# Patient Record
Sex: FEMALE | Race: WHITE | NOT HISPANIC OR LATINO | Employment: UNEMPLOYED | URBAN - METROPOLITAN AREA
[De-identification: names, ages, dates, MRNs, and addresses within clinical notes are randomized per-mention and may not be internally consistent; named-entity substitution may affect disease eponyms.]

---

## 2018-08-21 ENCOUNTER — HOSPITAL ENCOUNTER (OUTPATIENT)
Dept: LAB | Facility: HOSPITAL | Age: 63
Discharge: HOME | End: 2018-08-21
Attending: FAMILY MEDICINE | Admitting: FAMILY MEDICINE

## 2018-08-21 LAB
ALBUMIN SERPL-MCNC: 4 G/DL (ref 3.5–4.8)
ALBUMIN/GLOB SERPL: 1.5 {RATIO} (ref 1–1.7)
ALP SERPL-CCNC: 51 IU/L (ref 32–91)
ALT SERPL-CCNC: 29 IU/L (ref 14–54)
ANION GAP SERPL CALC-SCNC: 13.4 MMOL/L (ref 10–20)
AST SERPL-CCNC: 24 IU/L (ref 15–41)
BILIRUB SERPL-MCNC: 0.5 MG/DL (ref 0.3–1.2)
BUN SERPL-MCNC: 17 MG/DL (ref 8–20)
BUN/CREAT SERPL: 24.3 (ref 5.4–26.2)
CALCIUM SERPL-MCNC: 10.3 MG/DL (ref 8.9–10.3)
CHLORIDE SERPL-SCNC: 99 MMOL/L (ref 101–111)
CHOLEST SERPL-MCNC: 279 MG/DL
CHOLEST/HDLC SERPL: 4.8 {RATIO}
CONV CO2: 28 MMOL/L (ref 22–32)
CONV LDL CHOLESTEROL DIRECT: 196 MG/DL (ref 0–100)
CONV MICROALBUM.,U,RANDOM: 2 MG/L
CONV TOTAL PROTEIN: 6.6 G/DL (ref 6.1–7.9)
CREAT UR-MCNC: 0.7 MG/DL (ref 0.4–1)
GLOBULIN UR ELPH-MCNC: 2.6 G/DL (ref 2.5–3.8)
GLUCOSE SERPL-MCNC: 125 MG/DL (ref 65–99)
HDLC SERPL-MCNC: 58 MG/DL
LDLC/HDLC SERPL: 3.4 {RATIO}
LIPID INTERPRETATION: ABNORMAL
POTASSIUM SERPL-SCNC: 4.4 MMOL/L (ref 3.6–5.1)
SODIUM SERPL-SCNC: 136 MMOL/L (ref 136–144)
TRIGL SERPL-MCNC: 193 MG/DL
URATE SERPL-MCNC: 5.5 MG/DL (ref 2.6–8)
VLDLC SERPL CALC-MCNC: 24.7 MG/DL

## 2018-08-27 ENCOUNTER — CONVERSION ENCOUNTER (OUTPATIENT)
Dept: FAMILY MEDICINE CLINIC | Facility: CLINIC | Age: 63
End: 2018-08-27

## 2018-08-27 LAB
FOLATE SERPL-MCNC: 10.7 NG/ML
T4 FREE SERPL-MCNC: 1.3 NG/DL (ref 0.8–1.8)
TSH SERPL-ACNC: 0.79 MICROINTL UNITS/ML (ref 0.4–4.5)
VIT B12 SERPL-MCNC: 430 PG/ML (ref 200–1100)

## 2019-01-08 ENCOUNTER — HOSPITAL ENCOUNTER (OUTPATIENT)
Dept: FAMILY MEDICINE CLINIC | Facility: CLINIC | Age: 64
Setting detail: SPECIMEN
Discharge: HOME | End: 2019-01-08
Attending: FAMILY MEDICINE | Admitting: FAMILY MEDICINE

## 2019-01-09 LAB
ALBUMIN SERPL-MCNC: 4 G/DL (ref 3.5–4.8)
ALBUMIN/GLOB SERPL: 1.4 {RATIO} (ref 1–1.7)
ALP SERPL-CCNC: 47 IU/L (ref 32–91)
ALT SERPL-CCNC: 28 IU/L (ref 14–54)
ANION GAP SERPL CALC-SCNC: 16.4 MMOL/L (ref 10–20)
AST SERPL-CCNC: 24 IU/L (ref 15–41)
BILIRUB SERPL-MCNC: 0.4 MG/DL (ref 0.3–1.2)
BUN SERPL-MCNC: 17 MG/DL (ref 8–20)
BUN/CREAT SERPL: 14.2 (ref 5.4–26.2)
CALCIUM SERPL-MCNC: 10.9 MG/DL (ref 8.9–10.3)
CHLORIDE SERPL-SCNC: 99 MMOL/L (ref 101–111)
CHOLEST SERPL-MCNC: 239 MG/DL
CHOLEST/HDLC SERPL: 3.7 {RATIO}
CONV CO2: 23 MMOL/L (ref 22–32)
CONV LDL CHOLESTEROL DIRECT: 168 MG/DL (ref 0–100)
CONV TOTAL PROTEIN: 6.9 G/DL (ref 6.1–7.9)
CREAT UR-MCNC: 1.2 MG/DL (ref 0.4–1)
GLOBULIN UR ELPH-MCNC: 2.9 G/DL (ref 2.5–3.8)
GLUCOSE SERPL-MCNC: 129 MG/DL (ref 65–99)
HDLC SERPL-MCNC: 64 MG/DL
LDLC/HDLC SERPL: 2.6 {RATIO}
LIPID INTERPRETATION: ABNORMAL
POTASSIUM SERPL-SCNC: 5.4 MMOL/L (ref 3.6–5.1)
SODIUM SERPL-SCNC: 133 MMOL/L (ref 136–144)
TRIGL SERPL-MCNC: 105 MG/DL
VLDLC SERPL CALC-MCNC: 7 MG/DL

## 2019-03-12 ENCOUNTER — HOSPITAL ENCOUNTER (OUTPATIENT)
Dept: FAMILY MEDICINE CLINIC | Facility: CLINIC | Age: 64
Setting detail: SPECIMEN
Discharge: HOME | End: 2019-03-12
Attending: FAMILY MEDICINE | Admitting: FAMILY MEDICINE

## 2019-03-12 LAB
ANION GAP SERPL CALC-SCNC: 16.3 MMOL/L (ref 10–20)
BUN SERPL-MCNC: 16 MG/DL (ref 8–20)
BUN/CREAT SERPL: 17.8 (ref 5.4–26.2)
CALCIUM SERPL-MCNC: 10.1 MG/DL (ref 8.9–10.3)
CHLORIDE SERPL-SCNC: 100 MMOL/L (ref 101–111)
CONV CO2: 25 MMOL/L (ref 22–32)
CREAT UR-MCNC: 0.9 MG/DL (ref 0.4–1)
GLUCOSE SERPL-MCNC: 129 MG/DL (ref 65–99)
POTASSIUM SERPL-SCNC: 5.3 MMOL/L (ref 3.6–5.1)
SODIUM SERPL-SCNC: 136 MMOL/L (ref 136–144)

## 2019-04-24 ENCOUNTER — HOSPITAL ENCOUNTER (OUTPATIENT)
Dept: FAMILY MEDICINE CLINIC | Facility: CLINIC | Age: 64
Setting detail: SPECIMEN
Discharge: HOME | End: 2019-04-24
Attending: FAMILY MEDICINE | Admitting: FAMILY MEDICINE

## 2019-04-24 LAB
ALBUMIN SERPL-MCNC: 4.2 G/DL (ref 3.5–4.8)
ALBUMIN/GLOB SERPL: 1.4 {RATIO} (ref 1–1.7)
ALP SERPL-CCNC: 43 IU/L (ref 32–91)
ALT SERPL-CCNC: 22 IU/L (ref 14–54)
ANION GAP SERPL CALC-SCNC: 17.6 MMOL/L (ref 10–20)
AST SERPL-CCNC: 23 IU/L (ref 15–41)
BILIRUB SERPL-MCNC: 0.2 MG/DL (ref 0.3–1.2)
BUN SERPL-MCNC: 16 MG/DL (ref 8–20)
BUN/CREAT SERPL: 17.8 (ref 5.4–26.2)
CALCIUM SERPL-MCNC: 10.3 MG/DL (ref 8.9–10.3)
CHLORIDE SERPL-SCNC: 101 MMOL/L (ref 101–111)
CONV CO2: 25 MMOL/L (ref 22–32)
CONV TOTAL PROTEIN: 7.1 G/DL (ref 6.1–7.9)
CREAT UR-MCNC: 0.9 MG/DL (ref 0.4–1)
GLOBULIN UR ELPH-MCNC: 2.9 G/DL (ref 2.5–3.8)
GLUCOSE SERPL-MCNC: 107 MG/DL (ref 65–99)
POTASSIUM SERPL-SCNC: 4.6 MMOL/L (ref 3.6–5.1)
SODIUM SERPL-SCNC: 139 MMOL/L (ref 136–144)

## 2019-05-22 ENCOUNTER — OUTSIDE FACILITY SERVICE (OUTPATIENT)
Dept: CARDIAC SURGERY | Facility: CLINIC | Age: 64
End: 2019-05-22

## 2019-05-22 PROCEDURE — 33518 CABG ARTERY-VEIN TWO: CPT | Performed by: THORACIC SURGERY (CARDIOTHORACIC VASCULAR SURGERY)

## 2019-05-22 PROCEDURE — 33508 ENDOSCOPIC VEIN HARVEST: CPT | Performed by: THORACIC SURGERY (CARDIOTHORACIC VASCULAR SURGERY)

## 2019-05-22 PROCEDURE — 33533 CABG ARTERIAL SINGLE: CPT | Performed by: THORACIC SURGERY (CARDIOTHORACIC VASCULAR SURGERY)

## 2019-06-12 ENCOUNTER — HOSPITAL ENCOUNTER (OUTPATIENT)
Dept: FAMILY MEDICINE CLINIC | Facility: CLINIC | Age: 64
Setting detail: SPECIMEN
Discharge: HOME | End: 2019-06-12
Attending: FAMILY MEDICINE | Admitting: FAMILY MEDICINE

## 2019-06-12 ENCOUNTER — CONVERSION ENCOUNTER (OUTPATIENT)
Dept: FAMILY MEDICINE CLINIC | Facility: CLINIC | Age: 64
End: 2019-06-12

## 2019-06-12 ENCOUNTER — CLINICAL SUPPORT (OUTPATIENT)
Dept: CARDIAC SURGERY | Facility: CLINIC | Age: 64
End: 2019-06-12

## 2019-06-12 DIAGNOSIS — E11.9 TYPE 2 DIABETES MELLITUS WITHOUT COMPLICATION, UNSPECIFIED WHETHER LONG TERM INSULIN USE (HCC): ICD-10-CM

## 2019-06-12 DIAGNOSIS — Z48.812 ENCOUNTER FOR SURGICAL AFTERCARE FOLLOWING SURGERY OF CIRCULATORY SYSTEM: Primary | ICD-10-CM

## 2019-06-12 DIAGNOSIS — I25.10 ATHEROSCLEROSIS OF NATIVE CORONARY ARTERY WITHOUT ANGINA PECTORIS, UNSPECIFIED WHETHER NATIVE OR TRANSPLANTED HEART: ICD-10-CM

## 2019-06-12 DIAGNOSIS — I10 ESSENTIAL (PRIMARY) HYPERTENSION: ICD-10-CM

## 2019-06-12 LAB
ALBUMIN SERPL-MCNC: 3.8 G/DL (ref 3.5–4.8)
ALBUMIN/GLOB SERPL: 1.2 {RATIO} (ref 1–1.7)
ALP SERPL-CCNC: 76 IU/L (ref 32–91)
ALT SERPL-CCNC: 13 IU/L (ref 14–54)
ANION GAP SERPL CALC-SCNC: 15.3 MMOL/L (ref 10–20)
AST SERPL-CCNC: 19 IU/L (ref 15–41)
BILIRUB SERPL-MCNC: 0.7 MG/DL (ref 0.3–1.2)
BUN SERPL-MCNC: 26 MG/DL (ref 8–20)
BUN/CREAT SERPL: 20 (ref 5.4–26.2)
CALCIUM SERPL-MCNC: 10.2 MG/DL (ref 8.9–10.3)
CHLORIDE SERPL-SCNC: 102 MMOL/L (ref 101–111)
CONV CO2: 28 MMOL/L (ref 22–32)
CONV TOTAL PROTEIN: 6.9 G/DL (ref 6.1–7.9)
CREAT UR-MCNC: 1.3 MG/DL (ref 0.4–1)
GLOBULIN UR ELPH-MCNC: 3.1 G/DL (ref 2.5–3.8)
GLUCOSE SERPL-MCNC: 204 MG/DL (ref 65–99)
POTASSIUM SERPL-SCNC: 4.3 MMOL/L (ref 3.6–5.1)
SODIUM SERPL-SCNC: 141 MMOL/L (ref 136–144)

## 2019-06-12 PROCEDURE — G0180 MD CERTIFICATION HHA PATIENT: HCPCS | Performed by: THORACIC SURGERY (CARDIOTHORACIC VASCULAR SURGERY)

## 2019-06-14 ENCOUNTER — TRANSCRIBE ORDERS (OUTPATIENT)
Dept: CARDIAC REHAB | Facility: HOSPITAL | Age: 64
End: 2019-06-14

## 2019-06-14 DIAGNOSIS — I25.2 CORONARY ARTERY DISEASE WITH HISTORY OF MYOCARDIAL INFARCTION WITHOUT HISTORY OF CABG: Primary | ICD-10-CM

## 2019-06-14 DIAGNOSIS — I25.10 CORONARY ARTERY DISEASE WITH HISTORY OF MYOCARDIAL INFARCTION WITHOUT HISTORY OF CABG: Primary | ICD-10-CM

## 2019-06-18 ENCOUNTER — APPOINTMENT (OUTPATIENT)
Dept: GENERAL RADIOLOGY | Facility: HOSPITAL | Age: 64
End: 2019-06-18

## 2019-06-18 ENCOUNTER — HOSPITAL ENCOUNTER (INPATIENT)
Facility: HOSPITAL | Age: 64
LOS: 6 days | Discharge: HOME HEALTH | End: 2019-06-24
Attending: EMERGENCY MEDICINE | Admitting: THORACIC SURGERY (CARDIOTHORACIC VASCULAR SURGERY)

## 2019-06-18 ENCOUNTER — TELEPHONE (OUTPATIENT)
Dept: CARDIAC SURGERY | Facility: CLINIC | Age: 64
End: 2019-06-18

## 2019-06-18 ENCOUNTER — APPOINTMENT (OUTPATIENT)
Dept: CT IMAGING | Facility: HOSPITAL | Age: 64
End: 2019-06-18

## 2019-06-18 DIAGNOSIS — L08.9 WOUND INFECTION: Primary | ICD-10-CM

## 2019-06-18 DIAGNOSIS — T14.8XXA WOUND INFECTION: Primary | ICD-10-CM

## 2019-06-18 LAB
ALBUMIN SERPL-MCNC: 3.8 G/DL (ref 3.5–5.2)
ALBUMIN/GLOB SERPL: 1.3 G/DL
ALP SERPL-CCNC: 73 U/L (ref 39–117)
ALT SERPL W P-5'-P-CCNC: 9 U/L (ref 1–33)
ANION GAP SERPL CALCULATED.3IONS-SCNC: 10.3 MMOL/L
AST SERPL-CCNC: 13 U/L (ref 1–32)
BASOPHILS # BLD AUTO: 0.07 10*3/MM3 (ref 0–0.2)
BASOPHILS NFR BLD AUTO: 0.5 % (ref 0–1.5)
BILIRUB SERPL-MCNC: 0.3 MG/DL (ref 0.2–1.2)
BUN BLD-MCNC: 16 MG/DL (ref 8–23)
BUN/CREAT SERPL: 22.2 (ref 7–25)
CALCIUM SPEC-SCNC: 10.2 MG/DL (ref 8.6–10.5)
CHLORIDE SERPL-SCNC: 103 MMOL/L (ref 98–107)
CO2 SERPL-SCNC: 25.7 MMOL/L (ref 22–29)
CREAT BLD-MCNC: 0.72 MG/DL (ref 0.57–1)
D-LACTATE SERPL-SCNC: 0.6 MMOL/L (ref 0.5–2)
DEPRECATED RDW RBC AUTO: 44.1 FL (ref 37–54)
EOSINOPHIL # BLD AUTO: 0.36 10*3/MM3 (ref 0–0.4)
EOSINOPHIL NFR BLD AUTO: 2.4 % (ref 0.3–6.2)
ERYTHROCYTE [DISTWIDTH] IN BLOOD BY AUTOMATED COUNT: 13.1 % (ref 12.3–15.4)
GFR SERPL CREATININE-BSD FRML MDRD: 82 ML/MIN/1.73
GLOBULIN UR ELPH-MCNC: 2.9 GM/DL
GLUCOSE BLD-MCNC: 88 MG/DL (ref 65–99)
GLUCOSE BLDC GLUCOMTR-MCNC: 139 MG/DL (ref 70–130)
HCT VFR BLD AUTO: 38 % (ref 34–46.6)
HGB BLD-MCNC: 11.6 G/DL (ref 12–15.9)
IMM GRANULOCYTES # BLD AUTO: 0.07 10*3/MM3 (ref 0–0.05)
IMM GRANULOCYTES NFR BLD AUTO: 0.5 % (ref 0–0.5)
LYMPHOCYTES # BLD AUTO: 2.78 10*3/MM3 (ref 0.7–3.1)
LYMPHOCYTES NFR BLD AUTO: 18.3 % (ref 19.6–45.3)
MCH RBC QN AUTO: 28.2 PG (ref 26.6–33)
MCHC RBC AUTO-ENTMCNC: 30.5 G/DL (ref 31.5–35.7)
MCV RBC AUTO: 92.2 FL (ref 79–97)
MONOCYTES # BLD AUTO: 1.38 10*3/MM3 (ref 0.1–0.9)
MONOCYTES NFR BLD AUTO: 9.1 % (ref 5–12)
NEUTROPHILS # BLD AUTO: 10.55 10*3/MM3 (ref 1.7–7)
NEUTROPHILS NFR BLD AUTO: 69.2 % (ref 42.7–76)
NRBC BLD AUTO-RTO: 0 /100 WBC (ref 0–0.2)
PLATELET # BLD AUTO: 527 10*3/MM3 (ref 140–450)
PMV BLD AUTO: 9.9 FL (ref 6–12)
POTASSIUM BLD-SCNC: 4.3 MMOL/L (ref 3.5–5.2)
PROT SERPL-MCNC: 6.7 G/DL (ref 6–8.5)
RBC # BLD AUTO: 4.12 10*6/MM3 (ref 3.77–5.28)
SODIUM BLD-SCNC: 139 MMOL/L (ref 136–145)
TROPONIN T SERPL-MCNC: <0.01 NG/ML (ref 0–0.03)
WBC NRBC COR # BLD: 15.21 10*3/MM3 (ref 3.4–10.8)

## 2019-06-18 PROCEDURE — 25010000002 PIPERACILLIN SOD-TAZOBACTAM PER 1 G: Performed by: THORACIC SURGERY (CARDIOTHORACIC VASCULAR SURGERY)

## 2019-06-18 PROCEDURE — 25010000002 ENOXAPARIN PER 10 MG: Performed by: THORACIC SURGERY (CARDIOTHORACIC VASCULAR SURGERY)

## 2019-06-18 PROCEDURE — 80053 COMPREHEN METABOLIC PANEL: CPT | Performed by: EMERGENCY MEDICINE

## 2019-06-18 PROCEDURE — 87070 CULTURE OTHR SPECIMN AEROBIC: CPT | Performed by: EMERGENCY MEDICINE

## 2019-06-18 PROCEDURE — 71260 CT THORAX DX C+: CPT

## 2019-06-18 PROCEDURE — 93005 ELECTROCARDIOGRAM TRACING: CPT | Performed by: EMERGENCY MEDICINE

## 2019-06-18 PROCEDURE — 25010000002 VANCOMYCIN 10 G RECONSTITUTED SOLUTION: Performed by: THORACIC SURGERY (CARDIOTHORACIC VASCULAR SURGERY)

## 2019-06-18 PROCEDURE — 87077 CULTURE AEROBIC IDENTIFY: CPT | Performed by: EMERGENCY MEDICINE

## 2019-06-18 PROCEDURE — 82962 GLUCOSE BLOOD TEST: CPT

## 2019-06-18 PROCEDURE — 93010 ELECTROCARDIOGRAM REPORT: CPT | Performed by: INTERNAL MEDICINE

## 2019-06-18 PROCEDURE — 25010000002 IOPAMIDOL 61 % SOLUTION: Performed by: THORACIC SURGERY (CARDIOTHORACIC VASCULAR SURGERY)

## 2019-06-18 PROCEDURE — 87205 SMEAR GRAM STAIN: CPT | Performed by: EMERGENCY MEDICINE

## 2019-06-18 PROCEDURE — 93005 ELECTROCARDIOGRAM TRACING: CPT

## 2019-06-18 PROCEDURE — 87186 SC STD MICRODIL/AGAR DIL: CPT | Performed by: EMERGENCY MEDICINE

## 2019-06-18 PROCEDURE — 94799 UNLISTED PULMONARY SVC/PX: CPT

## 2019-06-18 PROCEDURE — 71045 X-RAY EXAM CHEST 1 VIEW: CPT

## 2019-06-18 PROCEDURE — 99285 EMERGENCY DEPT VISIT HI MDM: CPT

## 2019-06-18 PROCEDURE — 94640 AIRWAY INHALATION TREATMENT: CPT

## 2019-06-18 PROCEDURE — 84484 ASSAY OF TROPONIN QUANT: CPT | Performed by: EMERGENCY MEDICINE

## 2019-06-18 PROCEDURE — 85025 COMPLETE CBC W/AUTO DIFF WBC: CPT | Performed by: EMERGENCY MEDICINE

## 2019-06-18 PROCEDURE — 83605 ASSAY OF LACTIC ACID: CPT | Performed by: EMERGENCY MEDICINE

## 2019-06-18 RX ORDER — PROPRANOLOL HCL 60 MG
120 CAPSULE, EXTENDED RELEASE 24HR ORAL DAILY
Status: DISCONTINUED | OUTPATIENT
Start: 2019-06-18 | End: 2019-06-24 | Stop reason: HOSPADM

## 2019-06-18 RX ORDER — ATORVASTATIN CALCIUM 20 MG/1
40 TABLET, FILM COATED ORAL NIGHTLY
Status: DISCONTINUED | OUTPATIENT
Start: 2019-06-18 | End: 2019-06-18

## 2019-06-18 RX ORDER — TRAMADOL HYDROCHLORIDE 50 MG/1
50 TABLET ORAL EVERY 6 HOURS PRN
Status: DISCONTINUED | OUTPATIENT
Start: 2019-06-18 | End: 2019-06-18

## 2019-06-18 RX ORDER — TRAMADOL HYDROCHLORIDE 50 MG/1
50 TABLET ORAL EVERY 6 HOURS PRN
COMMUNITY
End: 2019-06-24 | Stop reason: HOSPADM

## 2019-06-18 RX ORDER — AMLODIPINE BESYLATE 5 MG/1
5 TABLET ORAL DAILY
COMMUNITY
Start: 2019-05-13 | End: 2020-02-26

## 2019-06-18 RX ORDER — ONDANSETRON 2 MG/ML
4 INJECTION INTRAMUSCULAR; INTRAVENOUS EVERY 6 HOURS PRN
Status: DISCONTINUED | OUTPATIENT
Start: 2019-06-18 | End: 2019-06-24 | Stop reason: HOSPADM

## 2019-06-18 RX ORDER — ESCITALOPRAM OXALATE 10 MG/1
10 TABLET ORAL
COMMUNITY
Start: 2019-04-24 | End: 2020-01-27

## 2019-06-18 RX ORDER — SENNA AND DOCUSATE SODIUM 50; 8.6 MG/1; MG/1
1 TABLET, FILM COATED ORAL NIGHTLY PRN
Status: DISCONTINUED | OUTPATIENT
Start: 2019-06-18 | End: 2019-06-24 | Stop reason: HOSPADM

## 2019-06-18 RX ORDER — HYDROCODONE BITARTRATE AND ACETAMINOPHEN 5; 325 MG/1; MG/1
2 TABLET ORAL EVERY 4 HOURS PRN
Status: DISCONTINUED | OUTPATIENT
Start: 2019-06-18 | End: 2019-06-24 | Stop reason: HOSPADM

## 2019-06-18 RX ORDER — ATORVASTATIN CALCIUM 40 MG/1
40 TABLET, FILM COATED ORAL DAILY
COMMUNITY
Start: 2019-06-12 | End: 2020-02-06

## 2019-06-18 RX ORDER — ACETAMINOPHEN 325 MG/1
650 TABLET ORAL EVERY 4 HOURS PRN
Status: DISCONTINUED | OUTPATIENT
Start: 2019-06-18 | End: 2019-06-24 | Stop reason: HOSPADM

## 2019-06-18 RX ORDER — SODIUM CHLORIDE 0.9 % (FLUSH) 0.9 %
3-10 SYRINGE (ML) INJECTION AS NEEDED
Status: DISCONTINUED | OUTPATIENT
Start: 2019-06-18 | End: 2019-06-24 | Stop reason: HOSPADM

## 2019-06-18 RX ORDER — ASPIRIN 81 MG/1
324 TABLET, CHEWABLE ORAL ONCE
Status: DISCONTINUED | OUTPATIENT
Start: 2019-06-18 | End: 2019-06-18

## 2019-06-18 RX ORDER — ATORVASTATIN CALCIUM 20 MG/1
40 TABLET, FILM COATED ORAL NIGHTLY
Status: DISCONTINUED | OUTPATIENT
Start: 2019-06-18 | End: 2019-06-24 | Stop reason: HOSPADM

## 2019-06-18 RX ORDER — CEFTRIAXONE SODIUM 2 G/50ML
2 INJECTION, SOLUTION INTRAVENOUS EVERY 24 HOURS
Status: DISCONTINUED | OUTPATIENT
Start: 2019-06-18 | End: 2019-06-21

## 2019-06-18 RX ORDER — CALCIUM CARBONATE 200(500)MG
1 TABLET,CHEWABLE ORAL DAILY PRN
Status: DISCONTINUED | OUTPATIENT
Start: 2019-06-18 | End: 2019-06-24 | Stop reason: HOSPADM

## 2019-06-18 RX ORDER — PROPRANOLOL HYDROCHLORIDE 120 MG/1
CAPSULE, EXTENDED RELEASE ORAL
COMMUNITY
Start: 2019-04-24 | End: 2019-06-18

## 2019-06-18 RX ORDER — IBUPROFEN 200 MG
400 TABLET ORAL EVERY 6 HOURS PRN
Status: ON HOLD | COMMUNITY
End: 2019-06-20

## 2019-06-18 RX ORDER — BUDESONIDE AND FORMOTEROL FUMARATE DIHYDRATE 160; 4.5 UG/1; UG/1
2 AEROSOL RESPIRATORY (INHALATION)
COMMUNITY
End: 2020-05-13 | Stop reason: SDUPTHER

## 2019-06-18 RX ORDER — IPRATROPIUM BROMIDE AND ALBUTEROL SULFATE 2.5; .5 MG/3ML; MG/3ML
3 SOLUTION RESPIRATORY (INHALATION)
Status: DISCONTINUED | OUTPATIENT
Start: 2019-06-18 | End: 2019-06-24 | Stop reason: HOSPADM

## 2019-06-18 RX ORDER — NITROGLYCERIN 0.4 MG/1
0.4 TABLET SUBLINGUAL
Status: DISCONTINUED | OUTPATIENT
Start: 2019-06-18 | End: 2019-06-24 | Stop reason: HOSPADM

## 2019-06-18 RX ORDER — ASPIRIN 81 MG/1
81 TABLET ORAL DAILY
Status: DISCONTINUED | OUTPATIENT
Start: 2019-06-19 | End: 2019-06-24 | Stop reason: HOSPADM

## 2019-06-18 RX ORDER — SODIUM CHLORIDE 0.9 % (FLUSH) 0.9 %
3 SYRINGE (ML) INJECTION EVERY 12 HOURS SCHEDULED
Status: DISCONTINUED | OUTPATIENT
Start: 2019-06-18 | End: 2019-06-24 | Stop reason: HOSPADM

## 2019-06-18 RX ORDER — HYDROCODONE BITARTRATE AND ACETAMINOPHEN 7.5; 325 MG/1; MG/1
1 TABLET ORAL ONCE
Status: COMPLETED | OUTPATIENT
Start: 2019-06-18 | End: 2019-06-18

## 2019-06-18 RX ORDER — FENOFIBRATE 160 MG/1
160 TABLET ORAL DAILY
COMMUNITY
End: 2019-09-06 | Stop reason: SDUPTHER

## 2019-06-18 RX ORDER — ASPIRIN 81 MG/1
81 TABLET, CHEWABLE ORAL DAILY
Status: DISCONTINUED | OUTPATIENT
Start: 2019-06-18 | End: 2019-06-18

## 2019-06-18 RX ORDER — ACETAMINOPHEN 500 MG
500 TABLET ORAL EVERY 6 HOURS PRN
Status: DISCONTINUED | OUTPATIENT
Start: 2019-06-18 | End: 2019-06-24 | Stop reason: HOSPADM

## 2019-06-18 RX ORDER — THEOPHYLLINE 400 MG/1
200 TABLET, EXTENDED RELEASE ORAL DAILY
Status: ON HOLD | COMMUNITY
End: 2019-06-20

## 2019-06-18 RX ORDER — PROPRANOLOL HYDROCHLORIDE 120 MG/1
120 CAPSULE, EXTENDED RELEASE ORAL DAILY
COMMUNITY
End: 2019-12-10 | Stop reason: SDUPTHER

## 2019-06-18 RX ORDER — SODIUM CHLORIDE 0.9 % (FLUSH) 0.9 %
10 SYRINGE (ML) INJECTION AS NEEDED
Status: DISCONTINUED | OUTPATIENT
Start: 2019-06-18 | End: 2019-06-24 | Stop reason: HOSPADM

## 2019-06-18 RX ORDER — HYDRALAZINE HYDROCHLORIDE 10 MG/1
20 TABLET, FILM COATED ORAL 2 TIMES DAILY
Status: ON HOLD | COMMUNITY
End: 2019-06-20

## 2019-06-18 RX ADMIN — VANCOMYCIN HYDROCHLORIDE 1500 MG: 10 INJECTION, POWDER, LYOPHILIZED, FOR SOLUTION INTRAVENOUS at 17:54

## 2019-06-18 RX ADMIN — PROPRANOLOL HYDROCHLORIDE 120 MG: 60 CAPSULE, EXTENDED RELEASE ORAL at 22:15

## 2019-06-18 RX ADMIN — HYDROCODONE BITARTRATE AND ACETAMINOPHEN 2 TABLET: 5; 325 TABLET ORAL at 22:07

## 2019-06-18 RX ADMIN — ATORVASTATIN CALCIUM 40 MG: 20 TABLET, FILM COATED ORAL at 22:08

## 2019-06-18 RX ADMIN — HYDROCODONE BITARTRATE AND ACETAMINOPHEN 1 TABLET: 7.5; 325 TABLET ORAL at 16:50

## 2019-06-18 RX ADMIN — TAZOBACTAM SODIUM AND PIPERACILLIN SODIUM 3.38 G: 375; 3 INJECTION, SOLUTION INTRAVENOUS at 16:47

## 2019-06-18 RX ADMIN — ENOXAPARIN SODIUM 40 MG: 40 INJECTION SUBCUTANEOUS at 22:07

## 2019-06-18 RX ADMIN — IPRATROPIUM BROMIDE AND ALBUTEROL SULFATE 3 ML: 2.5; .5 SOLUTION RESPIRATORY (INHALATION) at 20:25

## 2019-06-18 RX ADMIN — IOPAMIDOL 75 ML: 612 INJECTION, SOLUTION INTRAVENOUS at 17:31

## 2019-06-19 LAB
ANION GAP SERPL CALCULATED.3IONS-SCNC: 12 MMOL/L
BUN BLD-MCNC: 13 MG/DL (ref 8–23)
BUN/CREAT SERPL: 20.3 (ref 7–25)
CALCIUM SPEC-SCNC: 9.2 MG/DL (ref 8.6–10.5)
CHLORIDE SERPL-SCNC: 107 MMOL/L (ref 98–107)
CO2 SERPL-SCNC: 23 MMOL/L (ref 22–29)
CREAT BLD-MCNC: 0.64 MG/DL (ref 0.57–1)
DEPRECATED RDW RBC AUTO: 43.7 FL (ref 37–54)
ERYTHROCYTE [DISTWIDTH] IN BLOOD BY AUTOMATED COUNT: 13.1 % (ref 12.3–15.4)
GFR SERPL CREATININE-BSD FRML MDRD: 94 ML/MIN/1.73
GLUCOSE BLD-MCNC: 109 MG/DL (ref 65–99)
GLUCOSE BLDC GLUCOMTR-MCNC: 106 MG/DL (ref 70–130)
GLUCOSE BLDC GLUCOMTR-MCNC: 148 MG/DL (ref 70–130)
GLUCOSE BLDC GLUCOMTR-MCNC: 158 MG/DL (ref 70–130)
GLUCOSE BLDC GLUCOMTR-MCNC: 195 MG/DL (ref 70–130)
HBA1C MFR BLD: 6.05 % (ref 4.8–5.6)
HCT VFR BLD AUTO: 35.1 % (ref 34–46.6)
HGB BLD-MCNC: 10.7 G/DL (ref 12–15.9)
MCH RBC QN AUTO: 28 PG (ref 26.6–33)
MCHC RBC AUTO-ENTMCNC: 30.5 G/DL (ref 31.5–35.7)
MCV RBC AUTO: 91.9 FL (ref 79–97)
PLATELET # BLD AUTO: 454 10*3/MM3 (ref 140–450)
PMV BLD AUTO: 10.2 FL (ref 6–12)
POTASSIUM BLD-SCNC: 4 MMOL/L (ref 3.5–5.2)
RBC # BLD AUTO: 3.82 10*6/MM3 (ref 3.77–5.28)
SODIUM BLD-SCNC: 142 MMOL/L (ref 136–145)
WBC NRBC COR # BLD: 11.57 10*3/MM3 (ref 3.4–10.8)

## 2019-06-19 PROCEDURE — 82962 GLUCOSE BLOOD TEST: CPT

## 2019-06-19 PROCEDURE — 99255 IP/OBS CONSLTJ NEW/EST HI 80: CPT | Performed by: INTERNAL MEDICINE

## 2019-06-19 PROCEDURE — 80048 BASIC METABOLIC PNL TOTAL CA: CPT | Performed by: THORACIC SURGERY (CARDIOTHORACIC VASCULAR SURGERY)

## 2019-06-19 PROCEDURE — 87070 CULTURE OTHR SPECIMN AEROBIC: CPT | Performed by: NURSE PRACTITIONER

## 2019-06-19 PROCEDURE — 94799 UNLISTED PULMONARY SVC/PX: CPT

## 2019-06-19 PROCEDURE — 63710000001 INSULIN LISPRO (HUMAN) PER 5 UNITS: Performed by: PHYSICIAN ASSISTANT

## 2019-06-19 PROCEDURE — 85027 COMPLETE CBC AUTOMATED: CPT | Performed by: THORACIC SURGERY (CARDIOTHORACIC VASCULAR SURGERY)

## 2019-06-19 PROCEDURE — 25010000003 CEFTRIAXONE PER 250 MG: Performed by: INTERNAL MEDICINE

## 2019-06-19 PROCEDURE — 99024 POSTOP FOLLOW-UP VISIT: CPT | Performed by: PHYSICIAN ASSISTANT

## 2019-06-19 PROCEDURE — 25010000002 VANCOMYCIN: Performed by: INTERNAL MEDICINE

## 2019-06-19 PROCEDURE — 83036 HEMOGLOBIN GLYCOSYLATED A1C: CPT | Performed by: PHYSICIAN ASSISTANT

## 2019-06-19 PROCEDURE — 25010000002 ENOXAPARIN PER 10 MG: Performed by: THORACIC SURGERY (CARDIOTHORACIC VASCULAR SURGERY)

## 2019-06-19 PROCEDURE — 99024 POSTOP FOLLOW-UP VISIT: CPT | Performed by: THORACIC SURGERY (CARDIOTHORACIC VASCULAR SURGERY)

## 2019-06-19 PROCEDURE — 25010000002 VANCOMYCIN 10 G RECONSTITUTED SOLUTION: Performed by: INTERNAL MEDICINE

## 2019-06-19 PROCEDURE — 87205 SMEAR GRAM STAIN: CPT | Performed by: NURSE PRACTITIONER

## 2019-06-19 RX ORDER — LIDOCAINE HYDROCHLORIDE 20 MG/ML
10 INJECTION, SOLUTION INFILTRATION; PERINEURAL ONCE
Status: COMPLETED | OUTPATIENT
Start: 2019-06-19 | End: 2019-06-19

## 2019-06-19 RX ORDER — DEXTROSE MONOHYDRATE 25 G/50ML
25 INJECTION, SOLUTION INTRAVENOUS
Status: DISCONTINUED | OUTPATIENT
Start: 2019-06-19 | End: 2019-06-24 | Stop reason: HOSPADM

## 2019-06-19 RX ORDER — NICOTINE POLACRILEX 4 MG
15 LOZENGE BUCCAL
Status: DISCONTINUED | OUTPATIENT
Start: 2019-06-19 | End: 2019-06-24 | Stop reason: HOSPADM

## 2019-06-19 RX ADMIN — IPRATROPIUM BROMIDE AND ALBUTEROL SULFATE 3 ML: 2.5; .5 SOLUTION RESPIRATORY (INHALATION) at 10:47

## 2019-06-19 RX ADMIN — IPRATROPIUM BROMIDE AND ALBUTEROL SULFATE 3 ML: 2.5; .5 SOLUTION RESPIRATORY (INHALATION) at 20:39

## 2019-06-19 RX ADMIN — SODIUM CHLORIDE, PRESERVATIVE FREE 3 ML: 5 INJECTION INTRAVENOUS at 21:15

## 2019-06-19 RX ADMIN — IPRATROPIUM BROMIDE AND ALBUTEROL SULFATE 3 ML: 2.5; .5 SOLUTION RESPIRATORY (INHALATION) at 16:21

## 2019-06-19 RX ADMIN — PROPRANOLOL HYDROCHLORIDE 120 MG: 60 CAPSULE, EXTENDED RELEASE ORAL at 21:15

## 2019-06-19 RX ADMIN — ENOXAPARIN SODIUM 40 MG: 40 INJECTION SUBCUTANEOUS at 21:15

## 2019-06-19 RX ADMIN — ASPIRIN 81 MG: 81 TABLET, COATED ORAL at 08:50

## 2019-06-19 RX ADMIN — VANCOMYCIN HYDROCHLORIDE 1250 MG: 10 INJECTION, POWDER, LYOPHILIZED, FOR SOLUTION INTRAVENOUS at 06:34

## 2019-06-19 RX ADMIN — CEFTRIAXONE SODIUM 2 G: 2 INJECTION, SOLUTION INTRAVENOUS at 22:36

## 2019-06-19 RX ADMIN — LIDOCAINE HYDROCHLORIDE 10 ML: 20 INJECTION, SOLUTION INFILTRATION; PERINEURAL at 17:15

## 2019-06-19 RX ADMIN — VANCOMYCIN HYDROCHLORIDE 1250 MG: 10 INJECTION, POWDER, LYOPHILIZED, FOR SOLUTION INTRAVENOUS at 17:13

## 2019-06-19 RX ADMIN — INSULIN LISPRO 2 UNITS: 100 INJECTION, SOLUTION INTRAVENOUS; SUBCUTANEOUS at 21:15

## 2019-06-19 RX ADMIN — HYDROCODONE BITARTRATE AND ACETAMINOPHEN 2 TABLET: 5; 325 TABLET ORAL at 08:50

## 2019-06-19 RX ADMIN — HYDROCODONE BITARTRATE AND ACETAMINOPHEN 2 TABLET: 5; 325 TABLET ORAL at 17:13

## 2019-06-19 RX ADMIN — IPRATROPIUM BROMIDE AND ALBUTEROL SULFATE 3 ML: 2.5; .5 SOLUTION RESPIRATORY (INHALATION) at 07:22

## 2019-06-19 RX ADMIN — ATORVASTATIN CALCIUM 40 MG: 20 TABLET, FILM COATED ORAL at 21:15

## 2019-06-19 RX ADMIN — HYDROCODONE BITARTRATE AND ACETAMINOPHEN 2 TABLET: 5; 325 TABLET ORAL at 22:35

## 2019-06-19 RX ADMIN — CEFTRIAXONE SODIUM 2 G: 2 INJECTION, SOLUTION INTRAVENOUS at 00:13

## 2019-06-20 LAB
BASOPHILS # BLD AUTO: 0.07 10*3/MM3 (ref 0–0.2)
BASOPHILS NFR BLD AUTO: 0.7 % (ref 0–1.5)
DEPRECATED RDW RBC AUTO: 43 FL (ref 37–54)
EOSINOPHIL # BLD AUTO: 0.87 10*3/MM3 (ref 0–0.4)
EOSINOPHIL NFR BLD AUTO: 8.5 % (ref 0.3–6.2)
ERYTHROCYTE [DISTWIDTH] IN BLOOD BY AUTOMATED COUNT: 12.9 % (ref 12.3–15.4)
GLUCOSE BLDC GLUCOMTR-MCNC: 106 MG/DL (ref 70–130)
GLUCOSE BLDC GLUCOMTR-MCNC: 189 MG/DL (ref 70–130)
GLUCOSE BLDC GLUCOMTR-MCNC: 199 MG/DL (ref 70–130)
GLUCOSE BLDC GLUCOMTR-MCNC: 75 MG/DL (ref 70–130)
HCT VFR BLD AUTO: 34.7 % (ref 34–46.6)
HGB BLD-MCNC: 10.7 G/DL (ref 12–15.9)
IMM GRANULOCYTES # BLD AUTO: 0.03 10*3/MM3 (ref 0–0.05)
IMM GRANULOCYTES NFR BLD AUTO: 0.3 % (ref 0–0.5)
LYMPHOCYTES # BLD AUTO: 2.73 10*3/MM3 (ref 0.7–3.1)
LYMPHOCYTES NFR BLD AUTO: 26.6 % (ref 19.6–45.3)
MCH RBC QN AUTO: 28.3 PG (ref 26.6–33)
MCHC RBC AUTO-ENTMCNC: 30.8 G/DL (ref 31.5–35.7)
MCV RBC AUTO: 91.8 FL (ref 79–97)
MONOCYTES # BLD AUTO: 0.93 10*3/MM3 (ref 0.1–0.9)
MONOCYTES NFR BLD AUTO: 9 % (ref 5–12)
NEUTROPHILS # BLD AUTO: 5.65 10*3/MM3 (ref 1.7–7)
NEUTROPHILS NFR BLD AUTO: 54.9 % (ref 42.7–76)
NRBC BLD AUTO-RTO: 0 /100 WBC (ref 0–0.2)
PLATELET # BLD AUTO: 470 10*3/MM3 (ref 140–450)
PMV BLD AUTO: 10.2 FL (ref 6–12)
RBC # BLD AUTO: 3.78 10*6/MM3 (ref 3.77–5.28)
VANCOMYCIN TROUGH SERPL-MCNC: 19.2 MCG/ML (ref 5–20)
WBC NRBC COR # BLD: 10.28 10*3/MM3 (ref 3.4–10.8)

## 2019-06-20 PROCEDURE — 85025 COMPLETE CBC W/AUTO DIFF WBC: CPT | Performed by: PHYSICIAN ASSISTANT

## 2019-06-20 PROCEDURE — 99024 POSTOP FOLLOW-UP VISIT: CPT | Performed by: NURSE PRACTITIONER

## 2019-06-20 PROCEDURE — 80202 ASSAY OF VANCOMYCIN: CPT | Performed by: INTERNAL MEDICINE

## 2019-06-20 PROCEDURE — 94799 UNLISTED PULMONARY SVC/PX: CPT

## 2019-06-20 PROCEDURE — 25010000002 ENOXAPARIN PER 10 MG: Performed by: THORACIC SURGERY (CARDIOTHORACIC VASCULAR SURGERY)

## 2019-06-20 PROCEDURE — 25010000003 CEFTRIAXONE PER 250 MG: Performed by: INTERNAL MEDICINE

## 2019-06-20 PROCEDURE — 25010000002 VANCOMYCIN 10 G RECONSTITUTED SOLUTION: Performed by: INTERNAL MEDICINE

## 2019-06-20 PROCEDURE — 82962 GLUCOSE BLOOD TEST: CPT

## 2019-06-20 PROCEDURE — 99232 SBSQ HOSP IP/OBS MODERATE 35: CPT | Performed by: INTERNAL MEDICINE

## 2019-06-20 PROCEDURE — 63710000001 INSULIN LISPRO (HUMAN) PER 5 UNITS: Performed by: PHYSICIAN ASSISTANT

## 2019-06-20 PROCEDURE — 94640 AIRWAY INHALATION TREATMENT: CPT

## 2019-06-20 PROCEDURE — 99024 POSTOP FOLLOW-UP VISIT: CPT | Performed by: THORACIC SURGERY (CARDIOTHORACIC VASCULAR SURGERY)

## 2019-06-20 RX ORDER — ESCITALOPRAM OXALATE 10 MG/1
10 TABLET ORAL DAILY
Status: DISCONTINUED | OUTPATIENT
Start: 2019-06-20 | End: 2019-06-24 | Stop reason: HOSPADM

## 2019-06-20 RX ORDER — BUDESONIDE AND FORMOTEROL FUMARATE DIHYDRATE 160; 4.5 UG/1; UG/1
2 AEROSOL RESPIRATORY (INHALATION)
Status: DISCONTINUED | OUTPATIENT
Start: 2019-06-20 | End: 2019-06-24 | Stop reason: HOSPADM

## 2019-06-20 RX ADMIN — LINAGLIPTIN 5 MG: 5 TABLET, FILM COATED ORAL at 17:13

## 2019-06-20 RX ADMIN — INSULIN LISPRO 2 UNITS: 100 INJECTION, SOLUTION INTRAVENOUS; SUBCUTANEOUS at 20:28

## 2019-06-20 RX ADMIN — ENOXAPARIN SODIUM 40 MG: 40 INJECTION SUBCUTANEOUS at 23:23

## 2019-06-20 RX ADMIN — PROPRANOLOL HYDROCHLORIDE 120 MG: 60 CAPSULE, EXTENDED RELEASE ORAL at 20:29

## 2019-06-20 RX ADMIN — BUDESONIDE AND FORMOTEROL FUMARATE DIHYDRATE 2 PUFF: 160; 4.5 AEROSOL RESPIRATORY (INHALATION) at 19:41

## 2019-06-20 RX ADMIN — ATORVASTATIN CALCIUM 40 MG: 20 TABLET, FILM COATED ORAL at 20:28

## 2019-06-20 RX ADMIN — IPRATROPIUM BROMIDE AND ALBUTEROL SULFATE 3 ML: 2.5; .5 SOLUTION RESPIRATORY (INHALATION) at 11:40

## 2019-06-20 RX ADMIN — HYDROCODONE BITARTRATE AND ACETAMINOPHEN 2 TABLET: 5; 325 TABLET ORAL at 23:23

## 2019-06-20 RX ADMIN — SODIUM CHLORIDE, PRESERVATIVE FREE 3 ML: 5 INJECTION INTRAVENOUS at 20:28

## 2019-06-20 RX ADMIN — HYDROCODONE BITARTRATE AND ACETAMINOPHEN 2 TABLET: 5; 325 TABLET ORAL at 09:57

## 2019-06-20 RX ADMIN — INSULIN LISPRO 2 UNITS: 100 INJECTION, SOLUTION INTRAVENOUS; SUBCUTANEOUS at 12:28

## 2019-06-20 RX ADMIN — ESCITALOPRAM 10 MG: 10 TABLET, FILM COATED ORAL at 17:13

## 2019-06-20 RX ADMIN — VANCOMYCIN HYDROCHLORIDE 1250 MG: 10 INJECTION, POWDER, LYOPHILIZED, FOR SOLUTION INTRAVENOUS at 05:13

## 2019-06-20 RX ADMIN — CEFTRIAXONE SODIUM 2 G: 2 INJECTION, SOLUTION INTRAVENOUS at 23:23

## 2019-06-20 RX ADMIN — IPRATROPIUM BROMIDE AND ALBUTEROL SULFATE 3 ML: 2.5; .5 SOLUTION RESPIRATORY (INHALATION) at 15:51

## 2019-06-20 RX ADMIN — SODIUM CHLORIDE, PRESERVATIVE FREE 3 ML: 5 INJECTION INTRAVENOUS at 09:42

## 2019-06-20 RX ADMIN — IPRATROPIUM BROMIDE AND ALBUTEROL SULFATE 3 ML: 2.5; .5 SOLUTION RESPIRATORY (INHALATION) at 08:37

## 2019-06-20 RX ADMIN — HYDROCODONE BITARTRATE AND ACETAMINOPHEN 2 TABLET: 5; 325 TABLET ORAL at 17:13

## 2019-06-20 RX ADMIN — ASPIRIN 81 MG: 81 TABLET, COATED ORAL at 09:41

## 2019-06-21 LAB
BACTERIA SPEC AEROBE CULT: ABNORMAL
DEPRECATED RDW RBC AUTO: 42.5 FL (ref 37–54)
ERYTHROCYTE [DISTWIDTH] IN BLOOD BY AUTOMATED COUNT: 12.6 % (ref 12.3–15.4)
GLUCOSE BLDC GLUCOMTR-MCNC: 120 MG/DL (ref 70–130)
GLUCOSE BLDC GLUCOMTR-MCNC: 138 MG/DL (ref 70–130)
GLUCOSE BLDC GLUCOMTR-MCNC: 140 MG/DL (ref 70–130)
GRAM STN SPEC: ABNORMAL
GRAM STN SPEC: ABNORMAL
HCT VFR BLD AUTO: 36 % (ref 34–46.6)
HGB BLD-MCNC: 11 G/DL (ref 12–15.9)
MCH RBC QN AUTO: 28 PG (ref 26.6–33)
MCHC RBC AUTO-ENTMCNC: 30.6 G/DL (ref 31.5–35.7)
MCV RBC AUTO: 91.6 FL (ref 79–97)
PLATELET # BLD AUTO: 495 10*3/MM3 (ref 140–450)
PMV BLD AUTO: 9.9 FL (ref 6–12)
RBC # BLD AUTO: 3.93 10*6/MM3 (ref 3.77–5.28)
WBC NRBC COR # BLD: 10.28 10*3/MM3 (ref 3.4–10.8)

## 2019-06-21 PROCEDURE — 99232 SBSQ HOSP IP/OBS MODERATE 35: CPT | Performed by: INTERNAL MEDICINE

## 2019-06-21 PROCEDURE — 99024 POSTOP FOLLOW-UP VISIT: CPT | Performed by: THORACIC SURGERY (CARDIOTHORACIC VASCULAR SURGERY)

## 2019-06-21 PROCEDURE — 25010000002 ENOXAPARIN PER 10 MG: Performed by: THORACIC SURGERY (CARDIOTHORACIC VASCULAR SURGERY)

## 2019-06-21 PROCEDURE — 82962 GLUCOSE BLOOD TEST: CPT

## 2019-06-21 PROCEDURE — 85027 COMPLETE CBC AUTOMATED: CPT | Performed by: THORACIC SURGERY (CARDIOTHORACIC VASCULAR SURGERY)

## 2019-06-21 PROCEDURE — 94799 UNLISTED PULMONARY SVC/PX: CPT

## 2019-06-21 PROCEDURE — 25010000002 PIPERACILLIN SOD-TAZOBACTAM PER 1 G: Performed by: INTERNAL MEDICINE

## 2019-06-21 RX ADMIN — ASPIRIN 81 MG: 81 TABLET, COATED ORAL at 08:09

## 2019-06-21 RX ADMIN — BUDESONIDE AND FORMOTEROL FUMARATE DIHYDRATE 2 PUFF: 160; 4.5 AEROSOL RESPIRATORY (INHALATION) at 07:31

## 2019-06-21 RX ADMIN — TAZOBACTAM SODIUM AND PIPERACILLIN SODIUM 3.38 G: 375; 3 INJECTION, SOLUTION INTRAVENOUS at 11:40

## 2019-06-21 RX ADMIN — TAZOBACTAM SODIUM AND PIPERACILLIN SODIUM 3.38 G: 375; 3 INJECTION, SOLUTION INTRAVENOUS at 18:46

## 2019-06-21 RX ADMIN — HYDROCODONE BITARTRATE AND ACETAMINOPHEN 2 TABLET: 5; 325 TABLET ORAL at 21:24

## 2019-06-21 RX ADMIN — BUDESONIDE AND FORMOTEROL FUMARATE DIHYDRATE 2 PUFF: 160; 4.5 AEROSOL RESPIRATORY (INHALATION) at 20:16

## 2019-06-21 RX ADMIN — PROPRANOLOL HYDROCHLORIDE 120 MG: 60 CAPSULE, EXTENDED RELEASE ORAL at 21:18

## 2019-06-21 RX ADMIN — ENOXAPARIN SODIUM 40 MG: 40 INJECTION SUBCUTANEOUS at 21:22

## 2019-06-21 RX ADMIN — HYDROCODONE BITARTRATE AND ACETAMINOPHEN 2 TABLET: 5; 325 TABLET ORAL at 08:16

## 2019-06-21 RX ADMIN — ATORVASTATIN CALCIUM 40 MG: 20 TABLET, FILM COATED ORAL at 20:05

## 2019-06-21 RX ADMIN — IPRATROPIUM BROMIDE AND ALBUTEROL SULFATE 3 ML: 2.5; .5 SOLUTION RESPIRATORY (INHALATION) at 20:16

## 2019-06-21 RX ADMIN — SODIUM CHLORIDE, PRESERVATIVE FREE 3 ML: 5 INJECTION INTRAVENOUS at 08:10

## 2019-06-21 RX ADMIN — ESCITALOPRAM 10 MG: 10 TABLET, FILM COATED ORAL at 08:09

## 2019-06-21 RX ADMIN — HYDROCODONE BITARTRATE AND ACETAMINOPHEN 2 TABLET: 5; 325 TABLET ORAL at 16:40

## 2019-06-21 RX ADMIN — LINAGLIPTIN 5 MG: 5 TABLET, FILM COATED ORAL at 08:09

## 2019-06-21 RX ADMIN — IPRATROPIUM BROMIDE AND ALBUTEROL SULFATE 3 ML: 2.5; .5 SOLUTION RESPIRATORY (INHALATION) at 14:55

## 2019-06-21 RX ADMIN — SODIUM CHLORIDE, PRESERVATIVE FREE 3 ML: 5 INJECTION INTRAVENOUS at 20:05

## 2019-06-22 LAB
ANION GAP SERPL CALCULATED.3IONS-SCNC: 12.5 MMOL/L
BACTERIA SPEC AEROBE CULT: NORMAL
BUN BLD-MCNC: 12 MG/DL (ref 8–23)
BUN/CREAT SERPL: 17.1 (ref 7–25)
CALCIUM SPEC-SCNC: 9.7 MG/DL (ref 8.6–10.5)
CHLORIDE SERPL-SCNC: 107 MMOL/L (ref 98–107)
CO2 SERPL-SCNC: 22.5 MMOL/L (ref 22–29)
CREAT BLD-MCNC: 0.7 MG/DL (ref 0.57–1)
DEPRECATED RDW RBC AUTO: 41.1 FL (ref 37–54)
ERYTHROCYTE [DISTWIDTH] IN BLOOD BY AUTOMATED COUNT: 12.5 % (ref 12.3–15.4)
GFR SERPL CREATININE-BSD FRML MDRD: 85 ML/MIN/1.73
GLUCOSE BLD-MCNC: 103 MG/DL (ref 65–99)
GLUCOSE BLDC GLUCOMTR-MCNC: 119 MG/DL (ref 70–130)
GLUCOSE BLDC GLUCOMTR-MCNC: 142 MG/DL (ref 70–130)
GLUCOSE BLDC GLUCOMTR-MCNC: 91 MG/DL (ref 70–130)
GLUCOSE BLDC GLUCOMTR-MCNC: 99 MG/DL (ref 70–130)
GRAM STN SPEC: NORMAL
GRAM STN SPEC: NORMAL
HCT VFR BLD AUTO: 35.4 % (ref 34–46.6)
HGB BLD-MCNC: 11.1 G/DL (ref 12–15.9)
MCH RBC QN AUTO: 28 PG (ref 26.6–33)
MCHC RBC AUTO-ENTMCNC: 31.4 G/DL (ref 31.5–35.7)
MCV RBC AUTO: 89.4 FL (ref 79–97)
PLATELET # BLD AUTO: 488 10*3/MM3 (ref 140–450)
PMV BLD AUTO: 9.8 FL (ref 6–12)
POTASSIUM BLD-SCNC: 4.1 MMOL/L (ref 3.5–5.2)
RBC # BLD AUTO: 3.96 10*6/MM3 (ref 3.77–5.28)
SODIUM BLD-SCNC: 142 MMOL/L (ref 136–145)
WBC NRBC COR # BLD: 8.67 10*3/MM3 (ref 3.4–10.8)

## 2019-06-22 PROCEDURE — 99024 POSTOP FOLLOW-UP VISIT: CPT | Performed by: THORACIC SURGERY (CARDIOTHORACIC VASCULAR SURGERY)

## 2019-06-22 PROCEDURE — 80048 BASIC METABOLIC PNL TOTAL CA: CPT | Performed by: THORACIC SURGERY (CARDIOTHORACIC VASCULAR SURGERY)

## 2019-06-22 PROCEDURE — 94799 UNLISTED PULMONARY SVC/PX: CPT

## 2019-06-22 PROCEDURE — 25010000002 ENOXAPARIN PER 10 MG: Performed by: THORACIC SURGERY (CARDIOTHORACIC VASCULAR SURGERY)

## 2019-06-22 PROCEDURE — 82962 GLUCOSE BLOOD TEST: CPT

## 2019-06-22 PROCEDURE — 25010000002 PIPERACILLIN SOD-TAZOBACTAM PER 1 G: Performed by: INTERNAL MEDICINE

## 2019-06-22 PROCEDURE — 85027 COMPLETE CBC AUTOMATED: CPT | Performed by: THORACIC SURGERY (CARDIOTHORACIC VASCULAR SURGERY)

## 2019-06-22 RX ADMIN — TAZOBACTAM SODIUM AND PIPERACILLIN SODIUM 3.38 G: 375; 3 INJECTION, SOLUTION INTRAVENOUS at 18:04

## 2019-06-22 RX ADMIN — PROPRANOLOL HYDROCHLORIDE 120 MG: 60 CAPSULE, EXTENDED RELEASE ORAL at 20:05

## 2019-06-22 RX ADMIN — BUDESONIDE AND FORMOTEROL FUMARATE DIHYDRATE 2 PUFF: 160; 4.5 AEROSOL RESPIRATORY (INHALATION) at 08:40

## 2019-06-22 RX ADMIN — SODIUM CHLORIDE, PRESERVATIVE FREE 3 ML: 5 INJECTION INTRAVENOUS at 20:07

## 2019-06-22 RX ADMIN — BUDESONIDE AND FORMOTEROL FUMARATE DIHYDRATE 2 PUFF: 160; 4.5 AEROSOL RESPIRATORY (INHALATION) at 19:43

## 2019-06-22 RX ADMIN — IPRATROPIUM BROMIDE AND ALBUTEROL SULFATE 3 ML: 2.5; .5 SOLUTION RESPIRATORY (INHALATION) at 10:55

## 2019-06-22 RX ADMIN — TAZOBACTAM SODIUM AND PIPERACILLIN SODIUM 3.38 G: 375; 3 INJECTION, SOLUTION INTRAVENOUS at 12:51

## 2019-06-22 RX ADMIN — ATORVASTATIN CALCIUM 40 MG: 20 TABLET, FILM COATED ORAL at 20:05

## 2019-06-22 RX ADMIN — TAZOBACTAM SODIUM AND PIPERACILLIN SODIUM 3.38 G: 375; 3 INJECTION, SOLUTION INTRAVENOUS at 03:58

## 2019-06-22 RX ADMIN — ESCITALOPRAM 10 MG: 10 TABLET, FILM COATED ORAL at 09:03

## 2019-06-22 RX ADMIN — HYDROCODONE BITARTRATE AND ACETAMINOPHEN 2 TABLET: 5; 325 TABLET ORAL at 20:05

## 2019-06-22 RX ADMIN — ENOXAPARIN SODIUM 40 MG: 40 INJECTION SUBCUTANEOUS at 21:14

## 2019-06-22 RX ADMIN — ASPIRIN 81 MG: 81 TABLET, COATED ORAL at 09:03

## 2019-06-22 RX ADMIN — LINAGLIPTIN 5 MG: 5 TABLET, FILM COATED ORAL at 09:03

## 2019-06-22 RX ADMIN — IPRATROPIUM BROMIDE AND ALBUTEROL SULFATE 3 ML: 2.5; .5 SOLUTION RESPIRATORY (INHALATION) at 19:43

## 2019-06-22 RX ADMIN — IPRATROPIUM BROMIDE AND ALBUTEROL SULFATE 3 ML: 2.5; .5 SOLUTION RESPIRATORY (INHALATION) at 15:51

## 2019-06-22 RX ADMIN — HYDROCODONE BITARTRATE AND ACETAMINOPHEN 2 TABLET: 5; 325 TABLET ORAL at 09:11

## 2019-06-22 RX ADMIN — SODIUM CHLORIDE, PRESERVATIVE FREE 3 ML: 5 INJECTION INTRAVENOUS at 09:04

## 2019-06-23 LAB
GLUCOSE BLDC GLUCOMTR-MCNC: 101 MG/DL (ref 70–130)
GLUCOSE BLDC GLUCOMTR-MCNC: 116 MG/DL (ref 70–130)
GLUCOSE BLDC GLUCOMTR-MCNC: 187 MG/DL (ref 70–130)
GLUCOSE BLDC GLUCOMTR-MCNC: 233 MG/DL (ref 70–130)

## 2019-06-23 PROCEDURE — 94799 UNLISTED PULMONARY SVC/PX: CPT

## 2019-06-23 PROCEDURE — 99024 POSTOP FOLLOW-UP VISIT: CPT | Performed by: THORACIC SURGERY (CARDIOTHORACIC VASCULAR SURGERY)

## 2019-06-23 PROCEDURE — 25010000002 ENOXAPARIN PER 10 MG: Performed by: THORACIC SURGERY (CARDIOTHORACIC VASCULAR SURGERY)

## 2019-06-23 PROCEDURE — 63710000001 INSULIN LISPRO (HUMAN) PER 5 UNITS: Performed by: PHYSICIAN ASSISTANT

## 2019-06-23 PROCEDURE — 25010000002 PIPERACILLIN SOD-TAZOBACTAM PER 1 G: Performed by: INTERNAL MEDICINE

## 2019-06-23 PROCEDURE — 82962 GLUCOSE BLOOD TEST: CPT

## 2019-06-23 RX ADMIN — HYDROCODONE BITARTRATE AND ACETAMINOPHEN 2 TABLET: 5; 325 TABLET ORAL at 08:39

## 2019-06-23 RX ADMIN — IPRATROPIUM BROMIDE AND ALBUTEROL SULFATE 3 ML: 2.5; .5 SOLUTION RESPIRATORY (INHALATION) at 07:44

## 2019-06-23 RX ADMIN — INSULIN LISPRO 2 UNITS: 100 INJECTION, SOLUTION INTRAVENOUS; SUBCUTANEOUS at 20:02

## 2019-06-23 RX ADMIN — LINAGLIPTIN 5 MG: 5 TABLET, FILM COATED ORAL at 08:34

## 2019-06-23 RX ADMIN — BUDESONIDE AND FORMOTEROL FUMARATE DIHYDRATE 2 PUFF: 160; 4.5 AEROSOL RESPIRATORY (INHALATION) at 07:50

## 2019-06-23 RX ADMIN — ATORVASTATIN CALCIUM 40 MG: 20 TABLET, FILM COATED ORAL at 20:02

## 2019-06-23 RX ADMIN — TAZOBACTAM SODIUM AND PIPERACILLIN SODIUM 3.38 G: 375; 3 INJECTION, SOLUTION INTRAVENOUS at 03:37

## 2019-06-23 RX ADMIN — BUDESONIDE AND FORMOTEROL FUMARATE DIHYDRATE 2 PUFF: 160; 4.5 AEROSOL RESPIRATORY (INHALATION) at 19:45

## 2019-06-23 RX ADMIN — ESCITALOPRAM 10 MG: 10 TABLET, FILM COATED ORAL at 08:34

## 2019-06-23 RX ADMIN — IPRATROPIUM BROMIDE AND ALBUTEROL SULFATE 3 ML: 2.5; .5 SOLUTION RESPIRATORY (INHALATION) at 11:51

## 2019-06-23 RX ADMIN — ENOXAPARIN SODIUM 40 MG: 40 INJECTION SUBCUTANEOUS at 21:21

## 2019-06-23 RX ADMIN — ASPIRIN 81 MG: 81 TABLET, COATED ORAL at 08:34

## 2019-06-23 RX ADMIN — SODIUM CHLORIDE, PRESERVATIVE FREE 3 ML: 5 INJECTION INTRAVENOUS at 20:03

## 2019-06-23 RX ADMIN — TAZOBACTAM SODIUM AND PIPERACILLIN SODIUM 3.38 G: 375; 3 INJECTION, SOLUTION INTRAVENOUS at 18:17

## 2019-06-23 RX ADMIN — HYDROCODONE BITARTRATE AND ACETAMINOPHEN 2 TABLET: 5; 325 TABLET ORAL at 17:13

## 2019-06-23 RX ADMIN — INSULIN LISPRO 3 UNITS: 100 INJECTION, SOLUTION INTRAVENOUS; SUBCUTANEOUS at 11:23

## 2019-06-23 RX ADMIN — IPRATROPIUM BROMIDE AND ALBUTEROL SULFATE 3 ML: 2.5; .5 SOLUTION RESPIRATORY (INHALATION) at 19:45

## 2019-06-23 RX ADMIN — SODIUM CHLORIDE, PRESERVATIVE FREE 3 ML: 5 INJECTION INTRAVENOUS at 08:34

## 2019-06-23 RX ADMIN — PROPRANOLOL HYDROCHLORIDE 120 MG: 60 CAPSULE, EXTENDED RELEASE ORAL at 20:02

## 2019-06-23 RX ADMIN — HYDROCODONE BITARTRATE AND ACETAMINOPHEN 2 TABLET: 5; 325 TABLET ORAL at 23:30

## 2019-06-23 RX ADMIN — TAZOBACTAM SODIUM AND PIPERACILLIN SODIUM 3.38 G: 375; 3 INJECTION, SOLUTION INTRAVENOUS at 10:28

## 2019-06-24 VITALS
BODY MASS INDEX: 24.72 KG/M2 | OXYGEN SATURATION: 93 % | HEIGHT: 64 IN | SYSTOLIC BLOOD PRESSURE: 145 MMHG | DIASTOLIC BLOOD PRESSURE: 93 MMHG | WEIGHT: 144.8 LBS | HEART RATE: 78 BPM | TEMPERATURE: 98.7 F | RESPIRATION RATE: 16 BRPM

## 2019-06-24 LAB
GLUCOSE BLDC GLUCOMTR-MCNC: 102 MG/DL (ref 70–130)
GLUCOSE BLDC GLUCOMTR-MCNC: 212 MG/DL (ref 70–130)

## 2019-06-24 PROCEDURE — 25010000002 PIPERACILLIN SOD-TAZOBACTAM PER 1 G: Performed by: INTERNAL MEDICINE

## 2019-06-24 PROCEDURE — 63710000001 INSULIN LISPRO (HUMAN) PER 5 UNITS: Performed by: PHYSICIAN ASSISTANT

## 2019-06-24 PROCEDURE — 82962 GLUCOSE BLOOD TEST: CPT

## 2019-06-24 PROCEDURE — 99232 SBSQ HOSP IP/OBS MODERATE 35: CPT | Performed by: INTERNAL MEDICINE

## 2019-06-24 PROCEDURE — 94799 UNLISTED PULMONARY SVC/PX: CPT

## 2019-06-24 PROCEDURE — 99024 POSTOP FOLLOW-UP VISIT: CPT | Performed by: NURSE PRACTITIONER

## 2019-06-24 RX ORDER — SULFAMETHOXAZOLE AND TRIMETHOPRIM 800; 160 MG/1; MG/1
1 TABLET ORAL EVERY 12 HOURS SCHEDULED
Qty: 59 TABLET | Refills: 0 | Status: SHIPPED | OUTPATIENT
Start: 2019-06-24 | End: 2019-07-20

## 2019-06-24 RX ORDER — AMOXICILLIN 875 MG/1
875 TABLET, COATED ORAL EVERY 12 HOURS SCHEDULED
Status: DISCONTINUED | OUTPATIENT
Start: 2019-06-24 | End: 2019-06-24 | Stop reason: HOSPADM

## 2019-06-24 RX ORDER — SULFAMETHOXAZOLE AND TRIMETHOPRIM 800; 160 MG/1; MG/1
1 TABLET ORAL EVERY 12 HOURS SCHEDULED
Status: DISCONTINUED | OUTPATIENT
Start: 2019-06-24 | End: 2019-06-24 | Stop reason: HOSPADM

## 2019-06-24 RX ORDER — HYDROCODONE BITARTRATE AND ACETAMINOPHEN 5; 325 MG/1; MG/1
1 TABLET ORAL EVERY 4 HOURS PRN
Qty: 42 TABLET | Refills: 0 | Status: SHIPPED | OUTPATIENT
Start: 2019-06-24 | End: 2019-07-01

## 2019-06-24 RX ORDER — AMOXICILLIN 875 MG/1
875 TABLET, COATED ORAL EVERY 12 HOURS SCHEDULED
Qty: 59 TABLET | Refills: 0 | Status: SHIPPED | OUTPATIENT
Start: 2019-06-24 | End: 2019-07-24

## 2019-06-24 RX ORDER — SACCHAROMYCES BOULARDII 250 MG
250 CAPSULE ORAL 2 TIMES DAILY
Qty: 60 CAPSULE | Refills: 0 | Status: SHIPPED | OUTPATIENT
Start: 2019-06-24 | End: 2019-07-20

## 2019-06-24 RX ADMIN — IPRATROPIUM BROMIDE AND ALBUTEROL SULFATE 3 ML: 2.5; .5 SOLUTION RESPIRATORY (INHALATION) at 07:59

## 2019-06-24 RX ADMIN — IPRATROPIUM BROMIDE AND ALBUTEROL SULFATE 3 ML: 2.5; .5 SOLUTION RESPIRATORY (INHALATION) at 10:55

## 2019-06-24 RX ADMIN — IPRATROPIUM BROMIDE AND ALBUTEROL SULFATE 3 ML: 2.5; .5 SOLUTION RESPIRATORY (INHALATION) at 15:07

## 2019-06-24 RX ADMIN — TAZOBACTAM SODIUM AND PIPERACILLIN SODIUM 3.38 G: 375; 3 INJECTION, SOLUTION INTRAVENOUS at 10:08

## 2019-06-24 RX ADMIN — SULFAMETHOXAZOLE AND TRIMETHOPRIM 160 MG: 800; 160 TABLET ORAL at 13:06

## 2019-06-24 RX ADMIN — ESCITALOPRAM 10 MG: 10 TABLET, FILM COATED ORAL at 08:32

## 2019-06-24 RX ADMIN — BUDESONIDE AND FORMOTEROL FUMARATE DIHYDRATE 2 PUFF: 160; 4.5 AEROSOL RESPIRATORY (INHALATION) at 07:59

## 2019-06-24 RX ADMIN — ASPIRIN 81 MG: 81 TABLET, COATED ORAL at 08:31

## 2019-06-24 RX ADMIN — HYDROCODONE BITARTRATE AND ACETAMINOPHEN 2 TABLET: 5; 325 TABLET ORAL at 12:11

## 2019-06-24 RX ADMIN — AMOXICILLIN 875 MG: 875 TABLET, FILM COATED ORAL at 12:06

## 2019-06-24 RX ADMIN — LINAGLIPTIN 5 MG: 5 TABLET, FILM COATED ORAL at 08:32

## 2019-06-24 RX ADMIN — SODIUM CHLORIDE, PRESERVATIVE FREE 3 ML: 5 INJECTION INTRAVENOUS at 08:32

## 2019-06-24 RX ADMIN — INSULIN LISPRO 3 UNITS: 100 INJECTION, SOLUTION INTRAVENOUS; SUBCUTANEOUS at 12:06

## 2019-06-24 RX ADMIN — TAZOBACTAM SODIUM AND PIPERACILLIN SODIUM 3.38 G: 375; 3 INJECTION, SOLUTION INTRAVENOUS at 03:30

## 2019-06-25 ENCOUNTER — READMISSION MANAGEMENT (OUTPATIENT)
Dept: CALL CENTER | Facility: HOSPITAL | Age: 64
End: 2019-06-25

## 2019-06-25 ENCOUNTER — NURSE TRIAGE (OUTPATIENT)
Dept: CALL CENTER | Facility: HOSPITAL | Age: 64
End: 2019-06-25

## 2019-06-27 ENCOUNTER — READMISSION MANAGEMENT (OUTPATIENT)
Dept: CALL CENTER | Facility: HOSPITAL | Age: 64
End: 2019-06-27

## 2019-06-27 VITALS
HEIGHT: 64 IN | DIASTOLIC BLOOD PRESSURE: 73 MMHG | WEIGHT: 146 LBS | BODY MASS INDEX: 24.92 KG/M2 | SYSTOLIC BLOOD PRESSURE: 120 MMHG | RESPIRATION RATE: 18 BRPM | HEART RATE: 76 BPM | OXYGEN SATURATION: 97 %

## 2019-06-28 ENCOUNTER — READMISSION MANAGEMENT (OUTPATIENT)
Dept: CALL CENTER | Facility: HOSPITAL | Age: 64
End: 2019-06-28

## 2019-07-01 ENCOUNTER — OFFICE VISIT (OUTPATIENT)
Dept: CARDIAC SURGERY | Facility: CLINIC | Age: 64
End: 2019-07-01

## 2019-07-01 VITALS
DIASTOLIC BLOOD PRESSURE: 63 MMHG | TEMPERATURE: 98.4 F | RESPIRATION RATE: 20 BRPM | WEIGHT: 142 LBS | HEART RATE: 77 BPM | SYSTOLIC BLOOD PRESSURE: 106 MMHG | BODY MASS INDEX: 24.24 KG/M2 | HEIGHT: 64 IN | OXYGEN SATURATION: 94 %

## 2019-07-01 DIAGNOSIS — T14.8XXA WOUND INFECTION: ICD-10-CM

## 2019-07-01 DIAGNOSIS — L08.9 WOUND INFECTION: ICD-10-CM

## 2019-07-01 DIAGNOSIS — Z95.1 S/P CABG X 3: Primary | ICD-10-CM

## 2019-07-01 PROCEDURE — 99024 POSTOP FOLLOW-UP VISIT: CPT | Performed by: NURSE PRACTITIONER

## 2019-07-01 RX ORDER — ONDANSETRON 4 MG/1
4 TABLET, FILM COATED ORAL EVERY 6 HOURS PRN
Refills: 0 | COMMUNITY
Start: 2019-06-12 | End: 2019-07-20

## 2019-07-01 RX ORDER — FLUCONAZOLE 150 MG/1
150 TABLET ORAL ONCE
Qty: 2 TABLET | Refills: 0 | Status: SHIPPED | OUTPATIENT
Start: 2019-07-01 | End: 2019-07-01

## 2019-07-01 RX ORDER — ATORVASTATIN CALCIUM 20 MG/1
20 TABLET, FILM COATED ORAL
Refills: 3 | COMMUNITY
Start: 2019-06-10 | End: 2019-07-01 | Stop reason: DRUGHIGH

## 2019-07-02 ENCOUNTER — READMISSION MANAGEMENT (OUTPATIENT)
Dept: CALL CENTER | Facility: HOSPITAL | Age: 64
End: 2019-07-02

## 2019-07-02 ENCOUNTER — TELEPHONE (OUTPATIENT)
Dept: CARDIAC REHAB | Facility: HOSPITAL | Age: 64
End: 2019-07-02

## 2019-07-08 ENCOUNTER — OFFICE VISIT (OUTPATIENT)
Dept: CARDIAC SURGERY | Facility: CLINIC | Age: 64
End: 2019-07-08

## 2019-07-08 VITALS
OXYGEN SATURATION: 94 % | HEIGHT: 64 IN | HEART RATE: 83 BPM | WEIGHT: 140.8 LBS | TEMPERATURE: 98.3 F | BODY MASS INDEX: 24.04 KG/M2 | DIASTOLIC BLOOD PRESSURE: 56 MMHG | SYSTOLIC BLOOD PRESSURE: 114 MMHG

## 2019-07-08 DIAGNOSIS — Z95.1 S/P CABG (CORONARY ARTERY BYPASS GRAFT): Primary | ICD-10-CM

## 2019-07-08 PROCEDURE — 99024 POSTOP FOLLOW-UP VISIT: CPT | Performed by: THORACIC SURGERY (CARDIOTHORACIC VASCULAR SURGERY)

## 2019-07-10 ENCOUNTER — READMISSION MANAGEMENT (OUTPATIENT)
Dept: CALL CENTER | Facility: HOSPITAL | Age: 64
End: 2019-07-10

## 2019-07-11 ENCOUNTER — READMISSION MANAGEMENT (OUTPATIENT)
Dept: CALL CENTER | Facility: HOSPITAL | Age: 64
End: 2019-07-11

## 2019-07-18 ENCOUNTER — OFFICE VISIT (OUTPATIENT)
Dept: CARDIAC SURGERY | Facility: CLINIC | Age: 64
End: 2019-07-18

## 2019-07-18 VITALS
DIASTOLIC BLOOD PRESSURE: 70 MMHG | HEIGHT: 64 IN | TEMPERATURE: 97.6 F | RESPIRATION RATE: 20 BRPM | BODY MASS INDEX: 24.07 KG/M2 | SYSTOLIC BLOOD PRESSURE: 120 MMHG | HEART RATE: 80 BPM | OXYGEN SATURATION: 96 % | WEIGHT: 141 LBS

## 2019-07-18 DIAGNOSIS — L08.9 WOUND INFECTION: ICD-10-CM

## 2019-07-18 DIAGNOSIS — Z95.1 S/P CABG X 3: Primary | ICD-10-CM

## 2019-07-18 DIAGNOSIS — T14.8XXA WOUND INFECTION: ICD-10-CM

## 2019-07-18 PROCEDURE — 99024 POSTOP FOLLOW-UP VISIT: CPT | Performed by: NURSE PRACTITIONER

## 2019-07-18 RX ORDER — FLUCONAZOLE 150 MG/1
TABLET ORAL
Refills: 0 | COMMUNITY
Start: 2019-07-01 | End: 2019-07-20

## 2019-07-19 ENCOUNTER — READMISSION MANAGEMENT (OUTPATIENT)
Dept: CALL CENTER | Facility: HOSPITAL | Age: 64
End: 2019-07-19

## 2019-07-29 ENCOUNTER — CLINICAL SUPPORT (OUTPATIENT)
Dept: FAMILY MEDICINE CLINIC | Facility: CLINIC | Age: 64
End: 2019-07-29

## 2019-07-29 DIAGNOSIS — E11.9 TYPE 2 DIABETES MELLITUS WITHOUT COMPLICATION, UNSPECIFIED WHETHER LONG TERM INSULIN USE (HCC): ICD-10-CM

## 2019-07-29 DIAGNOSIS — Z13.220 LIPID SCREENING: Primary | ICD-10-CM

## 2019-07-29 DIAGNOSIS — T14.8XXA WOUND INFECTION: ICD-10-CM

## 2019-07-29 DIAGNOSIS — E55.9 VITAMIN D DEFICIENCY: ICD-10-CM

## 2019-07-29 DIAGNOSIS — L08.9 WOUND INFECTION: ICD-10-CM

## 2019-07-29 LAB
ANION GAP SERPL CALCULATED.3IONS-SCNC: 16.2 MMOL/L (ref 5–15)
ARTICHOKE IGE QN: 123 MG/DL (ref 0–100)
BASOPHILS # BLD AUTO: 0.1 10*3/MM3 (ref 0–0.2)
BASOPHILS NFR BLD AUTO: 0.6 % (ref 0–1.5)
BUN BLD-MCNC: 26 MG/DL (ref 8–20)
BUN/CREAT SERPL: 23.6 (ref 5.4–26.2)
CALCIUM SPEC-SCNC: 10.4 MG/DL (ref 8.9–10.3)
CHLORIDE SERPL-SCNC: 101 MMOL/L (ref 101–111)
CHOLEST SERPL-MCNC: 188 MG/DL
CO2 SERPL-SCNC: 24 MMOL/L (ref 22–32)
CREAT BLD-MCNC: 1.1 MG/DL (ref 0.4–1)
DEPRECATED RDW RBC AUTO: 44.2 FL (ref 37–54)
EOSINOPHIL # BLD AUTO: 0.2 10*3/MM3 (ref 0–0.4)
EOSINOPHIL NFR BLD AUTO: 1.3 % (ref 0.3–6.2)
ERYTHROCYTE [DISTWIDTH] IN BLOOD BY AUTOMATED COUNT: 14.3 % (ref 12.3–15.4)
GFR SERPL CREATININE-BSD FRML MDRD: 50 ML/MIN/1.73
GLUCOSE BLD-MCNC: 100 MG/DL (ref 65–99)
HCT VFR BLD AUTO: 44.8 % (ref 34–46.6)
HDLC SERPL QL: 3.76
HDLC SERPL-MCNC: 50 MG/DL
HGB BLD-MCNC: 14.5 G/DL (ref 12–15.9)
LDLC/HDLC SERPL: 2.18 {RATIO}
LYMPHOCYTES # BLD AUTO: 3.5 10*3/MM3 (ref 0.7–3.1)
LYMPHOCYTES NFR BLD AUTO: 22.7 % (ref 19.6–45.3)
MCH RBC QN AUTO: 28 PG (ref 26.6–33)
MCHC RBC AUTO-ENTMCNC: 32.3 G/DL (ref 31.5–35.7)
MCV RBC AUTO: 86.6 FL (ref 79–97)
MONOCYTES # BLD AUTO: 1 10*3/MM3 (ref 0.1–0.9)
MONOCYTES NFR BLD AUTO: 6.4 % (ref 5–12)
NEUTROPHILS # BLD AUTO: 10.6 10*3/MM3 (ref 1.7–7)
NEUTROPHILS NFR BLD AUTO: 69 % (ref 42.7–76)
NRBC BLD AUTO-RTO: 0.1 /100 WBC (ref 0–0.2)
PLATELET # BLD AUTO: 528 10*3/MM3 (ref 140–450)
PMV BLD AUTO: 8.1 FL (ref 6–12)
POTASSIUM BLD-SCNC: 5.2 MMOL/L (ref 3.6–5.1)
RBC # BLD AUTO: 5.17 10*6/MM3 (ref 3.77–5.28)
SODIUM BLD-SCNC: 136 MMOL/L (ref 136–144)
TRIGL SERPL-MCNC: 145 MG/DL
VLDLC SERPL-MCNC: 29 MG/DL
WBC NRBC COR # BLD: 15.4 10*3/MM3 (ref 3.4–10.8)

## 2019-07-29 PROCEDURE — 80061 LIPID PANEL: CPT | Performed by: FAMILY MEDICINE

## 2019-07-29 PROCEDURE — 80048 BASIC METABOLIC PNL TOTAL CA: CPT | Performed by: FAMILY MEDICINE

## 2019-07-29 PROCEDURE — 36415 COLL VENOUS BLD VENIPUNCTURE: CPT | Performed by: FAMILY MEDICINE

## 2019-07-29 PROCEDURE — 82306 VITAMIN D 25 HYDROXY: CPT | Performed by: FAMILY MEDICINE

## 2019-07-29 PROCEDURE — 83036 HEMOGLOBIN GLYCOSYLATED A1C: CPT | Performed by: FAMILY MEDICINE

## 2019-07-29 PROCEDURE — 85025 COMPLETE CBC W/AUTO DIFF WBC: CPT | Performed by: FAMILY MEDICINE

## 2019-07-30 LAB
25(OH)D3 SERPL-MCNC: 16.4 NG/ML (ref 30–100)
HBA1C MFR BLD: 5.8 % (ref 3.5–5.6)

## 2019-07-30 RX ORDER — TRAMADOL HYDROCHLORIDE 50 MG/1
TABLET ORAL
Qty: 90 TABLET | Refills: 0 | Status: SHIPPED | OUTPATIENT
Start: 2019-07-30 | End: 2019-08-20 | Stop reason: SDUPTHER

## 2019-08-01 RX ORDER — TRAMADOL HYDROCHLORIDE 50 MG/1
TABLET ORAL
Qty: 90 TABLET | Refills: 0 | OUTPATIENT
Start: 2019-08-01

## 2019-08-02 DIAGNOSIS — E11.9 TYPE 2 DIABETES MELLITUS WITHOUT COMPLICATION, WITHOUT LONG-TERM CURRENT USE OF INSULIN (HCC): Primary | ICD-10-CM

## 2019-08-02 DIAGNOSIS — E55.9 VITAMIN D DEFICIENCY: ICD-10-CM

## 2019-08-02 RX ORDER — ERGOCALCIFEROL 1.25 MG/1
50000 CAPSULE ORAL WEEKLY
Qty: 12 CAPSULE | Refills: 0 | Status: SHIPPED | OUTPATIENT
Start: 2019-08-02 | End: 2019-08-20 | Stop reason: SDUPTHER

## 2019-08-02 RX ORDER — TRAMADOL HYDROCHLORIDE 50 MG/1
TABLET ORAL
Qty: 90 TABLET | Refills: 0 | OUTPATIENT
Start: 2019-08-02

## 2019-08-06 ENCOUNTER — TELEPHONE (OUTPATIENT)
Dept: CARDIAC REHAB | Facility: HOSPITAL | Age: 64
End: 2019-08-06

## 2019-08-06 ENCOUNTER — OFFICE VISIT (OUTPATIENT)
Dept: CARDIOLOGY | Facility: CLINIC | Age: 64
End: 2019-08-06

## 2019-08-06 VITALS
HEIGHT: 64 IN | DIASTOLIC BLOOD PRESSURE: 74 MMHG | RESPIRATION RATE: 18 BRPM | BODY MASS INDEX: 23.56 KG/M2 | OXYGEN SATURATION: 96 % | SYSTOLIC BLOOD PRESSURE: 110 MMHG | WEIGHT: 138 LBS | HEART RATE: 85 BPM

## 2019-08-06 DIAGNOSIS — Z95.1 S/P CABG X 3: ICD-10-CM

## 2019-08-06 PROCEDURE — 93000 ELECTROCARDIOGRAM COMPLETE: CPT | Performed by: INTERNAL MEDICINE

## 2019-08-06 PROCEDURE — 99214 OFFICE O/P EST MOD 30 MIN: CPT | Performed by: INTERNAL MEDICINE

## 2019-08-19 ENCOUNTER — TELEPHONE (OUTPATIENT)
Dept: FAMILY MEDICINE CLINIC | Facility: CLINIC | Age: 64
End: 2019-08-19

## 2019-08-20 ENCOUNTER — OFFICE VISIT (OUTPATIENT)
Dept: FAMILY MEDICINE CLINIC | Facility: CLINIC | Age: 64
End: 2019-08-20

## 2019-08-20 VITALS
WEIGHT: 138 LBS | HEART RATE: 78 BPM | BODY MASS INDEX: 23.56 KG/M2 | DIASTOLIC BLOOD PRESSURE: 71 MMHG | OXYGEN SATURATION: 95 % | HEIGHT: 64 IN | TEMPERATURE: 98.3 F | SYSTOLIC BLOOD PRESSURE: 150 MMHG | RESPIRATION RATE: 18 BRPM

## 2019-08-20 DIAGNOSIS — E55.9 VITAMIN D DEFICIENCY: ICD-10-CM

## 2019-08-20 DIAGNOSIS — N28.9 RENAL INSUFFICIENCY: ICD-10-CM

## 2019-08-20 DIAGNOSIS — I10 BENIGN ESSENTIAL HYPERTENSION: ICD-10-CM

## 2019-08-20 DIAGNOSIS — T07.XXXA MULTIPLE CONTUSIONS: Primary | ICD-10-CM

## 2019-08-20 DIAGNOSIS — J43.9 PULMONARY EMPHYSEMA, UNSPECIFIED EMPHYSEMA TYPE (HCC): ICD-10-CM

## 2019-08-20 PROBLEM — N28.89 LEFT RENAL MASS: Status: ACTIVE | Noted: 2018-07-02

## 2019-08-20 PROBLEM — G25.2 INTENTION TREMOR: Status: ACTIVE | Noted: 2018-07-17

## 2019-08-20 PROBLEM — R10.30 ABDOMINAL PAIN, LOWER: Status: ACTIVE | Noted: 2018-08-07

## 2019-08-20 PROBLEM — R41.3 OTHER AMNESIA: Status: ACTIVE | Noted: 2018-08-27

## 2019-08-20 PROBLEM — H53.9 VISUAL CHANGES: Status: ACTIVE | Noted: 2019-04-24

## 2019-08-20 PROBLEM — N39.0 ACUTE URINARY TRACT INFECTION: Status: ACTIVE | Noted: 2018-08-07

## 2019-08-20 PROBLEM — F41.1 GENERALIZED ANXIETY DISORDER: Status: ACTIVE | Noted: 2018-08-27

## 2019-08-20 PROBLEM — F32.A DEPRESSION: Status: ACTIVE | Noted: 2018-07-17

## 2019-08-20 PROBLEM — R89.9 ABNORMAL LABORATORY TEST RESULT: Status: ACTIVE | Noted: 2019-01-17

## 2019-08-20 PROBLEM — I25.10 CORONARY ARTERY DISEASE: Status: ACTIVE | Noted: 2019-05-06

## 2019-08-20 PROBLEM — E78.5 HYPERLIPIDEMIA: Status: ACTIVE | Noted: 2018-07-17

## 2019-08-20 PROBLEM — Z12.31 VISIT FOR SCREENING MAMMOGRAM: Status: ACTIVE | Noted: 2018-07-17

## 2019-08-20 PROBLEM — R19.5 FECES CONTENTS ABNORMAL: Status: ACTIVE | Noted: 2018-08-07

## 2019-08-20 PROBLEM — J44.9 CHRONIC OBSTRUCTIVE PULMONARY DISEASE: Status: ACTIVE | Noted: 2018-10-02

## 2019-08-20 PROBLEM — R06.09 DYSPNEA ON EXERTION: Status: ACTIVE | Noted: 2019-05-06

## 2019-08-20 PROBLEM — M10.9 GOUT: Status: ACTIVE | Noted: 2018-07-17

## 2019-08-20 PROBLEM — Z23 NEED FOR OTHER PROPHYLACTIC VACCINATION AND INOCULATION AGAINST SINGLE DISEASES: Status: ACTIVE | Noted: 2018-10-02

## 2019-08-20 PROBLEM — R06.02 SHORTNESS OF BREATH: Status: ACTIVE | Noted: 2019-05-06

## 2019-08-20 PROBLEM — T30.0 BURN: Status: ACTIVE | Noted: 2018-10-02

## 2019-08-20 PROBLEM — Z78.9 FAILURE OF OUTPATIENT TREATMENT: Status: ACTIVE | Noted: 2018-07-02

## 2019-08-20 PROBLEM — Z72.0 TOBACCO ABUSE: Status: ACTIVE | Noted: 2018-07-02

## 2019-08-20 PROBLEM — R41.0 CONFUSION: Status: ACTIVE | Noted: 2018-10-02

## 2019-08-20 PROBLEM — E11.9 TYPE 2 DIABETES MELLITUS WITHOUT COMPLICATIONS: Status: ACTIVE | Noted: 2018-07-17

## 2019-08-20 PROCEDURE — 99214 OFFICE O/P EST MOD 30 MIN: CPT | Performed by: FAMILY MEDICINE

## 2019-08-20 RX ORDER — TRAMADOL HYDROCHLORIDE 50 MG/1
50 TABLET ORAL EVERY 8 HOURS PRN
Qty: 90 TABLET | Refills: 0
Start: 2019-08-20 | End: 2019-09-13

## 2019-08-20 RX ORDER — ERGOCALCIFEROL 1.25 MG/1
50000 CAPSULE ORAL WEEKLY
Qty: 12 CAPSULE | Refills: 0 | Status: SHIPPED | OUTPATIENT
Start: 2019-08-20 | End: 2019-12-10 | Stop reason: SDUPTHER

## 2019-08-20 RX ORDER — IPRATROPIUM BROMIDE AND ALBUTEROL SULFATE 2.5; .5 MG/3ML; MG/3ML
3 SOLUTION RESPIRATORY (INHALATION) EVERY 4 HOURS PRN
Qty: 360 ML | Refills: 0
Start: 2019-08-20 | End: 2020-05-13 | Stop reason: SDUPTHER

## 2019-08-20 RX ORDER — PREDNISONE 20 MG/1
TABLET ORAL
Qty: 20 TABLET | Refills: 0 | Status: SHIPPED | OUTPATIENT
Start: 2019-08-20 | End: 2019-12-10

## 2019-08-20 RX ORDER — BUPROPION HYDROCHLORIDE 150 MG/1
150 TABLET ORAL EVERY MORNING
Qty: 30 TABLET | Refills: 1 | Status: SHIPPED | OUTPATIENT
Start: 2019-08-20 | End: 2019-09-30 | Stop reason: SDUPTHER

## 2019-09-06 RX ORDER — FENOFIBRATE 160 MG/1
TABLET ORAL
Qty: 90 TABLET | Refills: 0 | Status: SHIPPED | OUTPATIENT
Start: 2019-09-06 | End: 2020-01-27

## 2019-09-11 RX ORDER — TRAMADOL HYDROCHLORIDE 50 MG/1
TABLET ORAL
Qty: 90 TABLET | Refills: 0 | Status: SHIPPED | OUTPATIENT
Start: 2019-09-11 | End: 2019-09-13

## 2019-09-13 RX ORDER — TRAMADOL HYDROCHLORIDE 50 MG/1
TABLET ORAL
Qty: 90 TABLET | Refills: 0 | Status: SHIPPED | OUTPATIENT
Start: 2019-09-13 | End: 2019-11-27 | Stop reason: SDUPTHER

## 2019-10-01 ENCOUNTER — OFFICE VISIT (OUTPATIENT)
Dept: NEUROLOGY | Facility: CLINIC | Age: 64
End: 2019-10-01

## 2019-10-01 VITALS
SYSTOLIC BLOOD PRESSURE: 148 MMHG | DIASTOLIC BLOOD PRESSURE: 70 MMHG | HEIGHT: 64 IN | BODY MASS INDEX: 23.08 KG/M2 | WEIGHT: 135.2 LBS | HEART RATE: 76 BPM

## 2019-10-01 DIAGNOSIS — R25.1 TREMOR: ICD-10-CM

## 2019-10-01 DIAGNOSIS — R41.3 MEMORY LOSS: Primary | ICD-10-CM

## 2019-10-01 PROBLEM — G25.0 ESSENTIAL TREMOR: Status: ACTIVE | Noted: 2018-07-17

## 2019-10-01 PROCEDURE — 99244 OFF/OP CNSLTJ NEW/EST MOD 40: CPT | Performed by: PSYCHIATRY & NEUROLOGY

## 2019-10-01 RX ORDER — BUPROPION HYDROCHLORIDE 150 MG/1
TABLET ORAL
Qty: 90 TABLET | Refills: 0 | Status: SHIPPED | OUTPATIENT
Start: 2019-10-01 | End: 2019-10-16

## 2019-10-16 RX ORDER — BUPROPION HYDROCHLORIDE 150 MG/1
TABLET ORAL
Qty: 90 TABLET | Refills: 0 | Status: SHIPPED | OUTPATIENT
Start: 2019-10-16 | End: 2019-12-10 | Stop reason: SDUPTHER

## 2019-11-20 ENCOUNTER — CLINICAL SUPPORT (OUTPATIENT)
Dept: CARDIAC SURGERY | Facility: CLINIC | Age: 64
End: 2019-11-20

## 2019-11-20 DIAGNOSIS — I25.10 ATHSCL HEART DISEASE OF NATIVE CORONARY ARTERY W/O ANG PCTRS: ICD-10-CM

## 2019-11-20 DIAGNOSIS — E11.9 TYPE 2 DIABETES MELLITUS WITHOUT COMPLICATION, UNSPECIFIED WHETHER LONG TERM INSULIN USE (HCC): ICD-10-CM

## 2019-11-20 DIAGNOSIS — I10 ESSENTIAL (PRIMARY) HYPERTENSION: ICD-10-CM

## 2019-11-20 DIAGNOSIS — T81.42XA INFECTION FOLLOWING A PROCEDURE, DEEP INCISIONAL SURGICAL SITE, INITIAL ENCOUNTER: Primary | ICD-10-CM

## 2019-11-20 PROCEDURE — G0179 MD RECERTIFICATION HHA PT: HCPCS | Performed by: THORACIC SURGERY (CARDIOTHORACIC VASCULAR SURGERY)

## 2019-11-26 ENCOUNTER — CLINICAL SUPPORT (OUTPATIENT)
Dept: FAMILY MEDICINE CLINIC | Facility: CLINIC | Age: 64
End: 2019-11-26

## 2019-11-26 DIAGNOSIS — E55.9 VITAMIN D DEFICIENCY: ICD-10-CM

## 2019-11-26 DIAGNOSIS — E11.9 TYPE 2 DIABETES MELLITUS WITHOUT COMPLICATION, WITHOUT LONG-TERM CURRENT USE OF INSULIN (HCC): ICD-10-CM

## 2019-11-26 DIAGNOSIS — N28.9 RENAL INSUFFICIENCY: ICD-10-CM

## 2019-11-26 LAB — HBA1C MFR BLD: 6.1 % (ref 3.5–5.6)

## 2019-11-26 PROCEDURE — 83036 HEMOGLOBIN GLYCOSYLATED A1C: CPT | Performed by: FAMILY MEDICINE

## 2019-11-26 PROCEDURE — 82306 VITAMIN D 25 HYDROXY: CPT | Performed by: FAMILY MEDICINE

## 2019-11-26 PROCEDURE — 80048 BASIC METABOLIC PNL TOTAL CA: CPT | Performed by: FAMILY MEDICINE

## 2019-11-26 PROCEDURE — 36415 COLL VENOUS BLD VENIPUNCTURE: CPT | Performed by: FAMILY MEDICINE

## 2019-11-27 LAB
25(OH)D3 SERPL-MCNC: 27.5 NG/ML (ref 30–100)
ANION GAP SERPL CALCULATED.3IONS-SCNC: 10.7 MMOL/L (ref 5–15)
BUN BLD-MCNC: 17 MG/DL (ref 8–23)
BUN/CREAT SERPL: 17.7 (ref 7–25)
CALCIUM SPEC-SCNC: 10.2 MG/DL (ref 8.6–10.5)
CHLORIDE SERPL-SCNC: 102 MMOL/L (ref 98–107)
CO2 SERPL-SCNC: 28.3 MMOL/L (ref 22–29)
CREAT BLD-MCNC: 0.96 MG/DL (ref 0.57–1)
GFR SERPL CREATININE-BSD FRML MDRD: 59 ML/MIN/1.73
GLUCOSE BLD-MCNC: 110 MG/DL (ref 65–99)
POTASSIUM BLD-SCNC: 4.4 MMOL/L (ref 3.5–5.2)
SODIUM BLD-SCNC: 141 MMOL/L (ref 136–145)

## 2019-11-27 RX ORDER — TRAMADOL HYDROCHLORIDE 50 MG/1
TABLET ORAL
Qty: 90 TABLET | Refills: 0 | Status: SHIPPED | OUTPATIENT
Start: 2019-11-27 | End: 2020-01-07

## 2019-12-04 RX ORDER — TRAMADOL HYDROCHLORIDE 50 MG/1
TABLET ORAL
Qty: 90 TABLET | Refills: 0 | OUTPATIENT
Start: 2019-12-04

## 2019-12-10 ENCOUNTER — OFFICE VISIT (OUTPATIENT)
Dept: FAMILY MEDICINE CLINIC | Facility: CLINIC | Age: 64
End: 2019-12-10

## 2019-12-10 VITALS
WEIGHT: 140 LBS | SYSTOLIC BLOOD PRESSURE: 147 MMHG | HEIGHT: 64 IN | RESPIRATION RATE: 18 BRPM | HEART RATE: 85 BPM | OXYGEN SATURATION: 96 % | TEMPERATURE: 98.1 F | DIASTOLIC BLOOD PRESSURE: 81 MMHG | BODY MASS INDEX: 23.9 KG/M2

## 2019-12-10 DIAGNOSIS — J06.9 ACUTE URI: ICD-10-CM

## 2019-12-10 DIAGNOSIS — J44.1 COPD WITH ACUTE EXACERBATION (HCC): Primary | ICD-10-CM

## 2019-12-10 DIAGNOSIS — M54.50 ACUTE BILATERAL LOW BACK PAIN WITHOUT SCIATICA: ICD-10-CM

## 2019-12-10 DIAGNOSIS — E55.9 VITAMIN D DEFICIENCY: ICD-10-CM

## 2019-12-10 DIAGNOSIS — G25.0 ESSENTIAL TREMOR: ICD-10-CM

## 2019-12-10 DIAGNOSIS — E11.9 TYPE 2 DIABETES MELLITUS WITHOUT COMPLICATION, WITHOUT LONG-TERM CURRENT USE OF INSULIN (HCC): ICD-10-CM

## 2019-12-10 PROCEDURE — 99214 OFFICE O/P EST MOD 30 MIN: CPT | Performed by: FAMILY MEDICINE

## 2019-12-10 RX ORDER — BUPROPION HYDROCHLORIDE 150 MG/1
150 TABLET ORAL EVERY MORNING
Qty: 30 TABLET | Refills: 3 | Status: SHIPPED | OUTPATIENT
Start: 2019-12-10 | End: 2020-05-13 | Stop reason: SDUPTHER

## 2019-12-10 RX ORDER — PROPRANOLOL HYDROCHLORIDE 160 MG/1
160 CAPSULE, EXTENDED RELEASE ORAL DAILY
Qty: 30 CAPSULE | Refills: 2 | Status: SHIPPED | OUTPATIENT
Start: 2019-12-10 | End: 2020-05-13 | Stop reason: SDUPTHER

## 2019-12-10 RX ORDER — PREDNISONE 20 MG/1
TABLET ORAL
Qty: 20 TABLET | Refills: 0 | Status: SHIPPED | OUTPATIENT
Start: 2019-12-10 | End: 2020-03-12

## 2019-12-10 RX ORDER — CYCLOBENZAPRINE HCL 10 MG
10 TABLET ORAL NIGHTLY PRN
Qty: 30 TABLET | Refills: 0 | Status: SHIPPED | OUTPATIENT
Start: 2019-12-10 | End: 2020-01-07

## 2019-12-10 RX ORDER — PROPRANOLOL HYDROCHLORIDE 160 MG/1
160 CAPSULE, EXTENDED RELEASE ORAL
Qty: 30 CAPSULE | Refills: 3 | Status: SHIPPED | OUTPATIENT
Start: 2019-12-10 | End: 2020-03-12

## 2019-12-10 RX ORDER — PROPRANOLOL HYDROCHLORIDE 160 MG/1
160 CAPSULE, EXTENDED RELEASE ORAL
Qty: 30 CAPSULE | Refills: 3 | Status: SHIPPED | OUTPATIENT
Start: 2019-12-10 | End: 2019-12-10

## 2019-12-10 RX ORDER — ERGOCALCIFEROL 1.25 MG/1
50000 CAPSULE ORAL WEEKLY
Qty: 12 CAPSULE | Refills: 0 | Status: SHIPPED | OUTPATIENT
Start: 2019-12-10 | End: 2020-03-12

## 2019-12-20 ENCOUNTER — TELEPHONE (OUTPATIENT)
Dept: FAMILY MEDICINE CLINIC | Facility: CLINIC | Age: 64
End: 2019-12-20

## 2020-01-06 DIAGNOSIS — M54.50 ACUTE BILATERAL LOW BACK PAIN WITHOUT SCIATICA: Primary | ICD-10-CM

## 2020-01-07 RX ORDER — TRAMADOL HYDROCHLORIDE 50 MG/1
TABLET ORAL
Qty: 90 TABLET | Refills: 0 | Status: SHIPPED | OUTPATIENT
Start: 2020-01-07 | End: 2020-02-19

## 2020-01-07 RX ORDER — CYCLOBENZAPRINE HCL 10 MG
TABLET ORAL
Qty: 30 TABLET | Refills: 0 | Status: SHIPPED | OUTPATIENT
Start: 2020-01-07 | End: 2020-02-06

## 2020-01-09 RX ORDER — CYCLOBENZAPRINE HCL 10 MG
TABLET ORAL
Qty: 30 TABLET | Refills: 0 | OUTPATIENT
Start: 2020-01-09

## 2020-01-27 RX ORDER — ESCITALOPRAM OXALATE 10 MG/1
TABLET ORAL
Qty: 90 TABLET | Refills: 0 | Status: SHIPPED | OUTPATIENT
Start: 2020-01-27 | End: 2020-02-26

## 2020-01-27 RX ORDER — FENOFIBRATE 160 MG/1
TABLET ORAL
Qty: 90 TABLET | Refills: 0 | Status: SHIPPED | OUTPATIENT
Start: 2020-01-27 | End: 2020-05-13 | Stop reason: SDUPTHER

## 2020-01-31 RX ORDER — IPRATROPIUM/ALBUTEROL SULFATE 20-100 MCG
MIST INHALER (GRAM) INHALATION
Qty: 4 G | Refills: 2 | Status: SHIPPED | OUTPATIENT
Start: 2020-01-31 | End: 2020-03-12

## 2020-02-06 RX ORDER — ATORVASTATIN CALCIUM 40 MG/1
TABLET, FILM COATED ORAL
Qty: 30 TABLET | Refills: 0 | Status: SHIPPED | OUTPATIENT
Start: 2020-02-06 | End: 2020-03-30

## 2020-02-06 RX ORDER — CYCLOBENZAPRINE HCL 10 MG
TABLET ORAL
Qty: 30 TABLET | Refills: 0 | Status: SHIPPED | OUTPATIENT
Start: 2020-02-06 | End: 2020-03-04

## 2020-02-18 DIAGNOSIS — M54.50 ACUTE BILATERAL LOW BACK PAIN WITHOUT SCIATICA: ICD-10-CM

## 2020-02-19 RX ORDER — TRAMADOL HYDROCHLORIDE 50 MG/1
TABLET ORAL
Qty: 90 TABLET | Refills: 0 | Status: SHIPPED | OUTPATIENT
Start: 2020-02-19 | End: 2020-03-24

## 2020-02-20 DIAGNOSIS — M54.50 ACUTE BILATERAL LOW BACK PAIN WITHOUT SCIATICA: ICD-10-CM

## 2020-02-20 RX ORDER — TRAMADOL HYDROCHLORIDE 50 MG/1
TABLET ORAL
Qty: 90 TABLET | Refills: 0 | OUTPATIENT
Start: 2020-02-20

## 2020-02-26 RX ORDER — ESCITALOPRAM OXALATE 10 MG/1
TABLET ORAL
Qty: 90 TABLET | Refills: 0 | Status: SHIPPED | OUTPATIENT
Start: 2020-02-26 | End: 2020-05-13 | Stop reason: SDUPTHER

## 2020-02-26 RX ORDER — AMLODIPINE BESYLATE 5 MG/1
TABLET ORAL
Qty: 90 TABLET | Refills: 0 | Status: SHIPPED | OUTPATIENT
Start: 2020-02-26 | End: 2020-05-13 | Stop reason: SDUPTHER

## 2020-03-04 RX ORDER — CYCLOBENZAPRINE HCL 10 MG
TABLET ORAL
Qty: 30 TABLET | Refills: 0 | Status: SHIPPED | OUTPATIENT
Start: 2020-03-04 | End: 2020-04-10

## 2020-03-12 ENCOUNTER — OFFICE VISIT (OUTPATIENT)
Dept: FAMILY MEDICINE CLINIC | Facility: CLINIC | Age: 65
End: 2020-03-12

## 2020-03-12 VITALS
BODY MASS INDEX: 25.1 KG/M2 | WEIGHT: 147 LBS | HEART RATE: 104 BPM | SYSTOLIC BLOOD PRESSURE: 145 MMHG | DIASTOLIC BLOOD PRESSURE: 79 MMHG | HEIGHT: 64 IN | TEMPERATURE: 98.1 F | RESPIRATION RATE: 14 BRPM | OXYGEN SATURATION: 95 %

## 2020-03-12 DIAGNOSIS — R05.9 COUGH: ICD-10-CM

## 2020-03-12 DIAGNOSIS — B34.9 ACUTE VIRAL SYNDROME: Primary | ICD-10-CM

## 2020-03-12 DIAGNOSIS — E11.9 TYPE 2 DIABETES MELLITUS WITHOUT COMPLICATION, WITHOUT LONG-TERM CURRENT USE OF INSULIN (HCC): ICD-10-CM

## 2020-03-12 DIAGNOSIS — E55.9 VITAMIN D DEFICIENCY: ICD-10-CM

## 2020-03-12 DIAGNOSIS — J43.1 PANLOBULAR EMPHYSEMA (HCC): ICD-10-CM

## 2020-03-12 DIAGNOSIS — F43.21 GRIEF: ICD-10-CM

## 2020-03-12 LAB
EXPIRATION DATE: NORMAL
FLUAV AG NPH QL: NEGATIVE
FLUBV AG NPH QL: NEGATIVE
HBA1C MFR BLD: 7.1 % (ref 3.5–5.6)
INTERNAL CONTROL: NORMAL
Lab: NORMAL

## 2020-03-12 PROCEDURE — 99213 OFFICE O/P EST LOW 20 MIN: CPT | Performed by: FAMILY MEDICINE

## 2020-03-12 PROCEDURE — 82306 VITAMIN D 25 HYDROXY: CPT | Performed by: FAMILY MEDICINE

## 2020-03-12 PROCEDURE — 87804 INFLUENZA ASSAY W/OPTIC: CPT | Performed by: FAMILY MEDICINE

## 2020-03-12 PROCEDURE — 36415 COLL VENOUS BLD VENIPUNCTURE: CPT | Performed by: FAMILY MEDICINE

## 2020-03-12 PROCEDURE — 83036 HEMOGLOBIN GLYCOSYLATED A1C: CPT | Performed by: FAMILY MEDICINE

## 2020-03-12 RX ORDER — ALBUTEROL SULFATE 90 UG/1
2 AEROSOL, METERED RESPIRATORY (INHALATION) EVERY 6 HOURS PRN
Qty: 18 G | Refills: 0 | Status: SHIPPED | OUTPATIENT
Start: 2020-03-12 | End: 2020-07-23

## 2020-03-13 LAB — 25(OH)D3 SERPL-MCNC: 29.2 NG/ML (ref 30–100)

## 2020-03-24 DIAGNOSIS — M54.50 ACUTE BILATERAL LOW BACK PAIN WITHOUT SCIATICA: ICD-10-CM

## 2020-03-24 RX ORDER — TRAMADOL HYDROCHLORIDE 50 MG/1
TABLET ORAL
Qty: 90 TABLET | Refills: 0 | Status: SHIPPED | OUTPATIENT
Start: 2020-03-24 | End: 2020-05-13 | Stop reason: SDUPTHER

## 2020-03-30 RX ORDER — ATORVASTATIN CALCIUM 40 MG/1
TABLET, FILM COATED ORAL
Qty: 90 TABLET | Refills: 0 | Status: SHIPPED | OUTPATIENT
Start: 2020-03-30 | End: 2020-05-13 | Stop reason: SDUPTHER

## 2020-04-02 ENCOUNTER — TELEPHONE (OUTPATIENT)
Dept: FAMILY MEDICINE CLINIC | Facility: CLINIC | Age: 65
End: 2020-04-02

## 2020-04-02 RX ORDER — TRAZODONE HYDROCHLORIDE 100 MG/1
TABLET ORAL
Qty: 30 TABLET | Refills: 0 | Status: SHIPPED | OUTPATIENT
Start: 2020-04-02 | End: 2020-05-13

## 2020-04-10 RX ORDER — CYCLOBENZAPRINE HCL 10 MG
TABLET ORAL
Qty: 30 TABLET | Refills: 0 | Status: SHIPPED | OUTPATIENT
Start: 2020-04-10 | End: 2020-05-13

## 2020-05-13 ENCOUNTER — OFFICE VISIT (OUTPATIENT)
Dept: FAMILY MEDICINE CLINIC | Facility: CLINIC | Age: 65
End: 2020-05-13

## 2020-05-13 DIAGNOSIS — I10 BENIGN ESSENTIAL HYPERTENSION: ICD-10-CM

## 2020-05-13 DIAGNOSIS — J44.1 COPD WITH ACUTE EXACERBATION (HCC): Primary | ICD-10-CM

## 2020-05-13 DIAGNOSIS — E11.9 TYPE 2 DIABETES MELLITUS WITHOUT COMPLICATION, WITHOUT LONG-TERM CURRENT USE OF INSULIN (HCC): ICD-10-CM

## 2020-05-13 DIAGNOSIS — F43.21 GRIEF AT LOSS OF CHILD: ICD-10-CM

## 2020-05-13 DIAGNOSIS — N28.9 RENAL INSUFFICIENCY: ICD-10-CM

## 2020-05-13 DIAGNOSIS — G25.0 ESSENTIAL TREMOR: ICD-10-CM

## 2020-05-13 DIAGNOSIS — Z63.4 GRIEF AT LOSS OF CHILD: ICD-10-CM

## 2020-05-13 DIAGNOSIS — E78.2 MIXED HYPERLIPIDEMIA: ICD-10-CM

## 2020-05-13 DIAGNOSIS — M54.50 ACUTE BILATERAL LOW BACK PAIN WITHOUT SCIATICA: ICD-10-CM

## 2020-05-13 PROCEDURE — 99214 OFFICE O/P EST MOD 30 MIN: CPT | Performed by: FAMILY MEDICINE

## 2020-05-13 RX ORDER — PREDNISONE 20 MG/1
TABLET ORAL
Qty: 26 TABLET | Refills: 0 | Status: SHIPPED | OUTPATIENT
Start: 2020-05-13 | End: 2020-07-23

## 2020-05-13 RX ORDER — FENOFIBRATE 160 MG/1
160 TABLET ORAL DAILY
Qty: 90 TABLET | Refills: 1 | Status: SHIPPED | OUTPATIENT
Start: 2020-05-13 | End: 2020-11-03 | Stop reason: SDUPTHER

## 2020-05-13 RX ORDER — BUPROPION HYDROCHLORIDE 150 MG/1
150 TABLET ORAL EVERY MORNING
Qty: 30 TABLET | Refills: 3 | Status: SHIPPED | OUTPATIENT
Start: 2020-05-13 | End: 2020-09-11

## 2020-05-13 RX ORDER — BUDESONIDE AND FORMOTEROL FUMARATE DIHYDRATE 160; 4.5 UG/1; UG/1
2 AEROSOL RESPIRATORY (INHALATION)
Qty: 3 INHALER | Refills: 1 | Status: SHIPPED | OUTPATIENT
Start: 2020-05-13 | End: 2020-11-03 | Stop reason: SDUPTHER

## 2020-05-13 RX ORDER — ATORVASTATIN CALCIUM 40 MG/1
40 TABLET, FILM COATED ORAL DAILY
Qty: 90 TABLET | Refills: 0 | Status: SHIPPED | OUTPATIENT
Start: 2020-05-13 | End: 2020-09-11 | Stop reason: SDUPTHER

## 2020-05-13 RX ORDER — PROPRANOLOL HYDROCHLORIDE 160 MG/1
160 CAPSULE, EXTENDED RELEASE ORAL DAILY
Qty: 90 CAPSULE | Refills: 1 | Status: SHIPPED | OUTPATIENT
Start: 2020-05-13 | End: 2020-09-11 | Stop reason: SDUPTHER

## 2020-05-13 RX ORDER — TRAMADOL HYDROCHLORIDE 50 MG/1
50 TABLET ORAL DAILY
Qty: 90 TABLET | Refills: 0 | Status: SHIPPED | OUTPATIENT
Start: 2020-05-13 | End: 2020-07-01

## 2020-05-13 RX ORDER — IPRATROPIUM BROMIDE AND ALBUTEROL SULFATE 2.5; .5 MG/3ML; MG/3ML
3 SOLUTION RESPIRATORY (INHALATION) EVERY 4 HOURS PRN
Qty: 360 ML | Refills: 0 | Status: SHIPPED | OUTPATIENT
Start: 2020-05-13 | End: 2020-09-11

## 2020-05-13 RX ORDER — AMLODIPINE BESYLATE 5 MG/1
5 TABLET ORAL DAILY
Qty: 90 TABLET | Refills: 0 | Status: SHIPPED | OUTPATIENT
Start: 2020-05-13 | End: 2020-09-11 | Stop reason: SDUPTHER

## 2020-05-13 RX ORDER — ESCITALOPRAM OXALATE 10 MG/1
10 TABLET ORAL
Qty: 90 TABLET | Refills: 0 | Status: SHIPPED | OUTPATIENT
Start: 2020-05-13 | End: 2020-07-23 | Stop reason: SDUPTHER

## 2020-05-13 SDOH — SOCIAL STABILITY - SOCIAL INSECURITY: DISSAPEARANCE AND DEATH OF FAMILY MEMBER: Z63.4

## 2020-07-01 DIAGNOSIS — M54.50 ACUTE BILATERAL LOW BACK PAIN WITHOUT SCIATICA: ICD-10-CM

## 2020-07-01 RX ORDER — TRAMADOL HYDROCHLORIDE 50 MG/1
TABLET ORAL
Qty: 90 TABLET | Refills: 0 | Status: SHIPPED | OUTPATIENT
Start: 2020-07-01 | End: 2020-07-06 | Stop reason: SDUPTHER

## 2020-07-06 ENCOUNTER — TELEPHONE (OUTPATIENT)
Dept: FAMILY MEDICINE CLINIC | Facility: CLINIC | Age: 65
End: 2020-07-06

## 2020-07-06 DIAGNOSIS — M54.50 ACUTE BILATERAL LOW BACK PAIN WITHOUT SCIATICA: ICD-10-CM

## 2020-07-06 RX ORDER — TRAMADOL HYDROCHLORIDE 50 MG/1
50 TABLET ORAL EVERY 8 HOURS PRN
Qty: 90 TABLET | Refills: 0 | Status: SHIPPED | OUTPATIENT
Start: 2020-07-06 | End: 2020-09-11 | Stop reason: SDUPTHER

## 2020-07-21 ENCOUNTER — TELEPHONE (OUTPATIENT)
Dept: FAMILY MEDICINE CLINIC | Facility: CLINIC | Age: 65
End: 2020-07-21

## 2020-07-23 ENCOUNTER — OFFICE VISIT (OUTPATIENT)
Dept: FAMILY MEDICINE CLINIC | Facility: CLINIC | Age: 65
End: 2020-07-23

## 2020-07-23 VITALS
BODY MASS INDEX: 24.59 KG/M2 | TEMPERATURE: 97.3 F | HEART RATE: 79 BPM | DIASTOLIC BLOOD PRESSURE: 88 MMHG | WEIGHT: 144 LBS | SYSTOLIC BLOOD PRESSURE: 134 MMHG | RESPIRATION RATE: 18 BRPM | HEIGHT: 64 IN | OXYGEN SATURATION: 95 %

## 2020-07-23 DIAGNOSIS — Z12.31 SCREENING MAMMOGRAM, ENCOUNTER FOR: ICD-10-CM

## 2020-07-23 DIAGNOSIS — M54.50 ACUTE BILATERAL LOW BACK PAIN WITHOUT SCIATICA: Primary | ICD-10-CM

## 2020-07-23 DIAGNOSIS — E11.9 TYPE 2 DIABETES MELLITUS WITHOUT COMPLICATION, WITHOUT LONG-TERM CURRENT USE OF INSULIN (HCC): ICD-10-CM

## 2020-07-23 DIAGNOSIS — I10 BENIGN ESSENTIAL HYPERTENSION: ICD-10-CM

## 2020-07-23 DIAGNOSIS — N28.9 RENAL INSUFFICIENCY: ICD-10-CM

## 2020-07-23 DIAGNOSIS — E78.2 MIXED HYPERLIPIDEMIA: ICD-10-CM

## 2020-07-23 LAB — HBA1C MFR BLD: 8 % (ref 3.5–5.6)

## 2020-07-23 PROCEDURE — 80061 LIPID PANEL: CPT | Performed by: FAMILY MEDICINE

## 2020-07-23 PROCEDURE — 82043 UR ALBUMIN QUANTITATIVE: CPT | Performed by: FAMILY MEDICINE

## 2020-07-23 PROCEDURE — 83036 HEMOGLOBIN GLYCOSYLATED A1C: CPT | Performed by: FAMILY MEDICINE

## 2020-07-23 PROCEDURE — 36415 COLL VENOUS BLD VENIPUNCTURE: CPT | Performed by: FAMILY MEDICINE

## 2020-07-23 PROCEDURE — 99214 OFFICE O/P EST MOD 30 MIN: CPT | Performed by: FAMILY MEDICINE

## 2020-07-23 PROCEDURE — 80053 COMPREHEN METABOLIC PANEL: CPT | Performed by: FAMILY MEDICINE

## 2020-07-23 RX ORDER — PREDNISONE 20 MG/1
TABLET ORAL
Qty: 26 TABLET | Refills: 0 | Status: SHIPPED | OUTPATIENT
Start: 2020-07-23 | End: 2020-09-11

## 2020-07-23 RX ORDER — ESCITALOPRAM OXALATE 10 MG/1
10 TABLET ORAL
Qty: 90 TABLET | Refills: 0 | Status: SHIPPED | OUTPATIENT
Start: 2020-07-23 | End: 2020-09-11 | Stop reason: SDUPTHER

## 2020-07-23 RX ORDER — METHOCARBAMOL 500 MG/1
TABLET, FILM COATED ORAL
Qty: 40 TABLET | Refills: 0 | Status: SHIPPED | OUTPATIENT
Start: 2020-07-23 | End: 2020-11-03

## 2020-07-23 RX ORDER — DONEPEZIL HYDROCHLORIDE 5 MG/1
5 TABLET, FILM COATED ORAL NIGHTLY
Qty: 30 TABLET | Refills: 0 | Status: SHIPPED | OUTPATIENT
Start: 2020-07-23 | End: 2020-08-25

## 2020-07-24 LAB
ALBUMIN SERPL-MCNC: 4.1 G/DL (ref 3.5–5.2)
ALBUMIN UR-MCNC: 2.7 MG/DL
ALBUMIN/GLOB SERPL: 1.6 G/DL
ALP SERPL-CCNC: 58 U/L (ref 39–117)
ALT SERPL W P-5'-P-CCNC: 5 U/L (ref 1–33)
ANION GAP SERPL CALCULATED.3IONS-SCNC: 13.5 MMOL/L (ref 5–15)
AST SERPL-CCNC: 16 U/L (ref 1–32)
BILIRUB SERPL-MCNC: 0.3 MG/DL (ref 0–1.2)
BUN SERPL-MCNC: 22 MG/DL (ref 8–23)
BUN/CREAT SERPL: 28.2 (ref 7–25)
CALCIUM SPEC-SCNC: 10.2 MG/DL (ref 8.6–10.5)
CHLORIDE SERPL-SCNC: 100 MMOL/L (ref 98–107)
CHOLEST SERPL-MCNC: 206 MG/DL (ref 0–200)
CO2 SERPL-SCNC: 23.5 MMOL/L (ref 22–29)
CREAT SERPL-MCNC: 0.78 MG/DL (ref 0.57–1)
GFR SERPL CREATININE-BSD FRML MDRD: 75 ML/MIN/1.73
GLOBULIN UR ELPH-MCNC: 2.6 GM/DL
GLUCOSE SERPL-MCNC: 192 MG/DL (ref 65–99)
HDLC SERPL-MCNC: 47 MG/DL (ref 40–60)
LDLC SERPL CALC-MCNC: 128 MG/DL (ref 0–100)
LDLC/HDLC SERPL: 2.71 {RATIO}
POTASSIUM SERPL-SCNC: 4.3 MMOL/L (ref 3.5–5.2)
PROT SERPL-MCNC: 6.7 G/DL (ref 6–8.5)
SODIUM SERPL-SCNC: 137 MMOL/L (ref 136–145)
TRIGL SERPL-MCNC: 157 MG/DL (ref 0–150)
VLDLC SERPL-MCNC: 31.4 MG/DL (ref 5–40)

## 2020-07-29 RX ORDER — BLOOD-GLUCOSE METER
1 KIT MISCELLANEOUS DAILY
Qty: 1 EACH | Refills: 0 | Status: SHIPPED | OUTPATIENT
Start: 2020-07-29 | End: 2020-11-03

## 2020-08-12 ENCOUNTER — TELEPHONE (OUTPATIENT)
Dept: FAMILY MEDICINE CLINIC | Facility: CLINIC | Age: 65
End: 2020-08-12

## 2020-08-25 RX ORDER — DONEPEZIL HYDROCHLORIDE 5 MG/1
TABLET, FILM COATED ORAL
Qty: 90 TABLET | Refills: 0 | Status: SHIPPED | OUTPATIENT
Start: 2020-08-25 | End: 2020-09-11

## 2020-08-28 ENCOUNTER — TELEPHONE (OUTPATIENT)
Dept: FAMILY MEDICINE CLINIC | Facility: CLINIC | Age: 65
End: 2020-08-28

## 2020-09-11 ENCOUNTER — OFFICE VISIT (OUTPATIENT)
Dept: FAMILY MEDICINE CLINIC | Facility: CLINIC | Age: 65
End: 2020-09-11

## 2020-09-11 VITALS
HEART RATE: 84 BPM | HEIGHT: 64 IN | TEMPERATURE: 97.3 F | RESPIRATION RATE: 14 BRPM | DIASTOLIC BLOOD PRESSURE: 80 MMHG | SYSTOLIC BLOOD PRESSURE: 143 MMHG | WEIGHT: 143 LBS | BODY MASS INDEX: 24.41 KG/M2 | OXYGEN SATURATION: 96 %

## 2020-09-11 DIAGNOSIS — I25.10 CORONARY ARTERY DISEASE INVOLVING NATIVE CORONARY ARTERY OF NATIVE HEART WITHOUT ANGINA PECTORIS: Primary | ICD-10-CM

## 2020-09-11 DIAGNOSIS — R41.3 MEMORY LOSS: ICD-10-CM

## 2020-09-11 DIAGNOSIS — M54.50 ACUTE BILATERAL LOW BACK PAIN WITHOUT SCIATICA: ICD-10-CM

## 2020-09-11 DIAGNOSIS — Z23 NEED FOR INFLUENZA VACCINATION: ICD-10-CM

## 2020-09-11 DIAGNOSIS — Z12.31 VISIT FOR SCREENING MAMMOGRAM: ICD-10-CM

## 2020-09-11 DIAGNOSIS — E11.9 TYPE 2 DIABETES MELLITUS WITHOUT COMPLICATION, WITHOUT LONG-TERM CURRENT USE OF INSULIN (HCC): ICD-10-CM

## 2020-09-11 DIAGNOSIS — Z12.11 SCREENING FOR COLON CANCER: ICD-10-CM

## 2020-09-11 PROCEDURE — 99214 OFFICE O/P EST MOD 30 MIN: CPT | Performed by: FAMILY MEDICINE

## 2020-09-11 PROCEDURE — 90472 IMMUNIZATION ADMIN EACH ADD: CPT | Performed by: FAMILY MEDICINE

## 2020-09-11 PROCEDURE — 90686 IIV4 VACC NO PRSV 0.5 ML IM: CPT | Performed by: FAMILY MEDICINE

## 2020-09-11 PROCEDURE — 90471 IMMUNIZATION ADMIN: CPT | Performed by: FAMILY MEDICINE

## 2020-09-11 PROCEDURE — 90732 PPSV23 VACC 2 YRS+ SUBQ/IM: CPT | Performed by: FAMILY MEDICINE

## 2020-09-11 RX ORDER — IPRATROPIUM BROMIDE AND ALBUTEROL SULFATE 2.5; .5 MG/3ML; MG/3ML
3 SOLUTION RESPIRATORY (INHALATION) EVERY 4 HOURS PRN
Qty: 360 ML | Refills: 0 | Status: SHIPPED | OUTPATIENT
Start: 2020-09-11 | End: 2022-09-01

## 2020-09-11 RX ORDER — PROPRANOLOL HYDROCHLORIDE 160 MG/1
160 CAPSULE, EXTENDED RELEASE ORAL DAILY
Qty: 90 CAPSULE | Refills: 1 | Status: SHIPPED | OUTPATIENT
Start: 2020-09-11 | End: 2021-06-29

## 2020-09-11 RX ORDER — TRAMADOL HYDROCHLORIDE 50 MG/1
50 TABLET ORAL EVERY 8 HOURS PRN
Qty: 90 TABLET | Refills: 0 | Status: SHIPPED | OUTPATIENT
Start: 2020-09-11 | End: 2020-11-18

## 2020-09-11 RX ORDER — ATORVASTATIN CALCIUM 40 MG/1
40 TABLET, FILM COATED ORAL DAILY
Qty: 90 TABLET | Refills: 0 | Status: SHIPPED | OUTPATIENT
Start: 2020-09-11 | End: 2020-10-07

## 2020-09-11 RX ORDER — OMEPRAZOLE 40 MG/1
40 CAPSULE, DELAYED RELEASE ORAL DAILY
Qty: 90 CAPSULE | Refills: 0 | Status: SHIPPED | OUTPATIENT
Start: 2020-09-11 | End: 2020-11-03 | Stop reason: SDUPTHER

## 2020-09-11 RX ORDER — BUPROPION HYDROCHLORIDE 300 MG/1
300 TABLET ORAL EVERY MORNING
Qty: 90 TABLET | Refills: 0 | Status: SHIPPED | OUTPATIENT
Start: 2020-09-11 | End: 2020-11-03 | Stop reason: SDUPTHER

## 2020-09-11 RX ORDER — DONEPEZIL HYDROCHLORIDE 10 MG/1
10 TABLET, FILM COATED ORAL NIGHTLY
Qty: 90 TABLET | Refills: 0 | Status: SHIPPED | OUTPATIENT
Start: 2020-09-11 | End: 2020-11-03 | Stop reason: SDUPTHER

## 2020-09-11 RX ORDER — AMLODIPINE BESYLATE 5 MG/1
5 TABLET ORAL DAILY
Qty: 90 TABLET | Refills: 0 | Status: SHIPPED | OUTPATIENT
Start: 2020-09-11 | End: 2020-11-03 | Stop reason: SDUPTHER

## 2020-09-11 RX ORDER — ESCITALOPRAM OXALATE 10 MG/1
10 TABLET ORAL
Qty: 90 TABLET | Refills: 0 | Status: SHIPPED | OUTPATIENT
Start: 2020-09-11 | End: 2020-11-03 | Stop reason: SDUPTHER

## 2020-09-14 ENCOUNTER — TELEPHONE (OUTPATIENT)
Dept: FAMILY MEDICINE CLINIC | Facility: CLINIC | Age: 65
End: 2020-09-14

## 2020-09-19 ENCOUNTER — RESULTS ENCOUNTER (OUTPATIENT)
Dept: FAMILY MEDICINE CLINIC | Facility: CLINIC | Age: 65
End: 2020-09-19

## 2020-09-19 DIAGNOSIS — Z12.11 SCREENING FOR COLON CANCER: ICD-10-CM

## 2020-09-25 ENCOUNTER — TELEPHONE (OUTPATIENT)
Dept: FAMILY MEDICINE CLINIC | Facility: CLINIC | Age: 65
End: 2020-09-25

## 2020-09-30 RX ORDER — PROPRANOLOL HCL 60 MG
60 CAPSULE, EXTENDED RELEASE 24HR ORAL DAILY
Qty: 30 CAPSULE | Refills: 0 | Status: SHIPPED | OUTPATIENT
Start: 2020-09-30 | End: 2020-11-03

## 2020-10-07 ENCOUNTER — OFFICE VISIT (OUTPATIENT)
Dept: CARDIOLOGY | Facility: CLINIC | Age: 65
End: 2020-10-07

## 2020-10-07 VITALS
HEART RATE: 73 BPM | BODY MASS INDEX: 24.37 KG/M2 | SYSTOLIC BLOOD PRESSURE: 143 MMHG | DIASTOLIC BLOOD PRESSURE: 77 MMHG | OXYGEN SATURATION: 95 % | WEIGHT: 142 LBS

## 2020-10-07 DIAGNOSIS — I10 BENIGN ESSENTIAL HYPERTENSION: ICD-10-CM

## 2020-10-07 DIAGNOSIS — E78.2 MIXED HYPERLIPIDEMIA: ICD-10-CM

## 2020-10-07 DIAGNOSIS — I25.10 CORONARY ARTERY DISEASE INVOLVING NATIVE CORONARY ARTERY OF NATIVE HEART WITHOUT ANGINA PECTORIS: ICD-10-CM

## 2020-10-07 DIAGNOSIS — R07.9 CHEST PAIN, UNSPECIFIED TYPE: Primary | ICD-10-CM

## 2020-10-07 DIAGNOSIS — R06.09 DYSPNEA ON EXERTION: ICD-10-CM

## 2020-10-07 DIAGNOSIS — R06.02 SHORTNESS OF BREATH: ICD-10-CM

## 2020-10-07 PROCEDURE — 99214 OFFICE O/P EST MOD 30 MIN: CPT | Performed by: INTERNAL MEDICINE

## 2020-10-07 PROCEDURE — 93000 ELECTROCARDIOGRAM COMPLETE: CPT | Performed by: INTERNAL MEDICINE

## 2020-10-07 RX ORDER — ATORVASTATIN CALCIUM 80 MG/1
80 TABLET, FILM COATED ORAL DAILY
Qty: 90 TABLET | Refills: 3 | Status: SHIPPED | OUTPATIENT
Start: 2020-10-07 | End: 2021-11-16 | Stop reason: SDUPTHER

## 2020-10-09 ENCOUNTER — APPOINTMENT (OUTPATIENT)
Dept: MAMMOGRAPHY | Facility: HOSPITAL | Age: 65
End: 2020-10-09

## 2020-10-27 ENCOUNTER — TELEPHONE (OUTPATIENT)
Dept: FAMILY MEDICINE CLINIC | Facility: CLINIC | Age: 65
End: 2020-10-27

## 2020-11-03 ENCOUNTER — OFFICE VISIT (OUTPATIENT)
Dept: FAMILY MEDICINE CLINIC | Facility: CLINIC | Age: 65
End: 2020-11-03

## 2020-11-03 VITALS
OXYGEN SATURATION: 95 % | TEMPERATURE: 97.7 F | DIASTOLIC BLOOD PRESSURE: 70 MMHG | BODY MASS INDEX: 24.41 KG/M2 | WEIGHT: 143 LBS | RESPIRATION RATE: 14 BRPM | HEART RATE: 70 BPM | HEIGHT: 64 IN | SYSTOLIC BLOOD PRESSURE: 132 MMHG

## 2020-11-03 DIAGNOSIS — G25.0 ESSENTIAL TREMOR: ICD-10-CM

## 2020-11-03 DIAGNOSIS — I10 BENIGN ESSENTIAL HYPERTENSION: ICD-10-CM

## 2020-11-03 DIAGNOSIS — E11.9 TYPE 2 DIABETES MELLITUS WITHOUT COMPLICATION, WITHOUT LONG-TERM CURRENT USE OF INSULIN (HCC): Primary | ICD-10-CM

## 2020-11-03 LAB — HBA1C MFR BLD: 7.5 %

## 2020-11-03 PROCEDURE — 99214 OFFICE O/P EST MOD 30 MIN: CPT | Performed by: FAMILY MEDICINE

## 2020-11-03 PROCEDURE — 83036 HEMOGLOBIN GLYCOSYLATED A1C: CPT | Performed by: FAMILY MEDICINE

## 2020-11-03 RX ORDER — SITAGLIPTIN AND METFORMIN HYDROCHLORIDE 1000; 100 MG/1; MG/1
TABLET, FILM COATED, EXTENDED RELEASE ORAL
Qty: 30 TABLET | Refills: 3
Start: 2020-11-03 | End: 2021-02-26 | Stop reason: SDUPTHER

## 2020-11-03 RX ORDER — IBUPROFEN 200 MG
TABLET ORAL
COMMUNITY
End: 2022-03-27 | Stop reason: HOSPADM

## 2020-11-03 RX ORDER — AMLODIPINE BESYLATE 5 MG/1
5 TABLET ORAL DAILY
Qty: 90 TABLET | Refills: 0 | Status: SHIPPED | OUTPATIENT
Start: 2020-11-03 | End: 2020-12-09

## 2020-11-03 RX ORDER — DONEPEZIL HYDROCHLORIDE 10 MG/1
10 TABLET, FILM COATED ORAL NIGHTLY
Qty: 90 TABLET | Refills: 0 | Status: SHIPPED | OUTPATIENT
Start: 2020-11-03 | End: 2021-08-09

## 2020-11-03 RX ORDER — BUDESONIDE AND FORMOTEROL FUMARATE DIHYDRATE 160; 4.5 UG/1; UG/1
2 AEROSOL RESPIRATORY (INHALATION)
Qty: 3 INHALER | Refills: 1 | Status: SHIPPED | OUTPATIENT
Start: 2020-11-03 | End: 2021-08-09 | Stop reason: SDUPTHER

## 2020-11-03 RX ORDER — BUPROPION HYDROCHLORIDE 300 MG/1
300 TABLET ORAL EVERY MORNING
Qty: 90 TABLET | Refills: 0 | Status: SHIPPED | OUTPATIENT
Start: 2020-11-03 | End: 2021-01-18

## 2020-11-03 RX ORDER — FENOFIBRATE 160 MG/1
160 TABLET ORAL DAILY
Qty: 90 TABLET | Refills: 1 | Status: SHIPPED | OUTPATIENT
Start: 2020-11-03 | End: 2021-06-29

## 2020-11-03 RX ORDER — MULTIVIT-MIN/IRON/FOLIC ACID/K 18-600-40
1 CAPSULE ORAL DAILY
Qty: 30 CAPSULE
Start: 2020-11-03 | End: 2021-09-09

## 2020-11-03 RX ORDER — PROPRANOLOL HYDROCHLORIDE 80 MG/1
80 CAPSULE, EXTENDED RELEASE ORAL DAILY
Qty: 30 CAPSULE | Refills: 1 | Status: SHIPPED | OUTPATIENT
Start: 2020-11-03 | End: 2021-03-08

## 2020-11-03 RX ORDER — OMEPRAZOLE 40 MG/1
40 CAPSULE, DELAYED RELEASE ORAL DAILY
Qty: 90 CAPSULE | Refills: 0 | Status: SHIPPED | OUTPATIENT
Start: 2020-11-03 | End: 2021-09-16 | Stop reason: SDUPTHER

## 2020-11-03 RX ORDER — ESCITALOPRAM OXALATE 10 MG/1
10 TABLET ORAL
Qty: 90 TABLET | Refills: 0 | Status: SHIPPED | OUTPATIENT
Start: 2020-11-03 | End: 2021-09-16 | Stop reason: SDUPTHER

## 2020-11-18 DIAGNOSIS — M54.50 ACUTE BILATERAL LOW BACK PAIN WITHOUT SCIATICA: ICD-10-CM

## 2020-11-18 RX ORDER — TRAMADOL HYDROCHLORIDE 50 MG/1
TABLET ORAL
Qty: 90 TABLET | Refills: 0 | Status: SHIPPED | OUTPATIENT
Start: 2020-11-18 | End: 2021-01-08

## 2020-11-23 RX ORDER — PROPRANOLOL HCL 60 MG
CAPSULE, EXTENDED RELEASE 24HR ORAL
Qty: 90 CAPSULE | Refills: 0 | Status: SHIPPED | OUTPATIENT
Start: 2020-11-23 | End: 2020-12-09

## 2020-12-09 RX ORDER — PROPRANOLOL HCL 60 MG
CAPSULE, EXTENDED RELEASE 24HR ORAL
Qty: 90 CAPSULE | Refills: 0 | Status: SHIPPED | OUTPATIENT
Start: 2020-12-09 | End: 2021-11-16

## 2020-12-09 RX ORDER — AMLODIPINE BESYLATE 5 MG/1
TABLET ORAL
Qty: 90 TABLET | Refills: 0 | Status: SHIPPED | OUTPATIENT
Start: 2020-12-09 | End: 2021-03-08 | Stop reason: SDUPTHER

## 2020-12-09 RX ORDER — ATORVASTATIN CALCIUM 40 MG/1
TABLET, FILM COATED ORAL
Qty: 90 TABLET | Refills: 0 | Status: SHIPPED | OUTPATIENT
Start: 2020-12-09 | End: 2021-03-08

## 2021-01-04 ENCOUNTER — TELEPHONE (OUTPATIENT)
Dept: FAMILY MEDICINE CLINIC | Facility: CLINIC | Age: 66
End: 2021-01-04

## 2021-01-08 DIAGNOSIS — M54.50 ACUTE BILATERAL LOW BACK PAIN WITHOUT SCIATICA: ICD-10-CM

## 2021-01-08 RX ORDER — TRAMADOL HYDROCHLORIDE 50 MG/1
TABLET ORAL
Qty: 90 TABLET | Refills: 0 | Status: SHIPPED | OUTPATIENT
Start: 2021-01-08 | End: 2021-03-02

## 2021-01-18 RX ORDER — BUPROPION HYDROCHLORIDE 300 MG/1
TABLET ORAL
Qty: 90 TABLET | Refills: 0 | Status: SHIPPED | OUTPATIENT
Start: 2021-01-18 | End: 2021-08-06

## 2021-01-21 ENCOUNTER — TELEPHONE (OUTPATIENT)
Dept: FAMILY MEDICINE CLINIC | Facility: CLINIC | Age: 66
End: 2021-01-21

## 2021-02-02 RX ORDER — AMLODIPINE BESYLATE 5 MG/1
TABLET ORAL
Qty: 90 TABLET | Refills: 0 | OUTPATIENT
Start: 2021-02-02

## 2021-02-25 DIAGNOSIS — M54.50 ACUTE BILATERAL LOW BACK PAIN WITHOUT SCIATICA: ICD-10-CM

## 2021-02-25 RX ORDER — TRAMADOL HYDROCHLORIDE 50 MG/1
TABLET ORAL
Qty: 90 TABLET | Refills: 0 | OUTPATIENT
Start: 2021-02-25

## 2021-02-26 ENCOUNTER — TELEPHONE (OUTPATIENT)
Dept: FAMILY MEDICINE CLINIC | Facility: CLINIC | Age: 66
End: 2021-02-26

## 2021-02-26 RX ORDER — SITAGLIPTIN AND METFORMIN HYDROCHLORIDE 1000; 100 MG/1; MG/1
TABLET, FILM COATED, EXTENDED RELEASE ORAL
Qty: 30 TABLET | Refills: 3 | Status: SHIPPED | OUTPATIENT
Start: 2021-02-26 | End: 2021-11-16 | Stop reason: SDUPTHER

## 2021-03-01 DIAGNOSIS — M54.50 ACUTE BILATERAL LOW BACK PAIN WITHOUT SCIATICA: ICD-10-CM

## 2021-03-02 RX ORDER — TRAMADOL HYDROCHLORIDE 50 MG/1
TABLET ORAL
Qty: 90 TABLET | Refills: 0 | Status: SHIPPED | OUTPATIENT
Start: 2021-03-02 | End: 2021-05-07

## 2021-03-08 ENCOUNTER — OFFICE VISIT (OUTPATIENT)
Dept: FAMILY MEDICINE CLINIC | Facility: CLINIC | Age: 66
End: 2021-03-08

## 2021-03-08 VITALS
RESPIRATION RATE: 16 BRPM | SYSTOLIC BLOOD PRESSURE: 137 MMHG | HEART RATE: 71 BPM | DIASTOLIC BLOOD PRESSURE: 79 MMHG | HEIGHT: 64 IN | WEIGHT: 140 LBS | OXYGEN SATURATION: 98 % | TEMPERATURE: 97.1 F | BODY MASS INDEX: 23.9 KG/M2

## 2021-03-08 DIAGNOSIS — I10 BENIGN ESSENTIAL HYPERTENSION: ICD-10-CM

## 2021-03-08 DIAGNOSIS — J43.1 PANLOBULAR EMPHYSEMA (HCC): ICD-10-CM

## 2021-03-08 DIAGNOSIS — E11.9 TYPE 2 DIABETES MELLITUS WITHOUT COMPLICATION, WITHOUT LONG-TERM CURRENT USE OF INSULIN (HCC): Primary | ICD-10-CM

## 2021-03-08 LAB — HBA1C MFR BLD: 6.5 %

## 2021-03-08 PROCEDURE — 99214 OFFICE O/P EST MOD 30 MIN: CPT | Performed by: FAMILY MEDICINE

## 2021-03-08 PROCEDURE — 83036 HEMOGLOBIN GLYCOSYLATED A1C: CPT | Performed by: FAMILY MEDICINE

## 2021-03-08 RX ORDER — AMMONIUM LACTATE 12 G/100G
CREAM TOPICAL NIGHTLY
Qty: 140 G | Refills: 1 | Status: SHIPPED | OUTPATIENT
Start: 2021-03-08

## 2021-03-08 RX ORDER — AMLODIPINE BESYLATE 5 MG/1
5 TABLET ORAL DAILY
Qty: 90 TABLET | Refills: 0 | Status: SHIPPED | OUTPATIENT
Start: 2021-03-08 | End: 2021-09-16

## 2021-03-26 ENCOUNTER — TELEPHONE (OUTPATIENT)
Dept: FAMILY MEDICINE CLINIC | Facility: CLINIC | Age: 66
End: 2021-03-26

## 2021-03-26 RX ORDER — PREDNISONE 20 MG/1
TABLET ORAL
Qty: 26 TABLET | Refills: 0 | Status: SHIPPED | OUTPATIENT
Start: 2021-03-26 | End: 2021-07-13 | Stop reason: SDUPTHER

## 2021-04-01 ENCOUNTER — PATIENT MESSAGE (OUTPATIENT)
Dept: FAMILY MEDICINE CLINIC | Facility: CLINIC | Age: 66
End: 2021-04-01

## 2021-04-16 ENCOUNTER — TELEPHONE (OUTPATIENT)
Dept: FAMILY MEDICINE CLINIC | Facility: CLINIC | Age: 66
End: 2021-04-16

## 2021-04-16 PROBLEM — R35.0 URINE FREQUENCY: Status: ACTIVE | Noted: 2021-04-16

## 2021-04-16 PROCEDURE — 87086 URINE CULTURE/COLONY COUNT: CPT | Performed by: FAMILY MEDICINE

## 2021-04-16 PROCEDURE — 87077 CULTURE AEROBIC IDENTIFY: CPT | Performed by: FAMILY MEDICINE

## 2021-04-16 PROCEDURE — 87186 SC STD MICRODIL/AGAR DIL: CPT | Performed by: FAMILY MEDICINE

## 2021-04-23 ENCOUNTER — TELEPHONE (OUTPATIENT)
Dept: FAMILY MEDICINE CLINIC | Facility: CLINIC | Age: 66
End: 2021-04-23

## 2021-05-05 RX ORDER — ALBUTEROL SULFATE 90 UG/1
2 AEROSOL, METERED RESPIRATORY (INHALATION) EVERY 6 HOURS PRN
Qty: 18 G | Refills: 0 | Status: SHIPPED | OUTPATIENT
Start: 2021-05-05 | End: 2021-07-31

## 2021-05-07 DIAGNOSIS — M54.50 ACUTE BILATERAL LOW BACK PAIN WITHOUT SCIATICA: ICD-10-CM

## 2021-05-07 RX ORDER — TRAMADOL HYDROCHLORIDE 50 MG/1
TABLET ORAL
Qty: 90 TABLET | Refills: 0 | Status: SHIPPED | OUTPATIENT
Start: 2021-05-07 | End: 2021-07-15

## 2021-06-29 RX ORDER — PROPRANOLOL HYDROCHLORIDE 160 MG/1
CAPSULE, EXTENDED RELEASE ORAL
Qty: 90 CAPSULE | Refills: 0 | Status: SHIPPED | OUTPATIENT
Start: 2021-06-29 | End: 2021-11-16 | Stop reason: SDUPTHER

## 2021-06-29 RX ORDER — FENOFIBRATE 160 MG/1
TABLET ORAL
Qty: 90 TABLET | Refills: 0 | Status: SHIPPED | OUTPATIENT
Start: 2021-06-29 | End: 2021-11-16 | Stop reason: SDUPTHER

## 2021-07-13 ENCOUNTER — TELEPHONE (OUTPATIENT)
Dept: FAMILY MEDICINE CLINIC | Facility: CLINIC | Age: 66
End: 2021-07-13

## 2021-07-13 RX ORDER — PREDNISONE 20 MG/1
TABLET ORAL
Qty: 26 TABLET | Refills: 0 | Status: SHIPPED | OUTPATIENT
Start: 2021-07-13 | End: 2021-08-09

## 2021-07-14 DIAGNOSIS — M54.50 ACUTE BILATERAL LOW BACK PAIN WITHOUT SCIATICA: ICD-10-CM

## 2021-07-15 RX ORDER — TRAMADOL HYDROCHLORIDE 50 MG/1
TABLET ORAL
Qty: 90 TABLET | Refills: 0 | Status: SHIPPED | OUTPATIENT
Start: 2021-07-15 | End: 2021-09-23

## 2021-07-31 RX ORDER — ALBUTEROL SULFATE 90 UG/1
AEROSOL, METERED RESPIRATORY (INHALATION)
Qty: 18 G | Refills: 0 | Status: SHIPPED | OUTPATIENT
Start: 2021-07-31 | End: 2021-10-27

## 2021-08-06 RX ORDER — BUPROPION HYDROCHLORIDE 300 MG/1
TABLET ORAL
Qty: 90 TABLET | Refills: 0 | Status: SHIPPED | OUTPATIENT
Start: 2021-08-06 | End: 2021-11-16 | Stop reason: SDUPTHER

## 2021-08-09 ENCOUNTER — OFFICE VISIT (OUTPATIENT)
Dept: FAMILY MEDICINE CLINIC | Facility: CLINIC | Age: 66
End: 2021-08-09

## 2021-08-09 VITALS
HEART RATE: 74 BPM | SYSTOLIC BLOOD PRESSURE: 127 MMHG | OXYGEN SATURATION: 95 % | HEIGHT: 64 IN | WEIGHT: 140 LBS | RESPIRATION RATE: 16 BRPM | TEMPERATURE: 98.4 F | BODY MASS INDEX: 23.9 KG/M2 | DIASTOLIC BLOOD PRESSURE: 71 MMHG

## 2021-08-09 DIAGNOSIS — E55.9 VITAMIN D DEFICIENCY: ICD-10-CM

## 2021-08-09 DIAGNOSIS — E78.2 MIXED HYPERLIPIDEMIA: ICD-10-CM

## 2021-08-09 DIAGNOSIS — E11.9 TYPE 2 DIABETES MELLITUS WITHOUT COMPLICATION, WITHOUT LONG-TERM CURRENT USE OF INSULIN (HCC): ICD-10-CM

## 2021-08-09 DIAGNOSIS — R06.02 SHORTNESS OF BREATH: ICD-10-CM

## 2021-08-09 DIAGNOSIS — R13.12 OROPHARYNGEAL DYSPHAGIA: ICD-10-CM

## 2021-08-09 DIAGNOSIS — J44.1 COPD WITH EXACERBATION (HCC): ICD-10-CM

## 2021-08-09 DIAGNOSIS — R05.9 COUGH: Primary | ICD-10-CM

## 2021-08-09 PROCEDURE — U0003 INFECTIOUS AGENT DETECTION BY NUCLEIC ACID (DNA OR RNA); SEVERE ACUTE RESPIRATORY SYNDROME CORONAVIRUS 2 (SARS-COV-2) (CORONAVIRUS DISEASE [COVID-19]), AMPLIFIED PROBE TECHNIQUE, MAKING USE OF HIGH THROUGHPUT TECHNOLOGIES AS DESCRIBED BY CMS-2020-01-R: HCPCS | Performed by: FAMILY MEDICINE

## 2021-08-09 PROCEDURE — 96372 THER/PROPH/DIAG INJ SC/IM: CPT | Performed by: FAMILY MEDICINE

## 2021-08-09 PROCEDURE — 99214 OFFICE O/P EST MOD 30 MIN: CPT | Performed by: FAMILY MEDICINE

## 2021-08-09 RX ORDER — AZITHROMYCIN 250 MG/1
TABLET, FILM COATED ORAL
Qty: 6 TABLET | Refills: 0 | Status: SHIPPED | OUTPATIENT
Start: 2021-08-09 | End: 2021-09-16

## 2021-08-09 RX ORDER — PREDNISONE 20 MG/1
TABLET ORAL
Qty: 26 TABLET | Refills: 0 | Status: SHIPPED | OUTPATIENT
Start: 2021-08-09 | End: 2021-09-16

## 2021-08-09 RX ORDER — BUDESONIDE AND FORMOTEROL FUMARATE DIHYDRATE 160; 4.5 UG/1; UG/1
2 AEROSOL RESPIRATORY (INHALATION)
Qty: 30.6 G | Refills: 0 | Status: SHIPPED | OUTPATIENT
Start: 2021-08-09 | End: 2021-09-16

## 2021-08-09 RX ORDER — MEMANTINE HYDROCHLORIDE 5 MG/1
5 TABLET ORAL 2 TIMES DAILY
Qty: 30 TABLET | Refills: 1 | Status: SHIPPED | OUTPATIENT
Start: 2021-08-09 | End: 2021-09-16 | Stop reason: SDUPTHER

## 2021-08-09 RX ORDER — METHYLPREDNISOLONE SODIUM SUCCINATE 40 MG/ML
80 INJECTION, POWDER, LYOPHILIZED, FOR SOLUTION INTRAMUSCULAR; INTRAVENOUS ONCE
Status: COMPLETED | OUTPATIENT
Start: 2021-08-09 | End: 2021-08-09

## 2021-08-09 RX ADMIN — METHYLPREDNISOLONE SODIUM SUCCINATE 80 MG: 40 INJECTION, POWDER, LYOPHILIZED, FOR SOLUTION INTRAMUSCULAR; INTRAVENOUS at 17:15

## 2021-08-10 ENCOUNTER — PATIENT MESSAGE (OUTPATIENT)
Dept: FAMILY MEDICINE CLINIC | Facility: CLINIC | Age: 66
End: 2021-08-10

## 2021-08-10 LAB
LABCORP SARS-COV-2, NAA 2 DAY TAT: NORMAL
SARS-COV-2 RNA RESP QL NAA+PROBE: NOT DETECTED

## 2021-08-11 ENCOUNTER — TELEPHONE (OUTPATIENT)
Dept: FAMILY MEDICINE CLINIC | Facility: CLINIC | Age: 66
End: 2021-08-11

## 2021-08-11 ENCOUNTER — PATIENT MESSAGE (OUTPATIENT)
Dept: FAMILY MEDICINE CLINIC | Facility: CLINIC | Age: 66
End: 2021-08-11

## 2021-08-20 ENCOUNTER — TELEPHONE (OUTPATIENT)
Dept: FAMILY MEDICINE CLINIC | Facility: CLINIC | Age: 66
End: 2021-08-20

## 2021-09-08 ENCOUNTER — CLINICAL SUPPORT (OUTPATIENT)
Dept: FAMILY MEDICINE CLINIC | Facility: CLINIC | Age: 66
End: 2021-09-08

## 2021-09-08 DIAGNOSIS — E11.9 TYPE 2 DIABETES MELLITUS WITHOUT COMPLICATION, WITHOUT LONG-TERM CURRENT USE OF INSULIN (HCC): ICD-10-CM

## 2021-09-08 DIAGNOSIS — E55.9 VITAMIN D DEFICIENCY: ICD-10-CM

## 2021-09-08 DIAGNOSIS — E78.2 MIXED HYPERLIPIDEMIA: ICD-10-CM

## 2021-09-08 PROCEDURE — 80061 LIPID PANEL: CPT | Performed by: FAMILY MEDICINE

## 2021-09-08 PROCEDURE — 80053 COMPREHEN METABOLIC PANEL: CPT | Performed by: FAMILY MEDICINE

## 2021-09-08 PROCEDURE — 82043 UR ALBUMIN QUANTITATIVE: CPT | Performed by: FAMILY MEDICINE

## 2021-09-08 PROCEDURE — 82306 VITAMIN D 25 HYDROXY: CPT | Performed by: FAMILY MEDICINE

## 2021-09-08 PROCEDURE — 83036 HEMOGLOBIN GLYCOSYLATED A1C: CPT | Performed by: FAMILY MEDICINE

## 2021-09-08 PROCEDURE — 36415 COLL VENOUS BLD VENIPUNCTURE: CPT | Performed by: FAMILY MEDICINE

## 2021-09-09 LAB
25(OH)D3 SERPL-MCNC: 23.1 NG/ML (ref 30–100)
ALBUMIN SERPL-MCNC: 4.1 G/DL (ref 3.5–5.2)
ALBUMIN UR-MCNC: 1.7 MG/DL
ALBUMIN/GLOB SERPL: 1.8 G/DL
ALP SERPL-CCNC: 52 U/L (ref 39–117)
ALT SERPL W P-5'-P-CCNC: 14 U/L (ref 1–33)
ANION GAP SERPL CALCULATED.3IONS-SCNC: 10.1 MMOL/L (ref 5–15)
AST SERPL-CCNC: 17 U/L (ref 1–32)
BILIRUB SERPL-MCNC: 0.2 MG/DL (ref 0–1.2)
BUN SERPL-MCNC: 21 MG/DL (ref 8–23)
BUN/CREAT SERPL: 23.9 (ref 7–25)
CALCIUM SPEC-SCNC: 10.2 MG/DL (ref 8.6–10.5)
CHLORIDE SERPL-SCNC: 101 MMOL/L (ref 98–107)
CHOLEST SERPL-MCNC: 189 MG/DL (ref 0–200)
CO2 SERPL-SCNC: 28.9 MMOL/L (ref 22–29)
CREAT SERPL-MCNC: 0.88 MG/DL (ref 0.57–1)
GFR SERPL CREATININE-BSD FRML MDRD: 65 ML/MIN/1.73
GLOBULIN UR ELPH-MCNC: 2.3 GM/DL
GLUCOSE SERPL-MCNC: 153 MG/DL (ref 65–99)
HBA1C MFR BLD: 7.4 % (ref 3.5–5.6)
HDLC SERPL-MCNC: 45 MG/DL (ref 40–60)
LDLC SERPL CALC-MCNC: 117 MG/DL (ref 0–100)
LDLC/HDLC SERPL: 2.53 {RATIO}
POTASSIUM SERPL-SCNC: 4.6 MMOL/L (ref 3.5–5.2)
PROT SERPL-MCNC: 6.4 G/DL (ref 6–8.5)
SODIUM SERPL-SCNC: 140 MMOL/L (ref 136–145)
TRIGL SERPL-MCNC: 151 MG/DL (ref 0–150)
VLDLC SERPL-MCNC: 27 MG/DL (ref 5–40)

## 2021-09-09 RX ORDER — ERGOCALCIFEROL 1.25 MG/1
50000 CAPSULE ORAL WEEKLY
Qty: 12 CAPSULE | Refills: 0 | Status: SHIPPED | OUTPATIENT
Start: 2021-09-09 | End: 2021-11-16

## 2021-09-16 ENCOUNTER — OFFICE VISIT (OUTPATIENT)
Dept: FAMILY MEDICINE CLINIC | Facility: CLINIC | Age: 66
End: 2021-09-16

## 2021-09-16 VITALS
RESPIRATION RATE: 16 BRPM | OXYGEN SATURATION: 91 % | SYSTOLIC BLOOD PRESSURE: 112 MMHG | HEART RATE: 80 BPM | TEMPERATURE: 97.3 F | HEIGHT: 64 IN | BODY MASS INDEX: 22.53 KG/M2 | WEIGHT: 132 LBS | DIASTOLIC BLOOD PRESSURE: 67 MMHG

## 2021-09-16 DIAGNOSIS — Z12.31 VISIT FOR SCREENING MAMMOGRAM: ICD-10-CM

## 2021-09-16 DIAGNOSIS — I10 BENIGN ESSENTIAL HYPERTENSION: ICD-10-CM

## 2021-09-16 DIAGNOSIS — J43.1 PANLOBULAR EMPHYSEMA (HCC): ICD-10-CM

## 2021-09-16 DIAGNOSIS — Z00.00 ANNUAL PHYSICAL EXAM: Primary | ICD-10-CM

## 2021-09-16 DIAGNOSIS — I25.10 CORONARY ARTERY DISEASE INVOLVING NATIVE CORONARY ARTERY OF NATIVE HEART WITHOUT ANGINA PECTORIS: ICD-10-CM

## 2021-09-16 DIAGNOSIS — R41.3 MEMORY LOSS: ICD-10-CM

## 2021-09-16 DIAGNOSIS — E11.65 TYPE 2 DIABETES MELLITUS WITH HYPERGLYCEMIA, WITHOUT LONG-TERM CURRENT USE OF INSULIN (HCC): ICD-10-CM

## 2021-09-16 LAB
BILIRUB BLD-MCNC: ABNORMAL MG/DL
CLARITY, POC: CLEAR
COLOR UR: YELLOW
GLUCOSE UR STRIP-MCNC: NEGATIVE MG/DL
KETONES UR QL: NEGATIVE
LEUKOCYTE EST, POC: NEGATIVE
NITRITE UR-MCNC: NEGATIVE MG/ML
PH UR: 6 [PH] (ref 5–8)
PROT UR STRIP-MCNC: ABNORMAL MG/DL
RBC # UR STRIP: NEGATIVE /UL
SP GR UR: 1.03 (ref 1–1.03)
UROBILINOGEN UR QL: NORMAL

## 2021-09-16 PROCEDURE — 81003 URINALYSIS AUTO W/O SCOPE: CPT | Performed by: FAMILY MEDICINE

## 2021-09-16 PROCEDURE — 99396 PREV VISIT EST AGE 40-64: CPT | Performed by: FAMILY MEDICINE

## 2021-09-16 RX ORDER — OMEPRAZOLE 40 MG/1
40 CAPSULE, DELAYED RELEASE ORAL DAILY
Qty: 90 CAPSULE | Refills: 0 | Status: SHIPPED | OUTPATIENT
Start: 2021-09-16 | End: 2021-11-16 | Stop reason: SDUPTHER

## 2021-09-16 RX ORDER — ESCITALOPRAM OXALATE 10 MG/1
10 TABLET ORAL
Qty: 90 TABLET | Refills: 0 | Status: SHIPPED | OUTPATIENT
Start: 2021-09-16 | End: 2021-11-16 | Stop reason: SDUPTHER

## 2021-09-16 RX ORDER — MEMANTINE HYDROCHLORIDE 5 MG/1
5 TABLET ORAL 2 TIMES DAILY
Qty: 180 TABLET | Refills: 0 | Status: SHIPPED | OUTPATIENT
Start: 2021-09-16 | End: 2021-11-16

## 2021-09-16 RX ORDER — AMLODIPINE BESYLATE 5 MG/1
TABLET ORAL
Qty: 90 TABLET | Refills: 0 | Status: SHIPPED | OUTPATIENT
Start: 2021-09-16 | End: 2021-11-16 | Stop reason: SDUPTHER

## 2021-09-21 ENCOUNTER — TELEPHONE (OUTPATIENT)
Dept: FAMILY MEDICINE CLINIC | Facility: CLINIC | Age: 66
End: 2021-09-21

## 2021-09-21 LAB
AGE GDLN ACOG TESTING: NORMAL
CYTOLOGIST CVX/VAG CYTO: NORMAL
CYTOLOGY CVX/VAG DOC CYTO: NORMAL
CYTOLOGY CVX/VAG DOC THIN PREP: NORMAL
DX ICD CODE: NORMAL
HIV 1 & 2 AB SER-IMP: NORMAL
HPV I/H RISK 4 DNA CVX QL PROBE+SIG AMP: NEGATIVE
OTHER STN SPEC: NORMAL
STAT OF ADQ CVX/VAG CYTO-IMP: NORMAL

## 2021-09-23 DIAGNOSIS — M54.50 ACUTE BILATERAL LOW BACK PAIN WITHOUT SCIATICA: ICD-10-CM

## 2021-09-23 RX ORDER — TRAMADOL HYDROCHLORIDE 50 MG/1
TABLET ORAL
Qty: 90 TABLET | Refills: 0 | Status: SHIPPED | OUTPATIENT
Start: 2021-09-23 | End: 2021-12-09

## 2021-10-26 ENCOUNTER — TELEPHONE (OUTPATIENT)
Dept: FAMILY MEDICINE CLINIC | Facility: CLINIC | Age: 66
End: 2021-10-26

## 2021-10-27 RX ORDER — ALBUTEROL SULFATE 90 UG/1
AEROSOL, METERED RESPIRATORY (INHALATION)
Qty: 18 G | Refills: 0 | Status: SHIPPED | OUTPATIENT
Start: 2021-10-27 | End: 2022-03-17

## 2021-11-10 ENCOUNTER — TELEPHONE (OUTPATIENT)
Dept: FAMILY MEDICINE CLINIC | Facility: CLINIC | Age: 66
End: 2021-11-10

## 2021-11-16 ENCOUNTER — OFFICE VISIT (OUTPATIENT)
Dept: FAMILY MEDICINE CLINIC | Facility: CLINIC | Age: 66
End: 2021-11-16

## 2021-11-16 VITALS
SYSTOLIC BLOOD PRESSURE: 131 MMHG | WEIGHT: 137 LBS | BODY MASS INDEX: 23.39 KG/M2 | HEART RATE: 56 BPM | OXYGEN SATURATION: 99 % | DIASTOLIC BLOOD PRESSURE: 72 MMHG | TEMPERATURE: 96.8 F | HEIGHT: 64 IN | RESPIRATION RATE: 14 BRPM

## 2021-11-16 DIAGNOSIS — R42 DIZZY SPELLS: ICD-10-CM

## 2021-11-16 DIAGNOSIS — R41.3 MEMORY LOSS: ICD-10-CM

## 2021-11-16 DIAGNOSIS — F32.4 MAJOR DEPRESSIVE DISORDER IN PARTIAL REMISSION, UNSPECIFIED WHETHER RECURRENT (HCC): ICD-10-CM

## 2021-11-16 DIAGNOSIS — E55.9 VITAMIN D DEFICIENCY: ICD-10-CM

## 2021-11-16 DIAGNOSIS — Z23 NEED FOR INFLUENZA VACCINATION: ICD-10-CM

## 2021-11-16 DIAGNOSIS — G25.0 ESSENTIAL TREMOR: ICD-10-CM

## 2021-11-16 DIAGNOSIS — E11.65 TYPE 2 DIABETES MELLITUS WITH HYPERGLYCEMIA, WITHOUT LONG-TERM CURRENT USE OF INSULIN (HCC): Primary | ICD-10-CM

## 2021-11-16 PROCEDURE — 99214 OFFICE O/P EST MOD 30 MIN: CPT | Performed by: FAMILY MEDICINE

## 2021-11-16 PROCEDURE — 90471 IMMUNIZATION ADMIN: CPT | Performed by: FAMILY MEDICINE

## 2021-11-16 PROCEDURE — 90686 IIV4 VACC NO PRSV 0.5 ML IM: CPT | Performed by: FAMILY MEDICINE

## 2021-11-16 RX ORDER — AMLODIPINE BESYLATE 5 MG/1
5 TABLET ORAL DAILY
Qty: 90 TABLET | Refills: 1 | Status: SHIPPED | OUTPATIENT
Start: 2021-11-16 | End: 2021-12-17 | Stop reason: SDUPTHER

## 2021-11-16 RX ORDER — SITAGLIPTIN AND METFORMIN HYDROCHLORIDE 1000; 100 MG/1; MG/1
TABLET, FILM COATED, EXTENDED RELEASE ORAL
Qty: 30 TABLET | Refills: 5 | Status: SHIPPED | OUTPATIENT
Start: 2021-11-16 | End: 2022-08-11

## 2021-11-16 RX ORDER — BUPROPION HYDROCHLORIDE 300 MG/1
300 TABLET ORAL EVERY MORNING
Qty: 90 TABLET | Refills: 0 | Status: SHIPPED | OUTPATIENT
Start: 2021-11-16 | End: 2021-12-17 | Stop reason: SDUPTHER

## 2021-11-16 RX ORDER — PROPRANOLOL HYDROCHLORIDE 160 MG/1
160 CAPSULE, EXTENDED RELEASE ORAL DAILY
Qty: 90 CAPSULE | Refills: 1 | Status: SHIPPED | OUTPATIENT
Start: 2021-11-16 | End: 2022-10-11

## 2021-11-16 RX ORDER — ESCITALOPRAM OXALATE 20 MG/1
20 TABLET ORAL
Qty: 90 TABLET | Refills: 1 | Status: SHIPPED | OUTPATIENT
Start: 2021-11-16 | End: 2021-12-17 | Stop reason: SDUPTHER

## 2021-11-16 RX ORDER — MELATONIN
1000 DAILY
COMMUNITY

## 2021-11-16 RX ORDER — FENOFIBRATE 160 MG/1
160 TABLET ORAL DAILY
Qty: 90 TABLET | Refills: 0 | Status: SHIPPED | OUTPATIENT
Start: 2021-11-16 | End: 2021-12-17 | Stop reason: SDUPTHER

## 2021-11-16 RX ORDER — OMEPRAZOLE 40 MG/1
40 CAPSULE, DELAYED RELEASE ORAL DAILY
Qty: 90 CAPSULE | Refills: 0 | Status: SHIPPED | OUTPATIENT
Start: 2021-11-16 | End: 2023-03-02 | Stop reason: SDUPTHER

## 2021-11-16 RX ORDER — ATORVASTATIN CALCIUM 80 MG/1
80 TABLET, FILM COATED ORAL DAILY
Qty: 90 TABLET | Refills: 3 | Status: SHIPPED | OUTPATIENT
Start: 2021-11-16 | End: 2021-12-17 | Stop reason: SDUPTHER

## 2021-12-09 DIAGNOSIS — M54.50 ACUTE BILATERAL LOW BACK PAIN WITHOUT SCIATICA: ICD-10-CM

## 2021-12-09 RX ORDER — TRAMADOL HYDROCHLORIDE 50 MG/1
TABLET ORAL
Qty: 90 TABLET | Refills: 0 | Status: SHIPPED | OUTPATIENT
Start: 2021-12-09 | End: 2022-02-22

## 2021-12-15 ENCOUNTER — LAB (OUTPATIENT)
Dept: FAMILY MEDICINE CLINIC | Facility: CLINIC | Age: 66
End: 2021-12-15

## 2021-12-15 DIAGNOSIS — E55.9 VITAMIN D DEFICIENCY: ICD-10-CM

## 2021-12-15 DIAGNOSIS — E11.65 TYPE 2 DIABETES MELLITUS WITH HYPERGLYCEMIA, WITHOUT LONG-TERM CURRENT USE OF INSULIN (HCC): ICD-10-CM

## 2021-12-15 LAB — HBA1C MFR BLD: 6.3 % (ref 3.5–5.6)

## 2021-12-15 PROCEDURE — 82306 VITAMIN D 25 HYDROXY: CPT | Performed by: FAMILY MEDICINE

## 2021-12-15 PROCEDURE — 83036 HEMOGLOBIN GLYCOSYLATED A1C: CPT | Performed by: FAMILY MEDICINE

## 2021-12-15 PROCEDURE — 36415 COLL VENOUS BLD VENIPUNCTURE: CPT | Performed by: FAMILY MEDICINE

## 2021-12-16 ENCOUNTER — TELEPHONE (OUTPATIENT)
Dept: FAMILY MEDICINE CLINIC | Facility: CLINIC | Age: 66
End: 2021-12-16

## 2021-12-16 LAB — 25(OH)D3 SERPL-MCNC: 33.3 NG/ML

## 2021-12-17 RX ORDER — BUPROPION HYDROCHLORIDE 300 MG/1
300 TABLET ORAL EVERY MORNING
Qty: 90 TABLET | Refills: 0 | Status: SHIPPED | OUTPATIENT
Start: 2021-12-17 | End: 2022-10-11

## 2021-12-17 RX ORDER — ESCITALOPRAM OXALATE 20 MG/1
20 TABLET ORAL
Qty: 90 TABLET | Refills: 1 | Status: SHIPPED | OUTPATIENT
Start: 2021-12-17 | End: 2022-11-29

## 2021-12-17 RX ORDER — ATORVASTATIN CALCIUM 80 MG/1
80 TABLET, FILM COATED ORAL DAILY
Qty: 90 TABLET | Refills: 3 | Status: SHIPPED | OUTPATIENT
Start: 2021-12-17 | End: 2022-10-11 | Stop reason: SDUPTHER

## 2021-12-17 RX ORDER — AMLODIPINE BESYLATE 5 MG/1
5 TABLET ORAL DAILY
Qty: 90 TABLET | Refills: 1 | Status: SHIPPED | OUTPATIENT
Start: 2021-12-17 | End: 2023-02-01 | Stop reason: SDUPTHER

## 2021-12-17 RX ORDER — FENOFIBRATE 160 MG/1
160 TABLET ORAL DAILY
Qty: 90 TABLET | Refills: 0 | Status: SHIPPED | OUTPATIENT
Start: 2021-12-17 | End: 2022-10-11

## 2021-12-20 RX ORDER — ESCITALOPRAM OXALATE 10 MG/1
10 TABLET ORAL
Qty: 90 TABLET | Refills: 0 | OUTPATIENT
Start: 2021-12-20

## 2021-12-20 RX ORDER — ERGOCALCIFEROL 1.25 MG/1
CAPSULE ORAL
Qty: 12 CAPSULE | Refills: 0 | Status: SHIPPED | OUTPATIENT
Start: 2021-12-20 | End: 2022-04-05

## 2021-12-28 ENCOUNTER — TELEPHONE (OUTPATIENT)
Dept: FAMILY MEDICINE CLINIC | Facility: CLINIC | Age: 66
End: 2021-12-28

## 2021-12-28 RX ORDER — METHOCARBAMOL 500 MG/1
TABLET, FILM COATED ORAL
Qty: 40 TABLET | Refills: 0 | Status: SHIPPED | OUTPATIENT
Start: 2021-12-28 | End: 2022-07-26

## 2022-01-07 ENCOUNTER — TELEPHONE (OUTPATIENT)
Dept: FAMILY MEDICINE CLINIC | Facility: CLINIC | Age: 67
End: 2022-01-07

## 2022-01-07 RX ORDER — ESCITALOPRAM OXALATE 10 MG/1
10 TABLET ORAL
Qty: 90 TABLET | Refills: 0 | OUTPATIENT
Start: 2022-01-07

## 2022-01-07 RX ORDER — NITROFURANTOIN 25; 75 MG/1; MG/1
100 CAPSULE ORAL 2 TIMES DAILY
Qty: 14 CAPSULE | Refills: 0 | Status: ON HOLD | OUTPATIENT
Start: 2022-01-07 | End: 2022-03-26

## 2022-01-20 ENCOUNTER — TELEPHONE (OUTPATIENT)
Dept: FAMILY MEDICINE CLINIC | Facility: CLINIC | Age: 67
End: 2022-01-20

## 2022-01-21 ENCOUNTER — TELEPHONE (OUTPATIENT)
Dept: FAMILY MEDICINE CLINIC | Facility: CLINIC | Age: 67
End: 2022-01-21

## 2022-01-21 RX ORDER — DEXAMETHASONE 6 MG/1
6 TABLET ORAL DAILY
Qty: 10 TABLET | Refills: 0 | Status: ON HOLD | OUTPATIENT
Start: 2022-01-21 | End: 2022-03-26

## 2022-02-02 RX ORDER — AZITHROMYCIN 250 MG/1
TABLET, FILM COATED ORAL
Qty: 6 TABLET | Refills: 0 | Status: ON HOLD | OUTPATIENT
Start: 2022-02-02 | End: 2022-03-26

## 2022-02-21 DIAGNOSIS — M54.50 ACUTE BILATERAL LOW BACK PAIN WITHOUT SCIATICA: ICD-10-CM

## 2022-02-22 RX ORDER — TRAMADOL HYDROCHLORIDE 50 MG/1
TABLET ORAL
Qty: 90 TABLET | Refills: 0 | Status: SHIPPED | OUTPATIENT
Start: 2022-02-22 | End: 2022-05-17

## 2022-03-17 RX ORDER — ALBUTEROL SULFATE 90 UG/1
AEROSOL, METERED RESPIRATORY (INHALATION)
Qty: 18 G | Refills: 0 | Status: SHIPPED | OUTPATIENT
Start: 2022-03-17 | End: 2022-05-16

## 2022-03-23 ENCOUNTER — APPOINTMENT (OUTPATIENT)
Dept: GENERAL RADIOLOGY | Facility: HOSPITAL | Age: 67
End: 2022-03-23

## 2022-03-23 ENCOUNTER — APPOINTMENT (OUTPATIENT)
Dept: CARDIOLOGY | Facility: HOSPITAL | Age: 67
End: 2022-03-23

## 2022-03-23 ENCOUNTER — HOSPITAL ENCOUNTER (INPATIENT)
Facility: HOSPITAL | Age: 67
LOS: 2 days | Discharge: HOME HEALTH | End: 2022-03-27
Attending: EMERGENCY MEDICINE | Admitting: HOSPITALIST

## 2022-03-23 DIAGNOSIS — R06.09 EXERTIONAL DYSPNEA: ICD-10-CM

## 2022-03-23 DIAGNOSIS — N39.0 URINARY TRACT INFECTION WITHOUT HEMATURIA, SITE UNSPECIFIED: ICD-10-CM

## 2022-03-23 DIAGNOSIS — J96.21 ACUTE ON CHRONIC RESPIRATORY FAILURE WITH HYPOXIA: ICD-10-CM

## 2022-03-23 DIAGNOSIS — S72.044A NONDISPLACED FRACTURE OF BASE OF NECK OF RIGHT FEMUR, INITIAL ENCOUNTER FOR CLOSED FRACTURE: ICD-10-CM

## 2022-03-23 DIAGNOSIS — R07.9 CHEST PAIN, UNSPECIFIED TYPE: ICD-10-CM

## 2022-03-23 DIAGNOSIS — J44.1 COPD EXACERBATION: Primary | ICD-10-CM

## 2022-03-23 PROBLEM — J96.20 ACUTE-ON-CHRONIC RESPIRATORY FAILURE: Status: ACTIVE | Noted: 2022-03-23

## 2022-03-23 LAB
ALBUMIN SERPL-MCNC: 4 G/DL (ref 3.5–5.2)
ALBUMIN/GLOB SERPL: 1.6 G/DL
ALP SERPL-CCNC: 64 U/L (ref 39–117)
ALT SERPL W P-5'-P-CCNC: 12 U/L (ref 1–33)
ANION GAP SERPL CALCULATED.3IONS-SCNC: 11 MMOL/L (ref 5–15)
APTT PPP: 28.5 SECONDS (ref 61–76.5)
AST SERPL-CCNC: 17 U/L (ref 1–32)
B PARAPERT DNA SPEC QL NAA+PROBE: NOT DETECTED
B PERT DNA SPEC QL NAA+PROBE: NOT DETECTED
BACTERIA UR QL AUTO: ABNORMAL /HPF
BASOPHILS # BLD AUTO: 0.1 10*3/MM3 (ref 0–0.2)
BASOPHILS NFR BLD AUTO: 1.2 % (ref 0–1.5)
BH CV LOWER VASCULAR LEFT COMMON FEMORAL AUGMENT: NORMAL
BH CV LOWER VASCULAR LEFT COMMON FEMORAL COMPETENT: NORMAL
BH CV LOWER VASCULAR LEFT COMMON FEMORAL COMPRESS: NORMAL
BH CV LOWER VASCULAR LEFT COMMON FEMORAL PHASIC: NORMAL
BH CV LOWER VASCULAR LEFT COMMON FEMORAL SPONT: NORMAL
BH CV LOWER VASCULAR RIGHT COMMON FEMORAL AUGMENT: NORMAL
BH CV LOWER VASCULAR RIGHT COMMON FEMORAL COMPETENT: NORMAL
BH CV LOWER VASCULAR RIGHT COMMON FEMORAL COMPRESS: NORMAL
BH CV LOWER VASCULAR RIGHT COMMON FEMORAL PHASIC: NORMAL
BH CV LOWER VASCULAR RIGHT COMMON FEMORAL SPONT: NORMAL
BH CV LOWER VASCULAR RIGHT DISTAL FEMORAL COMPRESS: NORMAL
BH CV LOWER VASCULAR RIGHT GASTRONEMIUS COMPRESS: NORMAL
BH CV LOWER VASCULAR RIGHT GREATER SAPH BK COMPRESS: NORMAL
BH CV LOWER VASCULAR RIGHT LESSER SAPH COMPRESS: NORMAL
BH CV LOWER VASCULAR RIGHT MID FEMORAL AUGMENT: NORMAL
BH CV LOWER VASCULAR RIGHT MID FEMORAL COMPETENT: NORMAL
BH CV LOWER VASCULAR RIGHT MID FEMORAL COMPRESS: NORMAL
BH CV LOWER VASCULAR RIGHT MID FEMORAL PHASIC: NORMAL
BH CV LOWER VASCULAR RIGHT MID FEMORAL SPONT: NORMAL
BH CV LOWER VASCULAR RIGHT PERONEAL COMPRESS: NORMAL
BH CV LOWER VASCULAR RIGHT POPLITEAL AUGMENT: NORMAL
BH CV LOWER VASCULAR RIGHT POPLITEAL COMPETENT: NORMAL
BH CV LOWER VASCULAR RIGHT POPLITEAL COMPRESS: NORMAL
BH CV LOWER VASCULAR RIGHT POPLITEAL PHASIC: NORMAL
BH CV LOWER VASCULAR RIGHT POPLITEAL SPONT: NORMAL
BH CV LOWER VASCULAR RIGHT POSTERIOR TIBIAL COMPRESS: NORMAL
BH CV LOWER VASCULAR RIGHT PROXIMAL FEMORAL COMPRESS: NORMAL
BH CV LOWER VASCULAR RIGHT SAPHENOFEMORAL JUNCTION COMPRESS: NORMAL
BILIRUB SERPL-MCNC: <0.2 MG/DL (ref 0–1.2)
BILIRUB UR QL STRIP: NEGATIVE
BUN SERPL-MCNC: 22 MG/DL (ref 8–23)
BUN/CREAT SERPL: 23.9 (ref 7–25)
C PNEUM DNA NPH QL NAA+NON-PROBE: NOT DETECTED
CALCIUM SPEC-SCNC: 10 MG/DL (ref 8.6–10.5)
CHLORIDE SERPL-SCNC: 105 MMOL/L (ref 98–107)
CLARITY UR: ABNORMAL
CO2 SERPL-SCNC: 24 MMOL/L (ref 22–29)
COLOR UR: YELLOW
CREAT SERPL-MCNC: 0.92 MG/DL (ref 0.57–1)
D DIMER PPP FEU-MCNC: 0.42 MG/L (FEU) (ref 0–0.59)
D-LACTATE SERPL-SCNC: 0.4 MMOL/L (ref 0.5–2)
DEPRECATED RDW RBC AUTO: 41.6 FL (ref 37–54)
EGFRCR SERPLBLD CKD-EPI 2021: 69.7 ML/MIN/1.73
EOSINOPHIL # BLD AUTO: 1 10*3/MM3 (ref 0–0.4)
EOSINOPHIL NFR BLD AUTO: 10.9 % (ref 0.3–6.2)
ERYTHROCYTE [DISTWIDTH] IN BLOOD BY AUTOMATED COUNT: 13.8 % (ref 12.3–15.4)
FLUAV SUBTYP SPEC NAA+PROBE: NOT DETECTED
FLUBV RNA ISLT QL NAA+PROBE: NOT DETECTED
GLOBULIN UR ELPH-MCNC: 2.5 GM/DL
GLUCOSE SERPL-MCNC: 182 MG/DL (ref 65–99)
GLUCOSE UR STRIP-MCNC: NEGATIVE MG/DL
HADV DNA SPEC NAA+PROBE: NOT DETECTED
HCOV 229E RNA SPEC QL NAA+PROBE: NOT DETECTED
HCOV HKU1 RNA SPEC QL NAA+PROBE: NOT DETECTED
HCOV NL63 RNA SPEC QL NAA+PROBE: NOT DETECTED
HCOV OC43 RNA SPEC QL NAA+PROBE: NOT DETECTED
HCT VFR BLD AUTO: 40 % (ref 34–46.6)
HGB BLD-MCNC: 13.9 G/DL (ref 12–15.9)
HGB UR QL STRIP.AUTO: NEGATIVE
HMPV RNA NPH QL NAA+NON-PROBE: NOT DETECTED
HPIV1 RNA ISLT QL NAA+PROBE: NOT DETECTED
HPIV2 RNA SPEC QL NAA+PROBE: NOT DETECTED
HPIV3 RNA NPH QL NAA+PROBE: NOT DETECTED
HPIV4 P GENE NPH QL NAA+PROBE: NOT DETECTED
HYALINE CASTS UR QL AUTO: ABNORMAL /LPF
INR PPP: 1 (ref 0.93–1.1)
KETONES UR QL STRIP: NEGATIVE
LEUKOCYTE ESTERASE UR QL STRIP.AUTO: ABNORMAL
LYMPHOCYTES # BLD AUTO: 2.3 10*3/MM3 (ref 0.7–3.1)
LYMPHOCYTES NFR BLD AUTO: 25.2 % (ref 19.6–45.3)
M PNEUMO IGG SER IA-ACNC: NOT DETECTED
MAXIMAL PREDICTED HEART RATE: 156 BPM
MCH RBC QN AUTO: 29.9 PG (ref 26.6–33)
MCHC RBC AUTO-ENTMCNC: 34.7 G/DL (ref 31.5–35.7)
MCV RBC AUTO: 86 FL (ref 79–97)
MONOCYTES # BLD AUTO: 0.9 10*3/MM3 (ref 0.1–0.9)
MONOCYTES NFR BLD AUTO: 9.7 % (ref 5–12)
NEUTROPHILS NFR BLD AUTO: 4.8 10*3/MM3 (ref 1.7–7)
NEUTROPHILS NFR BLD AUTO: 53 % (ref 42.7–76)
NITRITE UR QL STRIP: NEGATIVE
NRBC BLD AUTO-RTO: 0 /100 WBC (ref 0–0.2)
NT-PROBNP SERPL-MCNC: 356.1 PG/ML (ref 0–900)
PH UR STRIP.AUTO: 7 [PH] (ref 5–8)
PLATELET # BLD AUTO: 345 10*3/MM3 (ref 140–450)
PMV BLD AUTO: 7.6 FL (ref 6–12)
POTASSIUM SERPL-SCNC: 4.3 MMOL/L (ref 3.5–5.2)
PROT SERPL-MCNC: 6.5 G/DL (ref 6–8.5)
PROT UR QL STRIP: NEGATIVE
PROTHROMBIN TIME: 11.1 SECONDS (ref 9.6–11.7)
RBC # BLD AUTO: 4.65 10*6/MM3 (ref 3.77–5.28)
RBC # UR STRIP: ABNORMAL /HPF
REF LAB TEST METHOD: ABNORMAL
RHINOVIRUS RNA SPEC NAA+PROBE: NOT DETECTED
RSV RNA NPH QL NAA+NON-PROBE: NOT DETECTED
SARS-COV-2 RNA NPH QL NAA+NON-PROBE: NOT DETECTED
SODIUM SERPL-SCNC: 140 MMOL/L (ref 136–145)
SP GR UR STRIP: 1.02 (ref 1–1.03)
SQUAMOUS #/AREA URNS HPF: ABNORMAL /HPF
STRESS TARGET HR: 133 BPM
TROPONIN T SERPL-MCNC: 0.02 NG/ML (ref 0–0.03)
UROBILINOGEN UR QL STRIP: ABNORMAL
WBC # UR STRIP: ABNORMAL /HPF
WBC NRBC COR # BLD: 9.1 10*3/MM3 (ref 3.4–10.8)

## 2022-03-23 PROCEDURE — 81001 URINALYSIS AUTO W/SCOPE: CPT | Performed by: EMERGENCY MEDICINE

## 2022-03-23 PROCEDURE — 73502 X-RAY EXAM HIP UNI 2-3 VIEWS: CPT

## 2022-03-23 PROCEDURE — 85610 PROTHROMBIN TIME: CPT | Performed by: EMERGENCY MEDICINE

## 2022-03-23 PROCEDURE — 85025 COMPLETE CBC W/AUTO DIFF WBC: CPT | Performed by: EMERGENCY MEDICINE

## 2022-03-23 PROCEDURE — G0378 HOSPITAL OBSERVATION PER HR: HCPCS

## 2022-03-23 PROCEDURE — 25010000002 CEFTRIAXONE PER 250 MG: Performed by: EMERGENCY MEDICINE

## 2022-03-23 PROCEDURE — 93005 ELECTROCARDIOGRAM TRACING: CPT

## 2022-03-23 PROCEDURE — 71045 X-RAY EXAM CHEST 1 VIEW: CPT

## 2022-03-23 PROCEDURE — 93971 EXTREMITY STUDY: CPT

## 2022-03-23 PROCEDURE — 84484 ASSAY OF TROPONIN QUANT: CPT | Performed by: EMERGENCY MEDICINE

## 2022-03-23 PROCEDURE — 85730 THROMBOPLASTIN TIME PARTIAL: CPT | Performed by: EMERGENCY MEDICINE

## 2022-03-23 PROCEDURE — 85379 FIBRIN DEGRADATION QUANT: CPT | Performed by: EMERGENCY MEDICINE

## 2022-03-23 PROCEDURE — 99284 EMERGENCY DEPT VISIT MOD MDM: CPT

## 2022-03-23 PROCEDURE — 0202U NFCT DS 22 TRGT SARS-COV-2: CPT | Performed by: EMERGENCY MEDICINE

## 2022-03-23 PROCEDURE — 83605 ASSAY OF LACTIC ACID: CPT

## 2022-03-23 PROCEDURE — 80053 COMPREHEN METABOLIC PANEL: CPT | Performed by: EMERGENCY MEDICINE

## 2022-03-23 PROCEDURE — 94799 UNLISTED PULMONARY SVC/PX: CPT

## 2022-03-23 PROCEDURE — 94640 AIRWAY INHALATION TREATMENT: CPT

## 2022-03-23 PROCEDURE — 83880 ASSAY OF NATRIURETIC PEPTIDE: CPT | Performed by: EMERGENCY MEDICINE

## 2022-03-23 PROCEDURE — 25010000002 METHYLPREDNISOLONE PER 125 MG: Performed by: EMERGENCY MEDICINE

## 2022-03-23 PROCEDURE — 87040 BLOOD CULTURE FOR BACTERIA: CPT | Performed by: EMERGENCY MEDICINE

## 2022-03-23 RX ORDER — IPRATROPIUM BROMIDE AND ALBUTEROL SULFATE 2.5; .5 MG/3ML; MG/3ML
3 SOLUTION RESPIRATORY (INHALATION)
Status: DISCONTINUED | OUTPATIENT
Start: 2022-03-24 | End: 2022-03-27 | Stop reason: HOSPADM

## 2022-03-23 RX ORDER — SODIUM CHLORIDE 0.9 % (FLUSH) 0.9 %
10 SYRINGE (ML) INJECTION AS NEEDED
Status: DISCONTINUED | OUTPATIENT
Start: 2022-03-23 | End: 2022-03-27 | Stop reason: HOSPADM

## 2022-03-23 RX ORDER — ONDANSETRON 2 MG/ML
4 INJECTION INTRAMUSCULAR; INTRAVENOUS EVERY 6 HOURS PRN
Status: DISCONTINUED | OUTPATIENT
Start: 2022-03-23 | End: 2022-03-27 | Stop reason: HOSPADM

## 2022-03-23 RX ORDER — METHYLPREDNISOLONE SODIUM SUCCINATE 40 MG/ML
32 INJECTION, POWDER, LYOPHILIZED, FOR SOLUTION INTRAMUSCULAR; INTRAVENOUS 2 TIMES DAILY
Status: COMPLETED | OUTPATIENT
Start: 2022-03-24 | End: 2022-03-25

## 2022-03-23 RX ORDER — METHYLPREDNISOLONE SODIUM SUCCINATE 125 MG/2ML
80 INJECTION, POWDER, LYOPHILIZED, FOR SOLUTION INTRAMUSCULAR; INTRAVENOUS ONCE
Status: COMPLETED | OUTPATIENT
Start: 2022-03-23 | End: 2022-03-23

## 2022-03-23 RX ORDER — CHOLECALCIFEROL (VITAMIN D3) 125 MCG
5 CAPSULE ORAL NIGHTLY PRN
Status: DISCONTINUED | OUTPATIENT
Start: 2022-03-23 | End: 2022-03-27 | Stop reason: HOSPADM

## 2022-03-23 RX ORDER — IPRATROPIUM BROMIDE AND ALBUTEROL SULFATE 2.5; .5 MG/3ML; MG/3ML
3 SOLUTION RESPIRATORY (INHALATION) ONCE
Status: COMPLETED | OUTPATIENT
Start: 2022-03-23 | End: 2022-03-23

## 2022-03-23 RX ORDER — SODIUM CHLORIDE 0.9 % (FLUSH) 0.9 %
10 SYRINGE (ML) INJECTION EVERY 12 HOURS SCHEDULED
Status: DISCONTINUED | OUTPATIENT
Start: 2022-03-23 | End: 2022-03-27 | Stop reason: HOSPADM

## 2022-03-23 RX ORDER — NITROGLYCERIN 0.4 MG/1
0.4 TABLET SUBLINGUAL
Status: DISCONTINUED | OUTPATIENT
Start: 2022-03-23 | End: 2022-03-27 | Stop reason: HOSPADM

## 2022-03-23 RX ORDER — ACETAMINOPHEN 325 MG/1
650 TABLET ORAL EVERY 4 HOURS PRN
Status: DISCONTINUED | OUTPATIENT
Start: 2022-03-23 | End: 2022-03-27 | Stop reason: HOSPADM

## 2022-03-23 RX ORDER — GUAIFENESIN/DEXTROMETHORPHAN 100-10MG/5
10 SYRUP ORAL EVERY 6 HOURS PRN
Status: DISCONTINUED | OUTPATIENT
Start: 2022-03-23 | End: 2022-03-24

## 2022-03-23 RX ORDER — ASPIRIN 325 MG
325 TABLET ORAL ONCE
Status: COMPLETED | OUTPATIENT
Start: 2022-03-23 | End: 2022-03-23

## 2022-03-23 RX ADMIN — WATER 1 G: 1000 INJECTION, SOLUTION INTRAVENOUS at 20:30

## 2022-03-23 RX ADMIN — IPRATROPIUM BROMIDE AND ALBUTEROL SULFATE 3 ML: .5; 3 SOLUTION RESPIRATORY (INHALATION) at 19:36

## 2022-03-23 RX ADMIN — Medication 10 ML: at 20:30

## 2022-03-23 RX ADMIN — METHYLPREDNISOLONE SODIUM SUCCINATE 80 MG: 125 INJECTION, POWDER, FOR SOLUTION INTRAMUSCULAR; INTRAVENOUS at 18:08

## 2022-03-23 RX ADMIN — Medication 10 ML: at 18:08

## 2022-03-23 RX ADMIN — ASPIRIN 325 MG ORAL TABLET 325 MG: 325 PILL ORAL at 20:28

## 2022-03-23 RX ADMIN — Medication 10 ML: at 23:06

## 2022-03-24 ENCOUNTER — APPOINTMENT (OUTPATIENT)
Dept: MRI IMAGING | Facility: HOSPITAL | Age: 67
End: 2022-03-24

## 2022-03-24 ENCOUNTER — APPOINTMENT (OUTPATIENT)
Dept: CT IMAGING | Facility: HOSPITAL | Age: 67
End: 2022-03-24

## 2022-03-24 PROBLEM — S72.044A: Status: ACTIVE | Noted: 2022-03-23

## 2022-03-24 LAB
ANION GAP SERPL CALCULATED.3IONS-SCNC: 12 MMOL/L (ref 5–15)
BUN SERPL-MCNC: 28 MG/DL (ref 8–23)
BUN/CREAT SERPL: 25.7 (ref 7–25)
CALCIUM SPEC-SCNC: 9.3 MG/DL (ref 8.6–10.5)
CHLORIDE SERPL-SCNC: 105 MMOL/L (ref 98–107)
CO2 SERPL-SCNC: 21 MMOL/L (ref 22–29)
CREAT SERPL-MCNC: 1.09 MG/DL (ref 0.57–1)
DEPRECATED RDW RBC AUTO: 41.1 FL (ref 37–54)
EGFRCR SERPLBLD CKD-EPI 2021: 56.8 ML/MIN/1.73
ERYTHROCYTE [DISTWIDTH] IN BLOOD BY AUTOMATED COUNT: 13.6 % (ref 12.3–15.4)
GLUCOSE SERPL-MCNC: 317 MG/DL (ref 65–99)
HCT VFR BLD AUTO: 40 % (ref 34–46.6)
HGB BLD-MCNC: 13.6 G/DL (ref 12–15.9)
MCH RBC QN AUTO: 29.2 PG (ref 26.6–33)
MCHC RBC AUTO-ENTMCNC: 34 G/DL (ref 31.5–35.7)
MCV RBC AUTO: 85.9 FL (ref 79–97)
PLATELET # BLD AUTO: 343 10*3/MM3 (ref 140–450)
PMV BLD AUTO: 7.8 FL (ref 6–12)
POTASSIUM SERPL-SCNC: 4 MMOL/L (ref 3.5–5.2)
RBC # BLD AUTO: 4.66 10*6/MM3 (ref 3.77–5.28)
SODIUM SERPL-SCNC: 138 MMOL/L (ref 136–145)
TROPONIN T SERPL-MCNC: <0.01 NG/ML (ref 0–0.03)
WBC NRBC COR # BLD: 10 10*3/MM3 (ref 3.4–10.8)

## 2022-03-24 PROCEDURE — 80048 BASIC METABOLIC PNL TOTAL CA: CPT | Performed by: EMERGENCY MEDICINE

## 2022-03-24 PROCEDURE — G0378 HOSPITAL OBSERVATION PER HR: HCPCS

## 2022-03-24 PROCEDURE — 73721 MRI JNT OF LWR EXTRE W/O DYE: CPT

## 2022-03-24 PROCEDURE — 99222 1ST HOSP IP/OBS MODERATE 55: CPT | Performed by: INTERNAL MEDICINE

## 2022-03-24 PROCEDURE — 94664 DEMO&/EVAL PT USE INHALER: CPT

## 2022-03-24 PROCEDURE — 94799 UNLISTED PULMONARY SVC/PX: CPT

## 2022-03-24 PROCEDURE — 25010000002 METHYLPREDNISOLONE PER 40 MG: Performed by: EMERGENCY MEDICINE

## 2022-03-24 PROCEDURE — 99204 OFFICE O/P NEW MOD 45 MIN: CPT | Performed by: INTERNAL MEDICINE

## 2022-03-24 PROCEDURE — 85027 COMPLETE CBC AUTOMATED: CPT | Performed by: EMERGENCY MEDICINE

## 2022-03-24 PROCEDURE — 71250 CT THORAX DX C-: CPT

## 2022-03-24 PROCEDURE — 84484 ASSAY OF TROPONIN QUANT: CPT | Performed by: EMERGENCY MEDICINE

## 2022-03-24 PROCEDURE — 25010000002 CEFTRIAXONE SODIUM-DEXTROSE 1-3.74 GM-%(50ML) RECONSTITUTED SOLUTION: Performed by: PHYSICIAN ASSISTANT

## 2022-03-24 RX ORDER — SODIUM CHLORIDE 9 MG/ML
75 INJECTION, SOLUTION INTRAVENOUS CONTINUOUS
Status: CANCELLED | OUTPATIENT
Start: 2022-03-25

## 2022-03-24 RX ORDER — GUAIFENESIN 600 MG/1
600 TABLET, EXTENDED RELEASE ORAL EVERY 12 HOURS SCHEDULED
Status: DISCONTINUED | OUTPATIENT
Start: 2022-03-24 | End: 2022-03-27 | Stop reason: HOSPADM

## 2022-03-24 RX ORDER — ESCITALOPRAM OXALATE 10 MG/1
20 TABLET ORAL DAILY
Status: DISCONTINUED | OUTPATIENT
Start: 2022-03-24 | End: 2022-03-27 | Stop reason: HOSPADM

## 2022-03-24 RX ORDER — BUDESONIDE 0.5 MG/2ML
0.5 INHALANT ORAL
Status: DISCONTINUED | OUTPATIENT
Start: 2022-03-24 | End: 2022-03-27 | Stop reason: HOSPADM

## 2022-03-24 RX ORDER — ATORVASTATIN CALCIUM 40 MG/1
80 TABLET, FILM COATED ORAL DAILY
Status: DISCONTINUED | OUTPATIENT
Start: 2022-03-24 | End: 2022-03-27 | Stop reason: HOSPADM

## 2022-03-24 RX ORDER — CEFTRIAXONE 1 G/50ML
1 INJECTION, SOLUTION INTRAVENOUS NIGHTLY
Status: DISCONTINUED | OUTPATIENT
Start: 2022-03-24 | End: 2022-03-27 | Stop reason: HOSPADM

## 2022-03-24 RX ORDER — BUPROPION HYDROCHLORIDE 150 MG/1
300 TABLET ORAL DAILY
Status: DISCONTINUED | OUTPATIENT
Start: 2022-03-24 | End: 2022-03-27 | Stop reason: HOSPADM

## 2022-03-24 RX ORDER — PANTOPRAZOLE SODIUM 40 MG/1
40 TABLET, DELAYED RELEASE ORAL EVERY MORNING
Refills: 0 | Status: DISCONTINUED | OUTPATIENT
Start: 2022-03-24 | End: 2022-03-27 | Stop reason: HOSPADM

## 2022-03-24 RX ORDER — BENZONATATE 100 MG/1
100 CAPSULE ORAL 3 TIMES DAILY PRN
Status: DISCONTINUED | OUTPATIENT
Start: 2022-03-24 | End: 2022-03-27 | Stop reason: HOSPADM

## 2022-03-24 RX ORDER — AMLODIPINE BESYLATE 5 MG/1
5 TABLET ORAL DAILY
Status: DISCONTINUED | OUTPATIENT
Start: 2022-03-24 | End: 2022-03-27 | Stop reason: HOSPADM

## 2022-03-24 RX ORDER — NICOTINE 21 MG/24HR
1 PATCH, TRANSDERMAL 24 HOURS TRANSDERMAL DAILY
Status: DISCONTINUED | OUTPATIENT
Start: 2022-03-24 | End: 2022-03-27 | Stop reason: HOSPADM

## 2022-03-24 RX ORDER — HYDROCODONE BITARTRATE AND ACETAMINOPHEN 7.5; 325 MG/1; MG/1
1 TABLET ORAL EVERY 6 HOURS PRN
Status: DISCONTINUED | OUTPATIENT
Start: 2022-03-24 | End: 2022-03-25 | Stop reason: SDUPTHER

## 2022-03-24 RX ORDER — ASPIRIN 81 MG/1
81 TABLET ORAL DAILY
Status: DISCONTINUED | OUTPATIENT
Start: 2022-03-24 | End: 2022-03-27 | Stop reason: HOSPADM

## 2022-03-24 RX ORDER — FENOFIBRATE 145 MG/1
145 TABLET, COATED ORAL DAILY
Refills: 0 | Status: DISCONTINUED | OUTPATIENT
Start: 2022-03-24 | End: 2022-03-27 | Stop reason: HOSPADM

## 2022-03-24 RX ORDER — PROPRANOLOL HYDROCHLORIDE 80 MG/1
160 CAPSULE, EXTENDED RELEASE ORAL DAILY
Status: DISCONTINUED | OUTPATIENT
Start: 2022-03-24 | End: 2022-03-27 | Stop reason: HOSPADM

## 2022-03-24 RX ADMIN — ATORVASTATIN CALCIUM 80 MG: 40 TABLET, FILM COATED ORAL at 09:03

## 2022-03-24 RX ADMIN — BUDESONIDE 0.5 MG: 0.5 INHALANT RESPIRATORY (INHALATION) at 19:08

## 2022-03-24 RX ADMIN — PROPRANOLOL HYDROCHLORIDE 160 MG: 80 CAPSULE, EXTENDED RELEASE ORAL at 09:03

## 2022-03-24 RX ADMIN — IPRATROPIUM BROMIDE AND ALBUTEROL SULFATE 3 ML: .5; 3 SOLUTION RESPIRATORY (INHALATION) at 19:00

## 2022-03-24 RX ADMIN — FENOFIBRATE 145 MG: 145 TABLET ORAL at 09:04

## 2022-03-24 RX ADMIN — ASPIRIN 81 MG: 81 TABLET, COATED ORAL at 09:11

## 2022-03-24 RX ADMIN — BUDESONIDE 0.5 MG: 0.5 INHALANT RESPIRATORY (INHALATION) at 13:02

## 2022-03-24 RX ADMIN — GUAIFENESIN 600 MG: 600 TABLET, EXTENDED RELEASE ORAL at 09:03

## 2022-03-24 RX ADMIN — BUPROPION HYDROCHLORIDE 300 MG: 150 TABLET, EXTENDED RELEASE ORAL at 09:03

## 2022-03-24 RX ADMIN — AMLODIPINE BESYLATE 5 MG: 5 TABLET ORAL at 09:03

## 2022-03-24 RX ADMIN — GUAIFENESIN 600 MG: 600 TABLET, EXTENDED RELEASE ORAL at 21:45

## 2022-03-24 RX ADMIN — CEFTRIAXONE 1 G: 1 INJECTION, SOLUTION INTRAVENOUS at 21:46

## 2022-03-24 RX ADMIN — Medication 10 ML: at 09:05

## 2022-03-24 RX ADMIN — METHYLPREDNISOLONE SODIUM SUCCINATE 32 MG: 40 INJECTION, POWDER, FOR SOLUTION INTRAMUSCULAR; INTRAVENOUS at 21:46

## 2022-03-24 RX ADMIN — PANTOPRAZOLE SODIUM 40 MG: 40 TABLET, DELAYED RELEASE ORAL at 09:04

## 2022-03-24 RX ADMIN — IPRATROPIUM BROMIDE AND ALBUTEROL SULFATE 3 ML: .5; 3 SOLUTION RESPIRATORY (INHALATION) at 12:56

## 2022-03-24 RX ADMIN — METHYLPREDNISOLONE SODIUM SUCCINATE 32 MG: 40 INJECTION, POWDER, FOR SOLUTION INTRAMUSCULAR; INTRAVENOUS at 09:02

## 2022-03-24 RX ADMIN — ESCITALOPRAM OXALATE 20 MG: 10 TABLET ORAL at 09:03

## 2022-03-24 RX ADMIN — HYDROCODONE BITARTRATE AND ACETAMINOPHEN 1 TABLET: 7.5; 325 TABLET ORAL at 15:15

## 2022-03-24 RX ADMIN — Medication 10 ML: at 21:46

## 2022-03-25 ENCOUNTER — ANESTHESIA (OUTPATIENT)
Dept: PERIOP | Facility: HOSPITAL | Age: 67
End: 2022-03-25

## 2022-03-25 ENCOUNTER — APPOINTMENT (OUTPATIENT)
Dept: GENERAL RADIOLOGY | Facility: HOSPITAL | Age: 67
End: 2022-03-25

## 2022-03-25 ENCOUNTER — ANESTHESIA EVENT (OUTPATIENT)
Dept: PERIOP | Facility: HOSPITAL | Age: 67
End: 2022-03-25

## 2022-03-25 LAB
ANION GAP SERPL CALCULATED.3IONS-SCNC: 11 MMOL/L (ref 5–15)
BUN SERPL-MCNC: 31 MG/DL (ref 8–23)
BUN/CREAT SERPL: 29.2 (ref 7–25)
CALCIUM SPEC-SCNC: 9.1 MG/DL (ref 8.6–10.5)
CHLORIDE SERPL-SCNC: 105 MMOL/L (ref 98–107)
CO2 SERPL-SCNC: 21 MMOL/L (ref 22–29)
CREAT SERPL-MCNC: 1.06 MG/DL (ref 0.57–1)
EGFRCR SERPLBLD CKD-EPI 2021: 58.8 ML/MIN/1.73
GLUCOSE BLDC GLUCOMTR-MCNC: 177 MG/DL (ref 70–105)
GLUCOSE BLDC GLUCOMTR-MCNC: 178 MG/DL (ref 70–105)
GLUCOSE BLDC GLUCOMTR-MCNC: 181 MG/DL (ref 70–105)
GLUCOSE SERPL-MCNC: 170 MG/DL (ref 65–99)
POTASSIUM SERPL-SCNC: 4.5 MMOL/L (ref 3.5–5.2)
QT INTERVAL: 393 MS
SODIUM SERPL-SCNC: 137 MMOL/L (ref 136–145)

## 2022-03-25 PROCEDURE — 25010000002 HYDROMORPHONE PER 4 MG: Performed by: NURSE ANESTHETIST, CERTIFIED REGISTERED

## 2022-03-25 PROCEDURE — 94799 UNLISTED PULMONARY SVC/PX: CPT

## 2022-03-25 PROCEDURE — 25010000002 PROPOFOL 10 MG/ML EMULSION: Performed by: NURSE ANESTHETIST, CERTIFIED REGISTERED

## 2022-03-25 PROCEDURE — 25010000002 CEFTRIAXONE SODIUM-DEXTROSE 1-3.74 GM-%(50ML) RECONSTITUTED SOLUTION: Performed by: ORTHOPAEDIC SURGERY

## 2022-03-25 PROCEDURE — 73502 X-RAY EXAM HIP UNI 2-3 VIEWS: CPT

## 2022-03-25 PROCEDURE — 76000 FLUOROSCOPY <1 HR PHYS/QHP: CPT

## 2022-03-25 PROCEDURE — 80048 BASIC METABOLIC PNL TOTAL CA: CPT | Performed by: ORTHOPAEDIC SURGERY

## 2022-03-25 PROCEDURE — C1713 ANCHOR/SCREW BN/BN,TIS/BN: HCPCS | Performed by: ORTHOPAEDIC SURGERY

## 2022-03-25 PROCEDURE — 25010000002 ONDANSETRON PER 1 MG: Performed by: NURSE ANESTHETIST, CERTIFIED REGISTERED

## 2022-03-25 PROCEDURE — 99232 SBSQ HOSP IP/OBS MODERATE 35: CPT | Performed by: INTERNAL MEDICINE

## 2022-03-25 PROCEDURE — 82962 GLUCOSE BLOOD TEST: CPT

## 2022-03-25 PROCEDURE — 0QS634Z REPOSITION RIGHT UPPER FEMUR WITH INTERNAL FIXATION DEVICE, PERCUTANEOUS APPROACH: ICD-10-PCS | Performed by: ORTHOPAEDIC SURGERY

## 2022-03-25 PROCEDURE — 25010000002 NEOSTIGMINE 5 MG/5ML SOLUTION: Performed by: NURSE ANESTHETIST, CERTIFIED REGISTERED

## 2022-03-25 PROCEDURE — 25010000002 METHYLPREDNISOLONE PER 40 MG: Performed by: ORTHOPAEDIC SURGERY

## 2022-03-25 PROCEDURE — 25010000002 METHYLPREDNISOLONE PER 40 MG: Performed by: EMERGENCY MEDICINE

## 2022-03-25 PROCEDURE — 25010000002 DEXAMETHASONE PER 1 MG: Performed by: NURSE ANESTHETIST, CERTIFIED REGISTERED

## 2022-03-25 PROCEDURE — 25010000002 FENTANYL CITRATE (PF) 100 MCG/2ML SOLUTION: Performed by: NURSE ANESTHETIST, CERTIFIED REGISTERED

## 2022-03-25 DEVICE — CORTEX SCREW S.T.: Type: IMPLANTABLE DEVICE | Site: HIP | Status: FUNCTIONAL

## 2022-03-25 DEVICE — STANDARD BARREL HIP PLATE; KEYLESS
Type: IMPLANTABLE DEVICE | Site: HIP | Status: FUNCTIONAL
Brand: OMEGA

## 2022-03-25 DEVICE — STANDARD LAG SCREW
Type: IMPLANTABLE DEVICE | Site: HIP | Status: FUNCTIONAL
Brand: OMEGA

## 2022-03-25 RX ORDER — SODIUM CHLORIDE 9 MG/ML
125 INJECTION, SOLUTION INTRAVENOUS CONTINUOUS
Status: DISPENSED | OUTPATIENT
Start: 2022-03-25 | End: 2022-03-26

## 2022-03-25 RX ORDER — MORPHINE SULFATE 4 MG/ML
2 INJECTION, SOLUTION INTRAMUSCULAR; INTRAVENOUS
Status: DISCONTINUED | OUTPATIENT
Start: 2022-03-25 | End: 2022-03-25 | Stop reason: HOSPADM

## 2022-03-25 RX ORDER — SODIUM CHLORIDE, SODIUM LACTATE, POTASSIUM CHLORIDE, CALCIUM CHLORIDE 600; 310; 30; 20 MG/100ML; MG/100ML; MG/100ML; MG/100ML
INJECTION, SOLUTION INTRAVENOUS CONTINUOUS PRN
Status: DISCONTINUED | OUTPATIENT
Start: 2022-03-25 | End: 2022-03-25 | Stop reason: SURG

## 2022-03-25 RX ORDER — MEPERIDINE HYDROCHLORIDE 25 MG/ML
12.5 INJECTION INTRAMUSCULAR; INTRAVENOUS; SUBCUTANEOUS
Status: DISCONTINUED | OUTPATIENT
Start: 2022-03-25 | End: 2022-03-25 | Stop reason: HOSPADM

## 2022-03-25 RX ORDER — FENTANYL CITRATE 50 UG/ML
INJECTION, SOLUTION INTRAMUSCULAR; INTRAVENOUS AS NEEDED
Status: DISCONTINUED | OUTPATIENT
Start: 2022-03-25 | End: 2022-03-25 | Stop reason: SURG

## 2022-03-25 RX ORDER — DEXAMETHASONE SODIUM PHOSPHATE 4 MG/ML
INJECTION, SOLUTION INTRA-ARTICULAR; INTRALESIONAL; INTRAMUSCULAR; INTRAVENOUS; SOFT TISSUE AS NEEDED
Status: DISCONTINUED | OUTPATIENT
Start: 2022-03-25 | End: 2022-03-25 | Stop reason: SURG

## 2022-03-25 RX ORDER — NALBUPHINE HCL 10 MG/ML
2 AMPUL (ML) INJECTION EVERY 4 HOURS PRN
Status: DISCONTINUED | OUTPATIENT
Start: 2022-03-25 | End: 2022-03-25 | Stop reason: HOSPADM

## 2022-03-25 RX ORDER — ONDANSETRON 2 MG/ML
4 INJECTION INTRAMUSCULAR; INTRAVENOUS EVERY 6 HOURS PRN
Status: DISCONTINUED | OUTPATIENT
Start: 2022-03-25 | End: 2022-03-27 | Stop reason: HOSPADM

## 2022-03-25 RX ORDER — PROPOFOL 10 MG/ML
VIAL (ML) INTRAVENOUS AS NEEDED
Status: DISCONTINUED | OUTPATIENT
Start: 2022-03-25 | End: 2022-03-25 | Stop reason: SURG

## 2022-03-25 RX ORDER — KETAMINE HCL IN NACL, ISO-OSM 100MG/10ML
SYRINGE (ML) INJECTION AS NEEDED
Status: DISCONTINUED | OUTPATIENT
Start: 2022-03-25 | End: 2022-03-25 | Stop reason: SURG

## 2022-03-25 RX ORDER — LIDOCAINE HYDROCHLORIDE 10 MG/ML
INJECTION, SOLUTION EPIDURAL; INFILTRATION; INTRACAUDAL; PERINEURAL AS NEEDED
Status: DISCONTINUED | OUTPATIENT
Start: 2022-03-25 | End: 2022-03-25 | Stop reason: SURG

## 2022-03-25 RX ORDER — HYDROCODONE BITARTRATE AND ACETAMINOPHEN 7.5; 325 MG/1; MG/1
1 TABLET ORAL ONCE AS NEEDED
Status: COMPLETED | OUTPATIENT
Start: 2022-03-25 | End: 2022-03-25

## 2022-03-25 RX ORDER — GLYCOPYRROLATE 1 MG/5 ML
SYRINGE (ML) INTRAVENOUS AS NEEDED
Status: DISCONTINUED | OUTPATIENT
Start: 2022-03-25 | End: 2022-03-25 | Stop reason: SURG

## 2022-03-25 RX ORDER — ONDANSETRON 4 MG/1
4 TABLET, FILM COATED ORAL EVERY 6 HOURS PRN
Status: DISCONTINUED | OUTPATIENT
Start: 2022-03-25 | End: 2022-03-27 | Stop reason: HOSPADM

## 2022-03-25 RX ORDER — DIPHENHYDRAMINE HCL 25 MG
25 CAPSULE ORAL
Status: DISCONTINUED | OUTPATIENT
Start: 2022-03-25 | End: 2022-03-25 | Stop reason: HOSPADM

## 2022-03-25 RX ORDER — ROCURONIUM BROMIDE 10 MG/ML
INJECTION, SOLUTION INTRAVENOUS AS NEEDED
Status: DISCONTINUED | OUTPATIENT
Start: 2022-03-25 | End: 2022-03-25 | Stop reason: SURG

## 2022-03-25 RX ORDER — HYDROCODONE BITARTRATE AND ACETAMINOPHEN 7.5; 325 MG/1; MG/1
1 TABLET ORAL EVERY 4 HOURS PRN
Status: DISCONTINUED | OUTPATIENT
Start: 2022-03-25 | End: 2022-03-27 | Stop reason: HOSPADM

## 2022-03-25 RX ORDER — ONDANSETRON 2 MG/ML
4 INJECTION INTRAMUSCULAR; INTRAVENOUS ONCE AS NEEDED
Status: COMPLETED | OUTPATIENT
Start: 2022-03-25 | End: 2022-03-25

## 2022-03-25 RX ORDER — ACETAMINOPHEN 325 MG/1
325 TABLET ORAL EVERY 4 HOURS PRN
Status: DISCONTINUED | OUTPATIENT
Start: 2022-03-25 | End: 2022-03-27 | Stop reason: HOSPADM

## 2022-03-25 RX ORDER — SODIUM CHLORIDE 0.9 % (FLUSH) 0.9 %
1-10 SYRINGE (ML) INJECTION AS NEEDED
Status: DISCONTINUED | OUTPATIENT
Start: 2022-03-25 | End: 2022-03-27 | Stop reason: HOSPADM

## 2022-03-25 RX ORDER — MORPHINE SULFATE 2 MG/ML
2 INJECTION, SOLUTION INTRAMUSCULAR; INTRAVENOUS
Status: DISCONTINUED | OUTPATIENT
Start: 2022-03-25 | End: 2022-03-27 | Stop reason: HOSPADM

## 2022-03-25 RX ORDER — HYDROMORPHONE HCL 110MG/55ML
0.5 PATIENT CONTROLLED ANALGESIA SYRINGE INTRAVENOUS
Status: DISCONTINUED | OUTPATIENT
Start: 2022-03-25 | End: 2022-03-25 | Stop reason: HOSPADM

## 2022-03-25 RX ORDER — NALBUPHINE HCL 10 MG/ML
10 AMPUL (ML) INJECTION EVERY 4 HOURS PRN
Status: DISCONTINUED | OUTPATIENT
Start: 2022-03-25 | End: 2022-03-25 | Stop reason: HOSPADM

## 2022-03-25 RX ORDER — POLYETHYLENE GLYCOL 3350 17 G/17G
17 POWDER, FOR SOLUTION ORAL DAILY
Status: DISCONTINUED | OUTPATIENT
Start: 2022-03-26 | End: 2022-03-27 | Stop reason: HOSPADM

## 2022-03-25 RX ORDER — DIPHENHYDRAMINE HYDROCHLORIDE 50 MG/ML
12.5 INJECTION INTRAMUSCULAR; INTRAVENOUS
Status: DISCONTINUED | OUTPATIENT
Start: 2022-03-25 | End: 2022-03-25 | Stop reason: HOSPADM

## 2022-03-25 RX ORDER — TRANEXAMIC ACID 10 MG/ML
1000 INJECTION, SOLUTION INTRAVENOUS ONCE
Status: COMPLETED | OUTPATIENT
Start: 2022-03-25 | End: 2022-03-25

## 2022-03-25 RX ORDER — NALOXONE HCL 0.4 MG/ML
0.4 VIAL (ML) INJECTION
Status: DISCONTINUED | OUTPATIENT
Start: 2022-03-25 | End: 2022-03-27 | Stop reason: HOSPADM

## 2022-03-25 RX ORDER — NEOSTIGMINE METHYLSULFATE 5 MG/5 ML
SYRINGE (ML) INTRAVENOUS AS NEEDED
Status: DISCONTINUED | OUTPATIENT
Start: 2022-03-25 | End: 2022-03-25 | Stop reason: SURG

## 2022-03-25 RX ORDER — OXYCODONE HYDROCHLORIDE 5 MG/1
10 TABLET ORAL EVERY 4 HOURS PRN
Status: DISCONTINUED | OUTPATIENT
Start: 2022-03-25 | End: 2022-03-27 | Stop reason: HOSPADM

## 2022-03-25 RX ADMIN — PANTOPRAZOLE SODIUM 40 MG: 40 TABLET, DELAYED RELEASE ORAL at 06:23

## 2022-03-25 RX ADMIN — PROPRANOLOL HYDROCHLORIDE 160 MG: 80 CAPSULE, EXTENDED RELEASE ORAL at 09:16

## 2022-03-25 RX ADMIN — METHYLPREDNISOLONE SODIUM SUCCINATE 32 MG: 40 INJECTION, POWDER, FOR SOLUTION INTRAMUSCULAR; INTRAVENOUS at 22:25

## 2022-03-25 RX ADMIN — NICOTINE 1 PATCH: 21 PATCH, EXTENDED RELEASE TRANSDERMAL at 09:17

## 2022-03-25 RX ADMIN — METHYLPREDNISOLONE SODIUM SUCCINATE 32 MG: 40 INJECTION, POWDER, FOR SOLUTION INTRAMUSCULAR; INTRAVENOUS at 09:16

## 2022-03-25 RX ADMIN — ATORVASTATIN CALCIUM 80 MG: 40 TABLET, FILM COATED ORAL at 09:17

## 2022-03-25 RX ADMIN — SODIUM CHLORIDE 125 ML/HR: 9 INJECTION, SOLUTION INTRAVENOUS at 22:26

## 2022-03-25 RX ADMIN — CEFTRIAXONE 1 G: 1 INJECTION, SOLUTION INTRAVENOUS at 22:25

## 2022-03-25 RX ADMIN — BUDESONIDE 0.5 MG: 0.5 INHALANT RESPIRATORY (INHALATION) at 07:04

## 2022-03-25 RX ADMIN — HYDROCODONE BITARTRATE AND ACETAMINOPHEN 1 TABLET: 7.5; 325 TABLET ORAL at 18:09

## 2022-03-25 RX ADMIN — SODIUM CHLORIDE, SODIUM LACTATE, POTASSIUM CHLORIDE, AND CALCIUM CHLORIDE: .6; .31; .03; .02 INJECTION, SOLUTION INTRAVENOUS at 16:13

## 2022-03-25 RX ADMIN — HYDROMORPHONE HYDROCHLORIDE 0.5 MG: 2 INJECTION, SOLUTION INTRAMUSCULAR; INTRAVENOUS; SUBCUTANEOUS at 18:10

## 2022-03-25 RX ADMIN — AMLODIPINE BESYLATE 5 MG: 5 TABLET ORAL at 09:17

## 2022-03-25 RX ADMIN — ESCITALOPRAM OXALATE 20 MG: 10 TABLET ORAL at 09:16

## 2022-03-25 RX ADMIN — FENTANYL CITRATE 50 MCG: 50 INJECTION, SOLUTION INTRAMUSCULAR; INTRAVENOUS at 16:47

## 2022-03-25 RX ADMIN — FENTANYL CITRATE 50 MCG: 50 INJECTION, SOLUTION INTRAMUSCULAR; INTRAVENOUS at 16:26

## 2022-03-25 RX ADMIN — PROPOFOL 150 MCG/KG/MIN: 10 INJECTION, EMULSION INTRAVENOUS at 16:28

## 2022-03-25 RX ADMIN — Medication 3 MG: at 17:12

## 2022-03-25 RX ADMIN — DEXAMETHASONE SODIUM PHOSPHATE 4 MG: 4 INJECTION, SOLUTION INTRAMUSCULAR; INTRAVENOUS at 16:37

## 2022-03-25 RX ADMIN — ONDANSETRON 4 MG: 2 INJECTION INTRAMUSCULAR; INTRAVENOUS at 18:10

## 2022-03-25 RX ADMIN — FENOFIBRATE 145 MG: 145 TABLET ORAL at 09:16

## 2022-03-25 RX ADMIN — GUAIFENESIN 600 MG: 600 TABLET, EXTENDED RELEASE ORAL at 09:16

## 2022-03-25 RX ADMIN — TRANEXAMIC ACID 1000 MG: 10 INJECTION, SOLUTION INTRAVENOUS at 16:39

## 2022-03-25 RX ADMIN — CEFAZOLIN SODIUM 2 G: 1 INJECTION, POWDER, FOR SOLUTION INTRAMUSCULAR; INTRAVENOUS at 16:23

## 2022-03-25 RX ADMIN — Medication 0.2 MG: at 17:13

## 2022-03-25 RX ADMIN — IPRATROPIUM BROMIDE AND ALBUTEROL SULFATE 3 ML: .5; 3 SOLUTION RESPIRATORY (INHALATION) at 06:57

## 2022-03-25 RX ADMIN — ACETAMINOPHEN 650 MG: 325 TABLET ORAL at 12:43

## 2022-03-25 RX ADMIN — ROCURONIUM BROMIDE 40 MG: 10 SOLUTION INTRAVENOUS at 16:26

## 2022-03-25 RX ADMIN — HYDROMORPHONE HYDROCHLORIDE 0.5 MG: 2 INJECTION, SOLUTION INTRAMUSCULAR; INTRAVENOUS; SUBCUTANEOUS at 18:32

## 2022-03-25 RX ADMIN — BUPROPION HYDROCHLORIDE 300 MG: 150 TABLET, EXTENDED RELEASE ORAL at 09:17

## 2022-03-25 RX ADMIN — PROPOFOL 100 MG: 10 INJECTION, EMULSION INTRAVENOUS at 16:26

## 2022-03-25 RX ADMIN — GUAIFENESIN 600 MG: 600 TABLET, EXTENDED RELEASE ORAL at 22:25

## 2022-03-25 RX ADMIN — Medication 10 ML: at 09:32

## 2022-03-25 RX ADMIN — Medication 10 ML: at 22:26

## 2022-03-25 RX ADMIN — Medication 1 MG: at 17:13

## 2022-03-25 RX ADMIN — ASPIRIN 81 MG: 81 TABLET, COATED ORAL at 09:16

## 2022-03-25 RX ADMIN — HYDROMORPHONE HYDROCHLORIDE 0.5 MG: 2 INJECTION, SOLUTION INTRAMUSCULAR; INTRAVENOUS; SUBCUTANEOUS at 17:51

## 2022-03-25 RX ADMIN — IPRATROPIUM BROMIDE AND ALBUTEROL SULFATE 3 ML: .5; 3 SOLUTION RESPIRATORY (INHALATION) at 12:11

## 2022-03-25 RX ADMIN — LIDOCAINE HYDROCHLORIDE 50 MG: 10 INJECTION, SOLUTION EPIDURAL; INFILTRATION; INTRACAUDAL at 16:26

## 2022-03-25 RX ADMIN — Medication 0.6 MG: at 17:12

## 2022-03-25 RX ADMIN — Medication 20 MG: at 16:53

## 2022-03-26 LAB
ANION GAP SERPL CALCULATED.3IONS-SCNC: 11 MMOL/L (ref 5–15)
BUN SERPL-MCNC: 30 MG/DL (ref 8–23)
BUN/CREAT SERPL: 27 (ref 7–25)
CALCIUM SPEC-SCNC: 9.2 MG/DL (ref 8.6–10.5)
CHLORIDE SERPL-SCNC: 104 MMOL/L (ref 98–107)
CO2 SERPL-SCNC: 23 MMOL/L (ref 22–29)
CREAT SERPL-MCNC: 1.11 MG/DL (ref 0.57–1)
EGFRCR SERPLBLD CKD-EPI 2021: 55.6 ML/MIN/1.73
GLUCOSE BLDC GLUCOMTR-MCNC: 129 MG/DL (ref 70–105)
GLUCOSE BLDC GLUCOMTR-MCNC: 142 MG/DL (ref 70–105)
GLUCOSE SERPL-MCNC: 188 MG/DL (ref 65–99)
HCT VFR BLD AUTO: 37.8 % (ref 34–46.6)
HGB BLD-MCNC: 12.8 G/DL (ref 12–15.9)
POTASSIUM SERPL-SCNC: 4.3 MMOL/L (ref 3.5–5.2)
SODIUM SERPL-SCNC: 138 MMOL/L (ref 136–145)

## 2022-03-26 PROCEDURE — 63710000001 DIPHENHYDRAMINE PER 50 MG: Performed by: HOSPITALIST

## 2022-03-26 PROCEDURE — 80048 BASIC METABOLIC PNL TOTAL CA: CPT | Performed by: ORTHOPAEDIC SURGERY

## 2022-03-26 PROCEDURE — 94799 UNLISTED PULMONARY SVC/PX: CPT

## 2022-03-26 PROCEDURE — 94760 N-INVAS EAR/PLS OXIMETRY 1: CPT

## 2022-03-26 PROCEDURE — 99232 SBSQ HOSP IP/OBS MODERATE 35: CPT | Performed by: HOSPITALIST

## 2022-03-26 PROCEDURE — 25010000002 CEFTRIAXONE SODIUM-DEXTROSE 1-3.74 GM-%(50ML) RECONSTITUTED SOLUTION: Performed by: ORTHOPAEDIC SURGERY

## 2022-03-26 PROCEDURE — 94761 N-INVAS EAR/PLS OXIMETRY MLT: CPT

## 2022-03-26 PROCEDURE — 63710000001 PREDNISONE PER 1 MG: Performed by: INTERNAL MEDICINE

## 2022-03-26 PROCEDURE — 85018 HEMOGLOBIN: CPT | Performed by: ORTHOPAEDIC SURGERY

## 2022-03-26 PROCEDURE — 85014 HEMATOCRIT: CPT | Performed by: ORTHOPAEDIC SURGERY

## 2022-03-26 PROCEDURE — 82962 GLUCOSE BLOOD TEST: CPT

## 2022-03-26 PROCEDURE — 97167 OT EVAL HIGH COMPLEX 60 MIN: CPT

## 2022-03-26 PROCEDURE — 25010000002 MORPHINE PER 10 MG: Performed by: ORTHOPAEDIC SURGERY

## 2022-03-26 PROCEDURE — 97162 PT EVAL MOD COMPLEX 30 MIN: CPT

## 2022-03-26 RX ORDER — DIPHENHYDRAMINE HYDROCHLORIDE 50 MG/ML
25 INJECTION INTRAMUSCULAR; INTRAVENOUS EVERY 6 HOURS PRN
Status: DISCONTINUED | OUTPATIENT
Start: 2022-03-26 | End: 2022-03-26 | Stop reason: SDUPTHER

## 2022-03-26 RX ORDER — DIPHENHYDRAMINE HCL 25 MG
25 CAPSULE ORAL EVERY 6 HOURS PRN
Status: DISCONTINUED | OUTPATIENT
Start: 2022-03-26 | End: 2022-03-27 | Stop reason: HOSPADM

## 2022-03-26 RX ORDER — PREDNISONE 20 MG/1
20 TABLET ORAL DAILY
Status: DISCONTINUED | OUTPATIENT
Start: 2022-03-26 | End: 2022-03-27 | Stop reason: HOSPADM

## 2022-03-26 RX ADMIN — PREDNISONE 20 MG: 20 TABLET ORAL at 12:57

## 2022-03-26 RX ADMIN — POLYETHYLENE GLYCOL 3350 17 G: 17 POWDER, FOR SOLUTION ORAL at 08:03

## 2022-03-26 RX ADMIN — OXYCODONE 10 MG: 5 TABLET ORAL at 13:09

## 2022-03-26 RX ADMIN — Medication 10 ML: at 20:42

## 2022-03-26 RX ADMIN — MORPHINE SULFATE 2 MG: 2 INJECTION, SOLUTION INTRAMUSCULAR; INTRAVENOUS at 01:26

## 2022-03-26 RX ADMIN — OXYCODONE 10 MG: 5 TABLET ORAL at 20:49

## 2022-03-26 RX ADMIN — OXYCODONE 10 MG: 5 TABLET ORAL at 07:55

## 2022-03-26 RX ADMIN — BUDESONIDE 0.5 MG: 0.5 INHALANT RESPIRATORY (INHALATION) at 00:40

## 2022-03-26 RX ADMIN — GUAIFENESIN 600 MG: 600 TABLET, EXTENDED RELEASE ORAL at 08:02

## 2022-03-26 RX ADMIN — IPRATROPIUM BROMIDE AND ALBUTEROL SULFATE 3 ML: .5; 3 SOLUTION RESPIRATORY (INHALATION) at 00:35

## 2022-03-26 RX ADMIN — Medication 10 ML: at 08:03

## 2022-03-26 RX ADMIN — AMLODIPINE BESYLATE 5 MG: 5 TABLET ORAL at 08:02

## 2022-03-26 RX ADMIN — BUPROPION HYDROCHLORIDE 300 MG: 150 TABLET, EXTENDED RELEASE ORAL at 08:02

## 2022-03-26 RX ADMIN — ESCITALOPRAM OXALATE 20 MG: 10 TABLET ORAL at 08:02

## 2022-03-26 RX ADMIN — PANTOPRAZOLE SODIUM 40 MG: 40 TABLET, DELAYED RELEASE ORAL at 07:55

## 2022-03-26 RX ADMIN — BUDESONIDE 0.5 MG: 0.5 INHALANT RESPIRATORY (INHALATION) at 08:32

## 2022-03-26 RX ADMIN — ATORVASTATIN CALCIUM 80 MG: 40 TABLET, FILM COATED ORAL at 08:02

## 2022-03-26 RX ADMIN — HYDROCODONE BITARTRATE AND ACETAMINOPHEN 1 TABLET: 7.5; 325 TABLET ORAL at 00:07

## 2022-03-26 RX ADMIN — BUDESONIDE 0.5 MG: 0.5 INHALANT RESPIRATORY (INHALATION) at 19:46

## 2022-03-26 RX ADMIN — IPRATROPIUM BROMIDE AND ALBUTEROL SULFATE 3 ML: .5; 3 SOLUTION RESPIRATORY (INHALATION) at 11:10

## 2022-03-26 RX ADMIN — FENOFIBRATE 145 MG: 145 TABLET ORAL at 08:02

## 2022-03-26 RX ADMIN — PROPRANOLOL HYDROCHLORIDE 160 MG: 80 CAPSULE, EXTENDED RELEASE ORAL at 08:02

## 2022-03-26 RX ADMIN — IPRATROPIUM BROMIDE AND ALBUTEROL SULFATE 3 ML: .5; 3 SOLUTION RESPIRATORY (INHALATION) at 19:46

## 2022-03-26 RX ADMIN — GUAIFENESIN 600 MG: 600 TABLET, EXTENDED RELEASE ORAL at 20:42

## 2022-03-26 RX ADMIN — IPRATROPIUM BROMIDE AND ALBUTEROL SULFATE 3 ML: .5; 3 SOLUTION RESPIRATORY (INHALATION) at 08:32

## 2022-03-26 RX ADMIN — NICOTINE 1 PATCH: 21 PATCH, EXTENDED RELEASE TRANSDERMAL at 08:03

## 2022-03-26 RX ADMIN — ASPIRIN 81 MG: 81 TABLET, COATED ORAL at 08:01

## 2022-03-26 RX ADMIN — CEFTRIAXONE 1 G: 1 INJECTION, SOLUTION INTRAVENOUS at 20:42

## 2022-03-26 RX ADMIN — DIPHENHYDRAMINE HYDROCHLORIDE 25 MG: 25 CAPSULE ORAL at 12:57

## 2022-03-27 ENCOUNTER — READMISSION MANAGEMENT (OUTPATIENT)
Dept: CALL CENTER | Facility: HOSPITAL | Age: 67
End: 2022-03-27

## 2022-03-27 VITALS
HEIGHT: 64 IN | RESPIRATION RATE: 16 BRPM | TEMPERATURE: 98 F | WEIGHT: 144.4 LBS | SYSTOLIC BLOOD PRESSURE: 151 MMHG | HEART RATE: 68 BPM | OXYGEN SATURATION: 99 % | BODY MASS INDEX: 24.65 KG/M2 | DIASTOLIC BLOOD PRESSURE: 72 MMHG

## 2022-03-27 LAB
ANION GAP SERPL CALCULATED.3IONS-SCNC: 8 MMOL/L (ref 5–15)
BUN SERPL-MCNC: 26 MG/DL (ref 8–23)
BUN/CREAT SERPL: 23.6 (ref 7–25)
CALCIUM SPEC-SCNC: 9 MG/DL (ref 8.6–10.5)
CHLORIDE SERPL-SCNC: 108 MMOL/L (ref 98–107)
CO2 SERPL-SCNC: 26 MMOL/L (ref 22–29)
CREAT SERPL-MCNC: 1.1 MG/DL (ref 0.57–1)
EGFRCR SERPLBLD CKD-EPI 2021: 56.2 ML/MIN/1.73
GLUCOSE BLDC GLUCOMTR-MCNC: 121 MG/DL (ref 70–105)
GLUCOSE BLDC GLUCOMTR-MCNC: 216 MG/DL (ref 70–105)
GLUCOSE SERPL-MCNC: 117 MG/DL (ref 65–99)
POTASSIUM SERPL-SCNC: 4.5 MMOL/L (ref 3.5–5.2)
SODIUM SERPL-SCNC: 142 MMOL/L (ref 136–145)

## 2022-03-27 PROCEDURE — 63710000001 PREDNISONE PER 1 MG: Performed by: INTERNAL MEDICINE

## 2022-03-27 PROCEDURE — 80048 BASIC METABOLIC PNL TOTAL CA: CPT | Performed by: ORTHOPAEDIC SURGERY

## 2022-03-27 PROCEDURE — 94618 PULMONARY STRESS TESTING: CPT

## 2022-03-27 PROCEDURE — 94799 UNLISTED PULMONARY SVC/PX: CPT

## 2022-03-27 PROCEDURE — 99239 HOSP IP/OBS DSCHRG MGMT >30: CPT | Performed by: HOSPITALIST

## 2022-03-27 PROCEDURE — 82962 GLUCOSE BLOOD TEST: CPT

## 2022-03-27 RX ORDER — CEFDINIR 300 MG/1
300 CAPSULE ORAL 2 TIMES DAILY
Qty: 10 CAPSULE | Refills: 0 | Status: SHIPPED | OUTPATIENT
Start: 2022-03-27 | End: 2022-04-05

## 2022-03-27 RX ORDER — HYDROCODONE BITARTRATE AND ACETAMINOPHEN 5; 325 MG/1; MG/1
1 TABLET ORAL EVERY 6 HOURS PRN
Qty: 10 TABLET | Refills: 0 | Status: SHIPPED | OUTPATIENT
Start: 2022-03-27 | End: 2022-04-05

## 2022-03-27 RX ORDER — PREDNISONE 10 MG/1
10 TABLET ORAL DAILY
Qty: 30 TABLET | Refills: 0 | Status: SHIPPED | OUTPATIENT
Start: 2022-03-27 | End: 2022-03-27 | Stop reason: SDUPTHER

## 2022-03-27 RX ORDER — PREDNISONE 10 MG/1
TABLET ORAL
Qty: 30 TABLET | Refills: 0 | Status: SHIPPED | OUTPATIENT
Start: 2022-03-27 | End: 2022-04-05

## 2022-03-27 RX ADMIN — ATORVASTATIN CALCIUM 80 MG: 40 TABLET, FILM COATED ORAL at 10:05

## 2022-03-27 RX ADMIN — ESCITALOPRAM OXALATE 20 MG: 10 TABLET ORAL at 10:05

## 2022-03-27 RX ADMIN — Medication 10 ML: at 10:06

## 2022-03-27 RX ADMIN — OXYCODONE 10 MG: 5 TABLET ORAL at 07:21

## 2022-03-27 RX ADMIN — NICOTINE 1 PATCH: 21 PATCH, EXTENDED RELEASE TRANSDERMAL at 10:15

## 2022-03-27 RX ADMIN — IPRATROPIUM BROMIDE AND ALBUTEROL SULFATE 3 ML: .5; 3 SOLUTION RESPIRATORY (INHALATION) at 11:30

## 2022-03-27 RX ADMIN — IPRATROPIUM BROMIDE AND ALBUTEROL SULFATE 3 ML: .5; 3 SOLUTION RESPIRATORY (INHALATION) at 00:43

## 2022-03-27 RX ADMIN — BUDESONIDE 0.5 MG: 0.5 INHALANT RESPIRATORY (INHALATION) at 07:57

## 2022-03-27 RX ADMIN — PROPRANOLOL HYDROCHLORIDE 160 MG: 80 CAPSULE, EXTENDED RELEASE ORAL at 10:15

## 2022-03-27 RX ADMIN — AMLODIPINE BESYLATE 5 MG: 5 TABLET ORAL at 10:05

## 2022-03-27 RX ADMIN — PREDNISONE 20 MG: 20 TABLET ORAL at 10:05

## 2022-03-27 RX ADMIN — POLYETHYLENE GLYCOL 3350 17 G: 17 POWDER, FOR SOLUTION ORAL at 10:05

## 2022-03-27 RX ADMIN — IPRATROPIUM BROMIDE AND ALBUTEROL SULFATE 3 ML: .5; 3 SOLUTION RESPIRATORY (INHALATION) at 08:03

## 2022-03-27 RX ADMIN — ASPIRIN 81 MG: 81 TABLET, COATED ORAL at 10:05

## 2022-03-27 RX ADMIN — GUAIFENESIN 600 MG: 600 TABLET, EXTENDED RELEASE ORAL at 10:05

## 2022-03-27 RX ADMIN — PANTOPRAZOLE SODIUM 40 MG: 40 TABLET, DELAYED RELEASE ORAL at 07:21

## 2022-03-27 RX ADMIN — FENOFIBRATE 145 MG: 145 TABLET ORAL at 10:05

## 2022-03-27 RX ADMIN — BUPROPION HYDROCHLORIDE 300 MG: 150 TABLET, EXTENDED RELEASE ORAL at 10:07

## 2022-03-28 ENCOUNTER — TRANSITIONAL CARE MANAGEMENT TELEPHONE ENCOUNTER (OUTPATIENT)
Dept: CALL CENTER | Facility: HOSPITAL | Age: 67
End: 2022-03-28

## 2022-03-28 LAB
BACTERIA SPEC AEROBE CULT: NORMAL
BACTERIA SPEC AEROBE CULT: NORMAL

## 2022-03-29 ENCOUNTER — TRANSITIONAL CARE MANAGEMENT TELEPHONE ENCOUNTER (OUTPATIENT)
Dept: CALL CENTER | Facility: HOSPITAL | Age: 67
End: 2022-03-29

## 2022-03-31 ENCOUNTER — OFFICE VISIT (OUTPATIENT)
Dept: NEUROLOGY | Facility: CLINIC | Age: 67
End: 2022-03-31

## 2022-03-31 VITALS
SYSTOLIC BLOOD PRESSURE: 135 MMHG | WEIGHT: 136 LBS | DIASTOLIC BLOOD PRESSURE: 89 MMHG | HEIGHT: 64 IN | HEART RATE: 102 BPM | TEMPERATURE: 97 F | BODY MASS INDEX: 23.22 KG/M2

## 2022-03-31 DIAGNOSIS — G25.0 ESSENTIAL TREMOR: ICD-10-CM

## 2022-03-31 DIAGNOSIS — R41.3 MEMORY LOSS: Primary | ICD-10-CM

## 2022-03-31 DIAGNOSIS — R26.9 GAIT ABNORMALITY: ICD-10-CM

## 2022-03-31 PROCEDURE — 99214 OFFICE O/P EST MOD 30 MIN: CPT | Performed by: PSYCHIATRY & NEUROLOGY

## 2022-04-01 ENCOUNTER — TELEPHONE (OUTPATIENT)
Dept: FAMILY MEDICINE CLINIC | Facility: CLINIC | Age: 67
End: 2022-04-01

## 2022-04-05 ENCOUNTER — OFFICE VISIT (OUTPATIENT)
Dept: FAMILY MEDICINE CLINIC | Facility: CLINIC | Age: 67
End: 2022-04-05

## 2022-04-05 VITALS
BODY MASS INDEX: 23.56 KG/M2 | WEIGHT: 138 LBS | SYSTOLIC BLOOD PRESSURE: 117 MMHG | RESPIRATION RATE: 18 BRPM | HEART RATE: 79 BPM | DIASTOLIC BLOOD PRESSURE: 65 MMHG | OXYGEN SATURATION: 97 % | TEMPERATURE: 97.8 F | HEIGHT: 64 IN

## 2022-04-05 DIAGNOSIS — S72.044A NONDISPLACED FRACTURE OF BASE OF NECK OF RIGHT FEMUR, INITIAL ENCOUNTER FOR CLOSED FRACTURE: Primary | ICD-10-CM

## 2022-04-05 DIAGNOSIS — E11.9 TYPE 2 DIABETES MELLITUS WITHOUT COMPLICATION, WITHOUT LONG-TERM CURRENT USE OF INSULIN: ICD-10-CM

## 2022-04-05 DIAGNOSIS — R41.3 MEMORY LOSS: ICD-10-CM

## 2022-04-05 DIAGNOSIS — Z78.0 POSTMENOPAUSAL: ICD-10-CM

## 2022-04-05 DIAGNOSIS — Z12.31 VISIT FOR SCREENING MAMMOGRAM: ICD-10-CM

## 2022-04-05 DIAGNOSIS — J43.1 PANLOBULAR EMPHYSEMA: ICD-10-CM

## 2022-04-05 DIAGNOSIS — Z87.440 HISTORY OF UTI: ICD-10-CM

## 2022-04-05 LAB
BILIRUB BLD-MCNC: ABNORMAL MG/DL
CLARITY, POC: CLEAR
COLOR UR: YELLOW
EXPIRATION DATE: ABNORMAL
EXPIRATION DATE: ABNORMAL
GLUCOSE UR STRIP-MCNC: NEGATIVE MG/DL
HBA1C MFR BLD: 6.9 %
KETONES UR QL: ABNORMAL
LEUKOCYTE EST, POC: ABNORMAL
Lab: ABNORMAL
Lab: ABNORMAL
NITRITE UR-MCNC: NEGATIVE MG/ML
PH UR: 5.5 [PH] (ref 5–8)
PROT UR STRIP-MCNC: ABNORMAL MG/DL
RBC # UR STRIP: NEGATIVE /UL
SP GR UR: 1.03 (ref 1–1.03)
UROBILINOGEN UR QL: NORMAL

## 2022-04-05 PROCEDURE — 36415 COLL VENOUS BLD VENIPUNCTURE: CPT | Performed by: FAMILY MEDICINE

## 2022-04-05 PROCEDURE — 83036 HEMOGLOBIN GLYCOSYLATED A1C: CPT | Performed by: FAMILY MEDICINE

## 2022-04-05 PROCEDURE — 84446 ASSAY OF VITAMIN E: CPT | Performed by: FAMILY MEDICINE

## 2022-04-05 PROCEDURE — 81003 URINALYSIS AUTO W/O SCOPE: CPT | Performed by: FAMILY MEDICINE

## 2022-04-05 PROCEDURE — 82607 VITAMIN B-12: CPT | Performed by: FAMILY MEDICINE

## 2022-04-05 PROCEDURE — 82746 ASSAY OF FOLIC ACID SERUM: CPT | Performed by: FAMILY MEDICINE

## 2022-04-05 PROCEDURE — 99495 TRANSJ CARE MGMT MOD F2F 14D: CPT | Performed by: FAMILY MEDICINE

## 2022-04-05 PROCEDURE — 87086 URINE CULTURE/COLONY COUNT: CPT | Performed by: FAMILY MEDICINE

## 2022-04-06 ENCOUNTER — READMISSION MANAGEMENT (OUTPATIENT)
Dept: CALL CENTER | Facility: HOSPITAL | Age: 67
End: 2022-04-06

## 2022-04-06 LAB
BACTERIA SPEC AEROBE CULT: NO GROWTH
FOLATE SERPL-MCNC: 7.38 NG/ML (ref 4.78–24.2)
VIT B12 BLD-MCNC: 678 PG/ML (ref 211–946)

## 2022-04-07 ENCOUNTER — TELEPHONE (OUTPATIENT)
Dept: FAMILY MEDICINE CLINIC | Facility: CLINIC | Age: 67
End: 2022-04-07

## 2022-04-12 ENCOUNTER — READMISSION MANAGEMENT (OUTPATIENT)
Dept: CALL CENTER | Facility: HOSPITAL | Age: 67
End: 2022-04-12

## 2022-04-16 LAB
A-TOCOPHEROL VIT E SERPL-MCNC: 16.5 MG/L (ref 9–29)
GAMMA TOCOPHEROL SERPL-MCNC: 2.2 MG/L (ref 0.5–4.9)

## 2022-04-22 ENCOUNTER — TELEPHONE (OUTPATIENT)
Dept: FAMILY MEDICINE CLINIC | Facility: CLINIC | Age: 67
End: 2022-04-22

## 2022-04-22 RX ORDER — PREDNISONE 20 MG/1
TABLET ORAL
Qty: 26 TABLET | Refills: 0 | Status: ON HOLD | OUTPATIENT
Start: 2022-04-22 | End: 2022-07-12

## 2022-04-29 ENCOUNTER — APPOINTMENT (OUTPATIENT)
Dept: MRI IMAGING | Facility: HOSPITAL | Age: 67
End: 2022-04-29

## 2022-05-02 ENCOUNTER — APPOINTMENT (OUTPATIENT)
Dept: BONE DENSITY | Facility: HOSPITAL | Age: 67
End: 2022-05-02

## 2022-05-02 ENCOUNTER — APPOINTMENT (OUTPATIENT)
Dept: MAMMOGRAPHY | Facility: HOSPITAL | Age: 67
End: 2022-05-02

## 2022-05-11 ENCOUNTER — TELEPHONE (OUTPATIENT)
Dept: FAMILY MEDICINE CLINIC | Facility: CLINIC | Age: 67
End: 2022-05-11

## 2022-05-16 DIAGNOSIS — M54.50 ACUTE BILATERAL LOW BACK PAIN WITHOUT SCIATICA: ICD-10-CM

## 2022-05-16 RX ORDER — ALBUTEROL SULFATE 90 UG/1
AEROSOL, METERED RESPIRATORY (INHALATION)
Qty: 18 G | Refills: 0 | Status: SHIPPED | OUTPATIENT
Start: 2022-05-16 | End: 2022-06-20

## 2022-05-17 RX ORDER — TRAMADOL HYDROCHLORIDE 50 MG/1
TABLET ORAL
Qty: 90 TABLET | Refills: 0 | Status: SHIPPED | OUTPATIENT
Start: 2022-05-17 | End: 2022-07-05

## 2022-06-20 ENCOUNTER — APPOINTMENT (OUTPATIENT)
Dept: GENERAL RADIOLOGY | Facility: HOSPITAL | Age: 67
End: 2022-06-20

## 2022-06-20 ENCOUNTER — HOSPITAL ENCOUNTER (OUTPATIENT)
Facility: HOSPITAL | Age: 67
Setting detail: OBSERVATION
Discharge: HOME | End: 2022-06-22
Attending: EMERGENCY MEDICINE | Admitting: EMERGENCY MEDICINE

## 2022-06-20 DIAGNOSIS — J44.1 COPD EXACERBATION: ICD-10-CM

## 2022-06-20 DIAGNOSIS — R06.09 EXERTIONAL DYSPNEA: Primary | ICD-10-CM

## 2022-06-20 LAB
ANION GAP SERPL CALCULATED.3IONS-SCNC: 11 MMOL/L (ref 5–15)
ANION GAP SERPL CALCULATED.3IONS-SCNC: 11 MMOL/L (ref 5–15)
APTT PPP: 27.6 SECONDS (ref 61–76.5)
B PARAPERT DNA SPEC QL NAA+PROBE: NOT DETECTED
B PERT DNA SPEC QL NAA+PROBE: NOT DETECTED
BASOPHILS # BLD AUTO: 0.1 10*3/MM3 (ref 0–0.2)
BASOPHILS # BLD AUTO: 0.1 10*3/MM3 (ref 0–0.2)
BASOPHILS NFR BLD AUTO: 0.6 % (ref 0–1.5)
BASOPHILS NFR BLD AUTO: 1.3 % (ref 0–1.5)
BUN SERPL-MCNC: 25 MG/DL (ref 8–23)
BUN SERPL-MCNC: 26 MG/DL (ref 8–23)
BUN/CREAT SERPL: 26 (ref 7–25)
BUN/CREAT SERPL: 29.4 (ref 7–25)
C PNEUM DNA NPH QL NAA+NON-PROBE: NOT DETECTED
CALCIUM SPEC-SCNC: 9.5 MG/DL (ref 8.6–10.5)
CALCIUM SPEC-SCNC: 9.7 MG/DL (ref 8.6–10.5)
CHLORIDE SERPL-SCNC: 102 MMOL/L (ref 98–107)
CHLORIDE SERPL-SCNC: 99 MMOL/L (ref 98–107)
CO2 SERPL-SCNC: 31 MMOL/L (ref 22–29)
CO2 SERPL-SCNC: 31 MMOL/L (ref 22–29)
CREAT SERPL-MCNC: 0.85 MG/DL (ref 0.57–1)
CREAT SERPL-MCNC: 1 MG/DL (ref 0.57–1)
DEPRECATED RDW RBC AUTO: 42 FL (ref 37–54)
DEPRECATED RDW RBC AUTO: 42.4 FL (ref 37–54)
EGFRCR SERPLBLD CKD-EPI 2021: 63 ML/MIN/1.73
EGFRCR SERPLBLD CKD-EPI 2021: 76.6 ML/MIN/1.73
EOSINOPHIL # BLD AUTO: 0.9 10*3/MM3 (ref 0–0.4)
EOSINOPHIL # BLD AUTO: 1.3 10*3/MM3 (ref 0–0.4)
EOSINOPHIL NFR BLD AUTO: 12.9 % (ref 0.3–6.2)
EOSINOPHIL NFR BLD AUTO: 7.3 % (ref 0.3–6.2)
ERYTHROCYTE [DISTWIDTH] IN BLOOD BY AUTOMATED COUNT: 13.7 % (ref 12.3–15.4)
ERYTHROCYTE [DISTWIDTH] IN BLOOD BY AUTOMATED COUNT: 14.1 % (ref 12.3–15.4)
FLUAV SUBTYP SPEC NAA+PROBE: NOT DETECTED
FLUBV RNA ISLT QL NAA+PROBE: NOT DETECTED
GLUCOSE SERPL-MCNC: 102 MG/DL (ref 65–99)
GLUCOSE SERPL-MCNC: 190 MG/DL (ref 65–99)
HADV DNA SPEC NAA+PROBE: NOT DETECTED
HCOV 229E RNA SPEC QL NAA+PROBE: NOT DETECTED
HCOV HKU1 RNA SPEC QL NAA+PROBE: NOT DETECTED
HCOV NL63 RNA SPEC QL NAA+PROBE: NOT DETECTED
HCOV OC43 RNA SPEC QL NAA+PROBE: NOT DETECTED
HCT VFR BLD AUTO: 45.9 % (ref 34–46.6)
HCT VFR BLD AUTO: 46 % (ref 34–46.6)
HGB BLD-MCNC: 15.1 G/DL (ref 12–15.9)
HGB BLD-MCNC: 15.4 G/DL (ref 12–15.9)
HMPV RNA NPH QL NAA+NON-PROBE: NOT DETECTED
HPIV1 RNA ISLT QL NAA+PROBE: NOT DETECTED
HPIV2 RNA SPEC QL NAA+PROBE: NOT DETECTED
HPIV3 RNA NPH QL NAA+PROBE: NOT DETECTED
HPIV4 P GENE NPH QL NAA+PROBE: NOT DETECTED
INR PPP: 1.03 (ref 0.93–1.1)
LYMPHOCYTES # BLD AUTO: 1.5 10*3/MM3 (ref 0.7–3.1)
LYMPHOCYTES # BLD AUTO: 2.7 10*3/MM3 (ref 0.7–3.1)
LYMPHOCYTES NFR BLD AUTO: 11.7 % (ref 19.6–45.3)
LYMPHOCYTES NFR BLD AUTO: 26.2 % (ref 19.6–45.3)
M PNEUMO IGG SER IA-ACNC: NOT DETECTED
MCH RBC QN AUTO: 28.2 PG (ref 26.6–33)
MCH RBC QN AUTO: 28.5 PG (ref 26.6–33)
MCHC RBC AUTO-ENTMCNC: 32.9 G/DL (ref 31.5–35.7)
MCHC RBC AUTO-ENTMCNC: 33.5 G/DL (ref 31.5–35.7)
MCV RBC AUTO: 85.2 FL (ref 79–97)
MCV RBC AUTO: 85.6 FL (ref 79–97)
MONOCYTES # BLD AUTO: 0.3 10*3/MM3 (ref 0.1–0.9)
MONOCYTES # BLD AUTO: 0.8 10*3/MM3 (ref 0.1–0.9)
MONOCYTES NFR BLD AUTO: 2 % (ref 5–12)
MONOCYTES NFR BLD AUTO: 7.9 % (ref 5–12)
NEUTROPHILS NFR BLD AUTO: 10.1 10*3/MM3 (ref 1.7–7)
NEUTROPHILS NFR BLD AUTO: 5.4 10*3/MM3 (ref 1.7–7)
NEUTROPHILS NFR BLD AUTO: 51.7 % (ref 42.7–76)
NEUTROPHILS NFR BLD AUTO: 78.4 % (ref 42.7–76)
NRBC BLD AUTO-RTO: 0 /100 WBC (ref 0–0.2)
NRBC BLD AUTO-RTO: 0 /100 WBC (ref 0–0.2)
NT-PROBNP SERPL-MCNC: 615.2 PG/ML (ref 0–900)
PLATELET # BLD AUTO: 336 10*3/MM3 (ref 140–450)
PLATELET # BLD AUTO: 365 10*3/MM3 (ref 140–450)
PMV BLD AUTO: 7.9 FL (ref 6–12)
PMV BLD AUTO: 8.2 FL (ref 6–12)
POTASSIUM SERPL-SCNC: 4.2 MMOL/L (ref 3.5–5.2)
POTASSIUM SERPL-SCNC: 4.5 MMOL/L (ref 3.5–5.2)
PROTHROMBIN TIME: 10.6 SECONDS (ref 9.6–11.7)
QT INTERVAL: 385 MS
RBC # BLD AUTO: 5.37 10*6/MM3 (ref 3.77–5.28)
RBC # BLD AUTO: 5.39 10*6/MM3 (ref 3.77–5.28)
RHINOVIRUS RNA SPEC NAA+PROBE: NOT DETECTED
RSV RNA NPH QL NAA+NON-PROBE: NOT DETECTED
SARS-COV-2 RNA NPH QL NAA+NON-PROBE: NOT DETECTED
SODIUM SERPL-SCNC: 141 MMOL/L (ref 136–145)
SODIUM SERPL-SCNC: 144 MMOL/L (ref 136–145)
TROPONIN T SERPL-MCNC: 0.02 NG/ML (ref 0–0.03)
TROPONIN T SERPL-MCNC: 0.02 NG/ML (ref 0–0.03)
WBC NRBC COR # BLD: 10.5 10*3/MM3 (ref 3.4–10.8)
WBC NRBC COR # BLD: 12.9 10*3/MM3 (ref 3.4–10.8)

## 2022-06-20 PROCEDURE — 94799 UNLISTED PULMONARY SVC/PX: CPT

## 2022-06-20 PROCEDURE — G0378 HOSPITAL OBSERVATION PER HR: HCPCS

## 2022-06-20 PROCEDURE — 80048 BASIC METABOLIC PNL TOTAL CA: CPT | Performed by: NURSE PRACTITIONER

## 2022-06-20 PROCEDURE — 84484 ASSAY OF TROPONIN QUANT: CPT | Performed by: NURSE PRACTITIONER

## 2022-06-20 PROCEDURE — 96374 THER/PROPH/DIAG INJ IV PUSH: CPT

## 2022-06-20 PROCEDURE — 0202U NFCT DS 22 TRGT SARS-COV-2: CPT | Performed by: NURSE PRACTITIONER

## 2022-06-20 PROCEDURE — 99285 EMERGENCY DEPT VISIT HI MDM: CPT

## 2022-06-20 PROCEDURE — 94761 N-INVAS EAR/PLS OXIMETRY MLT: CPT

## 2022-06-20 PROCEDURE — 93005 ELECTROCARDIOGRAM TRACING: CPT | Performed by: EMERGENCY MEDICINE

## 2022-06-20 PROCEDURE — 71045 X-RAY EXAM CHEST 1 VIEW: CPT

## 2022-06-20 PROCEDURE — 94640 AIRWAY INHALATION TREATMENT: CPT

## 2022-06-20 PROCEDURE — 25010000002 METHYLPREDNISOLONE PER 125 MG: Performed by: NURSE PRACTITIONER

## 2022-06-20 PROCEDURE — 94664 DEMO&/EVAL PT USE INHALER: CPT

## 2022-06-20 PROCEDURE — 83880 ASSAY OF NATRIURETIC PEPTIDE: CPT | Performed by: NURSE PRACTITIONER

## 2022-06-20 PROCEDURE — 85610 PROTHROMBIN TIME: CPT | Performed by: NURSE PRACTITIONER

## 2022-06-20 PROCEDURE — 93005 ELECTROCARDIOGRAM TRACING: CPT

## 2022-06-20 PROCEDURE — 36415 COLL VENOUS BLD VENIPUNCTURE: CPT | Performed by: NURSE PRACTITIONER

## 2022-06-20 PROCEDURE — 85730 THROMBOPLASTIN TIME PARTIAL: CPT | Performed by: NURSE PRACTITIONER

## 2022-06-20 PROCEDURE — 85025 COMPLETE CBC W/AUTO DIFF WBC: CPT | Performed by: NURSE PRACTITIONER

## 2022-06-20 RX ORDER — MELATONIN
1000 DAILY
Status: DISCONTINUED | OUTPATIENT
Start: 2022-06-20 | End: 2022-06-22 | Stop reason: HOSPADM

## 2022-06-20 RX ORDER — IPRATROPIUM BROMIDE AND ALBUTEROL SULFATE 2.5; .5 MG/3ML; MG/3ML
3 SOLUTION RESPIRATORY (INHALATION) EVERY 4 HOURS PRN
Status: DISCONTINUED | OUTPATIENT
Start: 2022-06-20 | End: 2022-06-22 | Stop reason: HOSPADM

## 2022-06-20 RX ORDER — SODIUM CHLORIDE 0.9 % (FLUSH) 0.9 %
10 SYRINGE (ML) INJECTION EVERY 12 HOURS SCHEDULED
Status: DISCONTINUED | OUTPATIENT
Start: 2022-06-20 | End: 2022-06-22 | Stop reason: HOSPADM

## 2022-06-20 RX ORDER — IPRATROPIUM BROMIDE AND ALBUTEROL SULFATE 2.5; .5 MG/3ML; MG/3ML
3 SOLUTION RESPIRATORY (INHALATION) ONCE
Status: COMPLETED | OUTPATIENT
Start: 2022-06-20 | End: 2022-06-20

## 2022-06-20 RX ORDER — ESCITALOPRAM OXALATE 10 MG/1
20 TABLET ORAL DAILY
Status: DISCONTINUED | OUTPATIENT
Start: 2022-06-21 | End: 2022-06-22 | Stop reason: HOSPADM

## 2022-06-20 RX ORDER — ASPIRIN 81 MG/1
81 TABLET ORAL ONCE
Status: DISCONTINUED | OUTPATIENT
Start: 2022-06-20 | End: 2022-06-21

## 2022-06-20 RX ORDER — BUPROPION HYDROCHLORIDE 150 MG/1
300 TABLET ORAL DAILY
Status: DISCONTINUED | OUTPATIENT
Start: 2022-06-21 | End: 2022-06-22 | Stop reason: HOSPADM

## 2022-06-20 RX ORDER — ALBUTEROL SULFATE 90 UG/1
2 AEROSOL, METERED RESPIRATORY (INHALATION)
Status: DISCONTINUED | OUTPATIENT
Start: 2022-06-20 | End: 2022-06-22

## 2022-06-20 RX ORDER — SODIUM CHLORIDE 0.9 % (FLUSH) 0.9 %
10 SYRINGE (ML) INJECTION AS NEEDED
Status: DISCONTINUED | OUTPATIENT
Start: 2022-06-20 | End: 2022-06-22 | Stop reason: HOSPADM

## 2022-06-20 RX ORDER — CHOLECALCIFEROL (VITAMIN D3) 125 MCG
5 CAPSULE ORAL NIGHTLY PRN
Status: DISCONTINUED | OUTPATIENT
Start: 2022-06-20 | End: 2022-06-22 | Stop reason: HOSPADM

## 2022-06-20 RX ORDER — ATORVASTATIN CALCIUM 40 MG/1
80 TABLET, FILM COATED ORAL DAILY
Status: DISCONTINUED | OUTPATIENT
Start: 2022-06-20 | End: 2022-06-22 | Stop reason: HOSPADM

## 2022-06-20 RX ORDER — ACETAMINOPHEN 325 MG/1
650 TABLET ORAL EVERY 4 HOURS PRN
Status: DISCONTINUED | OUTPATIENT
Start: 2022-06-20 | End: 2022-06-22 | Stop reason: HOSPADM

## 2022-06-20 RX ORDER — ALBUTEROL SULFATE 90 UG/1
AEROSOL, METERED RESPIRATORY (INHALATION)
Qty: 18 G | Refills: 0 | Status: SHIPPED | OUTPATIENT
Start: 2022-06-20 | End: 2022-07-18

## 2022-06-20 RX ORDER — ONDANSETRON 2 MG/ML
4 INJECTION INTRAMUSCULAR; INTRAVENOUS EVERY 6 HOURS PRN
Status: DISCONTINUED | OUTPATIENT
Start: 2022-06-20 | End: 2022-06-22 | Stop reason: HOSPADM

## 2022-06-20 RX ORDER — METHYLPREDNISOLONE SODIUM SUCCINATE 125 MG/2ML
125 INJECTION, POWDER, LYOPHILIZED, FOR SOLUTION INTRAMUSCULAR; INTRAVENOUS ONCE
Status: COMPLETED | OUTPATIENT
Start: 2022-06-20 | End: 2022-06-20

## 2022-06-20 RX ORDER — AMLODIPINE BESYLATE 5 MG/1
5 TABLET ORAL DAILY
Status: DISCONTINUED | OUTPATIENT
Start: 2022-06-20 | End: 2022-06-22 | Stop reason: HOSPADM

## 2022-06-20 RX ORDER — PANTOPRAZOLE SODIUM 40 MG/1
40 TABLET, DELAYED RELEASE ORAL
Status: DISCONTINUED | OUTPATIENT
Start: 2022-06-21 | End: 2022-06-22 | Stop reason: HOSPADM

## 2022-06-20 RX ADMIN — IPRATROPIUM BROMIDE AND ALBUTEROL SULFATE 3 ML: .5; 3 SOLUTION RESPIRATORY (INHALATION) at 16:40

## 2022-06-20 RX ADMIN — ALBUTEROL SULFATE 2 PUFF: 108 INHALANT RESPIRATORY (INHALATION) at 20:29

## 2022-06-20 RX ADMIN — METHYLPREDNISOLONE SODIUM SUCCINATE 125 MG: 125 INJECTION, POWDER, FOR SOLUTION INTRAMUSCULAR; INTRAVENOUS at 18:02

## 2022-06-21 ENCOUNTER — APPOINTMENT (OUTPATIENT)
Dept: CARDIOLOGY | Facility: HOSPITAL | Age: 67
End: 2022-06-21

## 2022-06-21 LAB
ANION GAP SERPL CALCULATED.3IONS-SCNC: 11 MMOL/L (ref 5–15)
BASOPHILS # BLD AUTO: 0 10*3/MM3 (ref 0–0.2)
BASOPHILS NFR BLD AUTO: 0.3 % (ref 0–1.5)
BH CV ECHO MEAS - ACS: 1.4 CM
BH CV ECHO MEAS - AO MAX PG: 4 MMHG
BH CV ECHO MEAS - AO MEAN PG: 2.31 MMHG
BH CV ECHO MEAS - AO ROOT DIAM: 3.3 CM
BH CV ECHO MEAS - AO V2 MAX: 99.5 CM/SEC
BH CV ECHO MEAS - AO V2 VTI: 20.4 CM
BH CV ECHO MEAS - AVA(I,D): 2.1 CM2
BH CV ECHO MEAS - EDV(CUBED): 33.3 ML
BH CV ECHO MEAS - EDV(MOD-SP4): 41.1 ML
BH CV ECHO MEAS - EF(MOD-BP): 56 %
BH CV ECHO MEAS - EF(MOD-SP4): 55.8 %
BH CV ECHO MEAS - ESV(CUBED): 12.5 ML
BH CV ECHO MEAS - ESV(MOD-SP4): 18.1 ML
BH CV ECHO MEAS - FS: 27.9 %
BH CV ECHO MEAS - IVS/LVPW: 0.89 CM
BH CV ECHO MEAS - IVSD: 1.39 CM
BH CV ECHO MEAS - LA DIMENSION: 3.1 CM
BH CV ECHO MEAS - LV DIASTOLIC VOL/BSA (35-75): 25.6 CM2
BH CV ECHO MEAS - LV MASS(C)D: 169.2 GRAMS
BH CV ECHO MEAS - LV MAX PG: 1.83 MMHG
BH CV ECHO MEAS - LV MEAN PG: 1.14 MMHG
BH CV ECHO MEAS - LV SYSTOLIC VOL/BSA (12-30): 11.3 CM2
BH CV ECHO MEAS - LV V1 MAX: 67.7 CM/SEC
BH CV ECHO MEAS - LV V1 VTI: 15.7 CM
BH CV ECHO MEAS - LVIDD: 3.2 CM
BH CV ECHO MEAS - LVIDS: 2.32 CM
BH CV ECHO MEAS - LVOT AREA: 2.7 CM2
BH CV ECHO MEAS - LVOT DIAM: 1.87 CM
BH CV ECHO MEAS - LVPWD: 1.57 CM
BH CV ECHO MEAS - MV A MAX VEL: 78.9 CM/SEC
BH CV ECHO MEAS - MV DEC SLOPE: 262.6 CM/SEC2
BH CV ECHO MEAS - MV DEC TIME: 0.27 MSEC
BH CV ECHO MEAS - MV E MAX VEL: 72.1 CM/SEC
BH CV ECHO MEAS - MV E/A: 0.91
BH CV ECHO MEAS - MV MAX PG: 2.34 MMHG
BH CV ECHO MEAS - MV MEAN PG: 1.08 MMHG
BH CV ECHO MEAS - MV V2 VTI: 20.9 CM
BH CV ECHO MEAS - MVA(VTI): 2.04 CM2
BH CV ECHO MEAS - PA ACC TIME: 0.09 SEC
BH CV ECHO MEAS - PA PR(ACCEL): 40.1 MMHG
BH CV ECHO MEAS - PA V2 MAX: 80.9 CM/SEC
BH CV ECHO MEAS - PI END-D VEL: 98.5 CM/SEC
BH CV ECHO MEAS - RAP SYSTOLE: 3 MMHG
BH CV ECHO MEAS - RV MAX PG: 1.75 MMHG
BH CV ECHO MEAS - RV V1 MAX: 66.2 CM/SEC
BH CV ECHO MEAS - RV V1 VTI: 13.4 CM
BH CV ECHO MEAS - RVDD: 2.6 CM
BH CV ECHO MEAS - SI(MOD-SP4): 14.3 ML/M2
BH CV ECHO MEAS - SV(LVOT): 42.8 ML
BH CV ECHO MEAS - SV(MOD-SP4): 22.9 ML
BUN SERPL-MCNC: 28 MG/DL (ref 8–23)
BUN/CREAT SERPL: 35.9 (ref 7–25)
CALCIUM SPEC-SCNC: 9.8 MG/DL (ref 8.6–10.5)
CHLORIDE SERPL-SCNC: 99 MMOL/L (ref 98–107)
CK SERPL-CCNC: 44 U/L (ref 20–180)
CO2 SERPL-SCNC: 28 MMOL/L (ref 22–29)
CREAT SERPL-MCNC: 0.78 MG/DL (ref 0.57–1)
CRP SERPL-MCNC: <0.3 MG/DL (ref 0–0.5)
D DIMER PPP FEU-MCNC: 0.3 MG/L (FEU) (ref 0–0.59)
DEPRECATED RDW RBC AUTO: 39.8 FL (ref 37–54)
EGFRCR SERPLBLD CKD-EPI 2021: 84.9 ML/MIN/1.73
EOSINOPHIL # BLD AUTO: 0 10*3/MM3 (ref 0–0.4)
EOSINOPHIL NFR BLD AUTO: 0.1 % (ref 0.3–6.2)
ERYTHROCYTE [DISTWIDTH] IN BLOOD BY AUTOMATED COUNT: 13.4 % (ref 12.3–15.4)
GLUCOSE BLDC GLUCOMTR-MCNC: 204 MG/DL (ref 70–105)
GLUCOSE BLDC GLUCOMTR-MCNC: 271 MG/DL (ref 70–105)
GLUCOSE BLDC GLUCOMTR-MCNC: 281 MG/DL (ref 70–105)
GLUCOSE SERPL-MCNC: 253 MG/DL (ref 65–99)
HCT VFR BLD AUTO: 42.4 % (ref 34–46.6)
HGB BLD-MCNC: 14.2 G/DL (ref 12–15.9)
LV EF 2D ECHO EST: 55 %
LYMPHOCYTES # BLD AUTO: 1.3 10*3/MM3 (ref 0.7–3.1)
LYMPHOCYTES NFR BLD AUTO: 15.8 % (ref 19.6–45.3)
MAXIMAL PREDICTED HEART RATE: 156 BPM
MCH RBC QN AUTO: 28.2 PG (ref 26.6–33)
MCHC RBC AUTO-ENTMCNC: 33.5 G/DL (ref 31.5–35.7)
MCV RBC AUTO: 84.2 FL (ref 79–97)
MONOCYTES # BLD AUTO: 0.1 10*3/MM3 (ref 0.1–0.9)
MONOCYTES NFR BLD AUTO: 0.8 % (ref 5–12)
NEUTROPHILS NFR BLD AUTO: 6.8 10*3/MM3 (ref 1.7–7)
NEUTROPHILS NFR BLD AUTO: 83 % (ref 42.7–76)
NRBC BLD AUTO-RTO: 0.1 /100 WBC (ref 0–0.2)
PLATELET # BLD AUTO: 345 10*3/MM3 (ref 140–450)
PMV BLD AUTO: 8.9 FL (ref 6–12)
POTASSIUM SERPL-SCNC: 4.5 MMOL/L (ref 3.5–5.2)
RBC # BLD AUTO: 5.04 10*6/MM3 (ref 3.77–5.28)
SODIUM SERPL-SCNC: 138 MMOL/L (ref 136–145)
STRESS TARGET HR: 133 BPM
WBC NRBC COR # BLD: 8.2 10*3/MM3 (ref 3.4–10.8)

## 2022-06-21 PROCEDURE — 94761 N-INVAS EAR/PLS OXIMETRY MLT: CPT

## 2022-06-21 PROCEDURE — 63710000001 INSULIN LISPRO (HUMAN) PER 5 UNITS: Performed by: NURSE PRACTITIONER

## 2022-06-21 PROCEDURE — 25010000002 METHYLPREDNISOLONE PER 40 MG: Performed by: NURSE PRACTITIONER

## 2022-06-21 PROCEDURE — 94664 DEMO&/EVAL PT USE INHALER: CPT

## 2022-06-21 PROCEDURE — 85025 COMPLETE CBC W/AUTO DIFF WBC: CPT | Performed by: NURSE PRACTITIONER

## 2022-06-21 PROCEDURE — 93306 TTE W/DOPPLER COMPLETE: CPT | Performed by: INTERNAL MEDICINE

## 2022-06-21 PROCEDURE — 94799 UNLISTED PULMONARY SVC/PX: CPT

## 2022-06-21 PROCEDURE — 80048 BASIC METABOLIC PNL TOTAL CA: CPT | Performed by: NURSE PRACTITIONER

## 2022-06-21 PROCEDURE — 93306 TTE W/DOPPLER COMPLETE: CPT

## 2022-06-21 PROCEDURE — 82962 GLUCOSE BLOOD TEST: CPT

## 2022-06-21 PROCEDURE — G0378 HOSPITAL OBSERVATION PER HR: HCPCS

## 2022-06-21 PROCEDURE — 96376 TX/PRO/DX INJ SAME DRUG ADON: CPT

## 2022-06-21 PROCEDURE — 86140 C-REACTIVE PROTEIN: CPT | Performed by: NURSE PRACTITIONER

## 2022-06-21 PROCEDURE — 85379 FIBRIN DEGRADATION QUANT: CPT | Performed by: NURSE PRACTITIONER

## 2022-06-21 PROCEDURE — 82550 ASSAY OF CK (CPK): CPT | Performed by: NURSE PRACTITIONER

## 2022-06-21 RX ORDER — BISACODYL 10 MG
10 SUPPOSITORY, RECTAL RECTAL DAILY PRN
Status: DISCONTINUED | OUTPATIENT
Start: 2022-06-21 | End: 2022-06-22 | Stop reason: HOSPADM

## 2022-06-21 RX ORDER — OLANZAPINE 10 MG/2ML
1 INJECTION, POWDER, LYOPHILIZED, FOR SOLUTION INTRAMUSCULAR
Status: DISCONTINUED | OUTPATIENT
Start: 2022-06-21 | End: 2022-06-22 | Stop reason: HOSPADM

## 2022-06-21 RX ORDER — ASPIRIN 81 MG/1
81 TABLET ORAL DAILY
Status: DISCONTINUED | OUTPATIENT
Start: 2022-06-21 | End: 2022-06-22 | Stop reason: HOSPADM

## 2022-06-21 RX ORDER — NICOTINE POLACRILEX 4 MG
15 LOZENGE BUCCAL
Status: DISCONTINUED | OUTPATIENT
Start: 2022-06-21 | End: 2022-06-22 | Stop reason: HOSPADM

## 2022-06-21 RX ORDER — TRAMADOL HYDROCHLORIDE 50 MG/1
50 TABLET ORAL EVERY 8 HOURS PRN
Status: DISCONTINUED | OUTPATIENT
Start: 2022-06-21 | End: 2022-06-22 | Stop reason: HOSPADM

## 2022-06-21 RX ORDER — POLYETHYLENE GLYCOL 3350 17 G/17G
17 POWDER, FOR SOLUTION ORAL DAILY PRN
Status: DISCONTINUED | OUTPATIENT
Start: 2022-06-21 | End: 2022-06-22 | Stop reason: HOSPADM

## 2022-06-21 RX ORDER — ONDANSETRON 2 MG/ML
4 INJECTION INTRAMUSCULAR; INTRAVENOUS EVERY 6 HOURS PRN
Status: DISCONTINUED | OUTPATIENT
Start: 2022-06-21 | End: 2022-06-22 | Stop reason: HOSPADM

## 2022-06-21 RX ORDER — INSULIN LISPRO 100 [IU]/ML
0-7 INJECTION, SOLUTION INTRAVENOUS; SUBCUTANEOUS
Status: DISCONTINUED | OUTPATIENT
Start: 2022-06-21 | End: 2022-06-22 | Stop reason: HOSPADM

## 2022-06-21 RX ORDER — BISACODYL 5 MG/1
5 TABLET, DELAYED RELEASE ORAL DAILY PRN
Status: DISCONTINUED | OUTPATIENT
Start: 2022-06-21 | End: 2022-06-22 | Stop reason: HOSPADM

## 2022-06-21 RX ORDER — METHYLPREDNISOLONE SODIUM SUCCINATE 40 MG/ML
40 INJECTION, POWDER, LYOPHILIZED, FOR SOLUTION INTRAMUSCULAR; INTRAVENOUS EVERY 12 HOURS
Status: DISCONTINUED | OUTPATIENT
Start: 2022-06-21 | End: 2022-06-22 | Stop reason: HOSPADM

## 2022-06-21 RX ORDER — PROPRANOLOL HYDROCHLORIDE 80 MG/1
160 CAPSULE, EXTENDED RELEASE ORAL DAILY
Status: DISCONTINUED | OUTPATIENT
Start: 2022-06-21 | End: 2022-06-22 | Stop reason: HOSPADM

## 2022-06-21 RX ORDER — DEXTROSE MONOHYDRATE 25 G/50ML
25 INJECTION, SOLUTION INTRAVENOUS
Status: DISCONTINUED | OUTPATIENT
Start: 2022-06-21 | End: 2022-06-22 | Stop reason: HOSPADM

## 2022-06-21 RX ORDER — ONDANSETRON 4 MG/1
4 TABLET, FILM COATED ORAL EVERY 6 HOURS PRN
Status: DISCONTINUED | OUTPATIENT
Start: 2022-06-21 | End: 2022-06-22 | Stop reason: HOSPADM

## 2022-06-21 RX ORDER — SODIUM CHLORIDE 0.9 % (FLUSH) 0.9 %
10 SYRINGE (ML) INJECTION AS NEEDED
Status: DISCONTINUED | OUTPATIENT
Start: 2022-06-21 | End: 2022-06-22 | Stop reason: HOSPADM

## 2022-06-21 RX ORDER — SODIUM CHLORIDE 0.9 % (FLUSH) 0.9 %
10 SYRINGE (ML) INJECTION EVERY 12 HOURS SCHEDULED
Status: DISCONTINUED | OUTPATIENT
Start: 2022-06-21 | End: 2022-06-22 | Stop reason: HOSPADM

## 2022-06-21 RX ORDER — INSULIN LISPRO 100 [IU]/ML
0-7 INJECTION, SOLUTION INTRAVENOUS; SUBCUTANEOUS AS NEEDED
Status: DISCONTINUED | OUTPATIENT
Start: 2022-06-21 | End: 2022-06-22 | Stop reason: HOSPADM

## 2022-06-21 RX ADMIN — AMLODIPINE BESYLATE 5 MG: 5 TABLET ORAL at 08:24

## 2022-06-21 RX ADMIN — ALBUTEROL SULFATE 2 PUFF: 108 INHALANT RESPIRATORY (INHALATION) at 18:18

## 2022-06-21 RX ADMIN — Medication 10 ML: at 00:19

## 2022-06-21 RX ADMIN — ASPIRIN 81 MG: 81 TABLET, COATED ORAL at 08:24

## 2022-06-21 RX ADMIN — METHYLPREDNISOLONE SODIUM SUCCINATE 40 MG: 40 INJECTION, POWDER, FOR SOLUTION INTRAMUSCULAR; INTRAVENOUS at 22:17

## 2022-06-21 RX ADMIN — Medication 5 MG: at 01:17

## 2022-06-21 RX ADMIN — INSULIN LISPRO 4 UNITS: 100 INJECTION, SOLUTION INTRAVENOUS; SUBCUTANEOUS at 12:53

## 2022-06-21 RX ADMIN — PANTOPRAZOLE SODIUM 40 MG: 40 TABLET, DELAYED RELEASE ORAL at 08:24

## 2022-06-21 RX ADMIN — ATORVASTATIN CALCIUM 80 MG: 40 TABLET, FILM COATED ORAL at 08:24

## 2022-06-21 RX ADMIN — ESCITALOPRAM OXALATE 20 MG: 10 TABLET ORAL at 08:24

## 2022-06-21 RX ADMIN — ALBUTEROL SULFATE 2 PUFF: 108 INHALANT RESPIRATORY (INHALATION) at 07:02

## 2022-06-21 RX ADMIN — PROPRANOLOL HYDROCHLORIDE 160 MG: 80 CAPSULE, EXTENDED RELEASE ORAL at 08:24

## 2022-06-21 RX ADMIN — Medication 10 ML: at 08:33

## 2022-06-21 RX ADMIN — Medication 10 ML: at 08:23

## 2022-06-21 RX ADMIN — Medication 10 ML: at 22:17

## 2022-06-21 RX ADMIN — INSULIN LISPRO 4 UNITS: 100 INJECTION, SOLUTION INTRAVENOUS; SUBCUTANEOUS at 17:41

## 2022-06-21 RX ADMIN — Medication 1000 UNITS: at 08:24

## 2022-06-21 RX ADMIN — TRAMADOL HYDROCHLORIDE 50 MG: 50 TABLET, COATED ORAL at 08:29

## 2022-06-21 RX ADMIN — METHYLPREDNISOLONE SODIUM SUCCINATE 40 MG: 40 INJECTION, POWDER, FOR SOLUTION INTRAMUSCULAR; INTRAVENOUS at 08:23

## 2022-06-21 RX ADMIN — BUPROPION HYDROCHLORIDE 300 MG: 150 TABLET, EXTENDED RELEASE ORAL at 08:24

## 2022-06-22 ENCOUNTER — READMISSION MANAGEMENT (OUTPATIENT)
Dept: CALL CENTER | Facility: HOSPITAL | Age: 67
End: 2022-06-22

## 2022-06-22 VITALS
DIASTOLIC BLOOD PRESSURE: 75 MMHG | RESPIRATION RATE: 16 BRPM | OXYGEN SATURATION: 95 % | BODY MASS INDEX: 21.34 KG/M2 | TEMPERATURE: 98.3 F | HEART RATE: 80 BPM | SYSTOLIC BLOOD PRESSURE: 138 MMHG | HEIGHT: 64 IN | WEIGHT: 125 LBS

## 2022-06-22 LAB
ANION GAP SERPL CALCULATED.3IONS-SCNC: 11 MMOL/L (ref 5–15)
BASOPHILS # BLD AUTO: 0.1 10*3/MM3 (ref 0–0.2)
BASOPHILS NFR BLD AUTO: 0.6 % (ref 0–1.5)
BUN SERPL-MCNC: 30 MG/DL (ref 8–23)
BUN/CREAT SERPL: 33.3 (ref 7–25)
CALCIUM SPEC-SCNC: 9.9 MG/DL (ref 8.6–10.5)
CHLORIDE SERPL-SCNC: 100 MMOL/L (ref 98–107)
CO2 SERPL-SCNC: 28 MMOL/L (ref 22–29)
CREAT SERPL-MCNC: 0.9 MG/DL (ref 0.57–1)
DEPRECATED RDW RBC AUTO: 41.1 FL (ref 37–54)
EGFRCR SERPLBLD CKD-EPI 2021: 71.5 ML/MIN/1.73
EOSINOPHIL # BLD AUTO: 0 10*3/MM3 (ref 0–0.4)
EOSINOPHIL NFR BLD AUTO: 0 % (ref 0.3–6.2)
ERYTHROCYTE [DISTWIDTH] IN BLOOD BY AUTOMATED COUNT: 13.8 % (ref 12.3–15.4)
GLUCOSE BLDC GLUCOMTR-MCNC: 193 MG/DL (ref 70–105)
GLUCOSE SERPL-MCNC: 228 MG/DL (ref 65–99)
HCT VFR BLD AUTO: 44 % (ref 34–46.6)
HGB BLD-MCNC: 14.3 G/DL (ref 12–15.9)
LYMPHOCYTES # BLD AUTO: 1.4 10*3/MM3 (ref 0.7–3.1)
LYMPHOCYTES NFR BLD AUTO: 6.5 % (ref 19.6–45.3)
MCH RBC QN AUTO: 27.7 PG (ref 26.6–33)
MCHC RBC AUTO-ENTMCNC: 32.4 G/DL (ref 31.5–35.7)
MCV RBC AUTO: 85.4 FL (ref 79–97)
MONOCYTES # BLD AUTO: 0.2 10*3/MM3 (ref 0.1–0.9)
MONOCYTES NFR BLD AUTO: 1 % (ref 5–12)
NEUTROPHILS NFR BLD AUTO: 20.1 10*3/MM3 (ref 1.7–7)
NEUTROPHILS NFR BLD AUTO: 91.9 % (ref 42.7–76)
NRBC BLD AUTO-RTO: 0 /100 WBC (ref 0–0.2)
PLATELET # BLD AUTO: 363 10*3/MM3 (ref 140–450)
PMV BLD AUTO: 8.5 FL (ref 6–12)
POTASSIUM SERPL-SCNC: 4.6 MMOL/L (ref 3.5–5.2)
RBC # BLD AUTO: 5.15 10*6/MM3 (ref 3.77–5.28)
SODIUM SERPL-SCNC: 139 MMOL/L (ref 136–145)
WBC NRBC COR # BLD: 21.9 10*3/MM3 (ref 3.4–10.8)

## 2022-06-22 PROCEDURE — 85025 COMPLETE CBC W/AUTO DIFF WBC: CPT | Performed by: NURSE PRACTITIONER

## 2022-06-22 PROCEDURE — 80048 BASIC METABOLIC PNL TOTAL CA: CPT | Performed by: NURSE PRACTITIONER

## 2022-06-22 PROCEDURE — 94799 UNLISTED PULMONARY SVC/PX: CPT

## 2022-06-22 PROCEDURE — 94664 DEMO&/EVAL PT USE INHALER: CPT

## 2022-06-22 PROCEDURE — 63710000001 INSULIN LISPRO (HUMAN) PER 5 UNITS: Performed by: NURSE PRACTITIONER

## 2022-06-22 PROCEDURE — 36415 COLL VENOUS BLD VENIPUNCTURE: CPT | Performed by: NURSE PRACTITIONER

## 2022-06-22 PROCEDURE — G0378 HOSPITAL OBSERVATION PER HR: HCPCS

## 2022-06-22 PROCEDURE — 96376 TX/PRO/DX INJ SAME DRUG ADON: CPT

## 2022-06-22 PROCEDURE — 82962 GLUCOSE BLOOD TEST: CPT

## 2022-06-22 PROCEDURE — 25010000002 METHYLPREDNISOLONE PER 40 MG: Performed by: NURSE PRACTITIONER

## 2022-06-22 RX ORDER — ALBUTEROL SULFATE 90 UG/1
2 AEROSOL, METERED RESPIRATORY (INHALATION)
Status: DISCONTINUED | OUTPATIENT
Start: 2022-06-22 | End: 2022-06-22 | Stop reason: HOSPADM

## 2022-06-22 RX ORDER — METHYLPREDNISOLONE 4 MG/1
TABLET ORAL
Qty: 21 TABLET | Refills: 0 | Status: ON HOLD | OUTPATIENT
Start: 2022-06-22 | End: 2022-07-12

## 2022-06-22 RX ORDER — METHYLPREDNISOLONE 4 MG/1
TABLET ORAL
Qty: 21 TABLET | Refills: 0 | Status: SHIPPED | OUTPATIENT
Start: 2022-06-22 | End: 2022-06-22 | Stop reason: SDUPTHER

## 2022-06-22 RX ADMIN — INSULIN LISPRO 2 UNITS: 100 INJECTION, SOLUTION INTRAVENOUS; SUBCUTANEOUS at 07:35

## 2022-06-22 RX ADMIN — ALBUTEROL SULFATE 2 PUFF: 90 AEROSOL, METERED RESPIRATORY (INHALATION) at 11:05

## 2022-06-22 RX ADMIN — ALBUTEROL SULFATE 2 PUFF: 108 INHALANT RESPIRATORY (INHALATION) at 06:58

## 2022-06-22 RX ADMIN — Medication 10 ML: at 09:48

## 2022-06-22 RX ADMIN — ASPIRIN 81 MG: 81 TABLET, COATED ORAL at 09:48

## 2022-06-22 RX ADMIN — METHYLPREDNISOLONE SODIUM SUCCINATE 40 MG: 40 INJECTION, POWDER, FOR SOLUTION INTRAMUSCULAR; INTRAVENOUS at 09:48

## 2022-06-22 RX ADMIN — Medication 1000 UNITS: at 09:48

## 2022-06-22 RX ADMIN — BUPROPION HYDROCHLORIDE 300 MG: 150 TABLET, EXTENDED RELEASE ORAL at 09:47

## 2022-06-22 RX ADMIN — AMLODIPINE BESYLATE 5 MG: 5 TABLET ORAL at 09:48

## 2022-06-22 RX ADMIN — PANTOPRAZOLE SODIUM 40 MG: 40 TABLET, DELAYED RELEASE ORAL at 07:35

## 2022-06-22 RX ADMIN — ESCITALOPRAM OXALATE 20 MG: 10 TABLET ORAL at 09:48

## 2022-06-22 RX ADMIN — ATORVASTATIN CALCIUM 80 MG: 40 TABLET, FILM COATED ORAL at 09:48

## 2022-06-23 ENCOUNTER — TRANSITIONAL CARE MANAGEMENT TELEPHONE ENCOUNTER (OUTPATIENT)
Dept: CALL CENTER | Facility: HOSPITAL | Age: 67
End: 2022-06-23

## 2022-06-24 ENCOUNTER — TRANSITIONAL CARE MANAGEMENT TELEPHONE ENCOUNTER (OUTPATIENT)
Dept: CALL CENTER | Facility: HOSPITAL | Age: 67
End: 2022-06-24

## 2022-06-24 ENCOUNTER — TELEPHONE (OUTPATIENT)
Dept: SOCIAL WORK | Facility: HOSPITAL | Age: 67
End: 2022-06-24

## 2022-07-05 DIAGNOSIS — M54.50 ACUTE BILATERAL LOW BACK PAIN WITHOUT SCIATICA: ICD-10-CM

## 2022-07-05 RX ORDER — TRAMADOL HYDROCHLORIDE 50 MG/1
TABLET ORAL
Qty: 90 TABLET | Refills: 0 | Status: SHIPPED | OUTPATIENT
Start: 2022-07-05 | End: 2022-09-23

## 2022-07-11 ENCOUNTER — APPOINTMENT (OUTPATIENT)
Dept: GENERAL RADIOLOGY | Facility: HOSPITAL | Age: 67
End: 2022-07-11

## 2022-07-11 ENCOUNTER — HOSPITAL ENCOUNTER (OUTPATIENT)
Facility: HOSPITAL | Age: 67
Setting detail: OBSERVATION
Discharge: HOME | End: 2022-07-12
Attending: EMERGENCY MEDICINE | Admitting: EMERGENCY MEDICINE

## 2022-07-11 DIAGNOSIS — R07.9 CHEST PAIN, UNSPECIFIED TYPE: Primary | ICD-10-CM

## 2022-07-11 DIAGNOSIS — R06.09 DOE (DYSPNEA ON EXERTION): ICD-10-CM

## 2022-07-11 LAB
ALBUMIN SERPL-MCNC: 3.7 G/DL (ref 3.5–5.2)
ALBUMIN/GLOB SERPL: 1.5 G/DL
ALP SERPL-CCNC: 75 U/L (ref 39–117)
ALT SERPL W P-5'-P-CCNC: 12 U/L (ref 1–33)
ANION GAP SERPL CALCULATED.3IONS-SCNC: 9 MMOL/L (ref 5–15)
AST SERPL-CCNC: 15 U/L (ref 1–32)
BASOPHILS # BLD AUTO: 0.1 10*3/MM3 (ref 0–0.2)
BASOPHILS NFR BLD AUTO: 1.4 % (ref 0–1.5)
BILIRUB SERPL-MCNC: <0.2 MG/DL (ref 0–1.2)
BUN SERPL-MCNC: 26 MG/DL (ref 8–23)
BUN/CREAT SERPL: 23.4 (ref 7–25)
CALCIUM SPEC-SCNC: 9.3 MG/DL (ref 8.6–10.5)
CHLORIDE SERPL-SCNC: 101 MMOL/L (ref 98–107)
CO2 SERPL-SCNC: 27 MMOL/L (ref 22–29)
CREAT SERPL-MCNC: 1.11 MG/DL (ref 0.57–1)
D DIMER PPP FEU-MCNC: 0.47 MG/L (FEU) (ref 0–0.59)
DEPRECATED RDW RBC AUTO: 42 FL (ref 37–54)
EGFRCR SERPLBLD CKD-EPI 2021: 55.6 ML/MIN/1.73
EOSINOPHIL # BLD AUTO: 0.9 10*3/MM3 (ref 0–0.4)
EOSINOPHIL NFR BLD AUTO: 8.4 % (ref 0.3–6.2)
ERYTHROCYTE [DISTWIDTH] IN BLOOD BY AUTOMATED COUNT: 14.1 % (ref 12.3–15.4)
GLOBULIN UR ELPH-MCNC: 2.4 GM/DL
GLUCOSE SERPL-MCNC: 203 MG/DL (ref 65–99)
HCT VFR BLD AUTO: 42.4 % (ref 34–46.6)
HGB BLD-MCNC: 13.9 G/DL (ref 12–15.9)
HOLD SPECIMEN: NORMAL
LYMPHOCYTES # BLD AUTO: 2.8 10*3/MM3 (ref 0.7–3.1)
LYMPHOCYTES NFR BLD AUTO: 27.4 % (ref 19.6–45.3)
MCH RBC QN AUTO: 27.8 PG (ref 26.6–33)
MCHC RBC AUTO-ENTMCNC: 32.7 G/DL (ref 31.5–35.7)
MCV RBC AUTO: 84.9 FL (ref 79–97)
MONOCYTES # BLD AUTO: 0.9 10*3/MM3 (ref 0.1–0.9)
MONOCYTES NFR BLD AUTO: 8.4 % (ref 5–12)
NEUTROPHILS NFR BLD AUTO: 5.7 10*3/MM3 (ref 1.7–7)
NEUTROPHILS NFR BLD AUTO: 54.4 % (ref 42.7–76)
NRBC BLD AUTO-RTO: 0.1 /100 WBC (ref 0–0.2)
NT-PROBNP SERPL-MCNC: 316.6 PG/ML (ref 0–900)
PLATELET # BLD AUTO: 310 10*3/MM3 (ref 140–450)
PMV BLD AUTO: 7.8 FL (ref 6–12)
POTASSIUM SERPL-SCNC: 3.8 MMOL/L (ref 3.5–5.2)
PROT SERPL-MCNC: 6.1 G/DL (ref 6–8.5)
QT INTERVAL: 368 MS
RBC # BLD AUTO: 5 10*6/MM3 (ref 3.77–5.28)
SODIUM SERPL-SCNC: 137 MMOL/L (ref 136–145)
TROPONIN T SERPL-MCNC: 0.01 NG/ML (ref 0–0.03)
WBC NRBC COR # BLD: 10.4 10*3/MM3 (ref 3.4–10.8)

## 2022-07-11 PROCEDURE — 71045 X-RAY EXAM CHEST 1 VIEW: CPT

## 2022-07-11 PROCEDURE — 83880 ASSAY OF NATRIURETIC PEPTIDE: CPT | Performed by: PHYSICIAN ASSISTANT

## 2022-07-11 PROCEDURE — 84484 ASSAY OF TROPONIN QUANT: CPT | Performed by: PHYSICIAN ASSISTANT

## 2022-07-11 PROCEDURE — 93005 ELECTROCARDIOGRAM TRACING: CPT

## 2022-07-11 PROCEDURE — 85025 COMPLETE CBC W/AUTO DIFF WBC: CPT | Performed by: PHYSICIAN ASSISTANT

## 2022-07-11 PROCEDURE — 99284 EMERGENCY DEPT VISIT MOD MDM: CPT

## 2022-07-11 PROCEDURE — G0378 HOSPITAL OBSERVATION PER HR: HCPCS

## 2022-07-11 PROCEDURE — 93005 ELECTROCARDIOGRAM TRACING: CPT | Performed by: EMERGENCY MEDICINE

## 2022-07-11 PROCEDURE — 85379 FIBRIN DEGRADATION QUANT: CPT | Performed by: PHYSICIAN ASSISTANT

## 2022-07-11 PROCEDURE — 80053 COMPREHEN METABOLIC PANEL: CPT | Performed by: PHYSICIAN ASSISTANT

## 2022-07-11 RX ORDER — SODIUM CHLORIDE 0.9 % (FLUSH) 0.9 %
10 SYRINGE (ML) INJECTION AS NEEDED
Status: DISCONTINUED | OUTPATIENT
Start: 2022-07-11 | End: 2022-07-12 | Stop reason: HOSPADM

## 2022-07-12 ENCOUNTER — APPOINTMENT (OUTPATIENT)
Dept: NUCLEAR MEDICINE | Facility: HOSPITAL | Age: 67
End: 2022-07-12

## 2022-07-12 ENCOUNTER — READMISSION MANAGEMENT (OUTPATIENT)
Dept: CALL CENTER | Facility: HOSPITAL | Age: 67
End: 2022-07-12

## 2022-07-12 VITALS
SYSTOLIC BLOOD PRESSURE: 134 MMHG | HEART RATE: 70 BPM | WEIGHT: 126.8 LBS | RESPIRATION RATE: 17 BRPM | BODY MASS INDEX: 21.65 KG/M2 | HEIGHT: 64 IN | OXYGEN SATURATION: 92 % | TEMPERATURE: 98.3 F | DIASTOLIC BLOOD PRESSURE: 75 MMHG

## 2022-07-12 LAB
ANION GAP SERPL CALCULATED.3IONS-SCNC: 8 MMOL/L (ref 5–15)
B PARAPERT DNA SPEC QL NAA+PROBE: NOT DETECTED
B PERT DNA SPEC QL NAA+PROBE: NOT DETECTED
BASOPHILS # BLD AUTO: 0.1 10*3/MM3 (ref 0–0.2)
BASOPHILS NFR BLD AUTO: 1.1 % (ref 0–1.5)
BH CV REST NUCLEAR ISOTOPE DOSE: 10.5 MCI
BH CV STRESS BP STAGE 1: NORMAL
BH CV STRESS COMMENTS STAGE 1: NORMAL
BH CV STRESS DOSE REGADENOSON STAGE 1: 0.4
BH CV STRESS DURATION MIN STAGE 1: 0
BH CV STRESS DURATION SEC STAGE 1: 10
BH CV STRESS HR STAGE 1: 77
BH CV STRESS NUCLEAR ISOTOPE DOSE: 30 MCI
BH CV STRESS PROTOCOL 1: NORMAL
BH CV STRESS RECOVERY BP: NORMAL MMHG
BH CV STRESS RECOVERY HR: 72 BPM
BH CV STRESS STAGE 1: 1
BUN SERPL-MCNC: 24 MG/DL (ref 8–23)
BUN/CREAT SERPL: 24 (ref 7–25)
C PNEUM DNA NPH QL NAA+NON-PROBE: NOT DETECTED
CALCIUM SPEC-SCNC: 9.5 MG/DL (ref 8.6–10.5)
CHLORIDE SERPL-SCNC: 101 MMOL/L (ref 98–107)
CO2 SERPL-SCNC: 31 MMOL/L (ref 22–29)
CREAT SERPL-MCNC: 1 MG/DL (ref 0.57–1)
DEPRECATED RDW RBC AUTO: 42.4 FL (ref 37–54)
EGFRCR SERPLBLD CKD-EPI 2021: 63 ML/MIN/1.73
EOSINOPHIL # BLD AUTO: 0.9 10*3/MM3 (ref 0–0.4)
EOSINOPHIL NFR BLD AUTO: 8.6 % (ref 0.3–6.2)
ERYTHROCYTE [DISTWIDTH] IN BLOOD BY AUTOMATED COUNT: 13.9 % (ref 12.3–15.4)
FLUAV SUBTYP SPEC NAA+PROBE: NOT DETECTED
FLUBV RNA ISLT QL NAA+PROBE: NOT DETECTED
GLUCOSE BLDC GLUCOMTR-MCNC: 192 MG/DL (ref 70–105)
GLUCOSE SERPL-MCNC: 142 MG/DL (ref 65–99)
HADV DNA SPEC NAA+PROBE: NOT DETECTED
HCOV 229E RNA SPEC QL NAA+PROBE: NOT DETECTED
HCOV HKU1 RNA SPEC QL NAA+PROBE: NOT DETECTED
HCOV NL63 RNA SPEC QL NAA+PROBE: NOT DETECTED
HCOV OC43 RNA SPEC QL NAA+PROBE: NOT DETECTED
HCT VFR BLD AUTO: 44.1 % (ref 34–46.6)
HGB BLD-MCNC: 14.4 G/DL (ref 12–15.9)
HMPV RNA NPH QL NAA+NON-PROBE: NOT DETECTED
HPIV1 RNA ISLT QL NAA+PROBE: NOT DETECTED
HPIV2 RNA SPEC QL NAA+PROBE: NOT DETECTED
HPIV3 RNA NPH QL NAA+PROBE: NOT DETECTED
HPIV4 P GENE NPH QL NAA+PROBE: NOT DETECTED
LV EF NUC BP: 60 %
LYMPHOCYTES # BLD AUTO: 3.4 10*3/MM3 (ref 0.7–3.1)
LYMPHOCYTES NFR BLD AUTO: 31.6 % (ref 19.6–45.3)
M PNEUMO IGG SER IA-ACNC: NOT DETECTED
MAXIMAL PREDICTED HEART RATE: 156 BPM
MCH RBC QN AUTO: 27.9 PG (ref 26.6–33)
MCHC RBC AUTO-ENTMCNC: 32.6 G/DL (ref 31.5–35.7)
MCV RBC AUTO: 85.8 FL (ref 79–97)
MONOCYTES # BLD AUTO: 0.9 10*3/MM3 (ref 0.1–0.9)
MONOCYTES NFR BLD AUTO: 8.5 % (ref 5–12)
NEUTROPHILS NFR BLD AUTO: 5.4 10*3/MM3 (ref 1.7–7)
NEUTROPHILS NFR BLD AUTO: 50.2 % (ref 42.7–76)
NRBC BLD AUTO-RTO: 0.1 /100 WBC (ref 0–0.2)
PERCENT MAX PREDICTED HR: 54.49 %
PLATELET # BLD AUTO: 252 10*3/MM3 (ref 140–450)
PMV BLD AUTO: 7.9 FL (ref 6–12)
POTASSIUM SERPL-SCNC: 4 MMOL/L (ref 3.5–5.2)
RBC # BLD AUTO: 5.14 10*6/MM3 (ref 3.77–5.28)
RHINOVIRUS RNA SPEC NAA+PROBE: NOT DETECTED
RSV RNA NPH QL NAA+NON-PROBE: NOT DETECTED
SARS-COV-2 RNA NPH QL NAA+NON-PROBE: NOT DETECTED
SARS-COV-2 RNA PNL SPEC NAA+PROBE: NOT DETECTED
SODIUM SERPL-SCNC: 140 MMOL/L (ref 136–145)
STRESS BASELINE BP: NORMAL MMHG
STRESS BASELINE HR: 67 BPM
STRESS PERCENT HR: 64 %
STRESS POST PEAK BP: NORMAL MMHG
STRESS POST PEAK HR: 85 BPM
STRESS TARGET HR: 133 BPM
TROPONIN T SERPL-MCNC: 0.02 NG/ML (ref 0–0.03)
TROPONIN T SERPL-MCNC: 0.02 NG/ML (ref 0–0.03)
WBC NRBC COR # BLD: 10.8 10*3/MM3 (ref 3.4–10.8)

## 2022-07-12 PROCEDURE — 93018 CV STRESS TEST I&R ONLY: CPT | Performed by: INTERNAL MEDICINE

## 2022-07-12 PROCEDURE — G0378 HOSPITAL OBSERVATION PER HR: HCPCS

## 2022-07-12 PROCEDURE — 78452 HT MUSCLE IMAGE SPECT MULT: CPT | Performed by: INTERNAL MEDICINE

## 2022-07-12 PROCEDURE — 85025 COMPLETE CBC W/AUTO DIFF WBC: CPT | Performed by: PHYSICIAN ASSISTANT

## 2022-07-12 PROCEDURE — 82962 GLUCOSE BLOOD TEST: CPT

## 2022-07-12 PROCEDURE — A9502 TC99M TETROFOSMIN: HCPCS | Performed by: EMERGENCY MEDICINE

## 2022-07-12 PROCEDURE — 0 TECHNETIUM TETROFOSMIN KIT: Performed by: EMERGENCY MEDICINE

## 2022-07-12 PROCEDURE — 84484 ASSAY OF TROPONIN QUANT: CPT | Performed by: EMERGENCY MEDICINE

## 2022-07-12 PROCEDURE — 93017 CV STRESS TEST TRACING ONLY: CPT

## 2022-07-12 PROCEDURE — 93016 CV STRESS TEST SUPVJ ONLY: CPT | Performed by: NURSE PRACTITIONER

## 2022-07-12 PROCEDURE — 99203 OFFICE O/P NEW LOW 30 MIN: CPT | Performed by: INTERNAL MEDICINE

## 2022-07-12 PROCEDURE — 25010000002 REGADENOSON 0.4 MG/5ML SOLUTION: Performed by: EMERGENCY MEDICINE

## 2022-07-12 PROCEDURE — 80048 BASIC METABOLIC PNL TOTAL CA: CPT | Performed by: PHYSICIAN ASSISTANT

## 2022-07-12 PROCEDURE — 97161 PT EVAL LOW COMPLEX 20 MIN: CPT

## 2022-07-12 PROCEDURE — 36415 COLL VENOUS BLD VENIPUNCTURE: CPT | Performed by: PHYSICIAN ASSISTANT

## 2022-07-12 PROCEDURE — 84484 ASSAY OF TROPONIN QUANT: CPT | Performed by: PHYSICIAN ASSISTANT

## 2022-07-12 PROCEDURE — 0202U NFCT DS 22 TRGT SARS-COV-2: CPT | Performed by: NURSE PRACTITIONER

## 2022-07-12 PROCEDURE — 63710000001 INSULIN LISPRO (HUMAN) PER 5 UNITS: Performed by: NURSE PRACTITIONER

## 2022-07-12 PROCEDURE — 78452 HT MUSCLE IMAGE SPECT MULT: CPT

## 2022-07-12 RX ORDER — ONDANSETRON 2 MG/ML
4 INJECTION INTRAMUSCULAR; INTRAVENOUS EVERY 6 HOURS PRN
Status: DISCONTINUED | OUTPATIENT
Start: 2022-07-12 | End: 2022-07-12 | Stop reason: HOSPADM

## 2022-07-12 RX ORDER — AMLODIPINE BESYLATE 5 MG/1
5 TABLET ORAL DAILY
Status: DISCONTINUED | OUTPATIENT
Start: 2022-07-12 | End: 2022-07-12 | Stop reason: HOSPADM

## 2022-07-12 RX ORDER — BISACODYL 10 MG
10 SUPPOSITORY, RECTAL RECTAL DAILY PRN
Status: DISCONTINUED | OUTPATIENT
Start: 2022-07-12 | End: 2022-07-12 | Stop reason: HOSPADM

## 2022-07-12 RX ORDER — CHOLECALCIFEROL (VITAMIN D3) 125 MCG
5 CAPSULE ORAL NIGHTLY PRN
Status: DISCONTINUED | OUTPATIENT
Start: 2022-07-12 | End: 2022-07-12 | Stop reason: HOSPADM

## 2022-07-12 RX ORDER — ONDANSETRON 4 MG/1
4 TABLET, FILM COATED ORAL EVERY 6 HOURS PRN
Status: DISCONTINUED | OUTPATIENT
Start: 2022-07-12 | End: 2022-07-12 | Stop reason: HOSPADM

## 2022-07-12 RX ORDER — SODIUM CHLORIDE 0.9 % (FLUSH) 0.9 %
10 SYRINGE (ML) INJECTION AS NEEDED
Status: DISCONTINUED | OUTPATIENT
Start: 2022-07-12 | End: 2022-07-12 | Stop reason: HOSPADM

## 2022-07-12 RX ORDER — OLANZAPINE 10 MG/2ML
1 INJECTION, POWDER, LYOPHILIZED, FOR SOLUTION INTRAMUSCULAR
Status: DISCONTINUED | OUTPATIENT
Start: 2022-07-12 | End: 2022-07-12 | Stop reason: HOSPADM

## 2022-07-12 RX ORDER — ALBUTEROL SULFATE 2.5 MG/3ML
2.5 SOLUTION RESPIRATORY (INHALATION) EVERY 4 HOURS PRN
Status: DISCONTINUED | OUTPATIENT
Start: 2022-07-12 | End: 2022-07-12 | Stop reason: HOSPADM

## 2022-07-12 RX ORDER — ACETAMINOPHEN 325 MG/1
650 TABLET ORAL EVERY 4 HOURS PRN
Status: DISCONTINUED | OUTPATIENT
Start: 2022-07-12 | End: 2022-07-12 | Stop reason: HOSPADM

## 2022-07-12 RX ORDER — ATORVASTATIN CALCIUM 40 MG/1
80 TABLET, FILM COATED ORAL DAILY
Status: DISCONTINUED | OUTPATIENT
Start: 2022-07-12 | End: 2022-07-12 | Stop reason: HOSPADM

## 2022-07-12 RX ORDER — SODIUM CHLORIDE 0.9 % (FLUSH) 0.9 %
10 SYRINGE (ML) INJECTION EVERY 12 HOURS SCHEDULED
Status: DISCONTINUED | OUTPATIENT
Start: 2022-07-12 | End: 2022-07-12 | Stop reason: HOSPADM

## 2022-07-12 RX ORDER — INSULIN LISPRO 100 [IU]/ML
0-9 INJECTION, SOLUTION INTRAVENOUS; SUBCUTANEOUS
Status: DISCONTINUED | OUTPATIENT
Start: 2022-07-12 | End: 2022-07-12 | Stop reason: HOSPADM

## 2022-07-12 RX ORDER — ISOSORBIDE MONONITRATE 30 MG/1
30 TABLET, EXTENDED RELEASE ORAL DAILY
Qty: 30 TABLET | Refills: 0 | Status: SHIPPED | OUTPATIENT
Start: 2022-07-12 | End: 2022-10-11

## 2022-07-12 RX ORDER — NITROGLYCERIN 0.4 MG/1
0.4 TABLET SUBLINGUAL
Qty: 100 TABLET | Refills: 12 | Status: SHIPPED | OUTPATIENT
Start: 2022-07-12

## 2022-07-12 RX ORDER — ESCITALOPRAM OXALATE 10 MG/1
20 TABLET ORAL EVERY MORNING
Status: DISCONTINUED | OUTPATIENT
Start: 2022-07-12 | End: 2022-07-12 | Stop reason: HOSPADM

## 2022-07-12 RX ORDER — PANTOPRAZOLE SODIUM 40 MG/1
40 TABLET, DELAYED RELEASE ORAL EVERY MORNING
Status: DISCONTINUED | OUTPATIENT
Start: 2022-07-12 | End: 2022-07-12 | Stop reason: HOSPADM

## 2022-07-12 RX ORDER — ALBUTEROL SULFATE 90 UG/1
2 AEROSOL, METERED RESPIRATORY (INHALATION) EVERY 4 HOURS PRN
Status: DISCONTINUED | OUTPATIENT
Start: 2022-07-12 | End: 2022-07-12

## 2022-07-12 RX ORDER — POLYETHYLENE GLYCOL 3350 17 G/17G
17 POWDER, FOR SOLUTION ORAL DAILY PRN
Status: DISCONTINUED | OUTPATIENT
Start: 2022-07-12 | End: 2022-07-12 | Stop reason: HOSPADM

## 2022-07-12 RX ORDER — DEXTROSE MONOHYDRATE 25 G/50ML
25 INJECTION, SOLUTION INTRAVENOUS
Status: DISCONTINUED | OUTPATIENT
Start: 2022-07-12 | End: 2022-07-12 | Stop reason: HOSPADM

## 2022-07-12 RX ORDER — AMOXICILLIN 250 MG
2 CAPSULE ORAL 2 TIMES DAILY
Status: DISCONTINUED | OUTPATIENT
Start: 2022-07-12 | End: 2022-07-12 | Stop reason: HOSPADM

## 2022-07-12 RX ORDER — ASPIRIN 81 MG/1
81 TABLET ORAL DAILY
Status: DISCONTINUED | OUTPATIENT
Start: 2022-07-12 | End: 2022-07-12 | Stop reason: HOSPADM

## 2022-07-12 RX ORDER — INSULIN LISPRO 100 [IU]/ML
0-9 INJECTION, SOLUTION INTRAVENOUS; SUBCUTANEOUS AS NEEDED
Status: DISCONTINUED | OUTPATIENT
Start: 2022-07-12 | End: 2022-07-12 | Stop reason: HOSPADM

## 2022-07-12 RX ORDER — NICOTINE POLACRILEX 4 MG
15 LOZENGE BUCCAL
Status: DISCONTINUED | OUTPATIENT
Start: 2022-07-12 | End: 2022-07-12 | Stop reason: HOSPADM

## 2022-07-12 RX ORDER — PROPRANOLOL HYDROCHLORIDE 80 MG/1
160 CAPSULE, EXTENDED RELEASE ORAL DAILY
Status: DISCONTINUED | OUTPATIENT
Start: 2022-07-12 | End: 2022-07-12 | Stop reason: HOSPADM

## 2022-07-12 RX ORDER — BUPROPION HYDROCHLORIDE 150 MG/1
300 TABLET ORAL EVERY MORNING
Status: DISCONTINUED | OUTPATIENT
Start: 2022-07-12 | End: 2022-07-12 | Stop reason: HOSPADM

## 2022-07-12 RX ORDER — BISACODYL 5 MG/1
5 TABLET, DELAYED RELEASE ORAL DAILY PRN
Status: DISCONTINUED | OUTPATIENT
Start: 2022-07-12 | End: 2022-07-12 | Stop reason: HOSPADM

## 2022-07-12 RX ORDER — IPRATROPIUM BROMIDE AND ALBUTEROL SULFATE 2.5; .5 MG/3ML; MG/3ML
3 SOLUTION RESPIRATORY (INHALATION) EVERY 4 HOURS PRN
Status: DISCONTINUED | OUTPATIENT
Start: 2022-07-12 | End: 2022-07-12 | Stop reason: HOSPADM

## 2022-07-12 RX ADMIN — TETROFOSMIN 1 DOSE: 1.38 INJECTION, POWDER, LYOPHILIZED, FOR SOLUTION INTRAVENOUS at 09:05

## 2022-07-12 RX ADMIN — AMLODIPINE BESYLATE 5 MG: 5 TABLET ORAL at 10:11

## 2022-07-12 RX ADMIN — PANTOPRAZOLE SODIUM 40 MG: 40 TABLET, DELAYED RELEASE ORAL at 07:08

## 2022-07-12 RX ADMIN — PROPRANOLOL HYDROCHLORIDE 160 MG: 80 CAPSULE, EXTENDED RELEASE ORAL at 10:11

## 2022-07-12 RX ADMIN — Medication 10 ML: at 10:12

## 2022-07-12 RX ADMIN — ATORVASTATIN CALCIUM 80 MG: 40 TABLET, FILM COATED ORAL at 10:11

## 2022-07-12 RX ADMIN — BUPROPION HYDROCHLORIDE 300 MG: 150 TABLET, EXTENDED RELEASE ORAL at 10:11

## 2022-07-12 RX ADMIN — TETROFOSMIN 1 DOSE: 1.38 INJECTION, POWDER, LYOPHILIZED, FOR SOLUTION INTRAVENOUS at 07:09

## 2022-07-12 RX ADMIN — INSULIN LISPRO 2 UNITS: 100 INJECTION, SOLUTION INTRAVENOUS; SUBCUTANEOUS at 10:16

## 2022-07-12 RX ADMIN — REGADENOSON 0.4 MG: 0.08 INJECTION, SOLUTION INTRAVENOUS at 09:05

## 2022-07-12 RX ADMIN — ESCITALOPRAM OXALATE 20 MG: 10 TABLET ORAL at 10:10

## 2022-07-12 RX ADMIN — ASPIRIN 81 MG: 81 TABLET, COATED ORAL at 10:11

## 2022-07-13 ENCOUNTER — HOME HEALTH ADMISSION (OUTPATIENT)
Dept: HOME HEALTH SERVICES | Facility: HOME HEALTHCARE | Age: 67
End: 2022-07-13

## 2022-07-13 ENCOUNTER — TRANSITIONAL CARE MANAGEMENT TELEPHONE ENCOUNTER (OUTPATIENT)
Dept: CALL CENTER | Facility: HOSPITAL | Age: 67
End: 2022-07-13

## 2022-07-14 ENCOUNTER — TRANSITIONAL CARE MANAGEMENT TELEPHONE ENCOUNTER (OUTPATIENT)
Dept: CALL CENTER | Facility: HOSPITAL | Age: 67
End: 2022-07-14

## 2022-07-15 ENCOUNTER — HOME CARE VISIT (OUTPATIENT)
Dept: HOME HEALTH SERVICES | Facility: HOME HEALTHCARE | Age: 67
End: 2022-07-15

## 2022-07-18 RX ORDER — ALBUTEROL SULFATE 90 UG/1
AEROSOL, METERED RESPIRATORY (INHALATION)
Qty: 18 G | Refills: 0 | Status: SHIPPED | OUTPATIENT
Start: 2022-07-18 | End: 2022-07-26 | Stop reason: SDUPTHER

## 2022-07-26 ENCOUNTER — OFFICE VISIT (OUTPATIENT)
Dept: FAMILY MEDICINE CLINIC | Facility: CLINIC | Age: 67
End: 2022-07-26

## 2022-07-26 VITALS
DIASTOLIC BLOOD PRESSURE: 75 MMHG | HEART RATE: 78 BPM | RESPIRATION RATE: 16 BRPM | WEIGHT: 124 LBS | OXYGEN SATURATION: 92 % | SYSTOLIC BLOOD PRESSURE: 134 MMHG | HEIGHT: 64 IN | BODY MASS INDEX: 21.17 KG/M2 | TEMPERATURE: 97.8 F

## 2022-07-26 DIAGNOSIS — S72.009A: ICD-10-CM

## 2022-07-26 DIAGNOSIS — J44.1 COPD EXACERBATION: ICD-10-CM
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
## 2022-07-26 DIAGNOSIS — E11.9 TYPE 2 DIABETES MELLITUS WITHOUT COMPLICATION, WITHOUT LONG-TERM CURRENT USE OF INSULIN: ICD-10-CM

## 2022-07-26 DIAGNOSIS — R07.89 ATYPICAL CHEST PAIN: Primary | ICD-10-CM

## 2022-07-26 LAB
EXPIRATION DATE: ABNORMAL
HBA1C MFR BLD: 7.6 %
Lab: ABNORMAL

## 2022-07-26 PROCEDURE — 3051F HG A1C>EQUAL 7.0%<8.0%: CPT | Performed by: FAMILY MEDICINE

## 2022-07-26 PROCEDURE — 83036 HEMOGLOBIN GLYCOSYLATED A1C: CPT | Performed by: FAMILY MEDICINE

## 2022-07-26 PROCEDURE — 99214 OFFICE O/P EST MOD 30 MIN: CPT | Performed by: FAMILY MEDICINE

## 2022-07-26 RX ORDER — TIOTROPIUM BROMIDE AND OLODATEROL 3.124; 2.736 UG/1; UG/1
2 SPRAY, METERED RESPIRATORY (INHALATION)
Qty: 4 G | Refills: 0 | COMMUNITY
Start: 2022-07-26 | End: 2022-11-29

## 2022-07-26 RX ORDER — ALBUTEROL SULFATE 90 UG/1
2 AEROSOL, METERED RESPIRATORY (INHALATION) EVERY 6 HOURS PRN
Qty: 18 G | Refills: 0 | Status: SHIPPED | OUTPATIENT
Start: 2022-07-26 | End: 2022-07-27

## 2022-07-26 RX ORDER — BUDESONIDE 0.5 MG/2ML
0.5 INHALANT ORAL
Qty: 30 EACH | Refills: 3 | Status: SHIPPED | OUTPATIENT
Start: 2022-07-26 | End: 2022-11-29

## 2022-07-26 RX ORDER — PREDNISONE 20 MG/1
TABLET ORAL
Qty: 20 TABLET | Refills: 0 | Status: SHIPPED | OUTPATIENT
Start: 2022-07-26 | End: 2022-10-11

## 2022-07-27 ENCOUNTER — TELEPHONE (OUTPATIENT)
Dept: FAMILY MEDICINE CLINIC | Facility: CLINIC | Age: 67
End: 2022-07-27

## 2022-07-27 RX ORDER — LEVALBUTEROL TARTRATE 45 UG/1
1-2 AEROSOL, METERED ORAL EVERY 6 HOURS PRN
Qty: 15 G | Refills: 2 | Status: SHIPPED | OUTPATIENT
Start: 2022-07-27 | End: 2023-03-02

## 2022-08-11 RX ORDER — SITAGLIPTIN AND METFORMIN HYDROCHLORIDE 1000; 100 MG/1; MG/1
TABLET, FILM COATED, EXTENDED RELEASE ORAL
Qty: 30 TABLET | Refills: 2 | Status: SHIPPED | OUTPATIENT
Start: 2022-08-11 | End: 2022-08-16 | Stop reason: SDUPTHER

## 2022-08-16 RX ORDER — SITAGLIPTIN AND METFORMIN HYDROCHLORIDE 1000; 100 MG/1; MG/1
TABLET, FILM COATED, EXTENDED RELEASE ORAL
Qty: 30 TABLET | Refills: 2 | Status: SHIPPED | OUTPATIENT
Start: 2022-08-16 | End: 2022-09-23

## 2022-08-30 ENCOUNTER — TELEPHONE (OUTPATIENT)
Dept: FAMILY MEDICINE CLINIC | Facility: CLINIC | Age: 67
End: 2022-08-30

## 2022-09-01 RX ORDER — IPRATROPIUM BROMIDE AND ALBUTEROL SULFATE 2.5; .5 MG/3ML; MG/3ML
3 SOLUTION RESPIRATORY (INHALATION) EVERY 4 HOURS PRN
Qty: 360 ML | Refills: 1 | Status: SHIPPED | OUTPATIENT
Start: 2022-09-01 | End: 2022-11-29 | Stop reason: SDUPTHER

## 2022-09-23 ENCOUNTER — TELEPHONE (OUTPATIENT)
Dept: FAMILY MEDICINE CLINIC | Facility: CLINIC | Age: 67
End: 2022-09-23

## 2022-09-23 DIAGNOSIS — M54.50 ACUTE BILATERAL LOW BACK PAIN WITHOUT SCIATICA: ICD-10-CM

## 2022-09-23 RX ORDER — TRAMADOL HYDROCHLORIDE 50 MG/1
TABLET ORAL
Qty: 90 TABLET | Refills: 0 | Status: SHIPPED | OUTPATIENT
Start: 2022-09-23 | End: 2022-12-08

## 2022-09-23 RX ORDER — METFORMIN HYDROCHLORIDE 500 MG/1
500 TABLET, EXTENDED RELEASE ORAL
Qty: 90 TABLET | Refills: 1 | Status: SHIPPED | OUTPATIENT
Start: 2022-09-23 | End: 2022-11-29

## 2022-10-10 RX ORDER — ALBUTEROL SULFATE 90 UG/1
AEROSOL, METERED RESPIRATORY (INHALATION)
Qty: 8.5 G | Refills: 0 | OUTPATIENT
Start: 2022-10-10

## 2022-10-11 ENCOUNTER — OFFICE VISIT (OUTPATIENT)
Dept: FAMILY MEDICINE CLINIC | Facility: CLINIC | Age: 67
End: 2022-10-11

## 2022-10-11 VITALS
OXYGEN SATURATION: 94 % | DIASTOLIC BLOOD PRESSURE: 82 MMHG | RESPIRATION RATE: 16 BRPM | HEART RATE: 76 BPM | HEIGHT: 64 IN | BODY MASS INDEX: 21.51 KG/M2 | WEIGHT: 126 LBS | SYSTOLIC BLOOD PRESSURE: 138 MMHG | TEMPERATURE: 98.2 F

## 2022-10-11 DIAGNOSIS — E11.65 TYPE 2 DIABETES MELLITUS WITH HYPERGLYCEMIA, WITHOUT LONG-TERM CURRENT USE OF INSULIN: Primary | ICD-10-CM

## 2022-10-11 DIAGNOSIS — J96.11 CHRONIC RESPIRATORY FAILURE WITH HYPOXIA: ICD-10-CM

## 2022-10-11 DIAGNOSIS — K21.9 GASTROESOPHAGEAL REFLUX DISEASE WITHOUT ESOPHAGITIS: ICD-10-CM

## 2022-10-11 DIAGNOSIS — Z23 NEED FOR INFLUENZA VACCINATION: ICD-10-CM

## 2022-10-11 DIAGNOSIS — M54.2 NECK PAIN ON LEFT SIDE: ICD-10-CM

## 2022-10-11 DIAGNOSIS — J43.1 PANLOBULAR EMPHYSEMA: ICD-10-CM

## 2022-10-11 LAB
EXPIRATION DATE: ABNORMAL
HBA1C MFR BLD: 7.3 %
Lab: ABNORMAL

## 2022-10-11 PROCEDURE — 90686 IIV4 VACC NO PRSV 0.5 ML IM: CPT | Performed by: FAMILY MEDICINE

## 2022-10-11 PROCEDURE — G0008 ADMIN INFLUENZA VIRUS VAC: HCPCS | Performed by: FAMILY MEDICINE

## 2022-10-11 PROCEDURE — 3051F HG A1C>EQUAL 7.0%<8.0%: CPT | Performed by: FAMILY MEDICINE

## 2022-10-11 PROCEDURE — 99214 OFFICE O/P EST MOD 30 MIN: CPT | Performed by: FAMILY MEDICINE

## 2022-10-11 PROCEDURE — 83036 HEMOGLOBIN GLYCOSYLATED A1C: CPT | Performed by: FAMILY MEDICINE

## 2022-10-11 RX ORDER — FENOFIBRATE 160 MG/1
TABLET ORAL
Qty: 90 TABLET | Refills: 0 | Status: SHIPPED | OUTPATIENT
Start: 2022-10-11

## 2022-10-11 RX ORDER — PROPRANOLOL HYDROCHLORIDE 160 MG/1
CAPSULE, EXTENDED RELEASE ORAL
Qty: 90 CAPSULE | Refills: 0 | Status: SHIPPED | OUTPATIENT
Start: 2022-10-11 | End: 2023-02-01 | Stop reason: SDUPTHER

## 2022-10-11 RX ORDER — ATORVASTATIN CALCIUM 80 MG/1
80 TABLET, FILM COATED ORAL DAILY
Qty: 90 TABLET | Refills: 0 | Status: SHIPPED | OUTPATIENT
Start: 2022-10-11 | End: 2023-02-01 | Stop reason: SDUPTHER

## 2022-10-11 RX ORDER — BUPROPION HYDROCHLORIDE 300 MG/1
TABLET ORAL
Qty: 90 TABLET | Refills: 0 | Status: SHIPPED | OUTPATIENT
Start: 2022-10-11

## 2022-10-24 ENCOUNTER — TELEPHONE (OUTPATIENT)
Dept: FAMILY MEDICINE CLINIC | Facility: CLINIC | Age: 67
End: 2022-10-24

## 2022-10-25 DIAGNOSIS — M54.2 NECK PAIN ON LEFT SIDE: Primary | ICD-10-CM

## 2022-11-03 RX ORDER — ALBUTEROL SULFATE 90 UG/1
AEROSOL, METERED RESPIRATORY (INHALATION)
Qty: 8.5 G | Refills: 0 | OUTPATIENT
Start: 2022-11-03

## 2022-11-23 ENCOUNTER — TRANSCRIBE ORDERS (OUTPATIENT)
Dept: ADMINISTRATIVE | Facility: HOSPITAL | Age: 67
End: 2022-11-23

## 2022-11-23 DIAGNOSIS — Z02.71 ENCOUNTER FOR DISABILITY DETERMINATION: Primary | ICD-10-CM

## 2022-11-28 RX ORDER — ALBUTEROL SULFATE 90 UG/1
AEROSOL, METERED RESPIRATORY (INHALATION)
Qty: 8.5 G | Refills: 0 | OUTPATIENT
Start: 2022-11-28

## 2022-11-29 ENCOUNTER — OFFICE VISIT (OUTPATIENT)
Dept: FAMILY MEDICINE CLINIC | Facility: CLINIC | Age: 67
End: 2022-11-29

## 2022-11-29 DIAGNOSIS — E78.2 MIXED HYPERLIPIDEMIA: ICD-10-CM

## 2022-11-29 DIAGNOSIS — Z99.81 CHRONIC RESPIRATORY FAILURE WITH HYPOXIA, ON HOME O2 THERAPY: Primary | ICD-10-CM

## 2022-11-29 DIAGNOSIS — J43.1 PANLOBULAR EMPHYSEMA: ICD-10-CM

## 2022-11-29 DIAGNOSIS — E55.9 VITAMIN D DEFICIENCY: ICD-10-CM

## 2022-11-29 DIAGNOSIS — I10 BENIGN ESSENTIAL HYPERTENSION: ICD-10-CM

## 2022-11-29 DIAGNOSIS — E11.65 TYPE 2 DIABETES MELLITUS WITH HYPERGLYCEMIA, WITHOUT LONG-TERM CURRENT USE OF INSULIN: ICD-10-CM

## 2022-11-29 DIAGNOSIS — J96.11 CHRONIC RESPIRATORY FAILURE WITH HYPOXIA, ON HOME O2 THERAPY: Primary | ICD-10-CM

## 2022-11-29 PROCEDURE — 99214 OFFICE O/P EST MOD 30 MIN: CPT | Performed by: FAMILY MEDICINE

## 2022-11-29 RX ORDER — ESCITALOPRAM OXALATE 20 MG/1
20 TABLET ORAL EVERY MORNING
Qty: 30 TABLET | Refills: 0 | Status: SHIPPED | OUTPATIENT
Start: 2022-11-29 | End: 2023-01-03

## 2022-11-29 RX ORDER — FLUTICASONE FUROATE, UMECLIDINIUM BROMIDE AND VILANTEROL TRIFENATATE 200; 62.5; 25 UG/1; UG/1; UG/1
1 POWDER RESPIRATORY (INHALATION) DAILY
Qty: 60 EACH | Refills: 0 | COMMUNITY
Start: 2022-11-29 | End: 2023-02-01 | Stop reason: SDUPTHER

## 2022-11-29 RX ORDER — BLOOD-GLUCOSE METER
1 EACH MISCELLANEOUS DAILY
Qty: 1 KIT | Refills: 0 | Status: SHIPPED | OUTPATIENT
Start: 2022-11-29

## 2022-11-29 RX ORDER — IPRATROPIUM BROMIDE AND ALBUTEROL SULFATE 2.5; .5 MG/3ML; MG/3ML
3 SOLUTION RESPIRATORY (INHALATION) EVERY 4 HOURS PRN
Qty: 120 ML | Refills: 1 | Status: SHIPPED | OUTPATIENT
Start: 2022-11-29 | End: 2023-02-01 | Stop reason: SDUPTHER

## 2022-11-29 RX ORDER — SITAGLIPTIN AND METFORMIN HYDROCHLORIDE 1000; 100 MG/1; MG/1
TABLET, FILM COATED, EXTENDED RELEASE ORAL
Qty: 30 TABLET | Refills: 2 | Status: SHIPPED | OUTPATIENT
Start: 2022-11-29 | End: 2023-03-02 | Stop reason: SDUPTHER

## 2022-11-29 RX ORDER — LANCETS 23 GAUGE
1 EACH MISCELLANEOUS DAILY
Qty: 100 EACH | Refills: 1 | Status: SHIPPED | OUTPATIENT
Start: 2022-11-29

## 2022-12-02 ENCOUNTER — TELEPHONE (OUTPATIENT)
Dept: FAMILY MEDICINE CLINIC | Facility: CLINIC | Age: 67
End: 2022-12-02

## 2022-12-02 VITALS
TEMPERATURE: 97.8 F | HEIGHT: 64 IN | RESPIRATION RATE: 14 BRPM | WEIGHT: 124 LBS | DIASTOLIC BLOOD PRESSURE: 74 MMHG | SYSTOLIC BLOOD PRESSURE: 117 MMHG | BODY MASS INDEX: 21.17 KG/M2 | OXYGEN SATURATION: 92 % | HEART RATE: 79 BPM

## 2022-12-06 ENCOUNTER — HOSPITAL ENCOUNTER (OUTPATIENT)
Dept: RESPIRATORY THERAPY | Facility: HOSPITAL | Age: 67
Discharge: HOME | End: 2022-12-06

## 2022-12-06 VITALS — OXYGEN SATURATION: 94 % | HEART RATE: 84 BPM | RESPIRATION RATE: 12 BRPM

## 2022-12-06 DIAGNOSIS — Z02.71 ENCOUNTER FOR DISABILITY DETERMINATION: ICD-10-CM

## 2022-12-06 PROCEDURE — 94060 EVALUATION OF WHEEZING: CPT

## 2022-12-06 RX ORDER — ALBUTEROL SULFATE 90 UG/1
2 AEROSOL, METERED RESPIRATORY (INHALATION) ONCE
Status: COMPLETED | OUTPATIENT
Start: 2022-12-06 | End: 2022-12-06

## 2022-12-06 RX ADMIN — ALBUTEROL SULFATE 2 PUFF: 108 INHALANT RESPIRATORY (INHALATION) at 14:12

## 2022-12-08 DIAGNOSIS — M54.50 ACUTE BILATERAL LOW BACK PAIN WITHOUT SCIATICA: ICD-10-CM

## 2022-12-08 RX ORDER — TRAMADOL HYDROCHLORIDE 50 MG/1
50 TABLET ORAL EVERY 8 HOURS PRN
Qty: 90 TABLET | Refills: 0 | Status: SHIPPED | OUTPATIENT
Start: 2022-12-08 | End: 2023-03-27

## 2022-12-14 DIAGNOSIS — M54.50 ACUTE BILATERAL LOW BACK PAIN WITHOUT SCIATICA: ICD-10-CM

## 2022-12-14 RX ORDER — TRAMADOL HYDROCHLORIDE 50 MG/1
TABLET ORAL
Qty: 90 TABLET | Refills: 0 | OUTPATIENT
Start: 2022-12-14

## 2022-12-15 DIAGNOSIS — M54.50 ACUTE BILATERAL LOW BACK PAIN WITHOUT SCIATICA: ICD-10-CM

## 2022-12-15 RX ORDER — TRAMADOL HYDROCHLORIDE 50 MG/1
TABLET ORAL
Qty: 90 TABLET | Refills: 0 | OUTPATIENT
Start: 2022-12-15

## 2022-12-16 DIAGNOSIS — M54.50 ACUTE BILATERAL LOW BACK PAIN WITHOUT SCIATICA: ICD-10-CM

## 2022-12-16 RX ORDER — TRAMADOL HYDROCHLORIDE 50 MG/1
TABLET ORAL
Qty: 90 TABLET | Refills: 0 | OUTPATIENT
Start: 2022-12-16

## 2022-12-22 ENCOUNTER — TRANSCRIBE ORDERS (OUTPATIENT)
Dept: ADMINISTRATIVE | Facility: HOSPITAL | Age: 67
End: 2022-12-22

## 2022-12-22 ENCOUNTER — LAB (OUTPATIENT)
Dept: LAB | Facility: HOSPITAL | Age: 67
End: 2022-12-22

## 2022-12-22 DIAGNOSIS — Z02.71 ENCOUNTER FOR DISABILITY DETERMINATION: ICD-10-CM

## 2022-12-22 DIAGNOSIS — Z02.71 ENCOUNTER FOR DISABILITY DETERMINATION: Primary | ICD-10-CM

## 2022-12-22 PROCEDURE — 82803 BLOOD GASES ANY COMBINATION: CPT | Performed by: INTERNAL MEDICINE

## 2022-12-23 LAB
ATMOSPHERIC PRESS: ABNORMAL MM[HG]
BASE EXCESS BLDV CALC-SCNC: 0.5 MMOL/L (ref -2–2)
BDY SITE: ABNORMAL
CO2 BLDA-SCNC: 28.1 MMOL/L (ref 22–29)
HCO3 BLDV-SCNC: 26.7 MMOL/L (ref 22–26)
INHALED O2 CONCENTRATION: 21 %
MODALITY: ABNORMAL
PCO2 BLDV: 47.2 MM HG (ref 42–51)
PH BLDV: 7.36 PH UNITS (ref 7.32–7.43)
PO2 BLDV: 82.4 MM HG (ref 40–42)
SAO2 % BLDCOV: 95.5 % (ref 45–75)

## 2023-01-03 RX ORDER — ESCITALOPRAM OXALATE 20 MG/1
20 TABLET ORAL EVERY MORNING
Qty: 30 TABLET | Refills: 0 | Status: SHIPPED | OUTPATIENT
Start: 2023-01-03 | End: 2023-02-01

## 2023-01-05 ENCOUNTER — LAB (OUTPATIENT)
Dept: LAB | Facility: HOSPITAL | Age: 68
End: 2023-01-05
Payer: MEDICARE

## 2023-01-05 DIAGNOSIS — E55.9 VITAMIN D DEFICIENCY: ICD-10-CM

## 2023-01-05 DIAGNOSIS — E11.65 TYPE 2 DIABETES MELLITUS WITH HYPERGLYCEMIA, WITHOUT LONG-TERM CURRENT USE OF INSULIN: ICD-10-CM

## 2023-01-05 DIAGNOSIS — E78.2 MIXED HYPERLIPIDEMIA: ICD-10-CM

## 2023-01-05 LAB
25(OH)D3 SERPL-MCNC: 27 NG/ML (ref 30–100)
ALBUMIN SERPL-MCNC: 4.4 G/DL (ref 3.5–5.2)
ALBUMIN UR-MCNC: 2.6 MG/DL
ALBUMIN/GLOB SERPL: 1.9 G/DL
ALP SERPL-CCNC: 58 U/L (ref 39–117)
ALT SERPL W P-5'-P-CCNC: 11 U/L (ref 1–33)
ANION GAP SERPL CALCULATED.3IONS-SCNC: 8 MMOL/L (ref 5–15)
AST SERPL-CCNC: 18 U/L (ref 1–32)
BILIRUB SERPL-MCNC: 0.3 MG/DL (ref 0–1.2)
BUN SERPL-MCNC: 19 MG/DL (ref 8–23)
BUN/CREAT SERPL: 24.1 (ref 7–25)
CALCIUM SPEC-SCNC: 9.9 MG/DL (ref 8.6–10.5)
CHLORIDE SERPL-SCNC: 103 MMOL/L (ref 98–107)
CHOLEST SERPL-MCNC: 236 MG/DL (ref 0–200)
CO2 SERPL-SCNC: 29 MMOL/L (ref 22–29)
CREAT SERPL-MCNC: 0.79 MG/DL (ref 0.57–1)
EGFRCR SERPLBLD CKD-EPI 2021: 83.1 ML/MIN/1.73
GLOBULIN UR ELPH-MCNC: 2.3 GM/DL
GLUCOSE SERPL-MCNC: 133 MG/DL (ref 65–99)
HBA1C MFR BLD: 6.8 % (ref 3.5–5.6)
HDLC SERPL-MCNC: 61 MG/DL (ref 40–60)
LDLC SERPL CALC-MCNC: 151 MG/DL (ref 0–100)
LDLC/HDLC SERPL: 2.43 {RATIO}
POTASSIUM SERPL-SCNC: 3.9 MMOL/L (ref 3.5–5.2)
PROT SERPL-MCNC: 6.7 G/DL (ref 6–8.5)
SODIUM SERPL-SCNC: 140 MMOL/L (ref 136–145)
TRIGL SERPL-MCNC: 134 MG/DL (ref 0–150)
VLDLC SERPL-MCNC: 24 MG/DL (ref 5–40)

## 2023-01-05 PROCEDURE — 82043 UR ALBUMIN QUANTITATIVE: CPT

## 2023-01-05 PROCEDURE — 83036 HEMOGLOBIN GLYCOSYLATED A1C: CPT

## 2023-01-05 PROCEDURE — 80061 LIPID PANEL: CPT

## 2023-01-05 PROCEDURE — 82306 VITAMIN D 25 HYDROXY: CPT

## 2023-01-05 PROCEDURE — 80053 COMPREHEN METABOLIC PANEL: CPT

## 2023-01-31 ENCOUNTER — TELEPHONE (OUTPATIENT)
Dept: FAMILY MEDICINE CLINIC | Facility: CLINIC | Age: 68
End: 2023-01-31

## 2023-02-01 ENCOUNTER — TELEPHONE (OUTPATIENT)
Dept: FAMILY MEDICINE CLINIC | Facility: CLINIC | Age: 68
End: 2023-02-01

## 2023-02-01 ENCOUNTER — TELEMEDICINE (OUTPATIENT)
Dept: FAMILY MEDICINE CLINIC | Facility: CLINIC | Age: 68
End: 2023-02-01
Payer: MEDICARE

## 2023-02-01 DIAGNOSIS — J43.1 PANLOBULAR EMPHYSEMA: ICD-10-CM

## 2023-02-01 DIAGNOSIS — E11.65 TYPE 2 DIABETES MELLITUS WITH HYPERGLYCEMIA, WITHOUT LONG-TERM CURRENT USE OF INSULIN: ICD-10-CM

## 2023-02-01 DIAGNOSIS — R30.0 DYSURIA: Primary | ICD-10-CM

## 2023-02-01 PROCEDURE — 99214 OFFICE O/P EST MOD 30 MIN: CPT | Performed by: FAMILY MEDICINE

## 2023-02-01 RX ORDER — IPRATROPIUM BROMIDE AND ALBUTEROL SULFATE 2.5; .5 MG/3ML; MG/3ML
3 SOLUTION RESPIRATORY (INHALATION) EVERY 4 HOURS PRN
Qty: 120 ML | Refills: 1 | Status: SHIPPED | OUTPATIENT
Start: 2023-02-01 | End: 2023-02-01 | Stop reason: SDUPTHER

## 2023-02-01 RX ORDER — IPRATROPIUM BROMIDE AND ALBUTEROL SULFATE 2.5; .5 MG/3ML; MG/3ML
3 SOLUTION RESPIRATORY (INHALATION) EVERY 4 HOURS PRN
Qty: 120 ML | Refills: 1 | Status: SHIPPED | OUTPATIENT
Start: 2023-02-01 | End: 2023-03-02 | Stop reason: SDUPTHER

## 2023-02-01 RX ORDER — NITROFURANTOIN 25; 75 MG/1; MG/1
100 CAPSULE ORAL 2 TIMES DAILY
Qty: 14 CAPSULE | Refills: 0 | Status: SHIPPED | OUTPATIENT
Start: 2023-02-01 | End: 2023-02-18

## 2023-02-01 RX ORDER — AMLODIPINE BESYLATE 5 MG/1
5 TABLET ORAL DAILY
Qty: 90 TABLET | Refills: 1 | Status: SHIPPED | OUTPATIENT
Start: 2023-02-01

## 2023-02-01 RX ORDER — METHOCARBAMOL 500 MG/1
TABLET, FILM COATED ORAL
Qty: 40 TABLET | Refills: 0 | Status: SHIPPED | OUTPATIENT
Start: 2023-02-01

## 2023-02-01 RX ORDER — CELECOXIB 200 MG/1
200 CAPSULE ORAL 2 TIMES DAILY PRN
Qty: 40 CAPSULE | Refills: 0 | Status: SHIPPED | OUTPATIENT
Start: 2023-02-01 | End: 2023-02-20

## 2023-02-01 RX ORDER — ATORVASTATIN CALCIUM 80 MG/1
80 TABLET, FILM COATED ORAL DAILY
Qty: 90 TABLET | Refills: 0 | Status: SHIPPED | OUTPATIENT
Start: 2023-02-01

## 2023-02-01 RX ORDER — PAROXETINE HYDROCHLORIDE 20 MG/1
20 TABLET, FILM COATED ORAL EVERY EVENING
Qty: 30 TABLET | Refills: 1 | Status: SHIPPED | OUTPATIENT
Start: 2023-02-01 | End: 2023-03-02 | Stop reason: SDUPTHER

## 2023-02-01 RX ORDER — FLUTICASONE FUROATE, UMECLIDINIUM BROMIDE AND VILANTEROL TRIFENATATE 200; 62.5; 25 UG/1; UG/1; UG/1
1 POWDER RESPIRATORY (INHALATION) DAILY
Qty: 60 EACH | Refills: 0 | Status: SHIPPED | OUTPATIENT
Start: 2023-02-01 | End: 2023-03-17

## 2023-02-01 RX ORDER — PROPRANOLOL HYDROCHLORIDE 160 MG/1
160 CAPSULE, EXTENDED RELEASE ORAL DAILY
Qty: 90 CAPSULE | Refills: 1 | Status: SHIPPED | OUTPATIENT
Start: 2023-02-01

## 2023-02-02 PROBLEM — J44.1 COPD EXACERBATION (HCC): Status: RESOLVED | Noted: 2018-10-02 | Resolved: 2023-02-02

## 2023-02-02 PROBLEM — Z78.9 FAILURE OF OUTPATIENT TREATMENT: Status: RESOLVED | Noted: 2018-07-02 | Resolved: 2023-02-02

## 2023-02-02 PROBLEM — J96.20 ACUTE-ON-CHRONIC RESPIRATORY FAILURE: Status: RESOLVED | Noted: 2022-03-23 | Resolved: 2023-02-02

## 2023-02-06 RX ORDER — ESCITALOPRAM OXALATE 20 MG/1
20 TABLET ORAL EVERY MORNING
Qty: 30 TABLET | Refills: 0 | OUTPATIENT
Start: 2023-02-06

## 2023-02-15 ENCOUNTER — APPOINTMENT (OUTPATIENT)
Dept: GENERAL RADIOLOGY | Facility: HOSPITAL | Age: 68
DRG: 189 | End: 2023-02-15
Payer: MEDICARE

## 2023-02-15 ENCOUNTER — APPOINTMENT (OUTPATIENT)
Dept: CT IMAGING | Facility: HOSPITAL | Age: 68
DRG: 189 | End: 2023-02-15
Payer: MEDICARE

## 2023-02-15 ENCOUNTER — HOSPITAL ENCOUNTER (INPATIENT)
Facility: HOSPITAL | Age: 68
LOS: 2 days | Discharge: HOME | DRG: 189 | End: 2023-02-17
Attending: EMERGENCY MEDICINE | Admitting: INTERNAL MEDICINE
Payer: MEDICARE

## 2023-02-15 DIAGNOSIS — S06.0X0A CONCUSSION WITHOUT LOSS OF CONSCIOUSNESS, INITIAL ENCOUNTER: Primary | ICD-10-CM

## 2023-02-15 DIAGNOSIS — R41.82 ALTERED MENTAL STATUS, UNSPECIFIED ALTERED MENTAL STATUS TYPE: ICD-10-CM

## 2023-02-15 DIAGNOSIS — J96.02 ACUTE RESPIRATORY FAILURE WITH HYPOXIA AND HYPERCAPNIA: ICD-10-CM

## 2023-02-15 DIAGNOSIS — J44.1 COPD EXACERBATION: ICD-10-CM

## 2023-02-15 DIAGNOSIS — J96.01 ACUTE RESPIRATORY FAILURE WITH HYPOXIA AND HYPERCAPNIA: ICD-10-CM

## 2023-02-15 LAB
ALBUMIN SERPL-MCNC: 3.7 G/DL (ref 3.5–5.2)
ALBUMIN/GLOB SERPL: 1.4 G/DL
ALP SERPL-CCNC: 90 U/L (ref 39–117)
ALT SERPL W P-5'-P-CCNC: 16 U/L (ref 1–33)
ANION GAP SERPL CALCULATED.3IONS-SCNC: 8 MMOL/L (ref 5–15)
ARTERIAL PATENCY WRIST A: POSITIVE
ARTERIAL PATENCY WRIST A: POSITIVE
AST SERPL-CCNC: 15 U/L (ref 1–32)
ATMOSPHERIC PRESS: ABNORMAL MM[HG]
ATMOSPHERIC PRESS: ABNORMAL MM[HG]
BACTERIA UR QL AUTO: ABNORMAL /HPF
BASE EXCESS BLDA CALC-SCNC: 0.7 MMOL/L (ref 0–3)
BASE EXCESS BLDA CALC-SCNC: 1.9 MMOL/L (ref 0–3)
BASOPHILS # BLD AUTO: 0.1 10*3/MM3 (ref 0–0.2)
BASOPHILS NFR BLD AUTO: 1.3 % (ref 0–1.5)
BDY SITE: ABNORMAL
BDY SITE: ABNORMAL
BILIRUB SERPL-MCNC: 0.2 MG/DL (ref 0–1.2)
BILIRUB UR QL STRIP: NEGATIVE
BUN SERPL-MCNC: 31 MG/DL (ref 8–23)
BUN/CREAT SERPL: 26.1 (ref 7–25)
CALCIUM SPEC-SCNC: 9.9 MG/DL (ref 8.6–10.5)
CHLORIDE SERPL-SCNC: 102 MMOL/L (ref 98–107)
CLARITY UR: CLEAR
CO2 BLDA-SCNC: 33.9 MMOL/L (ref 22–29)
CO2 BLDA-SCNC: 34.4 MMOL/L (ref 22–29)
CO2 SERPL-SCNC: 29 MMOL/L (ref 22–29)
COLOR UR: YELLOW
CREAT SERPL-MCNC: 1.19 MG/DL (ref 0.57–1)
DEPRECATED RDW RBC AUTO: 43.8 FL (ref 37–54)
EGFRCR SERPLBLD CKD-EPI 2021: 50.8 ML/MIN/1.73
EOSINOPHIL # BLD AUTO: 1.1 10*3/MM3 (ref 0–0.4)
EOSINOPHIL NFR BLD AUTO: 11.8 % (ref 0.3–6.2)
ERYTHROCYTE [DISTWIDTH] IN BLOOD BY AUTOMATED COUNT: 13.5 % (ref 12.3–15.4)
GEN 5 2HR TROPONIN T REFLEX: 70 NG/L
GLOBULIN UR ELPH-MCNC: 2.7 GM/DL
GLUCOSE SERPL-MCNC: 276 MG/DL (ref 65–99)
GLUCOSE UR STRIP-MCNC: ABNORMAL MG/DL
HCO3 BLDA-SCNC: 31.4 MMOL/L (ref 21–28)
HCO3 BLDA-SCNC: 32.1 MMOL/L (ref 21–28)
HCT VFR BLD AUTO: 43.6 % (ref 34–46.6)
HEMODILUTION: NO
HEMODILUTION: NO
HGB BLD-MCNC: 14.2 G/DL (ref 12–15.9)
HGB UR QL STRIP.AUTO: NEGATIVE
HYALINE CASTS UR QL AUTO: ABNORMAL /LPF
INHALED O2 CONCENTRATION: 25 %
INHALED O2 CONCENTRATION: 30 %
KETONES UR QL STRIP: ABNORMAL
LEUKOCYTE ESTERASE UR QL STRIP.AUTO: ABNORMAL
LYMPHOCYTES # BLD AUTO: 2.3 10*3/MM3 (ref 0.7–3.1)
LYMPHOCYTES NFR BLD AUTO: 25.6 % (ref 19.6–45.3)
MAGNESIUM SERPL-MCNC: 1.4 MG/DL (ref 1.6–2.4)
MCH RBC QN AUTO: 28.4 PG (ref 26.6–33)
MCHC RBC AUTO-ENTMCNC: 32.5 G/DL (ref 31.5–35.7)
MCV RBC AUTO: 87.4 FL (ref 79–97)
MODALITY: ABNORMAL
MODALITY: ABNORMAL
MONOCYTES # BLD AUTO: 0.8 10*3/MM3 (ref 0.1–0.9)
MONOCYTES NFR BLD AUTO: 9.4 % (ref 5–12)
NEUTROPHILS NFR BLD AUTO: 4.7 10*3/MM3 (ref 1.7–7)
NEUTROPHILS NFR BLD AUTO: 51.9 % (ref 42.7–76)
NITRITE UR QL STRIP: POSITIVE
NRBC BLD AUTO-RTO: 0 /100 WBC (ref 0–0.2)
NT-PROBNP SERPL-MCNC: 430.7 PG/ML (ref 0–900)
PCO2 BLDA: 77.1 MM HG (ref 35–48)
PCO2 BLDA: 78.9 MM HG (ref 35–48)
PH BLDA: 7.21 PH UNITS (ref 7.35–7.45)
PH BLDA: 7.23 PH UNITS (ref 7.35–7.45)
PH UR STRIP.AUTO: 6 [PH] (ref 5–8)
PLATELET # BLD AUTO: 368 10*3/MM3 (ref 140–450)
PMV BLD AUTO: 8 FL (ref 6–12)
PO2 BLDA: 84.6 MM HG (ref 83–108)
PO2 BLDA: 99.7 MM HG (ref 83–108)
POTASSIUM SERPL-SCNC: 4.2 MMOL/L (ref 3.5–5.2)
PROT SERPL-MCNC: 6.4 G/DL (ref 6–8.5)
PROT UR QL STRIP: ABNORMAL
RBC # BLD AUTO: 4.98 10*6/MM3 (ref 3.77–5.28)
RBC # UR STRIP: ABNORMAL /HPF
REF LAB TEST METHOD: ABNORMAL
RESPIRATORY RATE: 20
SAO2 % BLDCOA: 93.4 % (ref 94–98)
SAO2 % BLDCOA: 95.6 % (ref 94–98)
SODIUM SERPL-SCNC: 139 MMOL/L (ref 136–145)
SP GR UR STRIP: 1.02 (ref 1–1.03)
SQUAMOUS #/AREA URNS HPF: ABNORMAL /HPF
TROPONIN T DELTA: -2 NG/L
TROPONIN T SERPL HS-MCNC: 72 NG/L
UROBILINOGEN UR QL STRIP: ABNORMAL
VENTILATOR MODE: ABNORMAL
WBC # UR STRIP: ABNORMAL /HPF
WBC NRBC COR # BLD: 9 10*3/MM3 (ref 3.4–10.8)

## 2023-02-15 PROCEDURE — 81001 URINALYSIS AUTO W/SCOPE: CPT | Performed by: PHYSICIAN ASSISTANT

## 2023-02-15 PROCEDURE — 94660 CPAP INITIATION&MGMT: CPT

## 2023-02-15 PROCEDURE — 87086 URINE CULTURE/COLONY COUNT: CPT | Performed by: PHYSICIAN ASSISTANT

## 2023-02-15 PROCEDURE — 70450 CT HEAD/BRAIN W/O DYE: CPT

## 2023-02-15 PROCEDURE — 25010000002 CEFTRIAXONE PER 250 MG: Performed by: EMERGENCY MEDICINE

## 2023-02-15 PROCEDURE — 99285 EMERGENCY DEPT VISIT HI MDM: CPT

## 2023-02-15 PROCEDURE — 84484 ASSAY OF TROPONIN QUANT: CPT | Performed by: PHYSICIAN ASSISTANT

## 2023-02-15 PROCEDURE — 85025 COMPLETE CBC W/AUTO DIFF WBC: CPT | Performed by: PHYSICIAN ASSISTANT

## 2023-02-15 PROCEDURE — 93005 ELECTROCARDIOGRAM TRACING: CPT | Performed by: PHYSICIAN ASSISTANT

## 2023-02-15 PROCEDURE — 72220 X-RAY EXAM SACRUM TAILBONE: CPT

## 2023-02-15 PROCEDURE — 87186 SC STD MICRODIL/AGAR DIL: CPT | Performed by: PHYSICIAN ASSISTANT

## 2023-02-15 PROCEDURE — 82330 ASSAY OF CALCIUM: CPT

## 2023-02-15 PROCEDURE — 94799 UNLISTED PULMONARY SVC/PX: CPT

## 2023-02-15 PROCEDURE — 87641 MR-STAPH DNA AMP PROBE: CPT

## 2023-02-15 PROCEDURE — 82803 BLOOD GASES ANY COMBINATION: CPT

## 2023-02-15 PROCEDURE — 80053 COMPREHEN METABOLIC PANEL: CPT | Performed by: PHYSICIAN ASSISTANT

## 2023-02-15 PROCEDURE — 25010000002 MAGNESIUM SULFATE 2 GM/50ML SOLUTION

## 2023-02-15 PROCEDURE — 83735 ASSAY OF MAGNESIUM: CPT | Performed by: PHYSICIAN ASSISTANT

## 2023-02-15 PROCEDURE — 87077 CULTURE AEROBIC IDENTIFY: CPT | Performed by: PHYSICIAN ASSISTANT

## 2023-02-15 PROCEDURE — 71045 X-RAY EXAM CHEST 1 VIEW: CPT

## 2023-02-15 PROCEDURE — 83880 ASSAY OF NATRIURETIC PEPTIDE: CPT | Performed by: PHYSICIAN ASSISTANT

## 2023-02-15 PROCEDURE — 36600 WITHDRAWAL OF ARTERIAL BLOOD: CPT

## 2023-02-15 RX ORDER — SODIUM CHLORIDE 0.9 % (FLUSH) 0.9 %
10 SYRINGE (ML) INJECTION AS NEEDED
Status: DISCONTINUED | OUTPATIENT
Start: 2023-02-15 | End: 2023-02-17 | Stop reason: HOSPADM

## 2023-02-15 RX ORDER — SODIUM CHLORIDE 9 MG/ML
75 INJECTION, SOLUTION INTRAVENOUS CONTINUOUS
Status: DISCONTINUED | OUTPATIENT
Start: 2023-02-15 | End: 2023-02-16

## 2023-02-15 RX ORDER — ALUMINA, MAGNESIA, AND SIMETHICONE 2400; 2400; 240 MG/30ML; MG/30ML; MG/30ML
15 SUSPENSION ORAL EVERY 6 HOURS PRN
Status: DISCONTINUED | OUTPATIENT
Start: 2023-02-15 | End: 2023-02-17 | Stop reason: HOSPADM

## 2023-02-15 RX ORDER — ACETAMINOPHEN 650 MG/1
650 SUPPOSITORY RECTAL EVERY 4 HOURS PRN
Status: DISCONTINUED | OUTPATIENT
Start: 2023-02-15 | End: 2023-02-17 | Stop reason: HOSPADM

## 2023-02-15 RX ORDER — ONDANSETRON 2 MG/ML
4 INJECTION INTRAMUSCULAR; INTRAVENOUS EVERY 6 HOURS PRN
Status: DISCONTINUED | OUTPATIENT
Start: 2023-02-15 | End: 2023-02-17 | Stop reason: HOSPADM

## 2023-02-15 RX ORDER — SODIUM CHLORIDE 0.9 % (FLUSH) 0.9 %
10 SYRINGE (ML) INJECTION EVERY 12 HOURS SCHEDULED
Status: DISCONTINUED | OUTPATIENT
Start: 2023-02-15 | End: 2023-02-17 | Stop reason: HOSPADM

## 2023-02-15 RX ORDER — ACETAMINOPHEN 325 MG/1
650 TABLET ORAL EVERY 4 HOURS PRN
Status: DISCONTINUED | OUTPATIENT
Start: 2023-02-15 | End: 2023-02-17 | Stop reason: HOSPADM

## 2023-02-15 RX ORDER — ONDANSETRON 4 MG/1
4 TABLET, FILM COATED ORAL EVERY 6 HOURS PRN
Status: DISCONTINUED | OUTPATIENT
Start: 2023-02-15 | End: 2023-02-17 | Stop reason: HOSPADM

## 2023-02-15 RX ORDER — SODIUM CHLORIDE 9 MG/ML
40 INJECTION, SOLUTION INTRAVENOUS AS NEEDED
Status: DISCONTINUED | OUTPATIENT
Start: 2023-02-15 | End: 2023-02-17 | Stop reason: HOSPADM

## 2023-02-15 RX ORDER — MAGNESIUM SULFATE HEPTAHYDRATE 40 MG/ML
2 INJECTION, SOLUTION INTRAVENOUS ONCE
Status: COMPLETED | OUTPATIENT
Start: 2023-02-15 | End: 2023-02-16

## 2023-02-15 RX ADMIN — CEFTRIAXONE 1 G: 1 INJECTION, POWDER, FOR SOLUTION INTRAMUSCULAR; INTRAVENOUS at 21:54

## 2023-02-15 RX ADMIN — MAGNESIUM SULFATE HEPTAHYDRATE 2 G: 2 INJECTION, SOLUTION INTRAVENOUS at 22:51

## 2023-02-15 RX ADMIN — SODIUM CHLORIDE 75 ML/HR: 9 INJECTION, SOLUTION INTRAVENOUS at 22:51

## 2023-02-16 ENCOUNTER — APPOINTMENT (OUTPATIENT)
Dept: CT IMAGING | Facility: HOSPITAL | Age: 68
DRG: 189 | End: 2023-02-16
Payer: MEDICARE

## 2023-02-16 LAB
ANION GAP SERPL CALCULATED.3IONS-SCNC: 9 MMOL/L (ref 5–15)
ARTERIAL PATENCY WRIST A: ABNORMAL
ARTERIAL PATENCY WRIST A: POSITIVE
ARTERIAL PATENCY WRIST A: POSITIVE
ATMOSPHERIC PRESS: ABNORMAL MM[HG]
BACTERIA UR QL AUTO: ABNORMAL /HPF
BASE EXCESS BLDA CALC-SCNC: 1.5 MMOL/L (ref 0–3)
BASE EXCESS BLDA CALC-SCNC: 1.6 MMOL/L (ref 0–3)
BASE EXCESS BLDA CALC-SCNC: 2.2 MMOL/L (ref 0–3)
BASOPHILS # BLD AUTO: 0.1 10*3/MM3 (ref 0–0.2)
BASOPHILS NFR BLD AUTO: 0.8 % (ref 0–1.5)
BDY SITE: ABNORMAL
BILIRUB UR QL STRIP: NEGATIVE
BUN SERPL-MCNC: 24 MG/DL (ref 8–23)
BUN/CREAT SERPL: 25.5 (ref 7–25)
CA-I SERPL ISE-MCNC: 1.38 MMOL/L (ref 1.2–1.3)
CA-I SERPL ISE-MCNC: 1.39 MMOL/L (ref 1.2–1.3)
CALCIUM SPEC-SCNC: 9.7 MG/DL (ref 8.6–10.5)
CHLORIDE SERPL-SCNC: 104 MMOL/L (ref 98–107)
CHOLEST SERPL-MCNC: 175 MG/DL (ref 0–200)
CLARITY UR: CLEAR
CO2 BLDA-SCNC: 30.5 MMOL/L (ref 22–29)
CO2 BLDA-SCNC: 33.5 MMOL/L (ref 22–29)
CO2 BLDA-SCNC: 37.2 MMOL/L (ref 22–29)
CO2 SERPL-SCNC: 28 MMOL/L (ref 22–29)
COLOR UR: YELLOW
CREAT SERPL-MCNC: 0.94 MG/DL (ref 0.57–1)
D DIMER PPP FEU-MCNC: 2.33 MG/L (FEU) (ref 0–0.65)
D-LACTATE SERPL-SCNC: 0.5 MMOL/L (ref 0.5–2)
DEPRECATED RDW RBC AUTO: 45.9 FL (ref 37–54)
EGFRCR SERPLBLD CKD-EPI 2021: 67.5 ML/MIN/1.73
EOSINOPHIL # BLD AUTO: 1.2 10*3/MM3 (ref 0–0.4)
EOSINOPHIL NFR BLD AUTO: 12.9 % (ref 0.3–6.2)
ERYTHROCYTE [DISTWIDTH] IN BLOOD BY AUTOMATED COUNT: 13.6 % (ref 12.3–15.4)
GLUCOSE BLDC GLUCOMTR-MCNC: 112 MG/DL (ref 70–105)
GLUCOSE BLDC GLUCOMTR-MCNC: 161 MG/DL (ref 70–105)
GLUCOSE BLDC GLUCOMTR-MCNC: 168 MG/DL (ref 70–105)
GLUCOSE BLDC GLUCOMTR-MCNC: 326 MG/DL (ref 70–105)
GLUCOSE SERPL-MCNC: 125 MG/DL (ref 65–99)
GLUCOSE UR STRIP-MCNC: NEGATIVE MG/DL
HCO3 BLDA-SCNC: 28.8 MMOL/L (ref 21–28)
HCO3 BLDA-SCNC: 31.2 MMOL/L (ref 21–28)
HCO3 BLDA-SCNC: 34.3 MMOL/L (ref 21–28)
HCT VFR BLD AUTO: 41.1 % (ref 34–46.6)
HDLC SERPL-MCNC: 34 MG/DL (ref 40–60)
HEMODILUTION: NO
HGB BLD-MCNC: 13.4 G/DL (ref 12–15.9)
HGB UR QL STRIP.AUTO: NEGATIVE
HYALINE CASTS UR QL AUTO: ABNORMAL /LPF
INHALED O2 CONCENTRATION: 30 %
INHALED O2 CONCENTRATION: 30 %
INHALED O2 CONCENTRATION: 36 %
KETONES UR QL STRIP: NEGATIVE
LDLC SERPL CALC-MCNC: 113 MG/DL (ref 0–100)
LDLC/HDLC SERPL: 3.21 {RATIO}
LEUKOCYTE ESTERASE UR QL STRIP.AUTO: ABNORMAL
LYMPHOCYTES # BLD AUTO: 3 10*3/MM3 (ref 0.7–3.1)
LYMPHOCYTES NFR BLD AUTO: 33.4 % (ref 19.6–45.3)
MAGNESIUM SERPL-MCNC: 2 MG/DL (ref 1.6–2.4)
MCH RBC QN AUTO: 29.2 PG (ref 26.6–33)
MCHC RBC AUTO-ENTMCNC: 32.6 G/DL (ref 31.5–35.7)
MCV RBC AUTO: 89.7 FL (ref 79–97)
MODALITY: ABNORMAL
MONOCYTES # BLD AUTO: 0.9 10*3/MM3 (ref 0.1–0.9)
MONOCYTES NFR BLD AUTO: 10.1 % (ref 5–12)
MRSA DNA SPEC QL NAA+PROBE: NORMAL
NEUTROPHILS NFR BLD AUTO: 3.9 10*3/MM3 (ref 1.7–7)
NEUTROPHILS NFR BLD AUTO: 42.8 % (ref 42.7–76)
NITRITE UR QL STRIP: POSITIVE
NRBC BLD AUTO-RTO: 0.1 /100 WBC (ref 0–0.2)
PCO2 BLDA: 55.9 MM HG (ref 35–48)
PCO2 BLDA: 73.6 MM HG (ref 35–48)
PCO2 BLDA: 95.5 MM HG (ref 35–48)
PEEP RESPIRATORY: 5 CM[H2O]
PH BLDA: 7.16 PH UNITS (ref 7.35–7.45)
PH BLDA: 7.24 PH UNITS (ref 7.35–7.45)
PH BLDA: 7.32 PH UNITS (ref 7.35–7.45)
PH UR STRIP.AUTO: 6 [PH] (ref 5–8)
PHOSPHATE SERPL-MCNC: 5.2 MG/DL (ref 2.5–4.5)
PLATELET # BLD AUTO: 272 10*3/MM3 (ref 140–450)
PMV BLD AUTO: 7.9 FL (ref 6–12)
PO2 BLDA: 126.9 MM HG (ref 83–108)
PO2 BLDA: 71 MM HG (ref 83–108)
PO2 BLDA: 77.1 MM HG (ref 83–108)
POTASSIUM SERPL-SCNC: 4.3 MMOL/L (ref 3.5–5.2)
PROT UR QL STRIP: ABNORMAL
RBC # BLD AUTO: 4.59 10*6/MM3 (ref 3.77–5.28)
RBC # UR STRIP: ABNORMAL /HPF
REF LAB TEST METHOD: ABNORMAL
RESPIRATORY RATE: 22
RESPIRATORY RATE: 24
SAO2 % BLDCOA: 91.8 % (ref 94–98)
SAO2 % BLDCOA: 92.1 % (ref 94–98)
SAO2 % BLDCOA: 97.4 % (ref 94–98)
SODIUM SERPL-SCNC: 141 MMOL/L (ref 136–145)
SP GR UR STRIP: 1.02 (ref 1–1.03)
SQUAMOUS #/AREA URNS HPF: ABNORMAL /HPF
TRIGL SERPL-MCNC: 160 MG/DL (ref 0–150)
UROBILINOGEN UR QL STRIP: ABNORMAL
VENTILATOR MODE: ABNORMAL
VENTILATOR MODE: ABNORMAL
VLDLC SERPL-MCNC: 28 MG/DL (ref 5–40)
VT ON VENT VENT: 450 ML
WBC # UR STRIP: ABNORMAL /HPF
WBC NRBC COR # BLD: 9 10*3/MM3 (ref 3.4–10.8)

## 2023-02-16 PROCEDURE — 94799 UNLISTED PULMONARY SVC/PX: CPT

## 2023-02-16 PROCEDURE — 83605 ASSAY OF LACTIC ACID: CPT

## 2023-02-16 PROCEDURE — 71275 CT ANGIOGRAPHY CHEST: CPT

## 2023-02-16 PROCEDURE — 63710000001 INSULIN LISPRO (HUMAN) PER 5 UNITS

## 2023-02-16 PROCEDURE — 84100 ASSAY OF PHOSPHORUS: CPT

## 2023-02-16 PROCEDURE — 82330 ASSAY OF CALCIUM: CPT

## 2023-02-16 PROCEDURE — 80048 BASIC METABOLIC PNL TOTAL CA: CPT

## 2023-02-16 PROCEDURE — 85379 FIBRIN DEGRADATION QUANT: CPT | Performed by: INTERNAL MEDICINE

## 2023-02-16 PROCEDURE — 87086 URINE CULTURE/COLONY COUNT: CPT

## 2023-02-16 PROCEDURE — 83735 ASSAY OF MAGNESIUM: CPT

## 2023-02-16 PROCEDURE — 0 IOPAMIDOL PER 1 ML: Performed by: INTERNAL MEDICINE

## 2023-02-16 PROCEDURE — 94660 CPAP INITIATION&MGMT: CPT

## 2023-02-16 PROCEDURE — 36415 COLL VENOUS BLD VENIPUNCTURE: CPT

## 2023-02-16 PROCEDURE — 25010000002 METHYLPREDNISOLONE PER 40 MG: Performed by: INTERNAL MEDICINE

## 2023-02-16 PROCEDURE — 82962 GLUCOSE BLOOD TEST: CPT

## 2023-02-16 PROCEDURE — 25010000002 ENOXAPARIN PER 10 MG: Performed by: NURSE PRACTITIONER

## 2023-02-16 PROCEDURE — 94640 AIRWAY INHALATION TREATMENT: CPT

## 2023-02-16 PROCEDURE — 80061 LIPID PANEL: CPT

## 2023-02-16 PROCEDURE — 94761 N-INVAS EAR/PLS OXIMETRY MLT: CPT

## 2023-02-16 PROCEDURE — 36600 WITHDRAWAL OF ARTERIAL BLOOD: CPT

## 2023-02-16 PROCEDURE — 82803 BLOOD GASES ANY COMBINATION: CPT

## 2023-02-16 PROCEDURE — 25010000002 CEFTRIAXONE PER 250 MG

## 2023-02-16 PROCEDURE — 85025 COMPLETE CBC W/AUTO DIFF WBC: CPT

## 2023-02-16 PROCEDURE — 87040 BLOOD CULTURE FOR BACTERIA: CPT

## 2023-02-16 PROCEDURE — 81001 URINALYSIS AUTO W/SCOPE: CPT

## 2023-02-16 PROCEDURE — 92610 EVALUATE SWALLOWING FUNCTION: CPT

## 2023-02-16 RX ORDER — IPRATROPIUM BROMIDE AND ALBUTEROL SULFATE 2.5; .5 MG/3ML; MG/3ML
3 SOLUTION RESPIRATORY (INHALATION) EVERY 4 HOURS PRN
Status: DISCONTINUED | OUTPATIENT
Start: 2023-02-16 | End: 2023-02-17 | Stop reason: HOSPADM

## 2023-02-16 RX ORDER — PANTOPRAZOLE SODIUM 40 MG/10ML
40 INJECTION, POWDER, LYOPHILIZED, FOR SOLUTION INTRAVENOUS
Status: DISCONTINUED | OUTPATIENT
Start: 2023-02-16 | End: 2023-02-16

## 2023-02-16 RX ORDER — BUDESONIDE AND FORMOTEROL FUMARATE DIHYDRATE 80; 4.5 UG/1; UG/1
2 AEROSOL RESPIRATORY (INHALATION)
Status: DISCONTINUED | OUTPATIENT
Start: 2023-02-16 | End: 2023-02-17 | Stop reason: HOSPADM

## 2023-02-16 RX ORDER — PAROXETINE HYDROCHLORIDE 20 MG/1
20 TABLET, FILM COATED ORAL EVERY EVENING
Status: DISCONTINUED | OUTPATIENT
Start: 2023-02-16 | End: 2023-02-17 | Stop reason: HOSPADM

## 2023-02-16 RX ORDER — ENOXAPARIN SODIUM 100 MG/ML
40 INJECTION SUBCUTANEOUS DAILY
Status: DISCONTINUED | OUTPATIENT
Start: 2023-02-16 | End: 2023-02-17 | Stop reason: HOSPADM

## 2023-02-16 RX ORDER — BUDESONIDE 0.25 MG/2ML
0.5 INHALANT ORAL
Status: DISCONTINUED | OUTPATIENT
Start: 2023-02-16 | End: 2023-02-16 | Stop reason: SDUPTHER

## 2023-02-16 RX ORDER — TRAMADOL HYDROCHLORIDE 50 MG/1
50 TABLET ORAL EVERY 8 HOURS PRN
Status: DISCONTINUED | OUTPATIENT
Start: 2023-02-16 | End: 2023-02-17 | Stop reason: HOSPADM

## 2023-02-16 RX ORDER — IPRATROPIUM BROMIDE AND ALBUTEROL SULFATE 2.5; .5 MG/3ML; MG/3ML
3 SOLUTION RESPIRATORY (INHALATION)
Status: DISCONTINUED | OUTPATIENT
Start: 2023-02-16 | End: 2023-02-17

## 2023-02-16 RX ORDER — INSULIN LISPRO 100 [IU]/ML
2-7 INJECTION, SOLUTION INTRAVENOUS; SUBCUTANEOUS EVERY 6 HOURS SCHEDULED
Status: DISCONTINUED | OUTPATIENT
Start: 2023-02-16 | End: 2023-02-17

## 2023-02-16 RX ORDER — PANTOPRAZOLE SODIUM 40 MG/1
40 TABLET, DELAYED RELEASE ORAL
Status: DISCONTINUED | OUTPATIENT
Start: 2023-02-17 | End: 2023-02-16

## 2023-02-16 RX ORDER — ASPIRIN 81 MG/1
81 TABLET ORAL DAILY
Status: DISCONTINUED | OUTPATIENT
Start: 2023-02-16 | End: 2023-02-17 | Stop reason: HOSPADM

## 2023-02-16 RX ORDER — BUPROPION HYDROCHLORIDE 150 MG/1
300 TABLET ORAL EVERY MORNING
Status: DISCONTINUED | OUTPATIENT
Start: 2023-02-17 | End: 2023-02-17 | Stop reason: HOSPADM

## 2023-02-16 RX ORDER — DEXTROSE MONOHYDRATE 25 G/50ML
25 INJECTION, SOLUTION INTRAVENOUS
Status: DISCONTINUED | OUTPATIENT
Start: 2023-02-16 | End: 2023-02-17 | Stop reason: HOSPADM

## 2023-02-16 RX ORDER — PROPRANOLOL HYDROCHLORIDE 80 MG/1
160 CAPSULE, EXTENDED RELEASE ORAL DAILY
Status: DISCONTINUED | OUTPATIENT
Start: 2023-02-16 | End: 2023-02-17 | Stop reason: HOSPADM

## 2023-02-16 RX ORDER — CELECOXIB 200 MG/1
200 CAPSULE ORAL 2 TIMES DAILY PRN
Status: DISCONTINUED | OUTPATIENT
Start: 2023-02-16 | End: 2023-02-17 | Stop reason: HOSPADM

## 2023-02-16 RX ORDER — METHYLPREDNISOLONE SODIUM SUCCINATE 40 MG/ML
40 INJECTION, POWDER, LYOPHILIZED, FOR SOLUTION INTRAMUSCULAR; INTRAVENOUS DAILY
Status: DISCONTINUED | OUTPATIENT
Start: 2023-02-16 | End: 2023-02-17 | Stop reason: HOSPADM

## 2023-02-16 RX ORDER — METHOCARBAMOL 750 MG/1
750 TABLET, FILM COATED ORAL EVERY 8 HOURS PRN
Status: DISCONTINUED | OUTPATIENT
Start: 2023-02-16 | End: 2023-02-17 | Stop reason: HOSPADM

## 2023-02-16 RX ORDER — OLANZAPINE 10 MG/2ML
1 INJECTION, POWDER, LYOPHILIZED, FOR SOLUTION INTRAMUSCULAR
Status: DISCONTINUED | OUTPATIENT
Start: 2023-02-16 | End: 2023-02-17 | Stop reason: HOSPADM

## 2023-02-16 RX ORDER — ATORVASTATIN CALCIUM 40 MG/1
80 TABLET, FILM COATED ORAL DAILY
Status: DISCONTINUED | OUTPATIENT
Start: 2023-02-16 | End: 2023-02-17 | Stop reason: HOSPADM

## 2023-02-16 RX ORDER — AMLODIPINE BESYLATE 5 MG/1
5 TABLET ORAL DAILY
Status: DISCONTINUED | OUTPATIENT
Start: 2023-02-16 | End: 2023-02-17 | Stop reason: HOSPADM

## 2023-02-16 RX ORDER — NICOTINE POLACRILEX 4 MG
15 LOZENGE BUCCAL
Status: DISCONTINUED | OUTPATIENT
Start: 2023-02-16 | End: 2023-02-17 | Stop reason: HOSPADM

## 2023-02-16 RX ORDER — PANTOPRAZOLE SODIUM 40 MG/1
40 TABLET, DELAYED RELEASE ORAL
Status: DISCONTINUED | OUTPATIENT
Start: 2023-02-17 | End: 2023-02-17 | Stop reason: HOSPADM

## 2023-02-16 RX ADMIN — IPRATROPIUM BROMIDE AND ALBUTEROL SULFATE 3 ML: 2.5; .5 SOLUTION RESPIRATORY (INHALATION) at 20:06

## 2023-02-16 RX ADMIN — PAROXETINE HYDROCHLORIDE 20 MG: 20 TABLET, FILM COATED ORAL at 16:46

## 2023-02-16 RX ADMIN — IOPAMIDOL 100 ML: 755 INJECTION, SOLUTION INTRAVENOUS at 14:40

## 2023-02-16 RX ADMIN — Medication 10 ML: at 08:29

## 2023-02-16 RX ADMIN — Medication 10 ML: at 21:42

## 2023-02-16 RX ADMIN — ENOXAPARIN SODIUM 40 MG: 100 INJECTION SUBCUTANEOUS at 18:34

## 2023-02-16 RX ADMIN — ASPIRIN 81 MG: 81 TABLET, COATED ORAL at 16:46

## 2023-02-16 RX ADMIN — METHYLPREDNISOLONE SODIUM SUCCINATE 40 MG: 40 INJECTION, POWDER, FOR SOLUTION INTRAMUSCULAR; INTRAVENOUS at 08:29

## 2023-02-16 RX ADMIN — IPRATROPIUM BROMIDE AND ALBUTEROL SULFATE 3 ML: 2.5; .5 SOLUTION RESPIRATORY (INHALATION) at 11:50

## 2023-02-16 RX ADMIN — INSULIN LISPRO 2 UNITS: 100 INJECTION, SOLUTION INTRAVENOUS; SUBCUTANEOUS at 23:03

## 2023-02-16 RX ADMIN — TRAMADOL HYDROCHLORIDE 50 MG: 50 TABLET, COATED ORAL at 16:46

## 2023-02-16 RX ADMIN — PANTOPRAZOLE SODIUM 40 MG: 40 INJECTION, POWDER, LYOPHILIZED, FOR SOLUTION INTRAVENOUS at 05:55

## 2023-02-16 RX ADMIN — AMLODIPINE BESYLATE 5 MG: 5 TABLET ORAL at 16:46

## 2023-02-16 RX ADMIN — CEFTRIAXONE 2 G: 2 INJECTION, POWDER, FOR SOLUTION INTRAMUSCULAR; INTRAVENOUS at 12:12

## 2023-02-16 RX ADMIN — BUDESONIDE AND FORMOTEROL FUMARATE DIHYDRATE 2 PUFF: 80; 4.5 AEROSOL RESPIRATORY (INHALATION) at 20:10

## 2023-02-16 RX ADMIN — INSULIN LISPRO 5 UNITS: 100 INJECTION, SOLUTION INTRAVENOUS; SUBCUTANEOUS at 18:37

## 2023-02-16 RX ADMIN — IPRATROPIUM BROMIDE AND ALBUTEROL SULFATE 3 ML: 2.5; .5 SOLUTION RESPIRATORY (INHALATION) at 15:50

## 2023-02-16 RX ADMIN — TRAMADOL HYDROCHLORIDE 50 MG: 50 TABLET, COATED ORAL at 22:59

## 2023-02-16 RX ADMIN — PROPRANOLOL HYDROCHLORIDE 160 MG: 80 CAPSULE, EXTENDED RELEASE ORAL at 18:34

## 2023-02-16 RX ADMIN — ATORVASTATIN CALCIUM 80 MG: 40 TABLET, FILM COATED ORAL at 16:46

## 2023-02-17 ENCOUNTER — READMISSION MANAGEMENT (OUTPATIENT)
Dept: CALL CENTER | Facility: HOSPITAL | Age: 68
End: 2023-02-17
Payer: MEDICARE

## 2023-02-17 VITALS
HEIGHT: 63 IN | WEIGHT: 134.26 LBS | OXYGEN SATURATION: 90 % | HEART RATE: 79 BPM | RESPIRATION RATE: 18 BRPM | SYSTOLIC BLOOD PRESSURE: 127 MMHG | TEMPERATURE: 98.5 F | DIASTOLIC BLOOD PRESSURE: 76 MMHG | BODY MASS INDEX: 23.79 KG/M2

## 2023-02-17 LAB
ANION GAP SERPL CALCULATED.3IONS-SCNC: 7 MMOL/L (ref 5–15)
BACTERIA SPEC AEROBE CULT: NO GROWTH
BASOPHILS # BLD AUTO: 0.1 10*3/MM3 (ref 0–0.2)
BASOPHILS NFR BLD AUTO: 0.6 % (ref 0–1.5)
BUN SERPL-MCNC: 24 MG/DL (ref 8–23)
BUN/CREAT SERPL: 22.9 (ref 7–25)
CA-I SERPL ISE-MCNC: 1.32 MMOL/L (ref 1.2–1.3)
CALCIUM SPEC-SCNC: 9.3 MG/DL (ref 8.6–10.5)
CHLORIDE SERPL-SCNC: 104 MMOL/L (ref 98–107)
CO2 SERPL-SCNC: 29 MMOL/L (ref 22–29)
CREAT SERPL-MCNC: 1.05 MG/DL (ref 0.57–1)
DEPRECATED RDW RBC AUTO: 42 FL (ref 37–54)
EGFRCR SERPLBLD CKD-EPI 2021: 59.1 ML/MIN/1.73
EOSINOPHIL # BLD AUTO: 0.1 10*3/MM3 (ref 0–0.4)
EOSINOPHIL NFR BLD AUTO: 0.8 % (ref 0.3–6.2)
ERYTHROCYTE [DISTWIDTH] IN BLOOD BY AUTOMATED COUNT: 13.5 % (ref 12.3–15.4)
GLUCOSE BLDC GLUCOMTR-MCNC: 120 MG/DL (ref 70–105)
GLUCOSE BLDC GLUCOMTR-MCNC: 156 MG/DL (ref 70–105)
GLUCOSE BLDC GLUCOMTR-MCNC: 255 MG/DL (ref 70–105)
GLUCOSE SERPL-MCNC: 144 MG/DL (ref 65–99)
HCT VFR BLD AUTO: 37.2 % (ref 34–46.6)
HGB BLD-MCNC: 11.9 G/DL (ref 12–15.9)
LYMPHOCYTES # BLD AUTO: 2.7 10*3/MM3 (ref 0.7–3.1)
LYMPHOCYTES NFR BLD AUTO: 20.3 % (ref 19.6–45.3)
MAGNESIUM SERPL-MCNC: 1.5 MG/DL (ref 1.6–2.4)
MCH RBC QN AUTO: 28.5 PG (ref 26.6–33)
MCHC RBC AUTO-ENTMCNC: 32 G/DL (ref 31.5–35.7)
MCV RBC AUTO: 89.1 FL (ref 79–97)
MONOCYTES # BLD AUTO: 1.4 10*3/MM3 (ref 0.1–0.9)
MONOCYTES NFR BLD AUTO: 10.4 % (ref 5–12)
NEUTROPHILS NFR BLD AUTO: 67.9 % (ref 42.7–76)
NEUTROPHILS NFR BLD AUTO: 8.9 10*3/MM3 (ref 1.7–7)
NRBC BLD AUTO-RTO: 0 /100 WBC (ref 0–0.2)
PHOSPHATE SERPL-MCNC: 2.8 MG/DL (ref 2.5–4.5)
PLATELET # BLD AUTO: 359 10*3/MM3 (ref 140–450)
PMV BLD AUTO: 8.2 FL (ref 6–12)
POTASSIUM SERPL-SCNC: 3.8 MMOL/L (ref 3.5–5.2)
QT INTERVAL: 357 MS
RBC # BLD AUTO: 4.17 10*6/MM3 (ref 3.77–5.28)
SODIUM SERPL-SCNC: 140 MMOL/L (ref 136–145)
WBC NRBC COR # BLD: 13.1 10*3/MM3 (ref 3.4–10.8)

## 2023-02-17 PROCEDURE — 63710000001 INSULIN LISPRO (HUMAN) PER 5 UNITS: Performed by: NURSE PRACTITIONER

## 2023-02-17 PROCEDURE — 85025 COMPLETE CBC W/AUTO DIFF WBC: CPT

## 2023-02-17 PROCEDURE — 94761 N-INVAS EAR/PLS OXIMETRY MLT: CPT

## 2023-02-17 PROCEDURE — 82330 ASSAY OF CALCIUM: CPT

## 2023-02-17 PROCEDURE — 94660 CPAP INITIATION&MGMT: CPT

## 2023-02-17 PROCEDURE — 94799 UNLISTED PULMONARY SVC/PX: CPT

## 2023-02-17 PROCEDURE — 25010000002 MAGNESIUM SULFATE 2 GM/50ML SOLUTION: Performed by: INTERNAL MEDICINE

## 2023-02-17 PROCEDURE — 80048 BASIC METABOLIC PNL TOTAL CA: CPT

## 2023-02-17 PROCEDURE — 84100 ASSAY OF PHOSPHORUS: CPT

## 2023-02-17 PROCEDURE — 94664 DEMO&/EVAL PT USE INHALER: CPT

## 2023-02-17 PROCEDURE — 25010000002 METHYLPREDNISOLONE PER 40 MG: Performed by: INTERNAL MEDICINE

## 2023-02-17 PROCEDURE — 82962 GLUCOSE BLOOD TEST: CPT

## 2023-02-17 PROCEDURE — 36415 COLL VENOUS BLD VENIPUNCTURE: CPT

## 2023-02-17 PROCEDURE — 83735 ASSAY OF MAGNESIUM: CPT

## 2023-02-17 RX ORDER — MAGNESIUM SULFATE HEPTAHYDRATE 40 MG/ML
2 INJECTION, SOLUTION INTRAVENOUS ONCE
Status: COMPLETED | OUTPATIENT
Start: 2023-02-17 | End: 2023-02-17

## 2023-02-17 RX ORDER — IPRATROPIUM BROMIDE AND ALBUTEROL SULFATE 2.5; .5 MG/3ML; MG/3ML
3 SOLUTION RESPIRATORY (INHALATION)
Status: DISCONTINUED | OUTPATIENT
Start: 2023-02-17 | End: 2023-02-17 | Stop reason: HOSPADM

## 2023-02-17 RX ORDER — CEPHALEXIN 250 MG/1
250 CAPSULE ORAL 4 TIMES DAILY
Qty: 12 CAPSULE | Refills: 0 | Status: SHIPPED | OUTPATIENT
Start: 2023-02-17 | End: 2023-02-18

## 2023-02-17 RX ORDER — METHYLPREDNISOLONE 4 MG/1
TABLET ORAL
Qty: 21 TABLET | Refills: 0 | Status: SHIPPED | OUTPATIENT
Start: 2023-02-17 | End: 2023-03-02

## 2023-02-17 RX ORDER — ALBUTEROL SULFATE 90 UG/1
2 AEROSOL, METERED RESPIRATORY (INHALATION) EVERY 4 HOURS PRN
Qty: 8.5 G | Refills: 0 | Status: SHIPPED | OUTPATIENT
Start: 2023-02-17 | End: 2023-03-06

## 2023-02-17 RX ORDER — INSULIN LISPRO 100 [IU]/ML
2-7 INJECTION, SOLUTION INTRAVENOUS; SUBCUTANEOUS
Status: DISCONTINUED | OUTPATIENT
Start: 2023-02-17 | End: 2023-02-17 | Stop reason: HOSPADM

## 2023-02-17 RX ADMIN — PROPRANOLOL HYDROCHLORIDE 160 MG: 80 CAPSULE, EXTENDED RELEASE ORAL at 08:31

## 2023-02-17 RX ADMIN — ASPIRIN 81 MG: 81 TABLET, COATED ORAL at 08:32

## 2023-02-17 RX ADMIN — PANTOPRAZOLE SODIUM 40 MG: 40 TABLET, DELAYED RELEASE ORAL at 06:17

## 2023-02-17 RX ADMIN — BUDESONIDE AND FORMOTEROL FUMARATE DIHYDRATE 2 PUFF: 80; 4.5 AEROSOL RESPIRATORY (INHALATION) at 07:45

## 2023-02-17 RX ADMIN — IPRATROPIUM BROMIDE AND ALBUTEROL SULFATE 3 ML: 2.5; .5 SOLUTION RESPIRATORY (INHALATION) at 07:45

## 2023-02-17 RX ADMIN — ATORVASTATIN CALCIUM 80 MG: 40 TABLET, FILM COATED ORAL at 08:32

## 2023-02-17 RX ADMIN — Medication 10 ML: at 08:32

## 2023-02-17 RX ADMIN — MAGNESIUM SULFATE HEPTAHYDRATE 2 G: 2 INJECTION, SOLUTION INTRAVENOUS at 11:08

## 2023-02-17 RX ADMIN — IPRATROPIUM BROMIDE AND ALBUTEROL SULFATE 3 ML: 2.5; .5 SOLUTION RESPIRATORY (INHALATION) at 03:19

## 2023-02-17 RX ADMIN — BUPROPION HYDROCHLORIDE 300 MG: 150 TABLET, EXTENDED RELEASE ORAL at 06:17

## 2023-02-17 RX ADMIN — METHYLPREDNISOLONE SODIUM SUCCINATE 40 MG: 40 INJECTION, POWDER, FOR SOLUTION INTRAMUSCULAR; INTRAVENOUS at 08:32

## 2023-02-17 RX ADMIN — AMLODIPINE BESYLATE 5 MG: 5 TABLET ORAL at 08:32

## 2023-02-17 RX ADMIN — IPRATROPIUM BROMIDE AND ALBUTEROL SULFATE 3 ML: 2.5; .5 SOLUTION RESPIRATORY (INHALATION) at 11:16

## 2023-02-17 RX ADMIN — INSULIN LISPRO 4 UNITS: 100 INJECTION, SOLUTION INTRAVENOUS; SUBCUTANEOUS at 12:35

## 2023-02-18 DIAGNOSIS — F41.1 GENERALIZED ANXIETY DISORDER: Primary | ICD-10-CM

## 2023-02-18 LAB — BACTERIA SPEC AEROBE CULT: ABNORMAL

## 2023-02-18 RX ORDER — ALPRAZOLAM 0.25 MG/1
0.25 TABLET ORAL 3 TIMES DAILY PRN
Qty: 15 TABLET | Refills: 0 | Status: SHIPPED | OUTPATIENT
Start: 2023-02-18 | End: 2023-03-02

## 2023-02-18 RX ORDER — NICOTINE 21 MG/24HR
1 PATCH, TRANSDERMAL 24 HOURS TRANSDERMAL EVERY 24 HOURS
Qty: 30 PATCH | Refills: 1 | Status: SHIPPED | OUTPATIENT
Start: 2023-02-18

## 2023-02-18 RX ORDER — NITROFURANTOIN 25; 75 MG/1; MG/1
100 CAPSULE ORAL 2 TIMES DAILY
Qty: 14 CAPSULE | Refills: 0 | Status: SHIPPED | OUTPATIENT
Start: 2023-02-18 | End: 2023-02-25

## 2023-02-20 ENCOUNTER — TRANSITIONAL CARE MANAGEMENT TELEPHONE ENCOUNTER (OUTPATIENT)
Dept: CALL CENTER | Facility: HOSPITAL | Age: 68
End: 2023-02-20
Payer: MEDICARE

## 2023-02-20 RX ORDER — CELECOXIB 200 MG/1
CAPSULE ORAL
Qty: 40 CAPSULE | Refills: 0 | Status: SHIPPED | OUTPATIENT
Start: 2023-02-20 | End: 2023-03-14

## 2023-02-21 DIAGNOSIS — F41.1 GENERALIZED ANXIETY DISORDER: ICD-10-CM

## 2023-02-21 LAB
BACTERIA SPEC AEROBE CULT: NORMAL
BACTERIA SPEC AEROBE CULT: NORMAL

## 2023-02-21 RX ORDER — ALPRAZOLAM 0.25 MG/1
0.25 TABLET ORAL 3 TIMES DAILY PRN
Qty: 15 TABLET | Refills: 0 | OUTPATIENT
Start: 2023-02-21

## 2023-02-27 ENCOUNTER — READMISSION MANAGEMENT (OUTPATIENT)
Dept: CALL CENTER | Facility: HOSPITAL | Age: 68
End: 2023-02-27
Payer: MEDICARE

## 2023-03-02 ENCOUNTER — READMISSION MANAGEMENT (OUTPATIENT)
Dept: CALL CENTER | Facility: HOSPITAL | Age: 68
End: 2023-03-02
Payer: MEDICARE

## 2023-03-02 ENCOUNTER — OFFICE VISIT (OUTPATIENT)
Dept: FAMILY MEDICINE CLINIC | Facility: CLINIC | Age: 68
End: 2023-03-02
Payer: MEDICARE

## 2023-03-02 VITALS
SYSTOLIC BLOOD PRESSURE: 155 MMHG | WEIGHT: 129 LBS | HEART RATE: 80 BPM | RESPIRATION RATE: 14 BRPM | OXYGEN SATURATION: 94 % | TEMPERATURE: 96.9 F | HEIGHT: 63 IN | BODY MASS INDEX: 22.86 KG/M2 | DIASTOLIC BLOOD PRESSURE: 74 MMHG

## 2023-03-02 DIAGNOSIS — J96.12 CHRONIC RESPIRATORY FAILURE WITH HYPOXIA AND HYPERCAPNIA: Primary | ICD-10-CM

## 2023-03-02 DIAGNOSIS — J96.11 CHRONIC RESPIRATORY FAILURE WITH HYPOXIA AND HYPERCAPNIA: Primary | ICD-10-CM

## 2023-03-02 DIAGNOSIS — R53.81 PHYSICAL DECONDITIONING: ICD-10-CM

## 2023-03-02 DIAGNOSIS — F41.1 GENERALIZED ANXIETY DISORDER: ICD-10-CM

## 2023-03-02 DIAGNOSIS — I10 BENIGN ESSENTIAL HYPERTENSION: ICD-10-CM

## 2023-03-02 DIAGNOSIS — J43.1 PANLOBULAR EMPHYSEMA: ICD-10-CM

## 2023-03-02 DIAGNOSIS — N39.46 MIXED STRESS AND URGE INCONTINENCE: ICD-10-CM

## 2023-03-02 PROCEDURE — 99495 TRANSJ CARE MGMT MOD F2F 14D: CPT | Performed by: FAMILY MEDICINE

## 2023-03-02 RX ORDER — SITAGLIPTIN AND METFORMIN HYDROCHLORIDE 1000; 100 MG/1; MG/1
TABLET, FILM COATED, EXTENDED RELEASE ORAL
Qty: 30 TABLET | Refills: 2 | Status: SHIPPED | OUTPATIENT
Start: 2023-03-02

## 2023-03-02 RX ORDER — ALPRAZOLAM 0.25 MG/1
0.5 TABLET ORAL 3 TIMES DAILY PRN
Qty: 15 TABLET | Refills: 0 | Status: SHIPPED
Start: 2023-03-02 | End: 2023-03-06 | Stop reason: SDUPTHER

## 2023-03-02 RX ORDER — IPRATROPIUM BROMIDE AND ALBUTEROL SULFATE 2.5; .5 MG/3ML; MG/3ML
3 SOLUTION RESPIRATORY (INHALATION) EVERY 6 HOURS PRN
Qty: 360 ML | Refills: 1 | Status: SHIPPED | OUTPATIENT
Start: 2023-03-02

## 2023-03-02 RX ORDER — PAROXETINE HYDROCHLORIDE 40 MG/1
40 TABLET, FILM COATED ORAL EVERY EVENING
Qty: 90 TABLET | Refills: 0 | Status: SHIPPED | OUTPATIENT
Start: 2023-03-02 | End: 2023-04-03

## 2023-03-02 RX ORDER — OMEPRAZOLE 40 MG/1
40 CAPSULE, DELAYED RELEASE ORAL DAILY
Qty: 90 CAPSULE | Refills: 0 | Status: SHIPPED | OUTPATIENT
Start: 2023-03-02

## 2023-03-06 ENCOUNTER — TELEPHONE (OUTPATIENT)
Dept: FAMILY MEDICINE CLINIC | Facility: CLINIC | Age: 68
End: 2023-03-06
Payer: MEDICARE

## 2023-03-06 DIAGNOSIS — F41.1 GENERALIZED ANXIETY DISORDER: ICD-10-CM

## 2023-03-06 RX ORDER — ALPRAZOLAM 0.25 MG/1
0.5 TABLET ORAL 3 TIMES DAILY PRN
Qty: 15 TABLET | Refills: 0 | Status: SHIPPED | OUTPATIENT
Start: 2023-03-06 | End: 2023-03-17 | Stop reason: SDUPTHER

## 2023-03-09 RX ORDER — SITAGLIPTIN AND METFORMIN HYDROCHLORIDE 1000; 100 MG/1; MG/1
TABLET, FILM COATED, EXTENDED RELEASE ORAL
Qty: 30 TABLET | Refills: 2 | OUTPATIENT
Start: 2023-03-09

## 2023-03-10 ENCOUNTER — READMISSION MANAGEMENT (OUTPATIENT)
Dept: CALL CENTER | Facility: HOSPITAL | Age: 68
End: 2023-03-10
Payer: MEDICARE

## 2023-03-10 ENCOUNTER — TELEPHONE (OUTPATIENT)
Dept: FAMILY MEDICINE CLINIC | Facility: CLINIC | Age: 68
End: 2023-03-10
Payer: MEDICARE

## 2023-03-14 RX ORDER — CELECOXIB 200 MG/1
CAPSULE ORAL
Qty: 40 CAPSULE | Refills: 0 | Status: SHIPPED | OUTPATIENT
Start: 2023-03-14

## 2023-03-16 RX ORDER — FLUTICASONE FUROATE, UMECLIDINIUM BROMIDE AND VILANTEROL TRIFENATATE 200; 62.5; 25 UG/1; UG/1; UG/1
POWDER RESPIRATORY (INHALATION)
Qty: 60 EACH | Refills: 0 | OUTPATIENT
Start: 2023-03-16

## 2023-03-17 DIAGNOSIS — F41.1 GENERALIZED ANXIETY DISORDER: ICD-10-CM

## 2023-03-17 RX ORDER — FLUTICASONE FUROATE, UMECLIDINIUM BROMIDE AND VILANTEROL TRIFENATATE 200; 62.5; 25 UG/1; UG/1; UG/1
POWDER RESPIRATORY (INHALATION)
Qty: 60 EACH | Refills: 3 | Status: SHIPPED | OUTPATIENT
Start: 2023-03-17

## 2023-03-17 RX ORDER — ALPRAZOLAM 0.25 MG/1
0.5 TABLET ORAL 3 TIMES DAILY PRN
Qty: 15 TABLET | Refills: 0 | Status: SHIPPED | OUTPATIENT
Start: 2023-03-17 | End: 2023-04-04 | Stop reason: SDUPTHER

## 2023-03-20 ENCOUNTER — TELEPHONE (OUTPATIENT)
Dept: FAMILY MEDICINE CLINIC | Facility: CLINIC | Age: 68
End: 2023-03-20

## 2023-03-27 DIAGNOSIS — M54.50 ACUTE BILATERAL LOW BACK PAIN WITHOUT SCIATICA: ICD-10-CM

## 2023-03-27 RX ORDER — TRAMADOL HYDROCHLORIDE 50 MG/1
TABLET ORAL
Qty: 90 TABLET | Refills: 0 | Status: SHIPPED | OUTPATIENT
Start: 2023-03-27

## 2023-04-03 ENCOUNTER — OFFICE VISIT (OUTPATIENT)
Dept: FAMILY MEDICINE CLINIC | Facility: CLINIC | Age: 68
End: 2023-04-03
Payer: MEDICARE

## 2023-04-03 VITALS
HEIGHT: 63 IN | SYSTOLIC BLOOD PRESSURE: 130 MMHG | BODY MASS INDEX: 23.04 KG/M2 | HEART RATE: 72 BPM | DIASTOLIC BLOOD PRESSURE: 72 MMHG | OXYGEN SATURATION: 96 % | TEMPERATURE: 98 F | WEIGHT: 130 LBS
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
## 2023-04-03 DIAGNOSIS — M25.532 WRIST PAIN, ACUTE, LEFT: ICD-10-CM

## 2023-04-03 DIAGNOSIS — F41.9 ANXIETY: Primary | ICD-10-CM

## 2023-04-03 RX ORDER — GABAPENTIN 100 MG/1
100 CAPSULE ORAL 3 TIMES DAILY
Qty: 90 CAPSULE | Refills: 0 | Status: SHIPPED | OUTPATIENT
Start: 2023-04-03

## 2023-04-03 RX ORDER — ESCITALOPRAM OXALATE 5 MG/1
5 TABLET ORAL NIGHTLY
Qty: 30 TABLET | Refills: 0 | Status: SHIPPED | OUTPATIENT
Start: 2023-04-03

## 2023-04-04 DIAGNOSIS — F41.1 GENERALIZED ANXIETY DISORDER: ICD-10-CM

## 2023-04-04 RX ORDER — ALPRAZOLAM 0.25 MG/1
0.5 TABLET ORAL 3 TIMES DAILY PRN
Qty: 15 TABLET | Refills: 0 | Status: SHIPPED | OUTPATIENT
Start: 2023-04-04

## 2023-04-11 ENCOUNTER — TELEPHONE (OUTPATIENT)
Dept: FAMILY MEDICINE CLINIC | Facility: CLINIC | Age: 68
End: 2023-04-11
Payer: MEDICARE

## 2023-05-01 RX ORDER — ESCITALOPRAM OXALATE 5 MG/1
5 TABLET ORAL NIGHTLY
Qty: 30 TABLET | Refills: 0 | Status: SHIPPED | OUTPATIENT
Start: 2023-05-01

## 2023-05-01 RX ORDER — ATORVASTATIN CALCIUM 80 MG/1
80 TABLET, FILM COATED ORAL DAILY
Qty: 90 TABLET | Refills: 1 | Status: SHIPPED | OUTPATIENT
Start: 2023-05-01

## 2023-05-01 RX ORDER — FLUTICASONE FUROATE, UMECLIDINIUM BROMIDE AND VILANTEROL TRIFENATATE 200; 62.5; 25 UG/1; UG/1; UG/1
1 POWDER RESPIRATORY (INHALATION) DAILY
Qty: 60 EACH | Refills: 3 | Status: SHIPPED | OUTPATIENT
Start: 2023-05-01

## 2023-05-02 ENCOUNTER — TELEPHONE (OUTPATIENT)
Dept: FAMILY MEDICINE CLINIC | Facility: CLINIC | Age: 68
End: 2023-05-02

## 2023-05-02 DIAGNOSIS — F41.1 GENERALIZED ANXIETY DISORDER: ICD-10-CM

## 2023-05-02 RX ORDER — ALPRAZOLAM 0.25 MG/1
0.5 TABLET ORAL 3 TIMES DAILY PRN
Qty: 15 TABLET | Refills: 0 | Status: SHIPPED | OUTPATIENT
Start: 2023-05-02

## 2023-05-02 RX ORDER — IPRATROPIUM/ALBUTEROL SULFATE 20-100 MCG
1 MIST INHALER (GRAM) INHALATION 4 TIMES DAILY PRN
Qty: 4 G | Refills: 0 | Status: SHIPPED | OUTPATIENT
Start: 2023-05-02

## 2023-05-08 RX ORDER — BUPROPION HYDROCHLORIDE 300 MG/1
TABLET ORAL
Qty: 90 TABLET | Refills: 1 | Status: SHIPPED | OUTPATIENT
Start: 2023-05-08

## 2023-05-15 ENCOUNTER — OFFICE VISIT (OUTPATIENT)
Dept: FAMILY MEDICINE CLINIC | Facility: CLINIC | Age: 68
End: 2023-05-15
Payer: MEDICARE

## 2023-05-15 VITALS
OXYGEN SATURATION: 96 % | TEMPERATURE: 98 F | RESPIRATION RATE: 14 BRPM | DIASTOLIC BLOOD PRESSURE: 73 MMHG | BODY MASS INDEX: 24.63 KG/M2 | WEIGHT: 139 LBS | SYSTOLIC BLOOD PRESSURE: 136 MMHG | HEIGHT: 63 IN | HEART RATE: 95 BPM

## 2023-05-15 DIAGNOSIS — R42 VERTIGO: ICD-10-CM

## 2023-05-15 DIAGNOSIS — G25.0 ESSENTIAL TREMOR: ICD-10-CM

## 2023-05-15 DIAGNOSIS — S62.102G CLOSED FRACTURE OF LEFT WRIST WITH DELAYED HEALING, SUBSEQUENT ENCOUNTER: Primary | ICD-10-CM

## 2023-05-15 DIAGNOSIS — F41.1 GENERALIZED ANXIETY DISORDER: ICD-10-CM

## 2023-05-15 RX ORDER — ESCITALOPRAM OXALATE 10 MG/1
10 TABLET ORAL NIGHTLY
Qty: 30 TABLET | Refills: 1 | Status: SHIPPED | OUTPATIENT
Start: 2023-05-15

## 2023-05-15 RX ORDER — OMEGA-3 FATTY ACIDS/FISH OIL 300-1000MG
CAPSULE ORAL
COMMUNITY
End: 2023-05-15

## 2023-05-15 RX ORDER — GABAPENTIN 100 MG/1
CAPSULE ORAL
Qty: 120 CAPSULE | Refills: 1 | Status: SHIPPED | OUTPATIENT
Start: 2023-05-15

## 2023-05-15 RX ORDER — IBUPROFEN 800 MG/1
800 TABLET ORAL 2 TIMES DAILY PRN
Qty: 60 TABLET | Refills: 2 | Status: SHIPPED | OUTPATIENT
Start: 2023-05-15

## 2023-05-23 DIAGNOSIS — F41.1 GENERALIZED ANXIETY DISORDER: ICD-10-CM

## 2023-05-24 RX ORDER — ALPRAZOLAM 0.25 MG/1
TABLET ORAL
Qty: 25 TABLET | Refills: 0 | Status: SHIPPED | OUTPATIENT
Start: 2023-05-24

## 2023-06-05 DIAGNOSIS — M54.50 ACUTE BILATERAL LOW BACK PAIN WITHOUT SCIATICA: ICD-10-CM

## 2023-06-05 RX ORDER — TRAMADOL HYDROCHLORIDE 50 MG/1
50 TABLET ORAL EVERY 8 HOURS PRN
Qty: 90 TABLET | Refills: 0 | Status: SHIPPED | OUTPATIENT
Start: 2023-06-05

## 2023-06-09 RX ORDER — ESCITALOPRAM OXALATE 5 MG/1
TABLET ORAL
Qty: 30 TABLET | Refills: 0 | OUTPATIENT
Start: 2023-06-09

## 2023-06-09 RX ORDER — OMEPRAZOLE 40 MG/1
CAPSULE, DELAYED RELEASE ORAL
Qty: 90 CAPSULE | Refills: 3 | Status: SHIPPED | OUTPATIENT
Start: 2023-06-09

## 2023-06-15 RX ORDER — PAROXETINE HYDROCHLORIDE 40 MG/1
TABLET, FILM COATED ORAL
Qty: 90 TABLET | Refills: 0 | OUTPATIENT
Start: 2023-06-15

## 2023-07-10 ENCOUNTER — OFFICE VISIT (OUTPATIENT)
Dept: FAMILY MEDICINE CLINIC | Facility: CLINIC | Age: 68
End: 2023-07-10
Payer: MEDICARE

## 2023-07-10 VITALS
HEIGHT: 63 IN | SYSTOLIC BLOOD PRESSURE: 148 MMHG | HEART RATE: 83 BPM | RESPIRATION RATE: 16 BRPM | TEMPERATURE: 98 F | WEIGHT: 141 LBS | OXYGEN SATURATION: 97 % | BODY MASS INDEX: 24.98 KG/M2 | DIASTOLIC BLOOD PRESSURE: 66 MMHG

## 2023-07-10 DIAGNOSIS — J43.1 PANLOBULAR EMPHYSEMA: ICD-10-CM

## 2023-07-10 DIAGNOSIS — Z12.11 COLON CANCER SCREENING: ICD-10-CM

## 2023-07-10 DIAGNOSIS — G57.11 MERALGIA PARESTHETICA OF RIGHT SIDE: ICD-10-CM

## 2023-07-10 DIAGNOSIS — Z12.31 SCREENING MAMMOGRAM FOR BREAST CANCER: ICD-10-CM

## 2023-07-10 DIAGNOSIS — F32.4 MAJOR DEPRESSIVE DISORDER IN PARTIAL REMISSION, UNSPECIFIED WHETHER RECURRENT: ICD-10-CM

## 2023-07-10 DIAGNOSIS — I10 BENIGN ESSENTIAL HYPERTENSION: ICD-10-CM

## 2023-07-10 DIAGNOSIS — Z78.0 POSTMENOPAUSAL: ICD-10-CM

## 2023-07-10 DIAGNOSIS — E11.9 TYPE 2 DIABETES MELLITUS WITHOUT COMPLICATION, WITHOUT LONG-TERM CURRENT USE OF INSULIN: Primary | ICD-10-CM

## 2023-07-10 LAB
EXPIRATION DATE: ABNORMAL
HBA1C MFR BLD: 6.2 %
Lab: ABNORMAL

## 2023-07-10 RX ORDER — IPRATROPIUM BROMIDE AND ALBUTEROL 20; 100 UG/1; UG/1
1 SPRAY, METERED RESPIRATORY (INHALATION) 4 TIMES DAILY PRN
Qty: 4 G | Refills: 0 | Status: SHIPPED | OUTPATIENT
Start: 2023-07-10

## 2023-07-10 RX ORDER — GABAPENTIN 100 MG/1
CAPSULE ORAL
Qty: 120 CAPSULE | Refills: 1 | Status: SHIPPED | OUTPATIENT
Start: 2023-07-10

## 2023-07-10 RX ORDER — FENOFIBRATE 160 MG/1
160 TABLET ORAL DAILY
Qty: 90 TABLET | Refills: 1 | Status: SHIPPED | OUTPATIENT
Start: 2023-07-10

## 2023-07-10 RX ORDER — FLUTICASONE FUROATE, UMECLIDINIUM BROMIDE AND VILANTEROL TRIFENATATE 200; 62.5; 25 UG/1; UG/1; UG/1
1 POWDER RESPIRATORY (INHALATION) DAILY
Qty: 60 EACH | Refills: 3 | Status: SHIPPED | OUTPATIENT
Start: 2023-07-10

## 2023-07-10 RX ORDER — ESCITALOPRAM OXALATE 10 MG/1
10 TABLET ORAL DAILY
Qty: 30 TABLET | Refills: 1 | Status: SHIPPED | OUTPATIENT
Start: 2023-07-10

## 2023-07-10 RX ORDER — ATORVASTATIN CALCIUM 80 MG/1
80 TABLET, FILM COATED ORAL DAILY
Qty: 90 TABLET | Refills: 1 | Status: SHIPPED | OUTPATIENT
Start: 2023-07-10

## 2023-07-10 RX ORDER — CRISABOROLE 20 MG/G
1 OINTMENT TOPICAL 2 TIMES DAILY
Qty: 60 G | Refills: 0 | Status: SHIPPED | OUTPATIENT
Start: 2023-07-10

## 2023-07-26 DIAGNOSIS — F41.1 GENERALIZED ANXIETY DISORDER: ICD-10-CM

## 2023-07-26 DIAGNOSIS — M54.50 ACUTE BILATERAL LOW BACK PAIN WITHOUT SCIATICA: ICD-10-CM

## 2023-07-26 RX ORDER — ALPRAZOLAM 0.25 MG/1
TABLET ORAL
Qty: 25 TABLET | Refills: 0 | Status: SHIPPED | OUTPATIENT
Start: 2023-07-26

## 2023-07-26 RX ORDER — TRAMADOL HYDROCHLORIDE 50 MG/1
TABLET ORAL
Qty: 90 TABLET | Refills: 0 | Status: SHIPPED | OUTPATIENT
Start: 2023-07-26

## 2023-07-27 ENCOUNTER — PRIOR AUTHORIZATION (OUTPATIENT)
Dept: FAMILY MEDICINE CLINIC | Facility: CLINIC | Age: 68
End: 2023-07-27
Payer: MEDICARE

## 2023-07-29 DIAGNOSIS — R19.5 POSITIVE COLORECTAL CANCER SCREENING USING COLOGUARD TEST: Primary | ICD-10-CM

## 2023-07-29 DIAGNOSIS — Z12.11 SCREENING FOR COLON CANCER: ICD-10-CM

## 2023-08-10 RX ORDER — IPRATROPIUM BROMIDE AND ALBUTEROL 20; 100 UG/1; UG/1
1 SPRAY, METERED RESPIRATORY (INHALATION) 4 TIMES DAILY PRN
Qty: 4 G | Refills: 0 | Status: SHIPPED | OUTPATIENT
Start: 2023-08-10

## 2023-08-18 DIAGNOSIS — F41.1 GENERALIZED ANXIETY DISORDER: ICD-10-CM

## 2023-08-18 RX ORDER — ALPRAZOLAM 0.25 MG/1
0.5 TABLET ORAL 3 TIMES DAILY PRN
Qty: 25 TABLET | Refills: 0 | Status: SHIPPED | OUTPATIENT
Start: 2023-08-18

## 2023-09-01 ENCOUNTER — ON CAMPUS - OUTPATIENT (AMBULATORY)
Dept: URBAN - METROPOLITAN AREA HOSPITAL 2 | Facility: HOSPITAL | Age: 68
End: 2023-09-01

## 2023-09-01 ENCOUNTER — OFFICE (AMBULATORY)
Dept: URBAN - METROPOLITAN AREA PATHOLOGY 4 | Facility: PATHOLOGY | Age: 68
End: 2023-09-01
Payer: COMMERCIAL

## 2023-09-01 ENCOUNTER — OFFICE (AMBULATORY)
Dept: URBAN - METROPOLITAN AREA PATHOLOGY 4 | Facility: PATHOLOGY | Age: 68
End: 2023-09-01

## 2023-09-01 VITALS
SYSTOLIC BLOOD PRESSURE: 123 MMHG | SYSTOLIC BLOOD PRESSURE: 143 MMHG | HEART RATE: 72 BPM | DIASTOLIC BLOOD PRESSURE: 70 MMHG | OXYGEN SATURATION: 98 % | DIASTOLIC BLOOD PRESSURE: 81 MMHG | HEART RATE: 69 BPM | OXYGEN SATURATION: 99 % | RESPIRATION RATE: 16 BRPM | HEART RATE: 76 BPM | SYSTOLIC BLOOD PRESSURE: 112 MMHG | WEIGHT: 145 LBS | SYSTOLIC BLOOD PRESSURE: 118 MMHG | DIASTOLIC BLOOD PRESSURE: 63 MMHG | HEART RATE: 87 BPM | DIASTOLIC BLOOD PRESSURE: 66 MMHG | DIASTOLIC BLOOD PRESSURE: 71 MMHG | HEIGHT: 61 IN | SYSTOLIC BLOOD PRESSURE: 158 MMHG | TEMPERATURE: 98 F | DIASTOLIC BLOOD PRESSURE: 73 MMHG | DIASTOLIC BLOOD PRESSURE: 80 MMHG | DIASTOLIC BLOOD PRESSURE: 95 MMHG | RESPIRATION RATE: 18 BRPM | SYSTOLIC BLOOD PRESSURE: 130 MMHG | SYSTOLIC BLOOD PRESSURE: 132 MMHG | DIASTOLIC BLOOD PRESSURE: 67 MMHG | HEART RATE: 70 BPM | OXYGEN SATURATION: 100 % | OXYGEN SATURATION: 97 % | HEART RATE: 73 BPM

## 2023-09-01 DIAGNOSIS — R19.5 OTHER FECAL ABNORMALITIES: ICD-10-CM

## 2023-09-01 DIAGNOSIS — K63.5 POLYP OF COLON: ICD-10-CM

## 2023-09-01 DIAGNOSIS — K64.1 SECOND DEGREE HEMORRHOIDS: ICD-10-CM

## 2023-09-01 DIAGNOSIS — Z12.11 ENCOUNTER FOR SCREENING FOR MALIGNANT NEOPLASM OF COLON: ICD-10-CM

## 2023-09-01 LAB
GI HISTOLOGY: A. UNSPECIFIED: (no result)
GI HISTOLOGY: PDF REPORT: (no result)

## 2023-09-01 PROCEDURE — 88305 TISSUE EXAM BY PATHOLOGIST: CPT | Mod: 26 | Performed by: INTERNAL MEDICINE

## 2023-09-01 PROCEDURE — 45385 COLONOSCOPY W/LESION REMOVAL: CPT | Mod: PT | Performed by: INTERNAL MEDICINE

## 2023-09-11 RX ORDER — PREDNISONE 20 MG/1
TABLET ORAL
Qty: 20 TABLET | Refills: 0 | Status: SHIPPED | OUTPATIENT
Start: 2023-09-11

## 2023-09-21 DIAGNOSIS — F41.1 GENERALIZED ANXIETY DISORDER: ICD-10-CM

## 2023-09-21 RX ORDER — IBUPROFEN 800 MG/1
TABLET ORAL
Qty: 60 TABLET | Refills: 0 | Status: SHIPPED | OUTPATIENT
Start: 2023-09-21

## 2023-09-21 RX ORDER — ALPRAZOLAM 0.25 MG/1
TABLET ORAL
Qty: 25 TABLET | Refills: 0 | Status: SHIPPED | OUTPATIENT
Start: 2023-09-21

## 2023-10-03 DIAGNOSIS — M54.50 ACUTE BILATERAL LOW BACK PAIN WITHOUT SCIATICA: ICD-10-CM

## 2023-10-04 DIAGNOSIS — M54.50 ACUTE BILATERAL LOW BACK PAIN WITHOUT SCIATICA: ICD-10-CM

## 2023-10-04 RX ORDER — TRAMADOL HYDROCHLORIDE 50 MG/1
TABLET ORAL
Qty: 90 TABLET | Refills: 0 | Status: SHIPPED | OUTPATIENT
Start: 2023-10-04

## 2023-10-05 RX ORDER — TRAMADOL HYDROCHLORIDE 50 MG/1
TABLET ORAL
Qty: 90 TABLET | Refills: 0 | OUTPATIENT
Start: 2023-10-05

## 2023-10-16 RX ORDER — IPRATROPIUM BROMIDE AND ALBUTEROL 20; 100 UG/1; UG/1
SPRAY, METERED RESPIRATORY (INHALATION)
Qty: 4 G | Refills: 0 | Status: SHIPPED | OUTPATIENT
Start: 2023-10-16

## 2023-10-25 DIAGNOSIS — F41.1 GENERALIZED ANXIETY DISORDER: ICD-10-CM

## 2023-10-26 RX ORDER — ALPRAZOLAM 0.25 MG/1
TABLET ORAL
Qty: 25 TABLET | Refills: 0 | Status: SHIPPED | OUTPATIENT
Start: 2023-10-26

## 2023-10-27 DIAGNOSIS — F41.1 GENERALIZED ANXIETY DISORDER: ICD-10-CM

## 2023-10-27 RX ORDER — ALPRAZOLAM 0.25 MG/1
TABLET ORAL
Qty: 25 TABLET | Refills: 0 | OUTPATIENT
Start: 2023-10-27

## 2023-10-30 ENCOUNTER — OFFICE VISIT (OUTPATIENT)
Dept: FAMILY MEDICINE CLINIC | Facility: CLINIC | Age: 68
End: 2023-10-30
Payer: MEDICARE

## 2023-10-30 VITALS
OXYGEN SATURATION: 95 % | TEMPERATURE: 98.4 F | WEIGHT: 142 LBS | HEART RATE: 78 BPM | DIASTOLIC BLOOD PRESSURE: 78 MMHG | BODY MASS INDEX: 25.16 KG/M2 | HEIGHT: 63 IN | RESPIRATION RATE: 18 BRPM | SYSTOLIC BLOOD PRESSURE: 150 MMHG

## 2023-10-30 DIAGNOSIS — E78.2 MIXED HYPERLIPIDEMIA: ICD-10-CM

## 2023-10-30 DIAGNOSIS — E55.9 VITAMIN D DEFICIENCY: ICD-10-CM

## 2023-10-30 DIAGNOSIS — E11.9 TYPE 2 DIABETES MELLITUS WITHOUT COMPLICATION, WITHOUT LONG-TERM CURRENT USE OF INSULIN: ICD-10-CM

## 2023-10-30 DIAGNOSIS — Z23 NEED FOR INFLUENZA VACCINATION: ICD-10-CM

## 2023-10-30 DIAGNOSIS — I10 BENIGN ESSENTIAL HYPERTENSION: ICD-10-CM

## 2023-10-30 DIAGNOSIS — G25.0 ESSENTIAL TREMOR: ICD-10-CM

## 2023-10-30 DIAGNOSIS — G57.11 MERALGIA PARESTHETICA OF RIGHT SIDE: ICD-10-CM

## 2023-10-30 DIAGNOSIS — F41.1 GENERALIZED ANXIETY DISORDER: ICD-10-CM

## 2023-10-30 DIAGNOSIS — R42 VERTIGO: ICD-10-CM

## 2023-10-30 DIAGNOSIS — J43.1 PANLOBULAR EMPHYSEMA: ICD-10-CM

## 2023-10-30 DIAGNOSIS — Z00.00 MEDICARE ANNUAL WELLNESS VISIT, SUBSEQUENT: Primary | ICD-10-CM

## 2023-10-30 PROBLEM — K63.5 POLYP OF COLON: Status: ACTIVE | Noted: 2023-09-01

## 2023-10-30 PROBLEM — K64.1 SECOND DEGREE HEMORRHOIDS: Status: ACTIVE | Noted: 2023-09-01

## 2023-10-30 PROBLEM — R13.12 OROPHARYNGEAL DYSPHAGIA: Status: ACTIVE | Noted: 2023-10-30

## 2023-10-30 PROBLEM — Z12.11 ENCOUNTER FOR SCREENING FOR MALIGNANT NEOPLASM OF COLON: Status: ACTIVE | Noted: 2023-10-30

## 2023-10-30 PROBLEM — J44.9 CHRONIC OBSTRUCTIVE PULMONARY DISEASE, UNSPECIFIED: Status: ACTIVE | Noted: 2023-10-30

## 2023-10-30 PROBLEM — R19.5 OTHER FECAL ABNORMALITIES: Status: ACTIVE | Noted: 2023-10-30

## 2023-10-30 PROBLEM — I51.9 HEART DISEASE: Status: ACTIVE | Noted: 2023-10-30

## 2023-10-30 PROBLEM — K64.9 HEMORRHOIDS: Status: ACTIVE | Noted: 2023-10-30

## 2023-10-30 PROBLEM — E78.00 PURE HYPERCHOLESTEROLEMIA: Status: ACTIVE | Noted: 2023-10-30

## 2023-10-30 PROBLEM — M19.90 ARTHRITIS: Status: ACTIVE | Noted: 2023-10-30

## 2023-10-30 PROBLEM — M54.9 BACK PAIN: Status: ACTIVE | Noted: 2023-10-30

## 2023-10-30 LAB
EXPIRATION DATE: ABNORMAL
HBA1C MFR BLD: 6.6 % (ref 4.5–5.7)
Lab: ABNORMAL

## 2023-10-30 RX ORDER — IBANDRONATE SODIUM 150 MG/1
150 TABLET, FILM COATED ORAL
Qty: 3 TABLET | Refills: 0 | Status: SHIPPED | OUTPATIENT
Start: 2023-10-30

## 2023-10-30 RX ORDER — ESCITALOPRAM OXALATE 20 MG/1
20 TABLET ORAL DAILY
Qty: 30 TABLET | Refills: 1 | Status: SHIPPED | OUTPATIENT
Start: 2023-10-30

## 2023-11-17 ENCOUNTER — TELEPHONE (OUTPATIENT)
Dept: FAMILY MEDICINE CLINIC | Facility: CLINIC | Age: 68
End: 2023-11-17

## 2023-11-17 RX ORDER — PREDNISONE 20 MG/1
TABLET ORAL
Qty: 20 TABLET | Refills: 0 | Status: SHIPPED | OUTPATIENT
Start: 2023-11-17

## 2023-11-17 RX ORDER — SITAGLIPTIN 100 MG/1
100 TABLET, FILM COATED ORAL DAILY
Qty: 90 TABLET | Refills: 0 | Status: SHIPPED | OUTPATIENT
Start: 2023-11-17

## 2023-12-01 DIAGNOSIS — F41.1 GENERALIZED ANXIETY DISORDER: ICD-10-CM

## 2023-12-01 RX ORDER — GABAPENTIN 100 MG/1
CAPSULE ORAL
Qty: 120 CAPSULE | Refills: 0 | Status: SHIPPED | OUTPATIENT
Start: 2023-12-01

## 2023-12-01 RX ORDER — ALPRAZOLAM 0.25 MG/1
TABLET ORAL
Qty: 25 TABLET | Refills: 0 | Status: SHIPPED | OUTPATIENT
Start: 2023-12-01

## 2023-12-06 ENCOUNTER — OFFICE VISIT (OUTPATIENT)
Dept: FAMILY MEDICINE CLINIC | Facility: CLINIC | Age: 68
End: 2023-12-06
Payer: MEDICARE

## 2023-12-06 VITALS — BODY MASS INDEX: 25.16 KG/M2 | HEIGHT: 63 IN | HEART RATE: 88 BPM | OXYGEN SATURATION: 98 % | TEMPERATURE: 98 F

## 2023-12-06 DIAGNOSIS — R35.0 URINE FREQUENCY: Primary | ICD-10-CM

## 2023-12-06 DIAGNOSIS — N30.00 ACUTE CYSTITIS WITHOUT HEMATURIA: ICD-10-CM

## 2023-12-06 LAB
BILIRUB BLD-MCNC: NEGATIVE MG/DL
CLARITY, POC: ABNORMAL
COLOR UR: YELLOW
EXPIRATION DATE: ABNORMAL
GLUCOSE UR STRIP-MCNC: NEGATIVE MG/DL
KETONES UR QL: NEGATIVE
LEUKOCYTE EST, POC: ABNORMAL
Lab: ABNORMAL
NITRITE UR-MCNC: NEGATIVE MG/ML
PH UR: 6 [PH] (ref 5–8)
PROT UR STRIP-MCNC: ABNORMAL MG/DL
RBC # UR STRIP: NEGATIVE /UL
SP GR UR: 1.03 (ref 1–1.03)
UROBILINOGEN UR QL: NORMAL

## 2023-12-06 PROCEDURE — 87077 CULTURE AEROBIC IDENTIFY: CPT | Performed by: NURSE PRACTITIONER

## 2023-12-06 PROCEDURE — 87186 SC STD MICRODIL/AGAR DIL: CPT | Performed by: NURSE PRACTITIONER

## 2023-12-06 PROCEDURE — 87086 URINE CULTURE/COLONY COUNT: CPT | Performed by: NURSE PRACTITIONER

## 2023-12-06 PROCEDURE — 3044F HG A1C LEVEL LT 7.0%: CPT | Performed by: NURSE PRACTITIONER

## 2023-12-06 PROCEDURE — 99213 OFFICE O/P EST LOW 20 MIN: CPT | Performed by: NURSE PRACTITIONER

## 2023-12-06 PROCEDURE — 81003 URINALYSIS AUTO W/O SCOPE: CPT | Performed by: NURSE PRACTITIONER

## 2023-12-06 RX ORDER — CEFDINIR 300 MG/1
300 CAPSULE ORAL 2 TIMES DAILY
Qty: 14 CAPSULE | Refills: 0 | Status: SHIPPED | OUTPATIENT
Start: 2023-12-06

## 2023-12-08 RX ORDER — IBUPROFEN 800 MG/1
TABLET ORAL
Qty: 60 TABLET | Refills: 0 | Status: SHIPPED | OUTPATIENT
Start: 2023-12-08

## 2023-12-08 RX ORDER — IPRATROPIUM BROMIDE AND ALBUTEROL 20; 100 UG/1; UG/1
SPRAY, METERED RESPIRATORY (INHALATION)
Qty: 4 G | Refills: 0 | Status: SHIPPED | OUTPATIENT
Start: 2023-12-08

## 2023-12-09 LAB — BACTERIA SPEC AEROBE CULT: ABNORMAL

## 2023-12-11 ENCOUNTER — TELEPHONE (OUTPATIENT)
Dept: FAMILY MEDICINE CLINIC | Facility: CLINIC | Age: 68
End: 2023-12-11
Payer: MEDICARE

## 2023-12-11 RX ORDER — PHENAZOPYRIDINE HYDROCHLORIDE 200 MG/1
200 TABLET, FILM COATED ORAL 3 TIMES DAILY PRN
Qty: 30 TABLET | Refills: 0 | Status: SHIPPED | OUTPATIENT
Start: 2023-12-11

## 2024-01-02 ENCOUNTER — OFFICE VISIT (OUTPATIENT)
Dept: FAMILY MEDICINE CLINIC | Facility: CLINIC | Age: 69
End: 2024-01-02
Payer: MEDICARE

## 2024-01-02 VITALS
DIASTOLIC BLOOD PRESSURE: 68 MMHG | TEMPERATURE: 98.4 F | HEIGHT: 63 IN | OXYGEN SATURATION: 94 % | HEART RATE: 93 BPM | WEIGHT: 142 LBS | RESPIRATION RATE: 18 BRPM | BODY MASS INDEX: 25.16 KG/M2 | SYSTOLIC BLOOD PRESSURE: 151 MMHG

## 2024-01-02 DIAGNOSIS — E11.9 TYPE 2 DIABETES MELLITUS WITHOUT COMPLICATION, WITHOUT LONG-TERM CURRENT USE OF INSULIN: Primary | ICD-10-CM

## 2024-01-02 DIAGNOSIS — Z87.440 RECENT URINARY TRACT INFECTION: ICD-10-CM

## 2024-01-02 DIAGNOSIS — F41.1 GENERALIZED ANXIETY DISORDER: ICD-10-CM

## 2024-01-02 LAB
BILIRUB BLD-MCNC: NEGATIVE MG/DL
CLARITY, POC: CLEAR
COLOR UR: YELLOW
EXPIRATION DATE: ABNORMAL
GLUCOSE UR STRIP-MCNC: NEGATIVE MG/DL
KETONES UR QL: NEGATIVE
LEUKOCYTE EST, POC: NEGATIVE
Lab: ABNORMAL
NITRITE UR-MCNC: NEGATIVE MG/ML
PH UR: 6 [PH] (ref 5–8)
PROT UR STRIP-MCNC: ABNORMAL MG/DL
RBC # UR STRIP: NEGATIVE /UL
SP GR UR: 1.03 (ref 1–1.03)
UROBILINOGEN UR QL: NORMAL

## 2024-01-02 RX ORDER — ERGOCALCIFEROL 1.25 MG/1
50000 CAPSULE ORAL WEEKLY
Qty: 12 CAPSULE | Refills: 1 | Status: SHIPPED | OUTPATIENT
Start: 2024-01-02

## 2024-01-02 RX ORDER — ALPRAZOLAM 0.25 MG/1
0.25 TABLET ORAL NIGHTLY PRN
Qty: 25 TABLET | Refills: 0 | Status: SHIPPED | OUTPATIENT
Start: 2024-01-02

## 2024-01-03 DIAGNOSIS — G25.0 ESSENTIAL TREMOR: Primary | ICD-10-CM

## 2024-01-08 RX ORDER — IPRATROPIUM BROMIDE AND ALBUTEROL 20; 100 UG/1; UG/1
SPRAY, METERED RESPIRATORY (INHALATION)
Qty: 4 G | Refills: 0 | Status: SHIPPED | OUTPATIENT
Start: 2024-01-08

## 2024-01-08 RX ORDER — FLUTICASONE FUROATE, UMECLIDINIUM BROMIDE AND VILANTEROL TRIFENATATE 200; 62.5; 25 UG/1; UG/1; UG/1
1 POWDER RESPIRATORY (INHALATION) DAILY
Qty: 60 EACH | Refills: 3 | Status: SHIPPED | OUTPATIENT
Start: 2024-01-08

## 2024-01-12 DIAGNOSIS — M54.50 ACUTE BILATERAL LOW BACK PAIN WITHOUT SCIATICA: ICD-10-CM

## 2024-01-15 RX ORDER — IBUPROFEN 800 MG/1
TABLET ORAL
Qty: 60 TABLET | Refills: 0 | Status: SHIPPED | OUTPATIENT
Start: 2024-01-15

## 2024-01-15 RX ORDER — TRAMADOL HYDROCHLORIDE 50 MG/1
50 TABLET ORAL EVERY 8 HOURS PRN
Qty: 90 TABLET | Refills: 0 | Status: SHIPPED | OUTPATIENT
Start: 2024-01-15

## 2024-01-15 RX ORDER — PREDNISONE 20 MG/1
TABLET ORAL
Qty: 20 TABLET | Refills: 0 | Status: SHIPPED | OUTPATIENT
Start: 2024-01-15

## 2024-01-19 RX ORDER — AMLODIPINE BESYLATE 5 MG/1
5 TABLET ORAL DAILY
Qty: 90 TABLET | Refills: 1 | Status: SHIPPED | OUTPATIENT
Start: 2024-01-19

## 2024-02-02 DIAGNOSIS — F41.1 GENERALIZED ANXIETY DISORDER: ICD-10-CM

## 2024-02-03 RX ORDER — ALPRAZOLAM 0.25 MG/1
0.25 TABLET ORAL NIGHTLY PRN
Qty: 25 TABLET | Refills: 0 | Status: SHIPPED | OUTPATIENT
Start: 2024-02-03

## 2024-02-12 RX ORDER — IPRATROPIUM BROMIDE AND ALBUTEROL 20; 100 UG/1; UG/1
SPRAY, METERED RESPIRATORY (INHALATION)
Qty: 4 G | Refills: 0 | Status: SHIPPED | OUTPATIENT
Start: 2024-02-12

## 2024-02-22 ENCOUNTER — LAB (OUTPATIENT)
Dept: LAB | Facility: HOSPITAL | Age: 69
End: 2024-02-22
Payer: MEDICARE

## 2024-02-22 DIAGNOSIS — E11.9 TYPE 2 DIABETES MELLITUS WITHOUT COMPLICATION, WITHOUT LONG-TERM CURRENT USE OF INSULIN: ICD-10-CM

## 2024-02-22 DIAGNOSIS — E55.9 VITAMIN D DEFICIENCY: ICD-10-CM

## 2024-02-22 DIAGNOSIS — E78.2 MIXED HYPERLIPIDEMIA: ICD-10-CM

## 2024-02-22 DIAGNOSIS — I10 BENIGN ESSENTIAL HYPERTENSION: ICD-10-CM

## 2024-02-22 LAB
25(OH)D3 SERPL-MCNC: 36.1 NG/ML (ref 30–100)
ALBUMIN SERPL-MCNC: 3.7 G/DL (ref 3.5–5.2)
ALBUMIN UR-MCNC: 3 MG/DL
ALBUMIN/GLOB SERPL: 1.5 G/DL
ALP SERPL-CCNC: 56 U/L (ref 39–117)
ALT SERPL W P-5'-P-CCNC: 16 U/L (ref 1–33)
ANION GAP SERPL CALCULATED.3IONS-SCNC: 11.9 MMOL/L (ref 5–15)
AST SERPL-CCNC: 21 U/L (ref 1–32)
BILIRUB SERPL-MCNC: 0.3 MG/DL (ref 0–1.2)
BUN SERPL-MCNC: 20 MG/DL (ref 8–23)
BUN/CREAT SERPL: 22.2 (ref 7–25)
CALCIUM SPEC-SCNC: 9.5 MG/DL (ref 8.6–10.5)
CHLORIDE SERPL-SCNC: 105 MMOL/L (ref 98–107)
CHOLEST SERPL-MCNC: 186 MG/DL (ref 0–200)
CO2 SERPL-SCNC: 25.1 MMOL/L (ref 22–29)
CREAT SERPL-MCNC: 0.9 MG/DL (ref 0.57–1)
EGFRCR SERPLBLD CKD-EPI 2021: 70.7 ML/MIN/1.73
GLOBULIN UR ELPH-MCNC: 2.4 GM/DL
GLUCOSE SERPL-MCNC: 142 MG/DL (ref 65–99)
HBA1C MFR BLD: 6.7 % (ref 4.8–5.6)
HDLC SERPL-MCNC: 56 MG/DL (ref 40–60)
LDLC SERPL CALC-MCNC: 112 MG/DL (ref 0–100)
LDLC/HDLC SERPL: 1.97 {RATIO}
POTASSIUM SERPL-SCNC: 4.2 MMOL/L (ref 3.5–5.2)
PROT SERPL-MCNC: 6.1 G/DL (ref 6–8.5)
SODIUM SERPL-SCNC: 142 MMOL/L (ref 136–145)
TRIGL SERPL-MCNC: 98 MG/DL (ref 0–150)
VLDLC SERPL-MCNC: 18 MG/DL (ref 5–40)

## 2024-02-22 PROCEDURE — 80053 COMPREHEN METABOLIC PANEL: CPT

## 2024-02-22 PROCEDURE — 36415 COLL VENOUS BLD VENIPUNCTURE: CPT

## 2024-02-22 PROCEDURE — 82043 UR ALBUMIN QUANTITATIVE: CPT

## 2024-02-22 PROCEDURE — 82306 VITAMIN D 25 HYDROXY: CPT

## 2024-02-22 PROCEDURE — 80061 LIPID PANEL: CPT

## 2024-02-22 PROCEDURE — 83036 HEMOGLOBIN GLYCOSYLATED A1C: CPT

## 2024-02-26 ENCOUNTER — TELEPHONE (OUTPATIENT)
Dept: FAMILY MEDICINE CLINIC | Facility: CLINIC | Age: 69
End: 2024-02-26
Payer: MEDICARE

## 2024-02-27 ENCOUNTER — OFFICE VISIT (OUTPATIENT)
Dept: FAMILY MEDICINE CLINIC | Facility: CLINIC | Age: 69
End: 2024-02-27
Payer: MEDICARE

## 2024-02-27 VITALS
DIASTOLIC BLOOD PRESSURE: 78 MMHG | OXYGEN SATURATION: 94 % | HEIGHT: 63 IN | SYSTOLIC BLOOD PRESSURE: 122 MMHG | BODY MASS INDEX: 25.34 KG/M2 | WEIGHT: 143 LBS | HEART RATE: 90 BPM | TEMPERATURE: 98.2 F | RESPIRATION RATE: 16 BRPM

## 2024-02-27 DIAGNOSIS — F32.4 MAJOR DEPRESSIVE DISORDER IN PARTIAL REMISSION, UNSPECIFIED WHETHER RECURRENT: ICD-10-CM

## 2024-02-27 DIAGNOSIS — F41.1 GENERALIZED ANXIETY DISORDER: ICD-10-CM

## 2024-02-27 DIAGNOSIS — Z71.6 ENCOUNTER FOR TOBACCO USE CESSATION COUNSELING: ICD-10-CM

## 2024-02-27 DIAGNOSIS — M54.2 NECK PAIN: Primary | ICD-10-CM

## 2024-02-27 PROCEDURE — 99214 OFFICE O/P EST MOD 30 MIN: CPT | Performed by: FAMILY MEDICINE

## 2024-02-27 PROCEDURE — 1159F MED LIST DOCD IN RCRD: CPT | Performed by: FAMILY MEDICINE

## 2024-02-27 PROCEDURE — 3044F HG A1C LEVEL LT 7.0%: CPT | Performed by: FAMILY MEDICINE

## 2024-02-27 PROCEDURE — 1160F RVW MEDS BY RX/DR IN RCRD: CPT | Performed by: FAMILY MEDICINE

## 2024-02-27 PROCEDURE — 3078F DIAST BP <80 MM HG: CPT | Performed by: FAMILY MEDICINE

## 2024-02-27 PROCEDURE — 3074F SYST BP LT 130 MM HG: CPT | Performed by: FAMILY MEDICINE

## 2024-02-27 RX ORDER — BUPROPION HYDROCHLORIDE 300 MG/1
300 TABLET ORAL EVERY MORNING
Qty: 90 TABLET | Refills: 1 | Status: SHIPPED | OUTPATIENT
Start: 2024-02-27 | End: 2024-02-27 | Stop reason: SDUPTHER

## 2024-02-27 RX ORDER — ACYCLOVIR 400 MG/1
1 TABLET ORAL DAILY
Qty: 9 EACH | Refills: 3 | Status: SHIPPED | OUTPATIENT
Start: 2024-02-27

## 2024-02-27 RX ORDER — FENOFIBRATE 160 MG/1
160 TABLET ORAL DAILY
Qty: 90 TABLET | Refills: 2 | Status: SHIPPED | OUTPATIENT
Start: 2024-02-27

## 2024-02-27 RX ORDER — ACYCLOVIR 400 MG/1
1 TABLET ORAL DAILY
Qty: 1 EACH | Refills: 0 | Status: SHIPPED | OUTPATIENT
Start: 2024-02-27

## 2024-02-27 RX ORDER — DULOXETIN HYDROCHLORIDE 30 MG/1
30 CAPSULE, DELAYED RELEASE ORAL
Qty: 30 CAPSULE | Refills: 1 | Status: SHIPPED | OUTPATIENT
Start: 2024-02-27

## 2024-02-27 RX ORDER — DULOXETIN HYDROCHLORIDE 30 MG/1
30 CAPSULE, DELAYED RELEASE ORAL
Qty: 30 CAPSULE | Refills: 1 | Status: SHIPPED | OUTPATIENT
Start: 2024-02-27 | End: 2024-02-27 | Stop reason: SDUPTHER

## 2024-02-27 RX ORDER — ATORVASTATIN CALCIUM 80 MG/1
80 TABLET, FILM COATED ORAL DAILY
Qty: 90 TABLET | Refills: 2 | Status: SHIPPED | OUTPATIENT
Start: 2024-02-27 | End: 2024-02-27 | Stop reason: SDUPTHER

## 2024-02-27 RX ORDER — METHOCARBAMOL 500 MG/1
500 TABLET, FILM COATED ORAL NIGHTLY PRN
Qty: 7 TABLET | Refills: 0 | Status: SHIPPED | OUTPATIENT
Start: 2024-02-27

## 2024-02-27 RX ORDER — CHOLECALCIFEROL (VITAMIN D3) 50 MCG
2000 TABLET ORAL DAILY
Start: 2024-02-27 | End: 2025-02-26

## 2024-02-27 RX ORDER — ATORVASTATIN CALCIUM 80 MG/1
80 TABLET, FILM COATED ORAL DAILY
Qty: 90 TABLET | Refills: 2 | Status: SHIPPED | OUTPATIENT
Start: 2024-02-27

## 2024-02-27 RX ORDER — BUPROPION HYDROCHLORIDE 300 MG/1
300 TABLET ORAL EVERY MORNING
Qty: 90 TABLET | Refills: 1 | Status: SHIPPED | OUTPATIENT
Start: 2024-02-27

## 2024-02-27 RX ORDER — FENOFIBRATE 160 MG/1
160 TABLET ORAL DAILY
Qty: 90 TABLET | Refills: 2 | Status: SHIPPED | OUTPATIENT
Start: 2024-02-27 | End: 2024-02-27 | Stop reason: SDUPTHER

## 2024-02-27 RX ORDER — METHYLPREDNISOLONE 4 MG/1
TABLET ORAL
Qty: 21 TABLET | Refills: 0 | Status: SHIPPED | OUTPATIENT
Start: 2024-02-27 | End: 2024-02-27 | Stop reason: SDUPTHER

## 2024-02-27 RX ORDER — AMLODIPINE BESYLATE 5 MG/1
5 TABLET ORAL DAILY
Qty: 90 TABLET | Refills: 1 | Status: SHIPPED | OUTPATIENT
Start: 2024-02-27

## 2024-02-27 RX ORDER — AMLODIPINE BESYLATE 5 MG/1
5 TABLET ORAL DAILY
Qty: 90 TABLET | Refills: 1 | Status: SHIPPED | OUTPATIENT
Start: 2024-02-27 | End: 2024-02-27 | Stop reason: SDUPTHER

## 2024-02-27 RX ORDER — IBANDRONATE SODIUM 150 MG/1
150 TABLET, FILM COATED ORAL
Qty: 3 TABLET | Refills: 3 | Status: SHIPPED | OUTPATIENT
Start: 2024-02-27

## 2024-02-27 RX ORDER — METHYLPREDNISOLONE 4 MG/1
TABLET ORAL
Qty: 21 TABLET | Refills: 0 | Status: SHIPPED | OUTPATIENT
Start: 2024-02-27

## 2024-03-01 DIAGNOSIS — F41.1 GENERALIZED ANXIETY DISORDER: ICD-10-CM

## 2024-03-02 RX ORDER — ALPRAZOLAM 0.25 MG/1
0.25 TABLET ORAL NIGHTLY PRN
Qty: 25 TABLET | Refills: 0 | Status: SHIPPED | OUTPATIENT
Start: 2024-03-02

## 2024-03-04 DIAGNOSIS — F41.1 GENERALIZED ANXIETY DISORDER: ICD-10-CM

## 2024-03-05 RX ORDER — ALPRAZOLAM 0.25 MG/1
0.25 TABLET ORAL NIGHTLY PRN
Qty: 25 TABLET | Refills: 0 | OUTPATIENT
Start: 2024-03-05

## 2024-03-05 RX ORDER — ACYCLOVIR 400 MG/1
1 TABLET ORAL DAILY
Qty: 9 EACH | Refills: 3 | OUTPATIENT
Start: 2024-03-05

## 2024-03-05 RX ORDER — ACYCLOVIR 400 MG/1
1 TABLET ORAL DAILY
Qty: 1 EACH | Refills: 0 | OUTPATIENT
Start: 2024-03-05

## 2024-03-25 RX ORDER — GABAPENTIN 300 MG/1
300 CAPSULE ORAL 3 TIMES DAILY
Status: SHIPPED
Start: 2024-03-25

## 2024-03-25 RX ORDER — DONEPEZIL HYDROCHLORIDE 5 MG/1
5 TABLET, FILM COATED ORAL NIGHTLY
Qty: 30 TABLET | Refills: 2 | Status: SHIPPED | OUTPATIENT
Start: 2024-03-25

## 2024-03-25 RX ORDER — ACYCLOVIR 400 MG/1
1 TABLET ORAL DAILY
Qty: 9 EACH | Refills: 3 | Status: SHIPPED | OUTPATIENT
Start: 2024-03-25

## 2024-04-01 ENCOUNTER — TELEPHONE (OUTPATIENT)
Dept: FAMILY MEDICINE CLINIC | Facility: CLINIC | Age: 69
End: 2024-04-01

## 2024-04-01 DIAGNOSIS — G56.22 CUBITAL TUNNEL SYNDROME ON LEFT: ICD-10-CM

## 2024-04-01 DIAGNOSIS — G56.02 CARPAL TUNNEL SYNDROME OF LEFT WRIST: Primary | ICD-10-CM

## 2024-04-01 DIAGNOSIS — M54.50 ACUTE BILATERAL LOW BACK PAIN WITHOUT SCIATICA: ICD-10-CM

## 2024-04-01 RX ORDER — TRAMADOL HYDROCHLORIDE 50 MG/1
50 TABLET ORAL EVERY 8 HOURS PRN
Qty: 90 TABLET | Refills: 0 | Status: SHIPPED | OUTPATIENT
Start: 2024-04-01

## 2024-04-01 RX ORDER — ACYCLOVIR 400 MG/1
1 TABLET ORAL DAILY
Qty: 1 EACH | Refills: 0 | Status: SHIPPED | OUTPATIENT
Start: 2024-04-01 | End: 2024-04-02 | Stop reason: SDUPTHER

## 2024-04-02 ENCOUNTER — PRIOR AUTHORIZATION (OUTPATIENT)
Dept: FAMILY MEDICINE CLINIC | Facility: CLINIC | Age: 69
End: 2024-04-02
Payer: MEDICARE

## 2024-04-02 RX ORDER — ACYCLOVIR 400 MG/1
1 TABLET ORAL DAILY
Qty: 9 EACH | Refills: 3 | Status: SHIPPED | OUTPATIENT
Start: 2024-04-02

## 2024-04-02 RX ORDER — ACYCLOVIR 400 MG/1
1 TABLET ORAL DAILY
Qty: 1 EACH | Refills: 0 | Status: SHIPPED | OUTPATIENT
Start: 2024-04-02

## 2024-04-03 DIAGNOSIS — F41.1 GENERALIZED ANXIETY DISORDER: ICD-10-CM

## 2024-04-03 RX ORDER — ALPRAZOLAM 0.25 MG/1
0.25 TABLET ORAL NIGHTLY PRN
Qty: 25 TABLET | Refills: 0 | Status: SHIPPED | OUTPATIENT
Start: 2024-04-03

## 2024-04-03 RX ORDER — IBUPROFEN 800 MG/1
TABLET ORAL
Qty: 60 TABLET | Refills: 0 | Status: SHIPPED | OUTPATIENT
Start: 2024-04-03

## 2024-04-16 RX ORDER — IPRATROPIUM BROMIDE AND ALBUTEROL 20; 100 UG/1; UG/1
SPRAY, METERED RESPIRATORY (INHALATION)
Qty: 4 G | Refills: 0 | Status: SHIPPED | OUTPATIENT
Start: 2024-04-16

## 2024-04-26 DIAGNOSIS — G47.00 INSOMNIA, UNSPECIFIED TYPE: Primary | ICD-10-CM

## 2024-04-26 RX ORDER — ZALEPLON 5 MG/1
5 CAPSULE ORAL NIGHTLY PRN
Qty: 20 CAPSULE | Refills: 0 | Status: SHIPPED | OUTPATIENT
Start: 2024-04-26

## 2024-05-09 ENCOUNTER — OFFICE VISIT (OUTPATIENT)
Dept: FAMILY MEDICINE CLINIC | Facility: CLINIC | Age: 69
End: 2024-05-09
Payer: MEDICARE

## 2024-05-09 VITALS
TEMPERATURE: 98.6 F | OXYGEN SATURATION: 97 % | DIASTOLIC BLOOD PRESSURE: 78 MMHG | HEIGHT: 63 IN | BODY MASS INDEX: 25.69 KG/M2 | HEART RATE: 87 BPM | SYSTOLIC BLOOD PRESSURE: 127 MMHG | WEIGHT: 145 LBS

## 2024-05-09 DIAGNOSIS — H04.123 DRY EYES: ICD-10-CM

## 2024-05-09 DIAGNOSIS — F41.1 GENERALIZED ANXIETY DISORDER: ICD-10-CM

## 2024-05-09 DIAGNOSIS — F43.29 STRESS AND ADJUSTMENT REACTION: Primary | ICD-10-CM

## 2024-05-09 DIAGNOSIS — E11.9 TYPE 2 DIABETES MELLITUS WITHOUT COMPLICATION, WITHOUT LONG-TERM CURRENT USE OF INSULIN: ICD-10-CM

## 2024-05-09 LAB
EXPIRATION DATE: ABNORMAL
HBA1C MFR BLD: 6.2 % (ref 4.5–5.7)
Lab: ABNORMAL

## 2024-05-09 RX ORDER — ALPRAZOLAM 0.25 MG/1
0.25 TABLET ORAL DAILY PRN
Qty: 25 TABLET | Refills: 0 | Status: SHIPPED | OUTPATIENT
Start: 2024-05-09

## 2024-05-09 RX ORDER — MINERAL OIL AND WHITE PETROLATUM 150; 830 MG/G; MG/G
1 OINTMENT OPHTHALMIC NIGHTLY
Qty: 3.5 G | Refills: 0 | Status: SHIPPED | OUTPATIENT
Start: 2024-05-09

## 2024-05-09 RX ORDER — FLUTICASONE FUROATE, UMECLIDINIUM BROMIDE AND VILANTEROL TRIFENATATE 200; 62.5; 25 UG/1; UG/1; UG/1
1 POWDER RESPIRATORY (INHALATION) DAILY
Qty: 60 EACH | Refills: 3 | Status: SHIPPED | OUTPATIENT
Start: 2024-05-09

## 2024-05-09 RX ORDER — DULOXETIN HYDROCHLORIDE 60 MG/1
60 CAPSULE, DELAYED RELEASE ORAL
Qty: 90 CAPSULE | Refills: 0 | Status: SHIPPED | OUTPATIENT
Start: 2024-05-09

## 2024-05-09 RX ORDER — DULOXETIN HYDROCHLORIDE 60 MG/1
60 CAPSULE, DELAYED RELEASE ORAL
Qty: 90 CAPSULE | Refills: 0 | Status: SHIPPED | OUTPATIENT
Start: 2024-05-09 | End: 2024-05-09

## 2024-05-14 RX ORDER — IBUPROFEN 800 MG/1
TABLET ORAL
Qty: 60 TABLET | Refills: 0 | Status: SHIPPED | OUTPATIENT
Start: 2024-05-14

## 2024-05-20 DIAGNOSIS — M54.50 ACUTE BILATERAL LOW BACK PAIN WITHOUT SCIATICA: ICD-10-CM

## 2024-05-20 RX ORDER — TRAMADOL HYDROCHLORIDE 50 MG/1
50 TABLET ORAL EVERY 8 HOURS PRN
Qty: 90 TABLET | Refills: 0 | Status: SHIPPED | OUTPATIENT
Start: 2024-05-20

## 2024-05-28 DIAGNOSIS — N39.45 CONTINUOUS LEAKAGE OF URINE: Primary | ICD-10-CM

## 2024-05-28 RX ORDER — IPRATROPIUM BROMIDE AND ALBUTEROL 20; 100 UG/1; UG/1
SPRAY, METERED RESPIRATORY (INHALATION)
Qty: 4 G | Refills: 0 | Status: SHIPPED | OUTPATIENT
Start: 2024-05-28

## 2024-06-03 ENCOUNTER — OFFICE VISIT (OUTPATIENT)
Dept: FAMILY MEDICINE CLINIC | Facility: CLINIC | Age: 69
End: 2024-06-03
Payer: MEDICARE

## 2024-06-03 VITALS
HEIGHT: 63 IN | OXYGEN SATURATION: 95 % | WEIGHT: 145 LBS | SYSTOLIC BLOOD PRESSURE: 137 MMHG | DIASTOLIC BLOOD PRESSURE: 80 MMHG | TEMPERATURE: 98.4 F | BODY MASS INDEX: 25.69 KG/M2 | HEART RATE: 86 BPM

## 2024-06-03 DIAGNOSIS — R30.9 PAINFUL URINATION: Primary | ICD-10-CM

## 2024-06-03 DIAGNOSIS — N30.00 ACUTE CYSTITIS WITHOUT HEMATURIA: ICD-10-CM

## 2024-06-03 LAB
BILIRUB BLD-MCNC: NEGATIVE MG/DL
CLARITY, POC: CLEAR
COLOR UR: ABNORMAL
EXPIRATION DATE: ABNORMAL
GLUCOSE UR STRIP-MCNC: NEGATIVE MG/DL
KETONES UR QL: NEGATIVE
LEUKOCYTE EST, POC: ABNORMAL
Lab: ABNORMAL
NITRITE UR-MCNC: POSITIVE MG/ML
PH UR: 6 [PH] (ref 5–8)
PROT UR STRIP-MCNC: ABNORMAL MG/DL
RBC # UR STRIP: NEGATIVE /UL
SP GR UR: 1.03 (ref 1–1.03)
UROBILINOGEN UR QL: NORMAL

## 2024-06-03 PROCEDURE — 87086 URINE CULTURE/COLONY COUNT: CPT | Performed by: NURSE PRACTITIONER

## 2024-06-03 PROCEDURE — 87186 SC STD MICRODIL/AGAR DIL: CPT | Performed by: NURSE PRACTITIONER

## 2024-06-03 PROCEDURE — 3078F DIAST BP <80 MM HG: CPT | Performed by: NURSE PRACTITIONER

## 2024-06-03 PROCEDURE — 87077 CULTURE AEROBIC IDENTIFY: CPT | Performed by: NURSE PRACTITIONER

## 2024-06-03 PROCEDURE — 99213 OFFICE O/P EST LOW 20 MIN: CPT | Performed by: NURSE PRACTITIONER

## 2024-06-03 PROCEDURE — 81003 URINALYSIS AUTO W/O SCOPE: CPT | Performed by: NURSE PRACTITIONER

## 2024-06-03 PROCEDURE — 3044F HG A1C LEVEL LT 7.0%: CPT | Performed by: NURSE PRACTITIONER

## 2024-06-03 PROCEDURE — 3075F SYST BP GE 130 - 139MM HG: CPT | Performed by: NURSE PRACTITIONER

## 2024-06-03 PROCEDURE — 1126F AMNT PAIN NOTED NONE PRSNT: CPT | Performed by: NURSE PRACTITIONER

## 2024-06-03 RX ORDER — CEFDINIR 300 MG/1
300 CAPSULE ORAL 2 TIMES DAILY
Qty: 14 CAPSULE | Refills: 0 | Status: SHIPPED | OUTPATIENT
Start: 2024-06-03

## 2024-06-05 LAB — BACTERIA SPEC AEROBE CULT: ABNORMAL

## 2024-06-06 ENCOUNTER — TELEPHONE (OUTPATIENT)
Dept: FAMILY MEDICINE CLINIC | Facility: CLINIC | Age: 69
End: 2024-06-06

## 2024-06-07 RX ORDER — PHENAZOPYRIDINE HYDROCHLORIDE 100 MG/1
100 TABLET, FILM COATED ORAL 3 TIMES DAILY PRN
Qty: 30 TABLET | Refills: 0 | Status: SHIPPED | OUTPATIENT
Start: 2024-06-07

## 2024-06-11 ENCOUNTER — TELEPHONE (OUTPATIENT)
Dept: FAMILY MEDICINE CLINIC | Facility: CLINIC | Age: 69
End: 2024-06-11
Payer: MEDICARE

## 2024-06-11 DIAGNOSIS — F41.1 GENERALIZED ANXIETY DISORDER: ICD-10-CM

## 2024-06-11 RX ORDER — ALPRAZOLAM 0.25 MG/1
0.25 TABLET ORAL DAILY PRN
Qty: 25 TABLET | Refills: 0 | Status: SHIPPED | OUTPATIENT
Start: 2024-06-11

## 2024-06-12 ENCOUNTER — CLINICAL SUPPORT (OUTPATIENT)
Dept: FAMILY MEDICINE CLINIC | Facility: CLINIC | Age: 69
End: 2024-06-12
Payer: MEDICARE

## 2024-06-12 DIAGNOSIS — N39.45 CONTINUOUS LEAKAGE OF URINE: ICD-10-CM

## 2024-06-12 LAB
BILIRUB BLD-MCNC: ABNORMAL MG/DL
CLARITY, POC: ABNORMAL
COLOR UR: ABNORMAL
EXPIRATION DATE: ABNORMAL
GLUCOSE UR STRIP-MCNC: ABNORMAL MG/DL
KETONES UR QL: ABNORMAL
LEUKOCYTE EST, POC: ABNORMAL
Lab: ABNORMAL
NITRITE UR-MCNC: POSITIVE MG/ML
PH UR: 6.5 [PH] (ref 5–8)
PROT UR STRIP-MCNC: ABNORMAL MG/DL
RBC # UR STRIP: NEGATIVE /UL
SP GR UR: 1.02 (ref 1–1.03)
UROBILINOGEN UR QL: NORMAL

## 2024-06-12 PROCEDURE — 81003 URINALYSIS AUTO W/O SCOPE: CPT | Performed by: FAMILY MEDICINE

## 2024-06-12 PROCEDURE — 87086 URINE CULTURE/COLONY COUNT: CPT | Performed by: FAMILY MEDICINE

## 2024-06-12 RX ORDER — AMOXICILLIN AND CLAVULANATE POTASSIUM 875; 125 MG/1; MG/1
1 TABLET, FILM COATED ORAL 2 TIMES DAILY
Qty: 20 TABLET | Refills: 0 | Status: SHIPPED | OUTPATIENT
Start: 2024-06-12

## 2024-06-13 ENCOUNTER — PRIOR AUTHORIZATION (OUTPATIENT)
Dept: FAMILY MEDICINE CLINIC | Facility: CLINIC | Age: 69
End: 2024-06-13
Payer: MEDICARE

## 2024-06-14 LAB — BACTERIA SPEC AEROBE CULT: NORMAL

## 2024-06-15 ENCOUNTER — APPOINTMENT (OUTPATIENT)
Dept: CT IMAGING | Facility: HOSPITAL | Age: 69
End: 2024-06-15
Payer: MEDICARE

## 2024-06-15 ENCOUNTER — HOSPITAL ENCOUNTER (EMERGENCY)
Facility: HOSPITAL | Age: 69
Discharge: HOME | End: 2024-06-15
Attending: EMERGENCY MEDICINE
Payer: MEDICARE

## 2024-06-15 VITALS
HEART RATE: 92 BPM | RESPIRATION RATE: 17 BRPM | SYSTOLIC BLOOD PRESSURE: 149 MMHG | OXYGEN SATURATION: 94 % | HEIGHT: 61 IN | WEIGHT: 145.72 LBS | BODY MASS INDEX: 27.51 KG/M2 | TEMPERATURE: 98.5 F | DIASTOLIC BLOOD PRESSURE: 78 MMHG

## 2024-06-15 DIAGNOSIS — R30.0 DYSURIA: Primary | ICD-10-CM

## 2024-06-15 LAB
ALBUMIN SERPL-MCNC: 4.3 G/DL (ref 3.5–5.2)
ALBUMIN/GLOB SERPL: 2 G/DL
ALP SERPL-CCNC: 67 U/L (ref 39–117)
ALT SERPL W P-5'-P-CCNC: 31 U/L (ref 1–33)
ANION GAP SERPL CALCULATED.3IONS-SCNC: 9.1 MMOL/L (ref 5–15)
AST SERPL-CCNC: 27 U/L (ref 1–32)
BACTERIA UR QL AUTO: NORMAL /HPF
BASOPHILS # BLD AUTO: 0.05 10*3/MM3 (ref 0–0.2)
BASOPHILS NFR BLD AUTO: 0.5 % (ref 0–1.5)
BILIRUB SERPL-MCNC: 0.3 MG/DL (ref 0–1.2)
BILIRUB UR QL STRIP: NEGATIVE
BUN SERPL-MCNC: 28 MG/DL (ref 8–23)
BUN/CREAT SERPL: 28.9 (ref 7–25)
CALCIUM SPEC-SCNC: 10 MG/DL (ref 8.6–10.5)
CHLORIDE SERPL-SCNC: 108 MMOL/L (ref 98–107)
CLARITY UR: CLEAR
CO2 SERPL-SCNC: 25.9 MMOL/L (ref 22–29)
COLOR UR: ABNORMAL
CREAT SERPL-MCNC: 0.97 MG/DL (ref 0.57–1)
DEPRECATED RDW RBC AUTO: 42.5 FL (ref 37–54)
EGFRCR SERPLBLD CKD-EPI 2021: 64.6 ML/MIN/1.73
EOSINOPHIL # BLD AUTO: 0.96 10*3/MM3 (ref 0–0.4)
EOSINOPHIL NFR BLD AUTO: 10.4 % (ref 0.3–6.2)
ERYTHROCYTE [DISTWIDTH] IN BLOOD BY AUTOMATED COUNT: 12.3 % (ref 12.3–15.4)
GLOBULIN UR ELPH-MCNC: 2.1 GM/DL
GLUCOSE SERPL-MCNC: 114 MG/DL (ref 65–99)
GLUCOSE UR STRIP-MCNC: NEGATIVE MG/DL
HCT VFR BLD AUTO: 41.9 % (ref 34–46.6)
HGB BLD-MCNC: 12.8 G/DL (ref 12–15.9)
HGB UR QL STRIP.AUTO: NEGATIVE
HYALINE CASTS UR QL AUTO: NORMAL /LPF
IMM GRANULOCYTES # BLD AUTO: 0.03 10*3/MM3 (ref 0–0.05)
IMM GRANULOCYTES NFR BLD AUTO: 0.3 % (ref 0–0.5)
KETONES UR QL STRIP: NEGATIVE
LEUKOCYTE ESTERASE UR QL STRIP.AUTO: ABNORMAL
LYMPHOCYTES # BLD AUTO: 2.37 10*3/MM3 (ref 0.7–3.1)
LYMPHOCYTES NFR BLD AUTO: 25.7 % (ref 19.6–45.3)
MCH RBC QN AUTO: 28.6 PG (ref 26.6–33)
MCHC RBC AUTO-ENTMCNC: 30.5 G/DL (ref 31.5–35.7)
MCV RBC AUTO: 93.7 FL (ref 79–97)
MONOCYTES # BLD AUTO: 0.76 10*3/MM3 (ref 0.1–0.9)
MONOCYTES NFR BLD AUTO: 8.2 % (ref 5–12)
NEUTROPHILS NFR BLD AUTO: 5.05 10*3/MM3 (ref 1.7–7)
NEUTROPHILS NFR BLD AUTO: 54.9 % (ref 42.7–76)
NITRITE UR QL STRIP: POSITIVE
NRBC BLD AUTO-RTO: 0 /100 WBC (ref 0–0.2)
PH UR STRIP.AUTO: 5.5 [PH] (ref 5–8)
PLATELET # BLD AUTO: 355 10*3/MM3 (ref 140–450)
PMV BLD AUTO: 9.1 FL (ref 6–12)
POTASSIUM SERPL-SCNC: 4.9 MMOL/L (ref 3.5–5.2)
PROT SERPL-MCNC: 6.4 G/DL (ref 6–8.5)
PROT UR QL STRIP: NEGATIVE
RBC # BLD AUTO: 4.47 10*6/MM3 (ref 3.77–5.28)
RBC # UR STRIP: NORMAL /HPF
REF LAB TEST METHOD: NORMAL
SODIUM SERPL-SCNC: 143 MMOL/L (ref 136–145)
SP GR UR STRIP: 1.02 (ref 1–1.03)
SQUAMOUS #/AREA URNS HPF: NORMAL /HPF
UROBILINOGEN UR QL STRIP: ABNORMAL
WBC # UR STRIP: NORMAL /HPF
WBC NRBC COR # BLD AUTO: 9.22 10*3/MM3 (ref 3.4–10.8)

## 2024-06-15 PROCEDURE — 80053 COMPREHEN METABOLIC PANEL: CPT | Performed by: EMERGENCY MEDICINE

## 2024-06-15 PROCEDURE — 25810000003 SODIUM CHLORIDE 0.9 % SOLUTION: Performed by: EMERGENCY MEDICINE

## 2024-06-15 PROCEDURE — 74176 CT ABD & PELVIS W/O CONTRAST: CPT

## 2024-06-15 PROCEDURE — 99284 EMERGENCY DEPT VISIT MOD MDM: CPT

## 2024-06-15 PROCEDURE — 85025 COMPLETE CBC W/AUTO DIFF WBC: CPT | Performed by: EMERGENCY MEDICINE

## 2024-06-15 PROCEDURE — 81001 URINALYSIS AUTO W/SCOPE: CPT | Performed by: EMERGENCY MEDICINE

## 2024-06-15 RX ORDER — SODIUM CHLORIDE 0.9 % (FLUSH) 0.9 %
10 SYRINGE (ML) INJECTION AS NEEDED
Status: DISCONTINUED | OUTPATIENT
Start: 2024-06-15 | End: 2024-06-15 | Stop reason: HOSPADM

## 2024-06-15 RX ADMIN — SODIUM CHLORIDE 500 ML: 0.9 INJECTION, SOLUTION INTRAVENOUS at 16:46

## 2024-06-18 ENCOUNTER — TELEPHONE (OUTPATIENT)
Dept: FAMILY MEDICINE CLINIC | Facility: CLINIC | Age: 69
End: 2024-06-18
Payer: MEDICARE

## 2024-06-20 ENCOUNTER — PATIENT OUTREACH (OUTPATIENT)
Dept: CASE MANAGEMENT | Facility: OTHER | Age: 69
End: 2024-06-20
Payer: MEDICARE

## 2024-07-11 DIAGNOSIS — F41.1 GENERALIZED ANXIETY DISORDER: ICD-10-CM

## 2024-07-11 RX ORDER — ALPRAZOLAM 0.25 MG/1
0.25 TABLET ORAL DAILY PRN
Qty: 25 TABLET | Refills: 0 | Status: SHIPPED | OUTPATIENT
Start: 2024-07-11

## 2024-07-15 ENCOUNTER — TRANSCRIBE ORDERS (OUTPATIENT)
Dept: ADMINISTRATIVE | Facility: HOSPITAL | Age: 69
End: 2024-07-15
Payer: MEDICARE

## 2024-07-15 DIAGNOSIS — Z12.31 ENCOUNTER FOR SCREENING MAMMOGRAM FOR MALIGNANT NEOPLASM OF BREAST: Primary | ICD-10-CM

## 2024-07-17 RX ORDER — IBUPROFEN 800 MG/1
TABLET ORAL
Qty: 60 TABLET | Refills: 0 | Status: SHIPPED | OUTPATIENT
Start: 2024-07-17

## 2024-07-23 ENCOUNTER — HOSPITAL ENCOUNTER (OUTPATIENT)
Dept: MAMMOGRAPHY | Facility: HOSPITAL | Age: 69
Discharge: HOME | End: 2024-07-23
Admitting: FAMILY MEDICINE
Payer: MEDICARE

## 2024-07-23 DIAGNOSIS — Z12.31 ENCOUNTER FOR SCREENING MAMMOGRAM FOR MALIGNANT NEOPLASM OF BREAST: ICD-10-CM

## 2024-07-23 PROCEDURE — 77063 BREAST TOMOSYNTHESIS BI: CPT

## 2024-07-23 PROCEDURE — 77067 SCR MAMMO BI INCL CAD: CPT

## 2024-07-30 RX ORDER — IPRATROPIUM BROMIDE AND ALBUTEROL 20; 100 UG/1; UG/1
SPRAY, METERED RESPIRATORY (INHALATION)
Qty: 4 G | Refills: 0 | Status: SHIPPED | OUTPATIENT
Start: 2024-07-30

## 2024-08-08 ENCOUNTER — OFFICE VISIT (OUTPATIENT)
Dept: FAMILY MEDICINE CLINIC | Facility: CLINIC | Age: 69
End: 2024-08-08
Payer: MEDICARE

## 2024-08-08 VITALS
HEIGHT: 61 IN | TEMPERATURE: 98.4 F | OXYGEN SATURATION: 95 % | WEIGHT: 142 LBS | HEART RATE: 93 BPM | DIASTOLIC BLOOD PRESSURE: 77 MMHG | BODY MASS INDEX: 26.81 KG/M2 | SYSTOLIC BLOOD PRESSURE: 149 MMHG

## 2024-08-08 DIAGNOSIS — Z95.1 S/P CABG X 3: ICD-10-CM

## 2024-08-08 DIAGNOSIS — R42 VERTIGO: ICD-10-CM

## 2024-08-08 DIAGNOSIS — I10 PRIMARY HYPERTENSION: ICD-10-CM

## 2024-08-08 DIAGNOSIS — Z23 IMMUNIZATION DUE: ICD-10-CM

## 2024-08-08 DIAGNOSIS — Z00.00 MEDICARE ANNUAL WELLNESS VISIT, SUBSEQUENT: Primary | ICD-10-CM

## 2024-08-08 DIAGNOSIS — E11.9 TYPE 2 DIABETES MELLITUS WITHOUT COMPLICATION, WITHOUT LONG-TERM CURRENT USE OF INSULIN: ICD-10-CM

## 2024-08-08 LAB
EXPIRATION DATE: ABNORMAL
HBA1C MFR BLD: 6 % (ref 4.5–5.7)
Lab: ABNORMAL

## 2024-08-08 RX ORDER — MECLIZINE HCL 12.5 MG/1
TABLET ORAL
Qty: 30 TABLET | Refills: 0 | Status: SHIPPED | OUTPATIENT
Start: 2024-08-08

## 2024-08-08 RX ORDER — NITROGLYCERIN 0.4 MG/1
0.4 TABLET SUBLINGUAL
Qty: 25 TABLET | Refills: 2 | Status: SHIPPED | OUTPATIENT
Start: 2024-08-08

## 2024-08-08 RX ORDER — METHYLPREDNISOLONE 4 MG/1
TABLET ORAL
Qty: 21 TABLET | Refills: 0 | Status: SHIPPED | OUTPATIENT
Start: 2024-08-08

## 2024-08-11 DIAGNOSIS — F41.1 GENERALIZED ANXIETY DISORDER: ICD-10-CM

## 2024-08-13 RX ORDER — BACLOFEN 10 MG/1
10 TABLET ORAL 3 TIMES DAILY PRN
Qty: 30 TABLET | Refills: 0 | Status: SHIPPED | OUTPATIENT
Start: 2024-08-13 | End: 2024-08-19

## 2024-08-14 RX ORDER — ALPRAZOLAM 0.25 MG/1
0.25 TABLET ORAL DAILY PRN
Qty: 25 TABLET | Refills: 0 | Status: SHIPPED | OUTPATIENT
Start: 2024-08-14

## 2024-08-15 ENCOUNTER — APPOINTMENT (OUTPATIENT)
Dept: GENERAL RADIOLOGY | Facility: HOSPITAL | Age: 69
End: 2024-08-15
Payer: MEDICARE

## 2024-08-15 ENCOUNTER — HOSPITAL ENCOUNTER (EMERGENCY)
Facility: HOSPITAL | Age: 69
Discharge: HOME | End: 2024-08-15
Attending: EMERGENCY MEDICINE
Payer: MEDICARE

## 2024-08-15 VITALS
WEIGHT: 141.98 LBS | DIASTOLIC BLOOD PRESSURE: 81 MMHG | HEART RATE: 85 BPM | OXYGEN SATURATION: 95 % | BODY MASS INDEX: 26.81 KG/M2 | HEIGHT: 61 IN | RESPIRATION RATE: 17 BRPM | SYSTOLIC BLOOD PRESSURE: 154 MMHG | TEMPERATURE: 97.8 F

## 2024-08-15 DIAGNOSIS — M54.50 ACUTE LOW BACK PAIN, UNSPECIFIED BACK PAIN LATERALITY, UNSPECIFIED WHETHER SCIATICA PRESENT: Primary | ICD-10-CM

## 2024-08-15 DIAGNOSIS — N39.0 URINARY TRACT INFECTION WITHOUT HEMATURIA, SITE UNSPECIFIED: ICD-10-CM

## 2024-08-15 LAB
BACTERIA UR QL AUTO: ABNORMAL /HPF
BILIRUB UR QL STRIP: NEGATIVE
CLARITY UR: ABNORMAL
COLOR UR: ABNORMAL
GLUCOSE UR STRIP-MCNC: NEGATIVE MG/DL
HGB UR QL STRIP.AUTO: NEGATIVE
HYALINE CASTS UR QL AUTO: ABNORMAL /LPF
KETONES UR QL STRIP: ABNORMAL
LEUKOCYTE ESTERASE UR QL STRIP.AUTO: ABNORMAL
NITRITE UR QL STRIP: POSITIVE
PH UR STRIP.AUTO: 6 [PH] (ref 5–8)
PROT UR QL STRIP: ABNORMAL
RBC # UR STRIP: ABNORMAL /HPF
REF LAB TEST METHOD: ABNORMAL
SP GR UR STRIP: 1.02 (ref 1–1.03)
SQUAMOUS #/AREA URNS HPF: ABNORMAL /HPF
TRANS CELLS #/AREA URNS HPF: ABNORMAL /HPF
UROBILINOGEN UR QL STRIP: ABNORMAL
WBC # UR STRIP: ABNORMAL /HPF

## 2024-08-15 PROCEDURE — 99283 EMERGENCY DEPT VISIT LOW MDM: CPT

## 2024-08-15 PROCEDURE — 87186 SC STD MICRODIL/AGAR DIL: CPT | Performed by: EMERGENCY MEDICINE

## 2024-08-15 PROCEDURE — 87077 CULTURE AEROBIC IDENTIFY: CPT | Performed by: EMERGENCY MEDICINE

## 2024-08-15 PROCEDURE — 87147 CULTURE TYPE IMMUNOLOGIC: CPT | Performed by: EMERGENCY MEDICINE

## 2024-08-15 PROCEDURE — 72110 X-RAY EXAM L-2 SPINE 4/>VWS: CPT

## 2024-08-15 PROCEDURE — 81001 URINALYSIS AUTO W/SCOPE: CPT | Performed by: EMERGENCY MEDICINE

## 2024-08-15 PROCEDURE — 87086 URINE CULTURE/COLONY COUNT: CPT | Performed by: EMERGENCY MEDICINE

## 2024-08-15 RX ORDER — NITROFURANTOIN 25; 75 MG/1; MG/1
100 CAPSULE ORAL 2 TIMES DAILY
Qty: 10 CAPSULE | Refills: 0 | Status: SHIPPED | OUTPATIENT
Start: 2024-08-15 | End: 2024-08-19

## 2024-08-15 RX ORDER — NAPROXEN 375 MG/1
375 TABLET ORAL 2 TIMES DAILY PRN
Qty: 14 TABLET | Refills: 0 | Status: SHIPPED | OUTPATIENT
Start: 2024-08-15

## 2024-08-18 LAB
BACTERIA SPEC AEROBE CULT: ABNORMAL
BACTERIA SPEC AEROBE CULT: ABNORMAL

## 2024-08-19 ENCOUNTER — OFFICE VISIT (OUTPATIENT)
Dept: FAMILY MEDICINE CLINIC | Facility: CLINIC | Age: 69
End: 2024-08-19
Payer: MEDICARE

## 2024-08-19 VITALS
HEIGHT: 61 IN | WEIGHT: 141 LBS | BODY MASS INDEX: 26.62 KG/M2 | SYSTOLIC BLOOD PRESSURE: 148 MMHG | DIASTOLIC BLOOD PRESSURE: 73 MMHG | HEART RATE: 81 BPM | TEMPERATURE: 98 F | OXYGEN SATURATION: 96 % | RESPIRATION RATE: 14 BRPM

## 2024-08-19 DIAGNOSIS — M54.50 CHRONIC MIDLINE LOW BACK PAIN, UNSPECIFIED WHETHER SCIATICA PRESENT: ICD-10-CM

## 2024-08-19 DIAGNOSIS — G89.29 CHRONIC MIDLINE LOW BACK PAIN, UNSPECIFIED WHETHER SCIATICA PRESENT: ICD-10-CM

## 2024-08-19 DIAGNOSIS — N39.0 URINARY TRACT INFECTION WITHOUT HEMATURIA, SITE UNSPECIFIED: Primary | ICD-10-CM

## 2024-08-19 DIAGNOSIS — M54.50 ACUTE BILATERAL LOW BACK PAIN WITHOUT SCIATICA: ICD-10-CM

## 2024-08-19 LAB
BILIRUB BLD-MCNC: ABNORMAL MG/DL
CLARITY, POC: CLEAR
COLOR UR: YELLOW
EXPIRATION DATE: ABNORMAL
GLUCOSE UR STRIP-MCNC: ABNORMAL MG/DL
KETONES UR QL: NEGATIVE
LEUKOCYTE EST, POC: ABNORMAL
Lab: ABNORMAL
NITRITE UR-MCNC: NEGATIVE MG/ML
PH UR: 6 [PH] (ref 5–8)
PROT UR STRIP-MCNC: ABNORMAL MG/DL
RBC # UR STRIP: NEGATIVE /UL
SP GR UR: 1.02 (ref 1–1.03)
UROBILINOGEN UR QL: NORMAL

## 2024-08-19 PROCEDURE — 87086 URINE CULTURE/COLONY COUNT: CPT | Performed by: FAMILY MEDICINE

## 2024-08-19 PROCEDURE — 1159F MED LIST DOCD IN RCRD: CPT | Performed by: FAMILY MEDICINE

## 2024-08-19 PROCEDURE — 1125F AMNT PAIN NOTED PAIN PRSNT: CPT | Performed by: FAMILY MEDICINE

## 2024-08-19 PROCEDURE — 3078F DIAST BP <80 MM HG: CPT | Performed by: FAMILY MEDICINE

## 2024-08-19 PROCEDURE — 3077F SYST BP >= 140 MM HG: CPT | Performed by: FAMILY MEDICINE

## 2024-08-19 PROCEDURE — 3044F HG A1C LEVEL LT 7.0%: CPT | Performed by: FAMILY MEDICINE

## 2024-08-19 PROCEDURE — 99213 OFFICE O/P EST LOW 20 MIN: CPT | Performed by: FAMILY MEDICINE

## 2024-08-19 PROCEDURE — 81003 URINALYSIS AUTO W/O SCOPE: CPT | Performed by: FAMILY MEDICINE

## 2024-08-19 PROCEDURE — 1160F RVW MEDS BY RX/DR IN RCRD: CPT | Performed by: FAMILY MEDICINE

## 2024-08-19 RX ORDER — POLYETHYLENE GLYCOL 3350 17 G/17G
17 POWDER, FOR SOLUTION ORAL DAILY PRN
Qty: 10 PACKET | Refills: 0 | COMMUNITY
Start: 2024-08-19

## 2024-08-19 RX ORDER — HYDROCODONE BITARTRATE AND ACETAMINOPHEN 5; 325 MG/1; MG/1
1 TABLET ORAL EVERY 8 HOURS PRN
Qty: 20 TABLET | Refills: 0 | Status: SHIPPED | OUTPATIENT
Start: 2024-08-19

## 2024-08-19 RX ORDER — CYCLOBENZAPRINE HCL 10 MG
10 TABLET ORAL NIGHTLY PRN
Qty: 20 TABLET | Refills: 0 | Status: SHIPPED | OUTPATIENT
Start: 2024-08-19

## 2024-08-21 LAB — BACTERIA SPEC AEROBE CULT: NO GROWTH

## 2024-08-29 ENCOUNTER — HOSPITAL ENCOUNTER (OUTPATIENT)
Dept: CT IMAGING | Facility: HOSPITAL | Age: 69
Discharge: HOME | End: 2024-08-29
Admitting: FAMILY MEDICINE
Payer: MEDICARE

## 2024-08-29 DIAGNOSIS — G89.29 CHRONIC MIDLINE LOW BACK PAIN, UNSPECIFIED WHETHER SCIATICA PRESENT: ICD-10-CM

## 2024-08-29 DIAGNOSIS — M54.50 CHRONIC MIDLINE LOW BACK PAIN, UNSPECIFIED WHETHER SCIATICA PRESENT: ICD-10-CM

## 2024-08-29 PROCEDURE — 72131 CT LUMBAR SPINE W/O DYE: CPT

## 2024-09-04 DIAGNOSIS — M54.50 ACUTE BILATERAL LOW BACK PAIN WITHOUT SCIATICA: ICD-10-CM

## 2024-09-04 RX ORDER — TRAMADOL HYDROCHLORIDE 50 MG/1
50 TABLET ORAL EVERY 8 HOURS PRN
Qty: 90 TABLET | Refills: 0 | OUTPATIENT
Start: 2024-09-04

## 2024-09-05 RX ORDER — BUPROPION HYDROCHLORIDE 300 MG/1
300 TABLET ORAL EVERY MORNING
Qty: 90 TABLET | Refills: 1 | Status: SHIPPED | OUTPATIENT
Start: 2024-09-05

## 2024-09-05 RX ORDER — SITAGLIPTIN 100 MG/1
100 TABLET, FILM COATED ORAL DAILY
Qty: 90 TABLET | Refills: 1 | Status: SHIPPED | OUTPATIENT
Start: 2024-09-05

## 2024-09-09 DIAGNOSIS — M48.061 FORAMINAL STENOSIS OF LUMBAR REGION: ICD-10-CM

## 2024-09-09 DIAGNOSIS — M54.50 CHRONIC MIDLINE LOW BACK PAIN, UNSPECIFIED WHETHER SCIATICA PRESENT: Primary | ICD-10-CM

## 2024-09-09 DIAGNOSIS — G89.29 CHRONIC MIDLINE LOW BACK PAIN, UNSPECIFIED WHETHER SCIATICA PRESENT: Primary | ICD-10-CM

## 2024-09-10 DIAGNOSIS — M54.50 ACUTE BILATERAL LOW BACK PAIN WITHOUT SCIATICA: ICD-10-CM

## 2024-09-10 RX ORDER — NITROGLYCERIN 0.4 MG/1
0.4 TABLET SUBLINGUAL
Qty: 25 TABLET | Refills: 2 | Status: SHIPPED | OUTPATIENT
Start: 2024-09-10

## 2024-09-10 RX ORDER — FLUTICASONE FUROATE, UMECLIDINIUM BROMIDE AND VILANTEROL TRIFENATATE 200; 62.5; 25 UG/1; UG/1; UG/1
1 POWDER RESPIRATORY (INHALATION) DAILY
Qty: 60 EACH | Refills: 3 | Status: SHIPPED | OUTPATIENT
Start: 2024-09-10

## 2024-09-10 RX ORDER — TRAMADOL HYDROCHLORIDE 50 MG/1
50 TABLET ORAL EVERY 8 HOURS PRN
Qty: 90 TABLET | Refills: 0 | OUTPATIENT
Start: 2024-09-10

## 2024-09-13 ENCOUNTER — TELEPHONE (OUTPATIENT)
Dept: FAMILY MEDICINE CLINIC | Facility: CLINIC | Age: 69
End: 2024-09-13
Payer: MEDICARE

## 2024-09-16 RX ORDER — PREDNISONE 20 MG/1
TABLET ORAL
Qty: 26 TABLET | Refills: 0 | Status: SHIPPED | OUTPATIENT
Start: 2024-09-16

## 2024-09-18 DIAGNOSIS — M54.50 ACUTE BILATERAL LOW BACK PAIN WITHOUT SCIATICA: ICD-10-CM

## 2024-09-18 RX ORDER — TRAMADOL HYDROCHLORIDE 50 MG/1
50 TABLET ORAL EVERY 8 HOURS PRN
Qty: 90 TABLET | Refills: 0 | OUTPATIENT
Start: 2024-09-18

## 2024-09-19 ENCOUNTER — OFFICE VISIT (OUTPATIENT)
Dept: PAIN MEDICINE | Facility: CLINIC | Age: 69
End: 2024-09-19
Payer: MEDICARE

## 2024-09-19 VITALS
WEIGHT: 144 LBS | OXYGEN SATURATION: 96 % | BODY MASS INDEX: 27.21 KG/M2 | RESPIRATION RATE: 16 BRPM | HEART RATE: 88 BPM | SYSTOLIC BLOOD PRESSURE: 140 MMHG | DIASTOLIC BLOOD PRESSURE: 95 MMHG

## 2024-09-19 DIAGNOSIS — M47.812 CERVICAL SPONDYLOSIS WITHOUT MYELOPATHY: ICD-10-CM

## 2024-09-19 DIAGNOSIS — M54.16 LUMBAR RADICULITIS: ICD-10-CM

## 2024-09-19 DIAGNOSIS — M54.12 CERVICAL RADICULITIS: ICD-10-CM

## 2024-09-19 DIAGNOSIS — M47.817 LUMBOSACRAL SPONDYLOSIS WITHOUT MYELOPATHY: ICD-10-CM

## 2024-09-19 DIAGNOSIS — G89.4 CHRONIC PAIN SYNDROME: Primary | ICD-10-CM

## 2024-09-23 RX ORDER — IPRATROPIUM BROMIDE AND ALBUTEROL 20; 100 UG/1; UG/1
SPRAY, METERED RESPIRATORY (INHALATION)
Qty: 4 G | Refills: 0 | Status: SHIPPED | OUTPATIENT
Start: 2024-09-23

## 2024-09-24 DIAGNOSIS — F41.1 GENERALIZED ANXIETY DISORDER: ICD-10-CM

## 2024-09-24 RX ORDER — ALPRAZOLAM 0.25 MG
0.25 TABLET ORAL DAILY PRN
Qty: 25 TABLET | Refills: 0 | Status: SHIPPED | OUTPATIENT
Start: 2024-09-24

## 2024-09-30 ENCOUNTER — HOSPITAL ENCOUNTER (OUTPATIENT)
Dept: PAIN MEDICINE | Facility: HOSPITAL | Age: 69
Discharge: HOME | End: 2024-09-30
Payer: MEDICARE

## 2024-09-30 VITALS
HEART RATE: 82 BPM | DIASTOLIC BLOOD PRESSURE: 82 MMHG | WEIGHT: 144 LBS | RESPIRATION RATE: 16 BRPM | SYSTOLIC BLOOD PRESSURE: 159 MMHG | BODY MASS INDEX: 27.19 KG/M2 | OXYGEN SATURATION: 97 % | HEIGHT: 61 IN | TEMPERATURE: 97.3 F

## 2024-09-30 DIAGNOSIS — R52 PAIN: ICD-10-CM

## 2024-09-30 DIAGNOSIS — M54.16 LUMBAR RADICULITIS: Primary | ICD-10-CM

## 2024-09-30 PROCEDURE — 25510000001 IOPAMIDOL 41 % SOLUTION: Performed by: ANESTHESIOLOGY

## 2024-09-30 PROCEDURE — 62323 NJX INTERLAMINAR LMBR/SAC: CPT | Performed by: ANESTHESIOLOGY

## 2024-09-30 PROCEDURE — 25010000002 METHYLPREDNISOLONE PER 40 MG: Performed by: ANESTHESIOLOGY

## 2024-09-30 PROCEDURE — 77003 FLUOROGUIDE FOR SPINE INJECT: CPT

## 2024-09-30 RX ORDER — IOPAMIDOL 408 MG/ML
3 INJECTION, SOLUTION INTRATHECAL
Status: COMPLETED | OUTPATIENT
Start: 2024-09-30 | End: 2024-09-30

## 2024-09-30 RX ORDER — METHYLPREDNISOLONE ACETATE 40 MG/ML
40 INJECTION, SUSPENSION INTRA-ARTICULAR; INTRALESIONAL; INTRAMUSCULAR; SOFT TISSUE ONCE
Status: COMPLETED | OUTPATIENT
Start: 2024-09-30 | End: 2024-09-30

## 2024-09-30 RX ADMIN — METHYLPREDNISOLONE ACETATE 40 MG: 40 INJECTION, SUSPENSION INTRA-ARTICULAR; INTRALESIONAL; INTRAMUSCULAR; INTRASYNOVIAL; SOFT TISSUE at 11:22

## 2024-09-30 RX ADMIN — IOPAMIDOL 3 ML: 408 INJECTION, SOLUTION INTRATHECAL at 11:21

## 2024-10-01 ENCOUNTER — TELEPHONE (OUTPATIENT)
Dept: PAIN MEDICINE | Facility: HOSPITAL | Age: 69
End: 2024-10-01
Payer: MEDICARE

## 2024-10-02 ENCOUNTER — TELEPHONE (OUTPATIENT)
Dept: PAIN MEDICINE | Facility: CLINIC | Age: 69
End: 2024-10-02
Payer: MEDICARE

## 2024-10-04 ENCOUNTER — TELEPHONE (OUTPATIENT)
Dept: PAIN MEDICINE | Facility: CLINIC | Age: 69
End: 2024-10-04
Payer: MEDICARE

## 2024-10-10 ENCOUNTER — TELEPHONE (OUTPATIENT)
Dept: FAMILY MEDICINE CLINIC | Facility: CLINIC | Age: 69
End: 2024-10-10

## 2024-10-10 NOTE — TELEPHONE ENCOUNTER
Caller: Darleen Stallings    Relationship: Self    Best call back number     Haylie Darleen MARGARET (Self) [phone] (Mobile)       What was the call regarding: PATIENT HAS BEEN HAVING FLUCTUATING GLUCOSE LEVELS     FROM 120-300     SHE HASN'T BEEN CONSISTENT WITH HER MEDICATIONS     APPT MADE, BUT THE [facility] IS NEXT WEEK     PLEASE ADVISE    Is it okay if the provider responds through MyChart:

## 2024-10-11 NOTE — TELEPHONE ENCOUNTER
RELAY      Left msg for pt to call back if she wants to add farxiga 10mg samples or increase her metformin dosing - Dr Jameson

## 2024-10-14 ENCOUNTER — TELEPHONE (OUTPATIENT)
Dept: PAIN MEDICINE | Facility: CLINIC | Age: 69
End: 2024-10-14
Payer: MEDICARE

## 2024-10-14 RX ORDER — METFORMIN HCL 500 MG
TABLET, EXTENDED RELEASE 24 HR ORAL
Qty: 120 TABLET | Refills: 0 | Status: SHIPPED | OUTPATIENT
Start: 2024-10-14 | End: 2024-10-18 | Stop reason: SDUPTHER

## 2024-10-18 ENCOUNTER — HOSPITAL ENCOUNTER (OUTPATIENT)
Dept: PAIN MEDICINE | Facility: HOSPITAL | Age: 69
Discharge: HOME | End: 2024-10-18
Payer: MEDICARE

## 2024-10-18 ENCOUNTER — OFFICE VISIT (OUTPATIENT)
Dept: FAMILY MEDICINE CLINIC | Facility: CLINIC | Age: 69
End: 2024-10-18
Payer: MEDICARE

## 2024-10-18 VITALS
RESPIRATION RATE: 16 BRPM | OXYGEN SATURATION: 96 % | HEART RATE: 96 BPM | HEIGHT: 61 IN | WEIGHT: 144 LBS | BODY MASS INDEX: 27.19 KG/M2 | TEMPERATURE: 97.1 F | DIASTOLIC BLOOD PRESSURE: 86 MMHG | SYSTOLIC BLOOD PRESSURE: 136 MMHG

## 2024-10-18 VITALS
TEMPERATURE: 98.4 F | BODY MASS INDEX: 27.38 KG/M2 | SYSTOLIC BLOOD PRESSURE: 165 MMHG | HEART RATE: 98 BPM | RESPIRATION RATE: 14 BRPM | OXYGEN SATURATION: 96 % | DIASTOLIC BLOOD PRESSURE: 78 MMHG | HEIGHT: 61 IN | WEIGHT: 145 LBS

## 2024-10-18 DIAGNOSIS — Z23 NEED FOR INFLUENZA VACCINATION: ICD-10-CM

## 2024-10-18 DIAGNOSIS — J43.1 PANLOBULAR EMPHYSEMA: ICD-10-CM

## 2024-10-18 DIAGNOSIS — M89.9 DISORDER OF BONE, UNSPECIFIED: ICD-10-CM

## 2024-10-18 DIAGNOSIS — R52 PAIN: ICD-10-CM

## 2024-10-18 DIAGNOSIS — M54.12 CERVICAL RADICULITIS: Primary | ICD-10-CM

## 2024-10-18 DIAGNOSIS — M54.50 ACUTE BILATERAL LOW BACK PAIN WITHOUT SCIATICA: ICD-10-CM

## 2024-10-18 DIAGNOSIS — Z95.1 S/P CABG X 3: ICD-10-CM

## 2024-10-18 DIAGNOSIS — E11.9 TYPE 2 DIABETES MELLITUS WITHOUT COMPLICATION, WITHOUT LONG-TERM CURRENT USE OF INSULIN: Primary | ICD-10-CM

## 2024-10-18 DIAGNOSIS — E78.2 MIXED HYPERLIPIDEMIA: ICD-10-CM

## 2024-10-18 DIAGNOSIS — R79.89 LOW VITAMIN D LEVEL: ICD-10-CM

## 2024-10-18 DIAGNOSIS — I10 BENIGN ESSENTIAL HYPERTENSION: ICD-10-CM

## 2024-10-18 LAB
EXPIRATION DATE: ABNORMAL
HBA1C MFR BLD: 7.2 % (ref 4.5–5.7)
Lab: ABNORMAL

## 2024-10-18 PROCEDURE — 25010000002 METHYLPREDNISOLONE PER 40 MG: Performed by: ANESTHESIOLOGY

## 2024-10-18 PROCEDURE — 99214 OFFICE O/P EST MOD 30 MIN: CPT | Performed by: FAMILY MEDICINE

## 2024-10-18 PROCEDURE — 3077F SYST BP >= 140 MM HG: CPT | Performed by: FAMILY MEDICINE

## 2024-10-18 PROCEDURE — 90662 IIV NO PRSV INCREASED AG IM: CPT | Performed by: FAMILY MEDICINE

## 2024-10-18 PROCEDURE — 83036 HEMOGLOBIN GLYCOSYLATED A1C: CPT | Performed by: FAMILY MEDICINE

## 2024-10-18 PROCEDURE — 1126F AMNT PAIN NOTED NONE PRSNT: CPT | Performed by: FAMILY MEDICINE

## 2024-10-18 PROCEDURE — 3051F HG A1C>EQUAL 7.0%<8.0%: CPT | Performed by: FAMILY MEDICINE

## 2024-10-18 PROCEDURE — 25510000001 IOPAMIDOL 41 % SOLUTION: Performed by: ANESTHESIOLOGY

## 2024-10-18 PROCEDURE — 3078F DIAST BP <80 MM HG: CPT | Performed by: FAMILY MEDICINE

## 2024-10-18 PROCEDURE — G0008 ADMIN INFLUENZA VIRUS VAC: HCPCS | Performed by: FAMILY MEDICINE

## 2024-10-18 PROCEDURE — 77003 FLUOROGUIDE FOR SPINE INJECT: CPT

## 2024-10-18 RX ORDER — METFORMIN HYDROCHLORIDE 500 MG/1
TABLET, EXTENDED RELEASE ORAL
Qty: 120 TABLET | Refills: 2 | Status: SHIPPED | OUTPATIENT
Start: 2024-10-18

## 2024-10-18 RX ORDER — IOPAMIDOL 408 MG/ML
3 INJECTION, SOLUTION INTRATHECAL
Status: COMPLETED | OUTPATIENT
Start: 2024-10-18 | End: 2024-10-18

## 2024-10-18 RX ORDER — DAPAGLIFLOZIN 10 MG/1
1 TABLET, FILM COATED ORAL DAILY
Qty: 30 TABLET | Refills: 0 | Status: SHIPPED | OUTPATIENT
Start: 2024-10-18

## 2024-10-18 RX ORDER — TRAMADOL HYDROCHLORIDE 50 MG/1
50 TABLET ORAL EVERY 8 HOURS PRN
Qty: 90 TABLET | Refills: 0 | Status: SHIPPED | OUTPATIENT
Start: 2024-10-18

## 2024-10-18 RX ORDER — METHYLPREDNISOLONE ACETATE 40 MG/ML
40 INJECTION, SUSPENSION INTRA-ARTICULAR; INTRALESIONAL; INTRAMUSCULAR; SOFT TISSUE ONCE
Status: COMPLETED | OUTPATIENT
Start: 2024-10-18 | End: 2024-10-18

## 2024-10-18 RX ADMIN — METHYLPREDNISOLONE ACETATE 40 MG: 40 INJECTION, SUSPENSION INTRA-ARTICULAR; INTRALESIONAL; INTRAMUSCULAR; INTRASYNOVIAL; SOFT TISSUE at 10:49

## 2024-10-18 RX ADMIN — IOPAMIDOL 3 ML: 408 INJECTION, SOLUTION INTRATHECAL at 10:49

## 2024-10-18 NOTE — PROGRESS NOTES
Fingerstick performed in right middle finger by Harriett Aggarwal CMA  with good hemostasis. Patient tolerated well. 10/18/24 Harriett Aggarwal CMA

## 2024-10-18 NOTE — PROGRESS NOTES
Subjective   Darleen Stallings is a 68 y.o. female.     Chief Complaint   Patient presents with    glucose fluctuation     Patients HgbA1c while in the office today is 7.2%    Cough    Flu Vaccine     Patient was given the high dose influenza injection while in the office today.    COPD    Anxiety         Current Outpatient Medications:     ALPRAZolam (XANAX) 0.25 MG tablet, TAKE 1 TABLET BY MOUTH EVERY DAY AS NEEDED FOR ANXIETY, Disp: 25 tablet, Rfl: 0    amLODIPine (NORVASC) 5 MG tablet, Take 1 tablet by mouth Daily., Disp: 90 tablet, Rfl: 1    ASHWAGANDHA PO, As Needed. 1 po qd, Disp: , Rfl:     aspirin 81 MG tablet, Take 1 tablet by mouth Daily., Disp: , Rfl:     atorvastatin (LIPITOR) 80 MG tablet, Take 1 tablet by mouth Daily., Disp: 90 tablet, Rfl: 2    Blood Glucose Monitoring Suppl (Accu-Chek Guide) w/Device kit, 1 kit Daily., Disp: 1 kit, Rfl: 0    buPROPion XL (WELLBUTRIN XL) 300 MG 24 hr tablet, TAKE 1 TABLET BY MOUTH IN THE MORNING, Disp: 90 tablet, Rfl: 1    Cholecalciferol (Vitamin D) 50 MCG (2000 UT) tablet, Take 1 tablet by mouth Daily., Disp: , Rfl:     Combivent Respimat  MCG/ACT inhaler, INHALE 1 PUFF BY MOUTH 4 TIMES DAILY AS NEEDED FOR WHEEZING, Disp: 4 g, Rfl: 0    Continuous Blood Gluc  (Dexcom G7 ) device, Use 1 package Daily., Disp: 1 each, Rfl: 0    Continuous Blood Gluc Sensor (Dexcom G7 Sensor) misc, Use 1 package Daily., Disp: 9 each, Rfl: 3    CRANBERRY PO, Daily As Needed. 1 po qd, Disp: , Rfl:     Crisaborole (Eucrisa) 2 % ointment, Apply 1 application  topically 2 (Two) Times a Day. Apply to Rt upper eyelid, Disp: 60 g, Rfl: 0    DULoxetine (CYMBALTA) 60 MG capsule, Take 1 capsule by mouth Daily With Breakfast., Disp: 90 capsule, Rfl: 0    fenofibrate 160 MG tablet, Take 1 tablet by mouth Daily., Disp: 90 tablet, Rfl: 2    Fluticasone-Umeclidin-Vilant (Trelegy Ellipta) 200-62.5-25 MCG/ACT inhaler, Inhale 1 puff Daily., Disp: 60 each, Rfl: 3    gabapentin  (NEURONTIN) 300 MG capsule, Take 1 capsule by mouth 3 (Three) Times a Day., Disp: , Rfl:     glucose blood (Accu-Chek Guide) test strip, 1 each by Other route Daily. Use as instructed for BS monitoring, Disp: 100 each, Rfl: 1    ibandronate (Boniva) 150 MG tablet, Take 1 tablet by mouth Every 30 (Thirty) Days., Disp: 3 tablet, Rfl: 3    ibuprofen (ADVIL,MOTRIN) 800 MG tablet, TAKE 1 TABLET BY MOUTH 2 TIMES A DAY AS NEEDED FOR MODERATE PAIN, Disp: 60 tablet, Rfl: 0    ipratropium-albuterol (DUO-NEB) 0.5-2.5 mg/3 ml nebulizer, Take 3 mL by nebulization Every 6 (Six) Hours As Needed for Wheezing or Shortness of Air., Disp: 360 mL, Rfl: 1    Lancets (accu-chek safe-t pro) lancets, 1 each by Other route Daily. For BS monitoring, Disp: 100 each, Rfl: 1    meclizine (ANTIVERT) 12.5 MG tablet, 1-2 po q6 hrs prn vertigo, Disp: 30 tablet, Rfl: 0    metFORMIN ER (GLUCOPHAGE-XR) 500 MG 24 hr tablet, 1-2 po bid w/ meals, Disp: 120 tablet, Rfl: 2    nitroglycerin (NITROSTAT) 0.4 MG SL tablet, Place 1 tablet under the tongue Every 5 (Five) Minutes As Needed for Chest Pain. Take no more than 3 doses in 15 minutes., Disp: 25 tablet, Rfl: 2    omeprazole (priLOSEC) 40 MG capsule, TAKE 1 CAPSULE BY MOUTH EVERY DAY, Disp: 90 capsule, Rfl: 3    traMADol (ULTRAM) 50 MG tablet, Take 1 tablet by mouth Every 8 (Eight) Hours As Needed for Moderate Pain., Disp: 90 tablet, Rfl: 0    Farxiga 10 MG tablet, Take 10 mg by mouth Daily., Disp: 30 tablet, Rfl: 0    Past Medical History:   Diagnosis Date    CAD     Cervical disc disorder 2019    After fall    CHOL     Chronic pain disorder 2020    After fall down stairs    COPD     Dementia     Depression     DEXA     2023= (1.1/ -2.3)    DMII     Extremity pain 2019    Maybe longer    Fractures 2022    GERD     Hypertension     Joint pain 2020    Low back pain 2019    After fall    MAMMO     NEG = 2018/ 2023/ 2024    Myocardial infarction     Neck pain 2015    Maybe longer    Neuropathy in diabetes  2018    Osteoarthritis     Diagnosed    PAP     NEG =    Spinal stenosis 2019    Tremor        Past Surgical History:   Procedure Laterality Date    CATARACT EXTRACTION W/ INTRAOCULAR LENS  IMPLANT, BILATERAL      COLONOSCOPY      = NEG, rech    GSI    COMPRESSION HIP SCREW Right 2022    Procedure: HIP COMPRESSION SCREW, right [place] hip screw from [place];  Surgeon: Loco Cortes MD;  Location: [facility] MAIN OR;  Service: Orthopedics;  Laterality: Right;    CORONARY ARTERY BYPASS GRAFT      x 3    FRACTURE SURGERY      TONSILLECTOMY      TUBAL ABDOMINAL LIGATION         Family History   Problem Relation Age of Onset    Stroke Mother     COPD Mother     Hypertension Mother     Tremor Father     Heart disease Father         CHF    COPD Father     Depression Father     Tremor Sister     Fibromyalgia Sister     Hypertension Sister     Post-traumatic stress disorder Sister     Heart disease Sister     Hypertension Sister     Depression Sister     Tremor Brother     COPD Brother     Depression Brother     Hypertension Brother     Cancer Brother         Undiagnosed lung cancer    Tremor Paternal Grandmother     Depression Sister     Hypertension Brother        Social History     Socioeconomic History    Marital status: Single   Tobacco Use    Smoking status: Every Day     Current packs/day: 0.00     Average packs/day: 0.5 packs/day for 48.0 years (24.0 ttl pk-yrs)     Types: Cigarettes     Start date: 1975     Last attempt to quit:      Years since quittin.8     Passive exposure: Past    Smokeless tobacco: Never    Tobacco comments:     Started when i was 18 quit a few times. Used vape but i think it gave me pneumonia   Vaping Use    Vaping status: Every Day    Substances: Nicotine   Substance and Sexual Activity    Alcohol use: No    Drug use: No    Sexual activity: Not Currently     Partners: Male     Birth control/protection: None     Comment: Toomold to get pregnant..hahaa!        History of Present Illness  The patient is a 68-year-old female who presents for follow-up on hyperglycemia/ COPD and a recent upper respiratory infection with cough.    She has been monitoring her blood sugar levels, which have been fluctuating between 125 and 400. Over the past week, her blood sugar levels have been in the 200s. She had been on steroids recently, but she discontinued them when she noticed an increase in her blood sugar levels. She has not tried Farxiga before. She has been experiencing increased appetite and has been eating more than usual.    She reports that her blood pressure was within the normal range during her last check at the epidural clinic. She has a blood pressure cuff at home.    She experienced a urinary tract infection (UTI), fever, and cough, which confined her to bed for 3 to 4 days. She has not taken any medication for her respiratory symptoms, only ibuprofen. She has been using her Combivent inhaler and Trelegy for breathing treatments. She has not had a fever over 100.4°F in the past day.    She has discontinued the use of Flexeril and MiraLAX powder, the latter due to unresolved constipation. She is no longer taking hydrocodone as it is not available to her. She has not received the shingles vaccine.     She is scheduled to get her carotid artery checked and an echocardiogram. She had CABG in 2019, an echocardiogram in 2022, and a stress test in 2022. Her neurologist thinks it is benign essential tremor consistent with family history and prescribed gabapentin 3 times a day. He is going to do a focused ultrasound treatment. She had an incidental finding of a chronic stroke in the right deep part of her brain. An MRI of the brain was done in 04/2024, showing nothing acute but a small old hemorrhagic infarct in the right putamen and left corpus callosum.    ALLERGIES  She is allergic to LYRICA, ARICEPT, CODEINE, PAXIL, and ACE inhibitors.    IMMUNIZATIONS  She is up to date  on pneumonia vaccine.        The following portions of the patient's history were reviewed and updated as appropriate: allergies, current medications, past family history, past medical history, past social history, past surgical history and problem list.    Review of Systems   Constitutional:  Negative for activity change, appetite change, fatigue, unexpected weight gain and unexpected weight loss.   Respiratory:  Positive for cough. Negative for choking, chest tightness and shortness of breath.    Cardiovascular:  Negative for chest pain, palpitations and leg swelling.   Genitourinary:  Negative for frequency, urgency and urinary incontinence.   Musculoskeletal:  Negative for arthralgias and myalgias.   Neurological:  Positive for tremors. Negative for dizziness, facial asymmetry, speech difficulty, weakness, light-headedness, headache, memory problem and confusion.   Psychiatric/Behavioral:  Positive for stress.        Vitals:    10/18/24 1505   BP: 165/78   Pulse: 98   Resp: 14   Temp: 98.4 °F (36.9 °C)   SpO2: 96%       Objective   Physical Exam  Vitals and nursing note reviewed.   Constitutional:       General: She is not in acute distress.     Appearance: Normal appearance. She is well-developed. She is not ill-appearing or toxic-appearing.   Cardiovascular:      Rate and Rhythm: Normal rate and regular rhythm.      Heart sounds: Normal heart sounds. No murmur heard.  Pulmonary:      Effort: Pulmonary effort is normal. No respiratory distress.      Breath sounds: No stridor. Examination of the right-upper field reveals decreased breath sounds. Examination of the left-upper field reveals decreased breath sounds. Examination of the right-middle field reveals decreased breath sounds. Examination of the left-middle field reveals decreased breath sounds. Examination of the right-lower field reveals decreased breath sounds. Examination of the left-lower field reveals decreased breath sounds. Decreased breath sounds  present. No wheezing, rhonchi or rales.   Musculoskeletal:         General: No tenderness or deformity.   Feet:      Right foot:      Skin integrity: No ulcer, blister or warmth.      Toenail Condition: ingrown     Left foot:      Skin integrity: No blister, warmth or callus.      Toenail Condition: ingrown  Skin:     General: Skin is warm and dry.      Capillary Refill: Capillary refill takes less than 2 seconds.      Findings: No erythema or rash.   Neurological:      Mental Status: She is alert and oriented to person, place, and time.      Cranial Nerves: No cranial nerve deficit.   Psychiatric:         Attention and Perception: Attention and perception normal.         Mood and Affect: Mood and affect normal.         Speech: Speech normal.         Behavior: Behavior normal. Behavior is cooperative.         Thought Content: Thought content normal.         Cognition and Memory: Cognition and memory normal.         Judgment: Judgment normal.       Physical Exam  Vital Signs  BP= 165/78.     Assessment & Plan   Diagnoses and all orders for this visit:    1. Type 2 diabetes mellitus without complication, without long-term current use of insulin (Primary)  -     POC Glycosylated Hemoglobin (Hb A1C)  -     Hemoglobin A1c; Future  -     MicroAlbumin, Urine, Random - Urine, Clean Catch; Future    2. Panlobular emphysema    3. Acute bilateral low back pain without sciatica  -     traMADol (ULTRAM) 50 MG tablet; Take 1 tablet by mouth Every 8 (Eight) Hours As Needed for Moderate Pain.  Dispense: 90 tablet; Refill: 0    4. Benign essential hypertension  -     Comprehensive Metabolic Panel; Future  -     Vitamin D,25-Hydroxy; Future    5. Mixed hyperlipidemia  -     Lipid Panel; Future    6. Low vitamin D level  -     Vitamin D,25-Hydroxy; Future    7. S/P CABG x 3  -     RSVPreF3 Vac Recomb Adjuvanted (AREXVY) 120 MCG/0.5ML reconstituted suspension injection; Inject 0.5 mL into the appropriate muscle as directed by  prescriber 1 (One) Time for 1 dose.  Dispense: 0.5 mL; Refill: 0    8. Disorder of bone, unspecified  -     Vitamin D,25-Hydroxy; Future    9. Need for influenza vaccination  -     Fluzone High-Dose 65+yrs    Other orders  -     metFORMIN ER (GLUCOPHAGE-XR) 500 MG 24 hr tablet; 1-2 po bid w/ meals  Dispense: 120 tablet; Refill: 2  -     Farxiga 10 MG tablet; Take 10 mg by mouth Daily.  Dispense: 30 tablet; Refill: 0      Assessment & Plan  1. Hyperglycemia.  Her A1c was recorded as 6.0 on 08/08/2024, but she reports her blood sugar levels have been fluctuating between 125 and 400. She has not been on steroids recently, which could have contributed to the elevated levels. She was advised to monitor her blood sugar levels using a fingerstick method for accuracy. She is currently taking metformin 1-2 tablets twice a day and has been instructed to increase to two tablets twice a day if needed. Januvia was replaced with Farxiga, and samples were provided. Refills for metformin were given. Fasting labs including cholesterol, A1c, CMP, and vitamin D were ordered for 01/2025.    2.COPD  She reports having a fever and cough following a UTI. She has been using her Combivent inhaler and Trelegy but has not taken any additional medications for the respiratory symptoms. She was advised to continue her breathing treatments and check the expiration date on her Trelegy. An influenza vaccine was administered today. An RSV vaccine was ordered at [facility].    3. Hypertension.  Her blood pressure was recorded as 165/78. She was advised to bring her blood pressure cuff to the next visit for verification of its accuracy.    4. Health Maintenance.  An influenza vaccine was administered today. An RSV vaccine was ordered at [facility]. A copy of her last echo, carotid Doppler, and stress test will be sent to her neurologist.       Results  Laboratory Studies  A1C=6.0 in August. Blood sugar levels have been fluctuating between 125 and  400.    Imaging  MRI of the brain showed a small old hemorrhagic infarct in the right putamen and left corpus callosum.          Patient or patient representative verbalized consent for the use of Ambient Listening during the visit with  Elen Jaemson DO for chart documentation. 10/28/2024  08:31 EDT

## 2024-10-21 ENCOUNTER — TELEPHONE (OUTPATIENT)
Dept: PAIN MEDICINE | Facility: HOSPITAL | Age: 69
End: 2024-10-21
Payer: MEDICARE

## 2024-10-29 ENCOUNTER — TRANSCRIBE ORDERS (OUTPATIENT)
Dept: ADMINISTRATIVE | Facility: HOSPITAL | Age: 69
End: 2024-10-29
Payer: MEDICARE

## 2024-10-29 ENCOUNTER — TELEPHONE (OUTPATIENT)
Dept: FAMILY MEDICINE CLINIC | Facility: CLINIC | Age: 69
End: 2024-10-29
Payer: MEDICARE

## 2024-10-29 DIAGNOSIS — R30.0 DYSURIA: Primary | ICD-10-CM

## 2024-10-29 DIAGNOSIS — Z86.73 HISTORY OF STROKE: Primary | ICD-10-CM

## 2024-10-29 NOTE — TELEPHONE ENCOUNTER
Patient coming in for a bp cuff check tomorrow and is asking if she can also have a UA done as well, she believes she may have a UTI.

## 2024-10-30 ENCOUNTER — CLINICAL SUPPORT (OUTPATIENT)
Dept: FAMILY MEDICINE CLINIC | Facility: CLINIC | Age: 69
End: 2024-10-30
Payer: MEDICARE

## 2024-10-30 VITALS — DIASTOLIC BLOOD PRESSURE: 88 MMHG | HEART RATE: 82 BPM | SYSTOLIC BLOOD PRESSURE: 164 MMHG

## 2024-10-30 DIAGNOSIS — R30.9 PAINFUL URINATION: Primary | ICD-10-CM

## 2024-10-30 LAB
BILIRUB UR QL STRIP: NEGATIVE
CLARITY UR: CLEAR
COLOR UR: YELLOW
GLUCOSE UR STRIP-MCNC: ABNORMAL MG/DL
HGB UR QL STRIP.AUTO: NEGATIVE
HOLD SPECIMEN: NORMAL
KETONES UR QL STRIP: NEGATIVE
LEUKOCYTE ESTERASE UR QL STRIP.AUTO: NEGATIVE
NITRITE UR QL STRIP: NEGATIVE
PH UR STRIP.AUTO: 6.5 [PH] (ref 5–8)
PROT UR QL STRIP: ABNORMAL
SP GR UR STRIP: >1.03 (ref 1–1.03)
UROBILINOGEN UR QL STRIP: ABNORMAL

## 2024-10-30 PROCEDURE — 81003 URINALYSIS AUTO W/O SCOPE: CPT | Performed by: FAMILY MEDICINE

## 2024-10-30 NOTE — PROGRESS NOTES
154/78 hr 81 - pt cuff   164/88 hr 82 - manual p/ KA  138/69 hr 82 - pt cuff again    Pt is unsure if she took her BP med yet today.

## 2024-11-03 ENCOUNTER — APPOINTMENT (OUTPATIENT)
Dept: GENERAL RADIOLOGY | Facility: HOSPITAL | Age: 69
End: 2024-11-03
Payer: MEDICARE

## 2024-11-03 ENCOUNTER — HOSPITAL ENCOUNTER (EMERGENCY)
Facility: HOSPITAL | Age: 69
Discharge: HOME | End: 2024-11-03
Attending: EMERGENCY MEDICINE | Admitting: EMERGENCY MEDICINE
Payer: MEDICARE

## 2024-11-03 VITALS
HEART RATE: 89 BPM | TEMPERATURE: 98.4 F | SYSTOLIC BLOOD PRESSURE: 132 MMHG | RESPIRATION RATE: 20 BRPM | BODY MASS INDEX: 27.6 KG/M2 | OXYGEN SATURATION: 98 % | HEIGHT: 61 IN | WEIGHT: 146.16 LBS | DIASTOLIC BLOOD PRESSURE: 77 MMHG

## 2024-11-03 DIAGNOSIS — J20.9 ACUTE BRONCHITIS, UNSPECIFIED ORGANISM: Primary | ICD-10-CM

## 2024-11-03 PROCEDURE — 0202U NFCT DS 22 TRGT SARS-COV-2: CPT | Performed by: EMERGENCY MEDICINE

## 2024-11-03 PROCEDURE — 99283 EMERGENCY DEPT VISIT LOW MDM: CPT

## 2024-11-03 PROCEDURE — 71045 X-RAY EXAM CHEST 1 VIEW: CPT

## 2024-11-03 RX ORDER — METHYLPREDNISOLONE 4 MG/1
TABLET ORAL
Qty: 21 TABLET | Refills: 0 | Status: SHIPPED | OUTPATIENT
Start: 2024-11-03 | End: 2024-11-08

## 2024-11-03 RX ORDER — AZITHROMYCIN 250 MG/1
TABLET, FILM COATED ORAL
Qty: 6 TABLET | Refills: 0 | Status: SHIPPED | OUTPATIENT
Start: 2024-11-03

## 2024-11-03 NOTE — ED PROVIDER NOTES
Subjective   History of Present Illness  Chief complaint: Fever    69-year-old female presents with subjective fever.  Symptoms have been intermittent since Thursday.  She has had a productive cough with congestion.  She states she has had some gagging with cough but denies any vomiting or diarrhea.  She has had no chest pain or abdominal pain.  No known sick contacts.    History provided by:  Patient      Review of Systems   Constitutional:  Positive for fever.   HENT:  Positive for congestion.    Respiratory:  Positive for cough. Negative for shortness of breath.    Cardiovascular:  Negative for chest pain.   Gastrointestinal:  Negative for abdominal pain, diarrhea and vomiting.   Musculoskeletal:  Negative for back pain.   Neurological:  Negative for headaches.   Psychiatric/Behavioral:  Negative for confusion.        Past Medical History:   Diagnosis Date    CAD     Cervical disc disorder 2019    After fall    CHOL     Chronic pain disorder 2020    After fall down stairs    COPD     Dementia     Depression     DEXA     2023= (1.1/ -2.3)    DMII     Extremity pain 2019    Maybe longer    Fractures 2022    GERD     Hypertension     Joint pain 2020    Low back pain 2019    After fall    MAMMO     NEG = 2018/ 2023/ 2024    Myocardial infarction     Neck pain 2015    Maybe longer    Neuropathy in diabetes 2018    Osteoarthritis 2023    Diagnosed    PAP     NEG =2021    Spinal stenosis 2019    Tremor        Allergies   Allergen Reactions    Ace Inhibitors Cough    Pregabalin Confusion    Aricept [Donepezil] Mental Status Change    Codeine Nausea And Vomiting    Paxil [Paroxetine] Other (See Comments)     TREMOR WORSENING       Past Surgical History:   Procedure Laterality Date    CATARACT EXTRACTION W/ INTRAOCULAR LENS  IMPLANT, BILATERAL      COLONOSCOPY      2023= NEG, rech 2026   GSI    COMPRESSION HIP SCREW Right 03/25/2022    Procedure: HIP COMPRESSION SCREW, right [place] hip screw from [place];  Surgeon: Sebastian  "Loco BISHOP MD;  Location: [facility] MAIN OR;  Service: Orthopedics;  Laterality: Right;    CORONARY ARTERY BYPASS GRAFT      x 3    FRACTURE SURGERY      TONSILLECTOMY      TUBAL ABDOMINAL LIGATION         Family History   Problem Relation Age of Onset    Stroke Mother     COPD Mother     Hypertension Mother     Tremor Father     Heart disease Father         CHF    COPD Father     Depression Father     Tremor Sister     Fibromyalgia Sister     Hypertension Sister     Post-traumatic stress disorder Sister     Heart disease Sister     Hypertension Sister     Depression Sister     Tremor Brother     COPD Brother     Depression Brother     Hypertension Brother     Cancer Brother         Undiagnosed lung cancer    Tremor Paternal Grandmother     Depression Sister     Hypertension Brother        Social History     Socioeconomic History    Marital status: Single   Tobacco Use    Smoking status: Every Day     Current packs/day: 0.00     Average packs/day: 0.5 packs/day for 48.0 years (24.0 ttl pk-yrs)     Types: Cigarettes     Start date: 1975     Last attempt to quit:      Years since quittin.8     Passive exposure: Past    Smokeless tobacco: Never    Tobacco comments:     Started when i was 18 quit a few times. Used vape but i think it gave me pneumonia   Vaping Use    Vaping status: Every Day    Substances: Nicotine   Substance and Sexual Activity    Alcohol use: No    Drug use: No    Sexual activity: Not Currently     Partners: Male     Birth control/protection: None     Comment: Toomold to get pregnant..hahaa!       /77   Pulse 89   Temp 98.4 °F (36.9 °C)   Resp 20   Ht 154.9 cm (61\")   Wt 66.3 kg (146 lb 2.6 oz)   SpO2 98%   BMI 27.62 kg/m²       Objective   Physical Exam  Vitals and nursing note reviewed.   Constitutional:       Appearance: Normal appearance.   HENT:      Head: Normocephalic and atraumatic.      Mouth/Throat:      Mouth: Mucous membranes are moist.   Cardiovascular:      Rate and " Rhythm: Normal rate and regular rhythm.      Heart sounds: Normal heart sounds.   Pulmonary:      Effort: Pulmonary effort is normal. No respiratory distress.      Comments: Occasional wheeze, otherwise clear lungs  Abdominal:      Palpations: Abdomen is soft.      Tenderness: There is no abdominal tenderness.   Skin:     General: Skin is warm and dry.   Neurological:      Mental Status: She is alert and oriented to person, place, and time.         Procedures           ED Course      Results for orders placed or performed during the hospital encounter of 11/03/24   Respiratory Panel PCR w/COVID-19(SARS-CoV-2) MANJIT/THEO/AUREA/PAD/COR/PHILIP In-House, NP Swab in UTM/VTM, 2 HR TAT - Swab, Nasopharynx    Collection Time: 11/03/24  1:39 PM    Specimen: Nasopharynx; Swab   Result Value Ref Range    ADENOVIRUS, PCR Not Detected Not Detected    Coronavirus 229E Not Detected Not Detected    Coronavirus HKU1 Not Detected Not Detected    Coronavirus NL63 Not Detected Not Detected    Coronavirus OC43 Not Detected Not Detected    COVID19 Not Detected Not Detected - Ref. Range    Human Metapneumovirus Not Detected Not Detected    Human Rhinovirus/Enterovirus Not Detected Not Detected    Influenza A PCR Not Detected Not Detected    Influenza B PCR Not Detected Not Detected    Parainfluenza Virus 1 Not Detected Not Detected    Parainfluenza Virus 2 Not Detected Not Detected    Parainfluenza Virus 3 Not Detected Not Detected    Parainfluenza Virus 4 Not Detected Not Detected    RSV, PCR Not Detected Not Detected    Bordetella pertussis pcr Not Detected Not Detected    Bordetella parapertussis PCR Not Detected Not Detected    Chlamydophila pneumoniae PCR Not Detected Not Detected    Mycoplasma pneumo by PCR Not Detected Not Detected     XR Chest 1 View    Result Date: 11/3/2024  Impression: No acute cardiopulmonary disease. Electronically Signed: Sergio Singh MD  11/3/2024 2:16 PM EST  Workstation ID: [id number]                                             Medical Decision Making    My interpretation of chest x-ray shows no  effusion.  Respiratory panel is negative.  Symptoms consistent with bronchitis.  Patient does have history of COPD.  She will be given prescriptions for Medrol Dosepak and azithromycin.  He is to follow-up with her primary doctor.      Final diagnoses:   Acute bronchitis, unspecified organism       ED Disposition  ED Disposition       ED Disposition   Discharge    Condition   Stable    Comment   --               Elen Jameson,   [address]  [address]  [place] [address] [phone]    Call in 2 days           Medication List        New Prescriptions      azithromycin 250 MG tablet  Commonly known as: ZITHROMAX  Take 2 tablets the first day, then 1 tablet daily for 4 days.     methylPREDNISolone 4 MG dose pack  Commonly known as: MEDROL  Take as directed on package instructions.               Where to Get Your Medications        These medications were sent to [id number] - [place], [place] - [address] - [phone]  - [phone] FX  [address] [place] [address]      Phone: [phone]   azithromycin 250 MG tablet  methylPREDNISolone 4 MG dose pack            oTnio Lucas MD  11/03/24 1566

## 2024-11-07 DIAGNOSIS — F41.1 GENERALIZED ANXIETY DISORDER: ICD-10-CM

## 2024-11-07 RX ORDER — ALPRAZOLAM 0.25 MG/1
0.25 TABLET ORAL DAILY PRN
Qty: 25 TABLET | Refills: 0 | Status: SHIPPED | OUTPATIENT
Start: 2024-11-07

## 2024-11-07 NOTE — TELEPHONE ENCOUNTER
INS[facility]AN    Rx Refill Note  Requested Prescriptions     Pending Prescriptions Disp Refills    ALPRAZolam (XANAX) 0.25 MG tablet [Pharmacy Med Name: ALPRAZolam Oral Tablet 0.25 MG] 25 tablet 0     Sig: TAKE 1 TABLET BY MOUTH EVERY DAY AS NEEDED FOR ANXIETY      Last office visit with prescribing clinician: 10/18/2024   Last telemedicine visit with prescribing clinician: Visit date not found   Next office visit with prescribing clinician: 1/17/2025                         Would you like a call back once the refill request has been completed: [] Yes [] No    If the office needs to give you a call back, can they leave a voicemail: [] Yes [] No    Radha Henry, RT  11/07/24, 14:37 EST

## 2024-11-08 ENCOUNTER — TELEPHONE (OUTPATIENT)
Dept: FAMILY MEDICINE CLINIC | Facility: CLINIC | Age: 69
End: 2024-11-08

## 2024-11-08 RX ORDER — PREDNISONE 20 MG/1
TABLET ORAL
Qty: 20 TABLET | Refills: 0 | Status: SHIPPED | OUTPATIENT
Start: 2024-11-08

## 2024-11-08 RX ORDER — HYDROCODONE POLISTIREX AND CHLORPHENIRAMINE POLISTIREX 10; 8 MG/5ML; MG/5ML
5 SUSPENSION, EXTENDED RELEASE ORAL EVERY 12 HOURS PRN
Qty: 60 ML | Refills: 0 | Status: SHIPPED | OUTPATIENT
Start: 2024-11-08

## 2024-11-08 NOTE — TELEPHONE ENCOUNTER
Pt agreeable to whatever will take her cough away. She is taking everything over the counter, and nothing is working.

## 2024-11-08 NOTE — TELEPHONE ENCOUNTER
Caller: Darleen Stallings    Relationship: Self    Best call back number: [phone]        What was the call regarding: PATIENT CALLING HAS HAD A COUGH FOR ABOUT A MONTH AND HAS BOUGHT OVER THE COUNTER MEDICATION BUT IT HASN'T WENT AWAY.    PATIENT IS WANTING TO SEE IF PADMAJA ANGEL WOULD WRITE HER A PRESCRIPTION FOR THE COUGH AND TO DRY UP THE [id number] - [place], [place] - [address] - [phone]  - [phone] FX     IF PATIENT NEEDS ANOTHER APPOINTMENT PLEASE LET HER KNOW

## 2024-11-13 ENCOUNTER — PRIOR AUTHORIZATION (OUTPATIENT)
Dept: FAMILY MEDICINE CLINIC | Facility: CLINIC | Age: 69
End: 2024-11-13
Payer: MEDICARE

## 2024-11-13 NOTE — TELEPHONE ENCOUNTER
HUB to read    PA was sent for Hydrocod Terrance-Chlorphe Terrance ER 10-8MG/5ML er suspension    Waiting on the outcome from insurance.

## 2024-11-21 RX ORDER — IBUPROFEN 800 MG/1
TABLET, FILM COATED ORAL
Qty: 60 TABLET | Refills: 0 | Status: SHIPPED | OUTPATIENT
Start: 2024-11-21

## 2024-12-02 DIAGNOSIS — F41.1 GENERALIZED ANXIETY DISORDER: ICD-10-CM

## 2024-12-02 NOTE — TELEPHONE ENCOUNTER
[facility]    Rx Refill Note  Requested Prescriptions     Pending Prescriptions Disp Refills    ALPRAZolam (XANAX) 0.25 MG tablet [Pharmacy Med Name: ALPRAZolam Oral Tablet 0.25 MG] 25 tablet 0     Sig: TAKE 1 TABLET BY MOUTH EVERY DAY AS NEEDED FOR ANXIETY      Last office visit with prescribing clinician: 10/18/2024   Last telemedicine visit with prescribing clinician: Visit date not found   Next office visit with prescribing clinician: 1/17/2025                         Would you like a call back once the refill request has been completed: [] Yes [] No    If the office needs to give you a call back, can they leave a voicemail: [] Yes [] No    Radha Henry, RT  12/02/24, 15:05 EST

## 2024-12-03 RX ORDER — IPRATROPIUM BROMIDE AND ALBUTEROL 20; 100 UG/1; UG/1
SPRAY, METERED RESPIRATORY (INHALATION)
Qty: 4 G | Refills: 2 | Status: SHIPPED | OUTPATIENT
Start: 2024-12-03

## 2024-12-04 RX ORDER — ALPRAZOLAM 0.25 MG/1
0.25 TABLET ORAL DAILY PRN
Qty: 25 TABLET | Refills: 0 | Status: SHIPPED | OUTPATIENT
Start: 2024-12-04

## 2024-12-11 ENCOUNTER — TELEPHONE (OUTPATIENT)
Dept: FAMILY MEDICINE CLINIC | Facility: CLINIC | Age: 69
End: 2024-12-11

## 2024-12-11 NOTE — TELEPHONE ENCOUNTER
Caller: Darleen Stallings    Relationship: Self    Best call back number: [phone]     What is the best time to reach you: ANY    Who are you requesting to speak with (clinical staff, provider,  specific staff member): PCP    Do you know the name of the person who called:     What was the call regarding: PATIENT IS CURRENTLY HAVING SEVERE PAIN IN THE MIDDLE OF HER BACK,UNCONTROLLABLE BOWELS,MUSHY POOP AND SLOW URINE STREAM. PATIENT IS NOT SURE WHAT TO DO.    Is it okay if the provider responds through MyChart:

## 2024-12-11 NOTE — TELEPHONE ENCOUNTER
RELAY.    LVM RELAYING DR ANGEL'S MESSAGE. WE CURRENTLY DO NOT HAVE ANY AVAILABLE APPTS TODAY, PATIENT SHOULD GO TO ER/UC. SHE CAN ALSO CALL US BACK AT 8AM TO SEE IF WE HAVE ANY APPTS AVAILABLE BUT IF THIS DOES WORSEN SHE NEEDS TO GO TO ER/UC.

## 2024-12-17 ENCOUNTER — OFFICE VISIT (OUTPATIENT)
Dept: FAMILY MEDICINE CLINIC | Facility: CLINIC | Age: 69
End: 2024-12-17
Payer: MEDICARE

## 2024-12-17 VITALS
HEIGHT: 61 IN | BODY MASS INDEX: 27.94 KG/M2 | DIASTOLIC BLOOD PRESSURE: 72 MMHG | WEIGHT: 148 LBS | TEMPERATURE: 98.2 F | RESPIRATION RATE: 18 BRPM | SYSTOLIC BLOOD PRESSURE: 127 MMHG | OXYGEN SATURATION: 95 % | HEART RATE: 83 BPM

## 2024-12-17 DIAGNOSIS — R15.1 FECAL SMEARING: ICD-10-CM

## 2024-12-17 DIAGNOSIS — R82.90 ABNORMAL URINE: ICD-10-CM

## 2024-12-17 DIAGNOSIS — E11.9 TYPE 2 DIABETES MELLITUS WITHOUT COMPLICATION, WITHOUT LONG-TERM CURRENT USE OF INSULIN: Primary | ICD-10-CM

## 2024-12-17 DIAGNOSIS — Z86.73 HISTORY OF COMPLETED STROKE: ICD-10-CM

## 2024-12-17 DIAGNOSIS — R19.03 RIGHT LOWER QUADRANT ABDOMINAL SWELLING, MASS AND LUMP: ICD-10-CM

## 2024-12-17 DIAGNOSIS — M54.50 CHRONIC MIDLINE LOW BACK PAIN, UNSPECIFIED WHETHER SCIATICA PRESENT: ICD-10-CM

## 2024-12-17 DIAGNOSIS — G89.29 CHRONIC MIDLINE LOW BACK PAIN, UNSPECIFIED WHETHER SCIATICA PRESENT: ICD-10-CM

## 2024-12-17 LAB
BILIRUB BLD-MCNC: ABNORMAL MG/DL
CLARITY, POC: ABNORMAL
COLOR UR: ABNORMAL
EXPIRATION DATE: ABNORMAL
GLUCOSE UR STRIP-MCNC: NEGATIVE MG/DL
KETONES UR QL: ABNORMAL
LEUKOCYTE EST, POC: NEGATIVE
Lab: ABNORMAL
NITRITE UR-MCNC: NEGATIVE MG/ML
PH UR: 5.5 [PH] (ref 5–8)
PROT UR STRIP-MCNC: NEGATIVE MG/DL
RBC # UR STRIP: NEGATIVE /UL
SP GR UR: 1.02 (ref 1–1.03)
UROBILINOGEN UR QL: ABNORMAL

## 2024-12-17 PROCEDURE — 3078F DIAST BP <80 MM HG: CPT | Performed by: FAMILY MEDICINE

## 2024-12-17 PROCEDURE — 99214 OFFICE O/P EST MOD 30 MIN: CPT | Performed by: FAMILY MEDICINE

## 2024-12-17 PROCEDURE — 1125F AMNT PAIN NOTED PAIN PRSNT: CPT | Performed by: FAMILY MEDICINE

## 2024-12-17 PROCEDURE — 1160F RVW MEDS BY RX/DR IN RCRD: CPT | Performed by: FAMILY MEDICINE

## 2024-12-17 PROCEDURE — 1159F MED LIST DOCD IN RCRD: CPT | Performed by: FAMILY MEDICINE

## 2024-12-17 PROCEDURE — 3074F SYST BP LT 130 MM HG: CPT | Performed by: FAMILY MEDICINE

## 2024-12-17 PROCEDURE — 81003 URINALYSIS AUTO W/O SCOPE: CPT | Performed by: FAMILY MEDICINE

## 2024-12-17 RX ORDER — CITALOPRAM HYDROBROMIDE 20 MG/1
20 TABLET ORAL DAILY
Qty: 30 TABLET | Refills: 1 | Status: SHIPPED | OUTPATIENT
Start: 2024-12-17

## 2024-12-17 RX ORDER — FLUTICASONE FUROATE, UMECLIDINIUM BROMIDE AND VILANTEROL TRIFENATATE 200; 62.5; 25 UG/1; UG/1; UG/1
1 POWDER RESPIRATORY (INHALATION) DAILY
Qty: 60 EACH | Refills: 3 | Status: SHIPPED | OUTPATIENT
Start: 2024-12-17

## 2024-12-17 RX ORDER — CYCLOBENZAPRINE HCL 10 MG
10 TABLET ORAL NIGHTLY PRN
Qty: 30 TABLET | Refills: 0 | Status: SHIPPED | OUTPATIENT
Start: 2024-12-17

## 2024-12-17 RX ORDER — METFORMIN HYDROCHLORIDE 500 MG/1
TABLET, EXTENDED RELEASE ORAL
Qty: 120 TABLET | Refills: 2 | Status: SHIPPED | OUTPATIENT
Start: 2024-12-17

## 2024-12-17 NOTE — PROGRESS NOTES
Subjective   Darleen Stallings is a 69 y.o. female.     Chief Complaint   Patient presents with    Back Pain     Middle back pain,uncontrollable bowels and slow urine stream.         Current Outpatient Medications:     ALPRAZolam (XANAX) 0.25 MG tablet, TAKE 1 TABLET BY MOUTH EVERY DAY AS NEEDED FOR ANXIETY, Disp: 25 tablet, Rfl: 0    amLODIPine (NORVASC) 5 MG tablet, Take 1 tablet by mouth Daily., Disp: 90 tablet, Rfl: 1    aspirin 81 MG tablet, Take 1 tablet by mouth Daily., Disp: , Rfl:     atorvastatin (LIPITOR) 80 MG tablet, Take 1 tablet by mouth Daily., Disp: 90 tablet, Rfl: 2    Blood Glucose Monitoring Suppl (Accu-Chek Guide) w/Device kit, 1 kit Daily., Disp: 1 kit, Rfl: 0    Cholecalciferol (Vitamin D) 50 MCG (2000 UT) tablet, Take 1 tablet by mouth Daily., Disp: , Rfl:     Continuous Blood Gluc  (Dexcom G7 ) device, Use 1 package Daily., Disp: 1 each, Rfl: 0    Continuous Blood Gluc Sensor (Dexcom G7 Sensor) misc, Use 1 package Daily., Disp: 9 each, Rfl: 3    CRANBERRY PO, Daily As Needed. 1 po qd, Disp: , Rfl:     Crisaborole (Eucrisa) 2 % ointment, Apply 1 application  topically 2 (Two) Times a Day. Apply to Rt upper eyelid, Disp: 60 g, Rfl: 0    cyclobenzaprine (FLEXERIL) 10 MG tablet, Take 1 tablet by mouth At Night As Needed for Muscle Spasms., Disp: 30 tablet, Rfl: 0    fenofibrate 160 MG tablet, Take 1 tablet by mouth Daily., Disp: 90 tablet, Rfl: 2    Fluticasone-Umeclidin-Vilant (Trelegy Ellipta) 200-62.5-25 MCG/ACT inhaler, Inhale 1 puff Daily., Disp: 60 each, Rfl: 3    gabapentin (NEURONTIN) 300 MG capsule, Take 1 capsule by mouth 3 (Three) Times a Day., Disp: , Rfl:     ibandronate (Boniva) 150 MG tablet, Take 1 tablet by mouth Every 30 (Thirty) Days., Disp: 3 tablet, Rfl: 3    ibuprofen (ADVIL,MOTRIN) 800 MG tablet, Take 1 tablet by mouth 2 times a day as needed for moderate pain., Disp: 60 tablet, Rfl: 0    ipratropium-albuterol (Combivent Respimat)  MCG/ACT inhaler,  INHALE 1 PUFF BY MOUTH 4 TIMES A DAY AS NEEDED FOR WHEEZING, Disp: 4 g, Rfl: 2    ipratropium-albuterol (DUO-NEB) 0.5-2.5 mg/3 ml nebulizer, Take 3 mL by nebulization Every 6 (Six) Hours As Needed for Wheezing or Shortness of Air., Disp: 360 mL, Rfl: 1    meclizine (ANTIVERT) 12.5 MG tablet, 1-2 po q6 hrs prn vertigo, Disp: 30 tablet, Rfl: 0    metFORMIN ER (GLUCOPHAGE-XR) 500 MG 24 hr tablet, 1 po bid w/ meals, Disp: 120 tablet, Rfl: 2    nitroglycerin (NITROSTAT) 0.4 MG SL tablet, Place 1 tablet under the tongue Every 5 (Five) Minutes As Needed for Chest Pain. Take no more than 3 doses in 15 minutes., Disp: 25 tablet, Rfl: 2    omeprazole (priLOSEC) 40 MG capsule, TAKE 1 CAPSULE BY MOUTH EVERY DAY, Disp: 90 capsule, Rfl: 3    citalopram (CeleXA) 20 MG tablet, Take 1 tablet by mouth Daily., Disp: 30 tablet, Rfl: 1    empagliflozin (Jardiance) 25 MG tablet tablet, Take 1 tablet by mouth Daily., Disp: 30 tablet, Rfl: 0    traMADol (ULTRAM) 50 MG tablet, Take 1 tablet by mouth Every 8 (Eight) Hours As Needed for Moderate Pain., Disp: 90 tablet, Rfl: 0    Past Medical History:   Diagnosis Date    CAD     Cervical disc disorder 2019    After fall    CHOL     Chronic pain disorder 2020    After fall down stairs    COPD     Dementia     Depression     DEXA     2023= (1.1/ -2.3)    DMII     Extremity pain 2019    Maybe longer    Fractures 2022    GERD     Hypertension     Joint pain 2020    Low back pain 2019    After fall    MAMMO     NEG = 2018/ 2023/ 2024    Myocardial infarction     Neck pain 2015    Maybe longer    Neuropathy in diabetes 2018    Osteoarthritis 2023    Diagnosed    PAP     NEG =2021    Spinal stenosis 2019    Tremor        Past Surgical History:   Procedure Laterality Date    CATARACT EXTRACTION W/ INTRAOCULAR LENS  IMPLANT, BILATERAL      COLONOSCOPY      2023= NEG, rech 2026   GSI    COMPRESSION HIP SCREW Right 03/25/2022    Procedure: HIP COMPRESSION SCREW, right [place] hip screw from [employer];   Surgeon: Loco Cortes MD;  Location: [facility] MAIN OR;  Service: Orthopedics;  Laterality: Right;    CORONARY ARTERY BYPASS GRAFT      x 3    FRACTURE SURGERY      TONSILLECTOMY      TUBAL ABDOMINAL LIGATION         Family History   Problem Relation Age of Onset    Stroke Mother     COPD Mother     Hypertension Mother     Tremor Father     Heart disease Father         CHF    COPD Father     Depression Father     Tremor Sister     Fibromyalgia Sister     Hypertension Sister     Post-traumatic stress disorder Sister     Heart disease Sister     Hypertension Sister     Depression Sister     Tremor Brother     COPD Brother     Depression Brother     Hypertension Brother     Cancer Brother         Undiagnosed lung cancer    Tremor Paternal Grandmother     Depression Sister     Hypertension Brother        Social History     Socioeconomic History    Marital status: Single   Tobacco Use    Smoking status: Every Day     Current packs/day: 0.00     Average packs/day: 0.5 packs/day for 48.0 years (24.0 ttl pk-yrs)     Types: Cigarettes     Start date: 1975     Last attempt to quit:      Years since quittin.0     Passive exposure: Past    Smokeless tobacco: Never    Tobacco comments:     Started when i was 18 quit a few times. Used vape but i think it gave me pneumonia   Vaping Use    Vaping status: Every Day    Substances: Nicotine   Substance and Sexual Activity    Alcohol use: No    Drug use: No    Sexual activity: Not Currently     Partners: Male     Birth control/protection: None     Comment: [place] to get pregnant..hahaa!       History of Present Illness  The patient is a 69-year-old female who presents with complaints of middle back pain, slow urine stream, and uncontrollable bowel movements.    She reports experiencing involuntary bowel movements, often while standing or walking, and has resorted to using pads for management. She describes her stools as thick and difficult to pass, necessitating frequent  wiping. She has attempted to alleviate this issue with fruit juice and stool softeners, but these interventions have not been effective. She has always been regular in her bowel movements. She does not experience constipation. She suspects that her metformin medication may be contributing to her bowel issues, as she has also noticed bloating and weight gain.    She has discontinued Wellbutrin 300 mg due to adverse effects and Farxiga due to lack of efficacy in controlling her blood glucose levels. She uses a glucose meter to monitor her blood sugar, which frequently registers high readings, occasionally reaching up to 300. Her last A1C was 7.2 in October 2024. She maintains a consistent diet, including fried chicken and ice cream, but has been reducing her ice cream intake. She consumes Diet Coke. She has been unable to exercise due to severe back pain. She has been sleeping excessively and has been unable to bring her diabetic monitor to the clinic today. She is currently on metformin twice daily.    She reports a large, painful hernia, which she believes may have developed while burying her cat o/s. She has a history of a small hernia. She experiences constant pain in her shoulders and neck, which is not alleviated by ibuprofen. She has been referred to [facility] for evaluation of her tremors and has been advised to undergo an echocardiogram and carotid ultrasound. She has a history of two minor strokes, diagnosed based on MRI findings, but does not recall experiencing any symptoms. She has been experiencing fatigue and has been off all her psychiatric medications for approximately 2 months. She has been diagnosed with anxiety and has been prescribed Lexapro and Celexa in the past. She has been experiencing difficulty swallowing and occasional coughing up of vomit-like material, but without any associated burning sensation.    She has been experiencing severe back pain for several weeks, which is  exacerbated by movement and necessitates sitting down. She has a history of back injury and has received epidural injections for pain management, which were ineffective for her back pain but provided relief for her neck pain. She has been taking gabapentin three times daily, but is unsure of its efficacy. She has tried Flexeril in the past, which was beneficial. She has been sleeping through the night.    She has been using albuterol and Trelegy inhalers, which have been effective in managing her respiratory symptoms. She has been experiencing increased shortness of breath, which she attributes to decreased physical activity.    Supplemental Information  She has been applying lotion to her skin to manage dryness.    FAMILY HISTORY  Her sister has issues with bowel movements.    MEDICATIONS  Discontinued: Wellbutrin, Farxiga  Current: Metformin, ibuprofen, gabapentin, albuterol, Trelegy    IMMUNIZATIONS  She received a flu shot.        The following portions of the patient's history were reviewed and updated as appropriate: allergies, current medications, past family history, past medical history, past social history, past surgical history and problem list.    Review of Systems   Constitutional:  Positive for activity change, fatigue and unexpected weight gain. Negative for appetite change and unexpected weight loss.   HENT:  Positive for trouble swallowing.    Eyes:  Negative for blurred vision, double vision, pain and visual disturbance.   Respiratory:  Positive for shortness of breath. Negative for cough and wheezing.    Cardiovascular:  Negative for leg swelling.   Gastrointestinal:  Positive for abdominal distention, nausea and vomiting. Negative for abdominal pain, constipation and diarrhea.   Endocrine: Negative for polydipsia, polyphagia and polyuria.   Genitourinary:  Negative for frequency.   Musculoskeletal:  Positive for back pain and neck pain. Negative for gait problem.   Skin:  Negative for color  change, dry skin, rash and skin lesions.   Neurological:  Positive for tremors. Negative for weakness and numbness.   Psychiatric/Behavioral:  Negative for sleep disturbance. The patient is nervous/anxious.        Vitals:    12/17/24 1124   BP: 127/72   Pulse: 83   Resp: 18   Temp: 98.2 °F (36.8 °C)   SpO2: 95%       Objective   Physical Exam  Vitals and nursing note reviewed.   Constitutional:       General: She is not in acute distress.     Appearance: Normal appearance. She is well-developed. She is not ill-appearing or toxic-appearing.   Cardiovascular:      Rate and Rhythm: Normal rate and regular rhythm.      Heart sounds: Normal heart sounds. No murmur heard.  Pulmonary:      Effort: Pulmonary effort is normal. No respiratory distress.      Breath sounds: Normal breath sounds. No stridor. No wheezing, rhonchi or rales.   Abdominal:      Tenderness: There is abdominal tenderness in the right lower quadrant. There is no guarding or rebound.   Musculoskeletal:         General: No tenderness or deformity.   Feet:      Right foot:      Skin integrity: No ulcer, blister or warmth.      Toenail Condition: ingrown     Left foot:      Skin integrity: No blister, warmth or callus.      Toenail Condition: ingrown  Skin:     General: Skin is warm and dry.      Capillary Refill: Capillary refill takes less than 2 seconds.      Findings: No erythema or rash.   Neurological:      Mental Status: She is alert and oriented to person, place, and time.      Cranial Nerves: No cranial nerve deficit.   Psychiatric:         Attention and Perception: Attention normal.         Mood and Affect: Mood and affect normal.         Speech: Speech normal.         Behavior: Behavior normal. Behavior is cooperative.         Thought Content: Thought content normal.         Cognition and Memory: Cognition and memory normal.         Judgment: Judgment normal.       Physical Exam       Assessment & Plan   Diagnoses and all orders for this  visit:    1. Type 2 diabetes mellitus without complication, without long-term current use of insulin (Primary)    2. Right lower quadrant abdominal swelling, mass and lump  -     CT Abdomen Pelvis With Contrast; Future    3. Chronic midline low back pain, unspecified whether sciatica present    4. Abnormal urine  -     POC Urinalysis Dipstick, Automated    5. History of completed stroke  -     Cancel: US Carotid Bilateral; Future    6. Fecal smearing    Other orders  -     metFORMIN ER (GLUCOPHAGE-XR) 500 MG 24 hr tablet; 1 po bid w/ meals  Dispense: 120 tablet; Refill: 2  -     empagliflozin (Jardiance) 25 MG tablet tablet; Take 1 tablet by mouth Daily.  Dispense: 30 tablet; Refill: 0  -     citalopram (CeleXA) 20 MG tablet; Take 1 tablet by mouth Daily.  Dispense: 30 tablet; Refill: 1  -     Fluticasone-Umeclidin-Vilant (Trelegy Ellipta) 200-62.5-25 MCG/ACT inhaler; Inhale 1 puff Daily.  Dispense: 60 each; Refill: 3  -     cyclobenzaprine (FLEXERIL) 10 MG tablet; Take 1 tablet by mouth At Night As Needed for Muscle Spasms.  Dispense: 30 tablet; Refill: 0      Assessment & Plan  1. Uncontrolled bowel movements.  The patient's metformin medication may be contributing to her bowel issues, as she has also noticed bloating and weight gain. The dosage of metformin will be reduced to see if it helps with bowel control. She is advised to take a stool softener to manage constipation.    2. Diabetes mellitus.  Her last A1c was 7.2 in October 2024.    Samples of Jardiance will be provided for a month's supply to help manage her blood sugar levels. She is advised to reduce her intake of ice cream and other sugary foods. If her blood sugar levels remain high, insulin therapy may be considered.    3. Abdominal hernia.  A CT scan of the abdomen with oral and IV contrast will be ordered to assess the size and potential complications of the hernia. If the CT scan shows no significant issues, surgical consultation may not be  necessary.    4. Anxiety and depression.  She has been experiencing fatigue and has been off all her psychiatric medications for approximately 2 months. She has been diagnosed with anxiety and has been prescribed Lexapro and Celexa in the past. Celexa 20 mg will be prescribed to manage her anxiety. She is advised to take the medication at bedtime to minimize potential side effects.    5. Back pain.  She has a history of back injury and has received epidural injections for pain management, which were ineffective for her back pain but provided relief for her neck pain. She has been taking gabapentin three times daily, but is unsure of its efficacy. Flexeril will be prescribed for use at bedtime as needed to help manage her back pain and improve daytime function.    6. Respiratory issues.  She has been using albuterol and Trelegy inhalers, which have been effective in managing her respiratory symptoms. She has been experiencing increased shortness of breath, which she attributes to decreased physical activity. Refills for her Trelegy inhaler will be ordered to ensure she does not run out of medication.    Follow-up  The patient will follow up in 1 month.     Results  Laboratory Studies  Last A1c was 7.2 in October 2024.    Imaging  MRI from April 2024 showed small strokes.          Patient or patient representative verbalized consent for the use of Ambient Listening during the visit with  Elen Jameson DO for chart documentation. 1/5/2025  14:24 EST

## 2024-12-20 ENCOUNTER — TRANSCRIBE ORDERS (OUTPATIENT)
Dept: ADMINISTRATIVE | Facility: HOSPITAL | Age: 69
End: 2024-12-20
Payer: MEDICARE

## 2024-12-20 DIAGNOSIS — Z86.73 HISTORY OF STROKE: Primary | ICD-10-CM

## 2024-12-20 DIAGNOSIS — Z86.73 HISTORY OF COMPLETED STROKE: Primary | ICD-10-CM

## 2024-12-26 DIAGNOSIS — M54.50 ACUTE BILATERAL LOW BACK PAIN WITHOUT SCIATICA: ICD-10-CM

## 2024-12-26 RX ORDER — TRAMADOL HYDROCHLORIDE 50 MG/1
50 TABLET ORAL EVERY 8 HOURS PRN
Qty: 90 TABLET | Refills: 0 | OUTPATIENT
Start: 2024-12-26

## 2024-12-26 RX ORDER — TRAMADOL HYDROCHLORIDE 50 MG/1
50 TABLET ORAL EVERY 8 HOURS PRN
Qty: 90 TABLET | Refills: 0 | Status: SHIPPED | OUTPATIENT
Start: 2024-12-26

## 2024-12-26 NOTE — TELEPHONE ENCOUNTER
Incoming Refill Request      Medication requested (name and dose): TRAMADOL 50MG     Pharmacy where request should be sent: [facility]    Additional details provided by patient: N/A    Best call back number:     Does the patient have less than a 3 day supply:  [] Yes  [x] No    Anjel Marcum Rep  12/26/24, 14:01 EST

## 2024-12-26 NOTE — TELEPHONE ENCOUNTER
[facility]    Rx Refill Note  Requested Prescriptions     Pending Prescriptions Disp Refills    traMADol (ULTRAM) 50 MG tablet 90 tablet 0     Sig: Take 1 tablet by mouth Every 8 (Eight) Hours As Needed for Moderate Pain.      Last office visit with prescribing clinician: 12/17/2024   Last telemedicine visit with prescribing clinician: Visit date not found   Next office visit with prescribing clinician: 1/17/2025     Vitamin D,25-Hydroxy (02/22/2024 11:18)  Comprehensive Metabolic Panel (02/22/2024 11:18)  MicroAlbumin, Urine, Random - Urine, Clean Catch (02/22/2024 11:18)  Lipid Panel (02/22/2024 11:18)  Hemoglobin A1c (02/22/2024 11:18)                    Would you like a call back once the refill request has been completed: [] Yes [] No    If the office needs to give you a call back, can they leave a voicemail: [] Yes [] No    Veto Zapata  12/26/24, 14:21 EST

## 2024-12-27 RX ORDER — TRAMADOL HYDROCHLORIDE 50 MG/1
50 TABLET ORAL EVERY 8 HOURS PRN
Qty: 90 TABLET | Refills: 0 | OUTPATIENT
Start: 2024-12-27

## 2025-01-08 DIAGNOSIS — F41.1 GENERALIZED ANXIETY DISORDER: ICD-10-CM

## 2025-01-09 RX ORDER — ALPRAZOLAM 0.25 MG/1
0.25 TABLET ORAL DAILY PRN
Qty: 25 TABLET | Refills: 0 | Status: SHIPPED | OUTPATIENT
Start: 2025-01-09

## 2025-01-09 NOTE — TELEPHONE ENCOUNTER
[facility]    Rx Refill Note  Requested Prescriptions     Pending Prescriptions Disp Refills    ALPRAZolam (XANAX) 0.25 MG tablet [Pharmacy Med Name: ALPRAZolam Oral Tablet 0.25 MG] 25 tablet 0     Sig: TAKE 1 TABLET BY MOUTH DAILY AS NEEDED FOR ANXIETY      Last office visit with prescribing clinician: 12/17/2024   Last telemedicine visit with prescribing clinician: Visit date not found   Next office visit with prescribing clinician: 1/17/2025                         Would you like a call back once the refill request has been completed: [] Yes [] No    If the office needs to give you a call back, can they leave a voicemail: [] Yes [] No    Radha Henry, RT  01/09/25, 09:15 EST

## 2025-01-17 ENCOUNTER — OFFICE VISIT (OUTPATIENT)
Dept: FAMILY MEDICINE CLINIC | Facility: CLINIC | Age: 70
End: 2025-01-17
Payer: MEDICARE

## 2025-01-17 VITALS
SYSTOLIC BLOOD PRESSURE: 121 MMHG | RESPIRATION RATE: 18 BRPM | BODY MASS INDEX: 28.13 KG/M2 | WEIGHT: 149 LBS | DIASTOLIC BLOOD PRESSURE: 72 MMHG | OXYGEN SATURATION: 94 % | TEMPERATURE: 98.4 F | HEART RATE: 86 BPM | HEIGHT: 61 IN

## 2025-01-17 DIAGNOSIS — E11.9 TYPE 2 DIABETES MELLITUS WITHOUT COMPLICATION, WITHOUT LONG-TERM CURRENT USE OF INSULIN: ICD-10-CM

## 2025-01-17 DIAGNOSIS — B37.31 YEAST VAGINITIS: ICD-10-CM

## 2025-01-17 DIAGNOSIS — T30.0 SECOND DEGREE BURNS OF MULTIPLE SITES: Primary | ICD-10-CM

## 2025-01-17 LAB
BILIRUB BLD-MCNC: NEGATIVE MG/DL
CLARITY, POC: CLEAR
COLOR UR: YELLOW
EXPIRATION DATE: ABNORMAL
EXPIRATION DATE: ABNORMAL
GLUCOSE UR STRIP-MCNC: ABNORMAL MG/DL
HBA1C MFR BLD: 7 % (ref 4.5–5.7)
KETONES UR QL: NEGATIVE
LEUKOCYTE EST, POC: NEGATIVE
Lab: ABNORMAL
Lab: ABNORMAL
NITRITE UR-MCNC: NEGATIVE MG/ML
PH UR: 7 [PH] (ref 5–8)
PROT UR STRIP-MCNC: NEGATIVE MG/DL
RBC # UR STRIP: NEGATIVE /UL
SP GR UR: 1.01 (ref 1–1.03)
UROBILINOGEN UR QL: NORMAL

## 2025-01-17 RX ORDER — FLUCONAZOLE 150 MG/1
150 TABLET ORAL ONCE
Qty: 2 TABLET | Refills: 0 | Status: SHIPPED | OUTPATIENT
Start: 2025-01-17 | End: 2025-01-17

## 2025-01-17 RX ORDER — SILVER SULFADIAZINE 10 MG/G
1 CREAM TOPICAL DAILY
Qty: 50 G | Refills: 0 | Status: SHIPPED | OUTPATIENT
Start: 2025-01-17
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
## 2025-01-17 NOTE — PROGRESS NOTES
Fingerstick performed in right middle finger by Harriett Aggarwal CMA  with good hemostasis. Patient tolerated well. 01/17/25 Harriett Aggarwal CMA

## 2025-01-21 ENCOUNTER — HOSPITAL ENCOUNTER (OUTPATIENT)
Dept: CARDIOLOGY | Facility: HOSPITAL | Age: 70
Discharge: HOME | End: 2025-01-21
Payer: MEDICARE

## 2025-01-21 ENCOUNTER — HOSPITAL ENCOUNTER (OUTPATIENT)
Dept: CT IMAGING | Facility: HOSPITAL | Age: 70
Discharge: HOME | End: 2025-01-21
Payer: MEDICARE

## 2025-01-21 ENCOUNTER — APPOINTMENT (OUTPATIENT)
Dept: CARDIOLOGY | Facility: HOSPITAL | Age: 70
End: 2025-01-21
Payer: MEDICARE

## 2025-01-21 DIAGNOSIS — R19.03 RIGHT LOWER QUADRANT ABDOMINAL SWELLING, MASS AND LUMP: ICD-10-CM

## 2025-01-21 DIAGNOSIS — Z86.73 HISTORY OF COMPLETED STROKE: ICD-10-CM

## 2025-01-21 LAB
BH CV XLRA MEAS LEFT CAROTID BULB PSV: -50.3 CM/SEC
BH CV XLRA MEAS LEFT DIST CCA EDV: 18.1 CM/SEC
BH CV XLRA MEAS LEFT DIST CCA PSV: 68 CM/SEC
BH CV XLRA MEAS LEFT DIST ICA EDV: -21.1 CM/SEC
BH CV XLRA MEAS LEFT DIST ICA PSV: -93.2 CM/SEC
BH CV XLRA MEAS LEFT ICA/CCA RATIO: -1.37
BH CV XLRA MEAS LEFT PROX CCA EDV: 14 CM/SEC
BH CV XLRA MEAS LEFT PROX CCA PSV: 48.3 CM/SEC
BH CV XLRA MEAS LEFT PROX ECA PSV: 169 CM/SEC
BH CV XLRA MEAS LEFT PROX ICA EDV: -20.5 CM/SEC
BH CV XLRA MEAS LEFT PROX ICA PSV: -70.2 CM/SEC
BH CV XLRA MEAS LEFT PROX SCLA PSV: 167 CM/SEC
BH CV XLRA MEAS LEFT VERTEBRAL A PSV: -46.3 CM/SEC
BH CV XLRA MEAS RIGHT CAROTID BULB PSV: -91 CM/SEC
BH CV XLRA MEAS RIGHT DIST CCA EDV: -14.9 CM/SEC
BH CV XLRA MEAS RIGHT DIST CCA PSV: -80.1 CM/SEC
BH CV XLRA MEAS RIGHT DIST ICA EDV: -21.2 CM/SEC
BH CV XLRA MEAS RIGHT DIST ICA PSV: -68.2 CM/SEC
BH CV XLRA MEAS RIGHT ICA/CCA RATIO: -1.4
BH CV XLRA MEAS RIGHT PROX CCA EDV: 11.8 CM/SEC
BH CV XLRA MEAS RIGHT PROX CCA PSV: 88.8 CM/SEC
BH CV XLRA MEAS RIGHT PROX ECA PSV: 154 CM/SEC
BH CV XLRA MEAS RIGHT PROX ICA EDV: -20.4 CM/SEC
BH CV XLRA MEAS RIGHT PROX ICA PSV: -124 CM/SEC
BH CV XLRA MEAS RIGHT PROX SCLA PSV: 155 CM/SEC
BH CV XLRA MEAS RIGHT VERTEBRAL A PSV: 42.1 CM/SEC
CREAT BLDA-MCNC: 1.3 MG/DL (ref 0.6–1.3)
EGFRCR SERPLBLD CKD-EPI 2021: 45.2 ML/MIN/1.73

## 2025-01-21 PROCEDURE — 74177 CT ABD & PELVIS W/CONTRAST: CPT

## 2025-01-21 PROCEDURE — 82565 ASSAY OF CREATININE: CPT

## 2025-01-21 PROCEDURE — 93880 EXTRACRANIAL BILAT STUDY: CPT | Performed by: SURGERY

## 2025-01-21 PROCEDURE — 25510000001 IOPAMIDOL PER 1 ML: Performed by: FAMILY MEDICINE

## 2025-01-21 PROCEDURE — 93880 EXTRACRANIAL BILAT STUDY: CPT

## 2025-01-21 RX ORDER — IOPAMIDOL 755 MG/ML
100 INJECTION, SOLUTION INTRAVASCULAR
Status: COMPLETED | OUTPATIENT
Start: 2025-01-21 | End: 2025-01-21

## 2025-01-21 RX ADMIN — IOPAMIDOL 100 ML: 755 INJECTION, SOLUTION INTRAVENOUS at 18:05

## 2025-01-31 DIAGNOSIS — F41.1 GENERALIZED ANXIETY DISORDER: ICD-10-CM

## 2025-01-31 RX ORDER — IBUPROFEN 800 MG/1
TABLET, FILM COATED ORAL
Qty: 60 TABLET | Refills: 0 | Status: SHIPPED | OUTPATIENT
Start: 2025-01-31

## 2025-01-31 NOTE — TELEPHONE ENCOUNTER
[facility]    Rx Refill Note  Requested Prescriptions     Pending Prescriptions Disp Refills    ALPRAZolam (XANAX) 0.25 MG tablet 25 tablet 0     Sig: Take 1 tablet by mouth Daily As Needed. for anxiety      Last office visit with prescribing clinician: 1/17/2025   Last telemedicine visit with prescribing clinician: Visit date not found   Next office visit with prescribing clinician: 7/17/2025                         Would you like a call back once the refill request has been completed: [] Yes [] No    If the office needs to give you a call back, can they leave a voicemail: [] Yes [] No    Radha Henry, RT  01/31/25, 13:45 EST   Attending Only

## 2025-02-01 RX ORDER — ALPRAZOLAM 0.25 MG/1
0.25 TABLET ORAL DAILY PRN
Qty: 25 TABLET | Refills: 0 | Status: SHIPPED | OUTPATIENT
Start: 2025-02-01

## 2025-02-09 ENCOUNTER — TELEPHONE (OUTPATIENT)
Dept: FAMILY MEDICINE CLINIC | Facility: CLINIC | Age: 70
End: 2025-02-09
Payer: MEDICARE

## 2025-02-09 ENCOUNTER — DOCUMENTATION (OUTPATIENT)
Dept: FAMILY MEDICINE CLINIC | Facility: CLINIC | Age: 70
End: 2025-02-09
Payer: MEDICARE

## 2025-02-09 DIAGNOSIS — H60.509 ACUTE OTITIS EXTERNA, UNSPECIFIED LATERALITY, UNSPECIFIED TYPE: Primary | ICD-10-CM

## 2025-02-09 DIAGNOSIS — H66.90 ACUTE OTITIS MEDIA, UNSPECIFIED OTITIS MEDIA TYPE: ICD-10-CM

## 2025-02-09 RX ORDER — OFLOXACIN 3 MG/ML
SOLUTION/ DROPS OPHTHALMIC
Qty: 5 ML | Refills: 0 | Status: SHIPPED | OUTPATIENT
Start: 2025-02-09 | End: 2025-02-09 | Stop reason: SDUPTHER

## 2025-02-09 RX ORDER — CEFDINIR 300 MG/1
300 CAPSULE ORAL 2 TIMES DAILY
Qty: 14 CAPSULE | Refills: 0 | Status: SHIPPED | OUTPATIENT
Start: 2025-02-09 | End: 2025-02-16

## 2025-02-09 RX ORDER — CEFDINIR 300 MG/1
300 CAPSULE ORAL 2 TIMES DAILY
Qty: 14 CAPSULE | Refills: 0 | Status: SHIPPED | OUTPATIENT
Start: 2025-02-09 | End: 2025-02-09 | Stop reason: SDUPTHER

## 2025-02-09 RX ORDER — OFLOXACIN 3 MG/ML
SOLUTION/ DROPS OPHTHALMIC
Qty: 5 ML | Refills: 0 | Status: SHIPPED | OUTPATIENT
Start: 2025-02-09

## 2025-02-09 NOTE — TELEPHONE ENCOUNTER
Pt called the on call after hours service due to pain in ear not improving.  She was seen at the urgent care yesterday and diagnosed with an ear infection.  She was given toradol IM there and started on oral antibiotic and oral steroids.  She is not feeling any better today.  She is diabetic and glucose levels have ok so far.  She feels like her ear canal is swollen and it is tender to touch.  No fevers or drainage noted from ear.  She did have a headache yesterday but not today.  Discussed that I will send her in antibiotic drops and different oral antibiotic but I would like her to be seen in the office tomorrow.  I told her I would send you a message and someone would reach out to her to contact her tomorrow.  I also discussed with her that if anything worsens at all, she needs to go to the ER since she is diabetic and things like malignant otitis externa is something we worry about.  She voiced understanding.

## 2025-02-10 ENCOUNTER — OFFICE VISIT (OUTPATIENT)
Dept: FAMILY MEDICINE CLINIC | Facility: CLINIC | Age: 70
End: 2025-02-10
Payer: MEDICARE

## 2025-02-10 VITALS
BODY MASS INDEX: 27 KG/M2 | TEMPERATURE: 98 F | WEIGHT: 143 LBS | OXYGEN SATURATION: 95 % | DIASTOLIC BLOOD PRESSURE: 75 MMHG | HEIGHT: 61 IN | SYSTOLIC BLOOD PRESSURE: 150 MMHG | HEART RATE: 88 BPM

## 2025-02-10 DIAGNOSIS — I10 PRIMARY HYPERTENSION: Primary | ICD-10-CM

## 2025-02-10 DIAGNOSIS — H65.02 NON-RECURRENT ACUTE SEROUS OTITIS MEDIA OF LEFT EAR: ICD-10-CM

## 2025-02-10 PROCEDURE — 1125F AMNT PAIN NOTED PAIN PRSNT: CPT | Performed by: NURSE PRACTITIONER

## 2025-02-10 PROCEDURE — G2211 COMPLEX E/M VISIT ADD ON: HCPCS | Performed by: NURSE PRACTITIONER

## 2025-02-10 PROCEDURE — 3051F HG A1C>EQUAL 7.0%<8.0%: CPT | Performed by: NURSE PRACTITIONER

## 2025-02-10 PROCEDURE — 3077F SYST BP >= 140 MM HG: CPT | Performed by: NURSE PRACTITIONER

## 2025-02-10 PROCEDURE — 99213 OFFICE O/P EST LOW 20 MIN: CPT | Performed by: NURSE PRACTITIONER

## 2025-02-10 PROCEDURE — 3078F DIAST BP <80 MM HG: CPT | Performed by: NURSE PRACTITIONER

## 2025-02-10 NOTE — ASSESSMENT & PLAN NOTE
Left ear pain-She reports this started last Thursday. She went to immediate care on Saturday. She was given steroid and antibiotics. She started to cough last night. She is currently on oral medication and ear drops. She reports nothing has helped yet. She is also on steroids. She has had some mucus and it is white. She has not been around anyone sick. She has not checked her temperature, but has felt like she has had a fever. Denies any ear drainage. She reports her ear feel clogged.     Continue medrol dose pack, ear drops, and oral antibiotics

## 2025-02-10 NOTE — PROGRESS NOTES
"Chief Complaint  Chief Complaint   Patient presents with    Earache     LEFT EAR    Transitional Care Management        Subjective          Darleen Stallings is a 69-year-old female who reports to the office today due to left ear pain.    Left ear pain-She reports this started last Thursday. She went to immediate care on Saturday. She was given steroid and antibiotics. She started to cough last night. She is currently on oral medication and ear drops. She reports nothing has helped yet. She is also on steroids. She has had some mucus and it is white. She has not been around anyone sick. She has not checked her temperature, but has felt like she has had a fever. Denies any ear drainage. She reports her ear feel clogged.          Review of Systems   Constitutional:  Negative for chills and fever.   HENT:  Positive for ear pain. Negative for congestion and sinus pressure.    Respiratory:  Positive for cough. Negative for shortness of breath.    Cardiovascular:  Negative for chest pain.   Gastrointestinal:  Negative for abdominal pain, nausea and vomiting.   Genitourinary:  Negative for difficulty urinating.   Musculoskeletal:  Negative for myalgias.   Skin: Negative.    Neurological:  Positive for headache. Negative for dizziness and light-headedness.        Objective   Vital Signs:   Vitals:    02/10/25 1506   BP: 150/75   Pulse: 88   Temp: 98 °F (36.7 °C)   SpO2: 95%      Estimated body mass index is 27.03 kg/m² as calculated from the following:    Height as of this encounter: 154.9 cm (60.98\").    Weight as of this encounter: 64.9 kg (143 lb).                          Physical Exam  Vitals reviewed.   Constitutional:       Appearance: Normal appearance. She is normal weight.   HENT:      Head: Normocephalic and atraumatic.      Left Ear: Tympanic membrane, ear canal and external ear normal.      Ears:      Comments: Slight erythema noted in left ear     Nose: Nose normal.      Mouth/Throat:      Mouth: Mucous " membranes are moist.      Pharynx: Oropharynx is clear.   Eyes:      Extraocular Movements: Extraocular movements intact.      Conjunctiva/sclera: Conjunctivae normal.   Cardiovascular:      Rate and Rhythm: Normal rate and regular rhythm.      Pulses: Normal pulses.      Heart sounds: Normal heart sounds.      Comments: S1, S2 audible  Pulmonary:      Effort: Pulmonary effort is normal.      Breath sounds: Normal breath sounds.      Comments: On room air   Abdominal:      General: Abdomen is flat.   Musculoskeletal:         General: Normal range of motion.      Cervical back: Normal range of motion.   Skin:     General: Skin is warm and dry.   Neurological:      General: No focal deficit present.      Mental Status: She is alert and oriented to person, place, and time. Mental status is at baseline.   Psychiatric:         Mood and Affect: Mood normal.         Behavior: Behavior normal.         Thought Content: Thought content normal.         Judgment: Judgment normal.                Physical Exam   Result Review :             Procedures       Assessment and Plan     Diagnoses and all orders for this visit:    1. Primary hypertension (Primary)  Assessment & Plan:  BP: 152/86    She has not taken her BP medication this morning    Encourage to keep BP log over next 7 days       2. Non-recurrent acute serous otitis media of left ear  Assessment & Plan:  Left ear pain-She reports this started last Thursday. She went to immediate care on Saturday. She was given steroid and antibiotics. She started to cough last night. She is currently on oral medication and ear drops. She reports nothing has helped yet. She is also on steroids. She has had some mucus and it is white. She has not been around anyone sick. She has not checked her temperature, but has felt like she has had a fever. Denies any ear drainage. She reports her ear feel clogged.     Continue medrol dose pack, ear drops, and oral antibiotics                 Follow  Up   Return for Recheck, BP check .   Patient was given instructions and counseling regarding her condition or for health maintenance advice. Please see specific information pulled into the AVS if appropriate.

## 2025-02-10 NOTE — ASSESSMENT & PLAN NOTE
BP: 152/86    She has not taken her BP medication this morning    Encourage to keep BP log over next 7 days

## 2025-02-17 ENCOUNTER — CLINICAL SUPPORT (OUTPATIENT)
Dept: FAMILY MEDICINE CLINIC | Facility: CLINIC | Age: 70
End: 2025-02-17
Payer: MEDICARE

## 2025-02-17 VITALS — SYSTOLIC BLOOD PRESSURE: 150 MMHG | DIASTOLIC BLOOD PRESSURE: 71 MMHG | HEART RATE: 85 BPM

## 2025-02-17 RX ORDER — VALSARTAN 40 MG/1
40 TABLET ORAL DAILY
Qty: 90 TABLET | Refills: 0 | Status: SHIPPED | OUTPATIENT
Start: 2025-02-17

## 2025-02-21 ENCOUNTER — OFFICE VISIT (OUTPATIENT)
Dept: FAMILY MEDICINE CLINIC | Facility: CLINIC | Age: 70
End: 2025-02-21
Payer: MEDICARE

## 2025-02-21 VITALS
BODY MASS INDEX: 26.62 KG/M2 | RESPIRATION RATE: 18 BRPM | SYSTOLIC BLOOD PRESSURE: 156 MMHG | DIASTOLIC BLOOD PRESSURE: 78 MMHG | OXYGEN SATURATION: 95 % | WEIGHT: 141 LBS | HEART RATE: 85 BPM | HEIGHT: 61 IN | TEMPERATURE: 98 F

## 2025-02-21 DIAGNOSIS — M89.9 DISORDER OF BONE, UNSPECIFIED: ICD-10-CM

## 2025-02-21 DIAGNOSIS — I10 BENIGN ESSENTIAL HYPERTENSION: ICD-10-CM

## 2025-02-21 DIAGNOSIS — H92.02 LEFT EAR PAIN: ICD-10-CM

## 2025-02-21 DIAGNOSIS — R79.89 LOW VITAMIN D LEVEL: ICD-10-CM

## 2025-02-21 DIAGNOSIS — N76.0 VAGINITIS AND VULVOVAGINITIS: ICD-10-CM

## 2025-02-21 DIAGNOSIS — T17.308A CHOKING, INITIAL ENCOUNTER: Primary | ICD-10-CM

## 2025-02-21 DIAGNOSIS — E11.9 TYPE 2 DIABETES MELLITUS WITHOUT COMPLICATION, WITHOUT LONG-TERM CURRENT USE OF INSULIN: ICD-10-CM

## 2025-02-21 DIAGNOSIS — E78.2 MIXED HYPERLIPIDEMIA: ICD-10-CM

## 2025-02-21 RX ORDER — CYCLOBENZAPRINE HCL 10 MG
10 TABLET ORAL NIGHTLY PRN
Qty: 30 TABLET | Refills: 0 | Status: SHIPPED | OUTPATIENT
Start: 2025-02-21

## 2025-02-21 RX ORDER — GUAIFENESIN 600 MG/1
1200 TABLET, EXTENDED RELEASE ORAL 2 TIMES DAILY
Start: 2025-02-21

## 2025-02-21 RX ORDER — CONJUGATED ESTROGENS 0.62 MG/G
CREAM VAGINAL
Qty: 4 G | Refills: 0 | COMMUNITY
Start: 2025-02-21

## 2025-02-21 NOTE — PROGRESS NOTES
Subjective   Darleen Stallings is a 69 y.o. female.     Chief Complaint   Patient presents with    Earache     Ear pain    Hypertension         Current Outpatient Medications:     aspirin 81 MG tablet, Take 1 tablet by mouth Daily., Disp: , Rfl:     atorvastatin (LIPITOR) 80 MG tablet, Take 1 tablet by mouth Daily., Disp: 90 tablet, Rfl: 2    Blood Glucose Monitoring Suppl (Accu-Chek Guide) w/Device kit, 1 kit Daily., Disp: 1 kit, Rfl: 0    Continuous Blood Gluc  (Dexcom G7 ) device, Use 1 package Daily., Disp: 1 each, Rfl: 0    Continuous Blood Gluc Sensor (Dexcom G7 Sensor) misc, Use 1 package Daily., Disp: 9 each, Rfl: 3    CRANBERRY PO, Daily As Needed. 1 po qd, Disp: , Rfl:     Crisaborole (Eucrisa) 2 % ointment, Apply 1 application  topically 2 (Two) Times a Day. Apply to Rt upper eyelid, Disp: 60 g, Rfl: 0    cyclobenzaprine (FLEXERIL) 10 MG tablet, Take 1 tablet by mouth At Night As Needed for Muscle Spasms., Disp: 30 tablet, Rfl: 0    empagliflozin (Jardiance) 25 MG tablet tablet, Take 1 tablet by mouth Daily., Disp: 90 tablet, Rfl: 1    fenofibrate 160 MG tablet, Take 1 tablet by mouth Daily., Disp: 90 tablet, Rfl: 2    Fluticasone-Umeclidin-Vilant (Trelegy Ellipta) 200-62.5-25 MCG/ACT inhaler, Inhale 1 puff Daily., Disp: 60 each, Rfl: 3    gabapentin (NEURONTIN) 300 MG capsule, Take 1 capsule by mouth 3 (Three) Times a Day., Disp: , Rfl:     ibandronate (Boniva) 150 MG tablet, Take 1 tablet by mouth Every 30 (Thirty) Days., Disp: 3 tablet, Rfl: 3    ibuprofen (ADVIL,MOTRIN) 800 MG tablet, TAKE 1 TABLET BY MOUTH 2 TIMES A DAY AS NEEDED FOR MODERATE PAIN, Disp: 60 tablet, Rfl: 0    ipratropium-albuterol (Combivent Respimat)  MCG/ACT inhaler, INHALE 1 PUFF BY MOUTH 4 TIMES A DAY AS NEEDED FOR WHEEZING, Disp: 4 g, Rfl: 2    ipratropium-albuterol (DUO-NEB) 0.5-2.5 mg/3 ml nebulizer, Take 3 mL by nebulization Every 6 (Six) Hours As Needed for Wheezing or Shortness of Air., Disp: 360 mL,  Rfl: 1    meclizine (ANTIVERT) 12.5 MG tablet, 1-2 po q6 hrs prn vertigo, Disp: 30 tablet, Rfl: 0    metFORMIN ER (GLUCOPHAGE-XR) 500 MG 24 hr tablet, 1 po bid w/ meals, Disp: 120 tablet, Rfl: 2    methylPREDNISolone (MEDROL) 4 MG dose pack, Take as directed on package instructions., Disp: 21 tablet, Rfl: 0    nitroglycerin (NITROSTAT) 0.4 MG SL tablet, Place 1 tablet under the tongue Every 5 (Five) Minutes As Needed for Chest Pain. Take no more than 3 doses in 15 minutes., Disp: 25 tablet, Rfl: 2    ofloxacin (OCUFLOX) 0.3 %, Use 10 gtts in affected ear once daily x 7 days, Disp: 5 mL, Rfl: 0    omeprazole (priLOSEC) 40 MG capsule, TAKE 1 CAPSULE BY MOUTH EVERY DAY, Disp: 90 capsule, Rfl: 3    silver sulfadiazine (Silvadene) 1 % cream, Apply 1 Application topically to the appropriate area as directed Daily. Apply to blistered areas of digits, Disp: 50 g, Rfl: 0    valsartan (Diovan) 40 MG tablet, Take 1 tablet by mouth Daily., Disp: 90 tablet, Rfl: 0    vitamin D (ERGOCALCIFEROL) 1.25 MG (16062 UT) capsule capsule, Take 1 capsule by mouth 1 (One) Time Per Week., Disp: , Rfl:     ALPRAZolam (XANAX) 0.25 MG tablet, TAKE 1 TABLET BY MOUTH EVERY DAY AS NEEDED FOR SLEEP FOR ANXIETY, Disp: 25 tablet, Rfl: 0    amLODIPine (NORVASC) 5 MG tablet, TAKE 1 TABLET BY MOUTH EVERY DAY, Disp: 90 tablet, Rfl: 1    citalopram (CeleXA) 20 MG tablet, Take 1 tablet by mouth Daily., Disp: 30 tablet, Rfl: 0    Estrogens Conjugated (Premarin) 0.625 MG/GM vaginal cream, Apply to painful area qhs x 2 weeks then goto 2 times a week, Disp: 4 g, Rfl: 0    guaiFENesin (MUCINEX) 600 MG 12 hr tablet, Take 2 tablets by mouth 2 (Two) Times a Day., Disp: , Rfl:     traMADol (ULTRAM) 50 MG tablet, Take 1 tablet by mouth Every 8 (Eight) Hours As Needed for Moderate Pain., Disp: 60 tablet, Rfl: 0    Past Medical History:   Diagnosis Date    CAD     Cervical disc disorder 2019    After fall    CHOL     Chronic pain disorder 2020    After fall down  stairs    COPD     Dementia     Depression     DEXA     = (1.1/ -2.3)    DMII     Extremity pain 2019    Maybe longer    Fractures     GERD     Hypertension     Joint pain 2020    Low back pain 2019    After fall    MAMMO     NEG = 2023/     Myocardial infarction     Neck pain 2015    Maybe longer    Neuropathy in diabetes 2018    Osteoarthritis     Diagnosed    PAP     NEG =    Spinal stenosis 2019    Tremor        Past Surgical History:   Procedure Laterality Date    CATARACT EXTRACTION W/ INTRAOCULAR LENS  IMPLANT, BILATERAL      COLONOSCOPY      = NEG, rech    GSI    COMPRESSION HIP SCREW Right 2022    Procedure: HIP COMPRESSION SCREW, right [place] hip screw from [place];  Surgeon: Loco Cortes MD;  Location: [facility] MAIN OR;  Service: Orthopedics;  Laterality: Right;    CORONARY ARTERY BYPASS GRAFT      x 3    FRACTURE SURGERY      TONSILLECTOMY      TUBAL ABDOMINAL LIGATION         Family History   Problem Relation Age of Onset    Stroke Mother     COPD Mother     Hypertension Mother     Tremor Father     Heart disease Father         CHF    COPD Father     Depression Father     Tremor Sister     Fibromyalgia Sister     Hypertension Sister     Post-traumatic stress disorder Sister     Heart disease Sister     Hypertension Sister     Depression Sister     Tremor Brother     COPD Brother     Depression Brother     Hypertension Brother     Cancer Brother         Undiagnosed lung cancer    Tremor Paternal Grandmother     Depression Sister     Hypertension Brother        Social History     Socioeconomic History    Marital status: Single   Tobacco Use    Smoking status: Every Day     Current packs/day: 0.00     Average packs/day: 0.5 packs/day for 48.0 years (24.0 ttl pk-yrs)     Types: Cigarettes     Start date: 1975     Last attempt to quit:      Years since quittin.2     Passive exposure: Past    Smokeless tobacco: Never    Tobacco comments:     Started when i  was 18 quit a few times. Used vape but i think it gave me pneumonia   Vaping Use    Vaping status: Every Day    Substances: Nicotine   Substance and Sexual Activity    Alcohol use: No    Drug use: No    Sexual activity: Not Currently     Partners: Male     Birth control/protection: None     Comment: [place] to get pregnant..hahaa!       History of Present Illness  The patient is a 69-year-old female who presents for evaluation of left ear pain, choking, vaginal soreness, neck pain, and blood pressure management.    She reports persistent left ear pain, which she describes as a sensation of being struck in the ear, resulting in soreness. The pain has improved since her last visit on Monday but remains present. She sought immediate care at [place], where she was diagnosed with an ear infection and prescribed Augmentin and Medrol Dosepak. She did not receive the steroids until Monday. She also received a Toradol injection, which did not provide relief. She notes that the steroids have been more effective in alleviating her symptoms than the antibiotics.    She expresses concern about her throat, reporting frequent choking episodes accompanied by coughing up phlegm. She has been using dentures and suspects they may be contributing to her choking. She has been taking Mucinex 400 mg once daily, which she finds beneficial. She also reports increased throat clearing and acid reflux symptoms, for which she has been taking Prilosec. She has been avoiding soda due to associated stomach discomfort. She has been taking ibuprofen with food and reports difficulty chewing. She has been consuming chicken rice soup, which she finds easier to swallow. She recalls undergoing a video swallow study in 2000.    She reports frequent urination and subsequent soreness. She has been using gentle soap and Vaseline for relief but finds them ineffective. She does not have any associated rash.    She has been experiencing neck pain and difficulty  sleeping. She has previously found Flexeril beneficial and is requesting a refill. She has an upcoming appointment with pain management.    She came in on Monday for a blood pressure check, but it was high, and a new blood pressure pill was prescribed, but she has not picked it up yet. She is driving.    Supplemental Information  She has a history of open heart surgery and tubal ligation. She has an appointment with Dr. Kent on 03/03/2025 regarding her head surgery.    FAMILY HISTORY  Her sister had vulvar cancer.    ALLERGIES  She is allergic to ACE INHIBITORS, PREGABALIN (LYRICA), ARICEPT, PAXIL, and CODEINE.    MEDICATIONS  Current: Augmentin, Medrol Dosepak, Prilosec, ibuprofen, Mucinex, Flexeril (as needed).    IMMUNIZATIONS  She has not received the RSV vaccine yet.        The following portions of the patient's history were reviewed and updated as appropriate: allergies, current medications, past family history, past medical history, past social history, past surgical history and problem list.    Review of Systems   Constitutional:  Positive for appetite change. Negative for activity change, fatigue, unexpected weight gain and unexpected weight loss.   HENT:  Positive for dental problem, ear pain and trouble swallowing.    Respiratory:  Positive for choking. Negative for cough, chest tightness, shortness of breath and wheezing.    Cardiovascular:  Negative for chest pain, palpitations and leg swelling.   Gastrointestinal:  Positive for GERD. Negative for nausea and vomiting.   Genitourinary:  Positive for dysuria and frequency. Negative for urgency and urinary incontinence.   Musculoskeletal:  Positive for neck pain. Negative for arthralgias and myalgias.   Skin:  Negative for rash and wound.   Neurological:  Negative for dizziness, facial asymmetry, speech difficulty, weakness, light-headedness, headache, memory problem and confusion.   Psychiatric/Behavioral:  Positive for sleep disturbance.        Vitals:     02/21/25 1414   BP: 156/78   Pulse: 85   Resp: 18   Temp: 98 °F (36.7 °C)   SpO2: 95%       Objective   Physical Exam  Vitals and nursing note reviewed.   Constitutional:       General: She is not in acute distress.     Appearance: She is not ill-appearing or toxic-appearing.   HENT:      Right Ear: Tympanic membrane, ear canal and external ear normal. There is no impacted cerumen.      Left Ear: Tympanic membrane, ear canal and external ear normal. There is no impacted cerumen.   Pulmonary:      Effort: Pulmonary effort is normal.   Neurological:      Mental Status: She is alert and oriented to person, place, and time.      Cranial Nerves: No cranial nerve deficit.   Psychiatric:         Mood and Affect: Mood normal.         Behavior: Behavior normal.         Thought Content: Thought content normal.         Judgment: Judgment normal.       Physical Exam  Vital Signs  Blood pressure = 156/78.   Assessment & Plan   Diagnoses and all orders for this visit:    1. Choking, initial encounter (Primary)  -     FL Video Swallow With Speech Single Contrast; Future    2. Left ear pain    3. Vaginitis and vulvovaginitis    4. Benign essential hypertension  -     Comprehensive Metabolic Panel; Future    5. Mixed hyperlipidemia  -     Lipid Panel; Future    6. Type 2 diabetes mellitus without complication, without long-term current use of insulin  -     Hemoglobin A1c; Future  -     Microalbumin / Creatinine Urine Ratio - Urine, Clean Catch; Future  -     MicroAlbumin, Urine, Random - Urine, Clean Catch; Future    7. Low vitamin D level  -     Vitamin D,25-Hydroxy; Future    8. Disorder of bone, unspecified  -     Vitamin D,25-Hydroxy; Future    Other orders  -     guaiFENesin (MUCINEX) 600 MG 12 hr tablet; Take 2 tablets by mouth 2 (Two) Times a Day.  -     Estrogens Conjugated (Premarin) 0.625 MG/GM vaginal cream; Apply to painful area qhs x 2 weeks then goto 2 times a week  Dispense: 4 g; Refill: 0  -     cyclobenzaprine  (FLEXERIL) 10 MG tablet; Take 1 tablet by mouth At Night As Needed for Muscle Spasms.  Dispense: 30 tablet; Refill: 0  -     Shingrix Vaccine; Future      Assessment & Plan  1. Left ear pain.  The patient's left ear pain has shown improvement with the administration of steroids, suggesting an inflammatory etiology. She will continue her current steroid regimen (Medrol Dosepak) and complete the course.    2. Choking.  The patient's choking episodes may be attributed to acid reflux, which could potentially lead to scarring of the esophagus, thereby impeding food passage. A video swallow study will be ordered to further investigate the cause of her choking. She is advised to avoid lying down immediately after eating and to maintain an upright position for a couple of hours post meals. She is also advised to avoid foods that are high in acid.    3. Vaginal soreness.  The patient's vaginal soreness may be due to hormonal deficiency. She will be provided with a sample of Premarin cream, which she is instructed to apply to the affected area nightly for a duration of 2 weeks. Subsequently, she can reduce the application frequency to twice weekly. She is also advised to use gentle, unscented soap for personal hygiene. If the Premarin cream does not alleviate her symptoms, a physical examination will be conducted to assess for potential vaginal prolapse.    4. Neck pain.  A prescription for Flexeril will be sent to her pharmacy, [facility], to manage her neck pain.    5. Blood pressure management.  She is advised to commence her valsartan 40 mg regimen and to monitor her blood pressure at home. She can also come in for a nurse appointment to get her blood pressure checked.    6. Health maintenance.  She is scheduled for fasting labs in April 2024. She is also due for her shingles vaccine, which will be administered at her pharmacy. The pneumonia vaccine will be considered later this year.    Follow-up  The patient is scheduled for  a follow-up visit in July 2024.    PROCEDURE  The patient received a Toradol injection during her immediate care visit at [facility].     Results            Patient or patient representative verbalized consent for the use of Ambient Listening during the visit with  Elen Jameson DO for chart documentation. 3/16/2025  14:49 EST

## 2025-02-24 DIAGNOSIS — M54.50 ACUTE BILATERAL LOW BACK PAIN WITHOUT SCIATICA: ICD-10-CM

## 2025-02-25 DIAGNOSIS — M54.50 ACUTE BILATERAL LOW BACK PAIN WITHOUT SCIATICA: ICD-10-CM

## 2025-02-25 RX ORDER — TRAMADOL HYDROCHLORIDE 50 MG/1
50 TABLET ORAL EVERY 8 HOURS PRN
Qty: 90 TABLET | Refills: 0 | OUTPATIENT
Start: 2025-02-25

## 2025-02-25 RX ORDER — TRAMADOL HYDROCHLORIDE 50 MG/1
50 TABLET ORAL EVERY 8 HOURS PRN
Qty: 60 TABLET | Refills: 0 | Status: SHIPPED | OUTPATIENT
Start: 2025-02-25

## 2025-02-26 DIAGNOSIS — M54.50 ACUTE BILATERAL LOW BACK PAIN WITHOUT SCIATICA: ICD-10-CM

## 2025-02-26 RX ORDER — GUAIFENESIN 600 MG/1
1200 TABLET, EXTENDED RELEASE ORAL 2 TIMES DAILY
Status: CANCELLED
Start: 2025-02-26

## 2025-02-26 RX ORDER — TRAMADOL HYDROCHLORIDE 50 MG/1
50 TABLET ORAL EVERY 8 HOURS PRN
Qty: 60 TABLET | Refills: 0 | Status: CANCELLED | OUTPATIENT
Start: 2025-02-26

## 2025-02-27 RX ORDER — CITALOPRAM HYDROBROMIDE 20 MG/1
20 TABLET ORAL DAILY
Qty: 30 TABLET | Refills: 0 | Status: SHIPPED | OUTPATIENT
Start: 2025-02-27

## 2025-03-10 RX ORDER — AMLODIPINE BESYLATE 5 MG/1
5 TABLET ORAL DAILY
Qty: 90 TABLET | Refills: 1 | Status: SHIPPED | OUTPATIENT
Start: 2025-03-10

## 2025-03-12 ENCOUNTER — HOSPITAL ENCOUNTER (OUTPATIENT)
Dept: GENERAL RADIOLOGY | Facility: HOSPITAL | Age: 70
Discharge: HOME | End: 2025-03-12
Admitting: FAMILY MEDICINE
Payer: MEDICARE

## 2025-03-12 DIAGNOSIS — F41.1 GENERALIZED ANXIETY DISORDER: ICD-10-CM

## 2025-03-12 DIAGNOSIS — T17.308A CHOKING, INITIAL ENCOUNTER: ICD-10-CM

## 2025-03-12 PROCEDURE — 92611 MOTION FLUOROSCOPY/SWALLOW: CPT

## 2025-03-12 PROCEDURE — 74230 X-RAY XM SWLNG FUNCJ C+: CPT

## 2025-03-12 RX ORDER — ALPRAZOLAM 0.25 MG
TABLET ORAL
Qty: 25 TABLET | Refills: 0 | Status: SHIPPED | OUTPATIENT
Start: 2025-03-12

## 2025-03-12 RX ADMIN — BARIUM SULFATE 50 ML: 400 SUSPENSION ORAL at 10:04

## 2025-03-12 NOTE — TELEPHONE ENCOUNTER
[facility]    Rx Refill Note  Requested Prescriptions     Pending Prescriptions Disp Refills    ALPRAZolam (XANAX) 0.25 MG tablet [Pharmacy Med Name: ALPRAZolam Oral Tablet 0.25 MG] 25 tablet 0     Sig: TAKE 1 TABLET BY MOUTH EVERY DAY AS NEEDED FOR SLEEP FOR ANXIETY      Last office visit with prescribing clinician: 2/21/2025   Last telemedicine visit with prescribing clinician: Visit date not found   Next office visit with prescribing clinician: 7/17/2025                         Would you like a call back once the refill request has been completed: [] Yes [] No    If the office needs to give you a call back, can they leave a voicemail: [] Yes [] No    Radha Henry, RT  03/12/25, 11:51 EDT

## 2025-03-12 NOTE — MBS/VFSS/FEES
Outpatient Speech Language Pathology   Adult Swallow Initial Evaluation   [place]     Patient Name: Darleen Stallings  : 1955  MRN: [id number]  Today's Date: 3/12/2025         Visit Date: 2025   Patient Active Problem List   Diagnosis    S/P CABG x 3--Dr. Maria 2019    Benign essential hypertension    Confusion    Coronary artery disease    Depression    Generalized anxiety disorder    Gout    Hyperlipidemia    Essential tremor    Exertional dyspnea    Tobacco abuse    Diabetes mellitus    Visual changes    Memory loss    Chest pain    Urinary tract infection without hematuria    Nondisplaced fracture of base of neck of right femur, initial encounter for closed fracture    COPD    GERD (gastroesophageal reflux disease)    Concussion without loss of consciousness, initial encounter    Arthritis    Heart disease    High blood pressure    Hemorrhoids    Oropharyngeal dysphagia    Other fecal abnormalities    Polyp of colon    Pure hypercholesterolemia    Second degree hemorrhoids    Encounter for screening for malignant neoplasm of colon    Back pain    Chronic obstructive pulmonary disease, unspecified    Non-recurrent acute serous otitis media of left ear        Past Medical History:   Diagnosis Date    CAD     Cervical disc disorder 2019    After fall    CHOL     Chronic pain disorder 2020    After fall down stairs    COPD     Dementia     Depression     DEXA     = (1.1/ -2.3)    DMII     Extremity pain 2019    Maybe longer    Fractures     GERD     Hypertension     Joint pain 2020    Low back pain 2019    After fall    MAMMO     NEG = 2024    Myocardial infarction     Neck pain 2015    Maybe longer    Neuropathy in diabetes 2018    Osteoarthritis     Diagnosed    PAP     NEG =    Spinal stenosis 2019    Tremor         Past Surgical History:   Procedure Laterality Date    CATARACT EXTRACTION W/ INTRAOCULAR LENS  IMPLANT, BILATERAL      COLONOSCOPY      = NEG, rech  "2026   GSI    COMPRESSION HIP SCREW Right 03/25/2022    Procedure: HIP COMPRESSION SCREW, right [place] hip screw from [place];  Surgeon: Loco Cortes MD;  Location: [facility] MAIN OR;  Service: Orthopedics;  Laterality: Right;    CORONARY ARTERY BYPASS GRAFT      x 3    FRACTURE SURGERY      TONSILLECTOMY      TUBAL ABDOMINAL LIGATION         Visit Dx:     ICD-10-CM ICD-9-CM   1. Choking, initial encounter  T17.308A 933.1      SLP Adult Swallow Evaluation       Row Name 03/12/25 0945       Rehab Evaluation    Document Type evaluation  -PF    Subjective Information no complaints  -PF    Patient Observations alert;cooperative  -PF    Patient Effort excellent  -PF    Symptoms Noted During/After Treatment none  -PF       General Information    Patient Profile Reviewed yes  -PF    Pertinent History Of Current Problem Darleen Stallings is a 69 y.o. female, who presented to [facility] for VFSS today. PMHx of s/p CABGx3, HBP, coronary heart disease, anxiety, essential tremor, diabetes, tobacco abuse, COPD, reflux, and high cholesterol. She was referred by Elen Jameson DO (PCP). Pt reported \"I get choked up when I eat and drink\". Additionally, she reported reflux issues and \"I cough up phlegm and clear my throat a lot.\" Baseline diet: regular and thin liquids. -PF    Precautions/Limitations, Vision WFL;for purposes of eval  -PF    Precautions/Limitations, Hearing WFL;for purposes of eval  -PF    Prior Level of Function-Communication WFL  -PF    Prior Level of Function-Swallowing no diet consistency restrictions  -PF    Plans/Goals Discussed with patient  -PF       Pain    Pretreatment Pain Rating 0/10 - no pain  -PF    Posttreatment Pain Rating 0/10 - no pain  -PF       Oral Motor Structure and Function    Dentition Assessment upper dentures/partial in place  lower edentulous  -PF    Secretion Management WNL/WFL  -PF    Mucosal Quality moist, healthy  -PF       Oral Musculature and Cranial Nerve " "Assessment    Oral Motor General Assessment generalized oral motor weakness  -PF       General Eating/Swallowing Observations    Respiratory Support Currently in Use room air  -PF    Eating/Swallowing Skills self-fed  -PF    Positioning During Eating upright 90 degree  -PF    Utensils Used spoon;cup  -PF       MBS/VFSS    Utensils Used spoon;cup  -PF    Consistencies Trialed nectar/syrup-thick liquids;thin liquids;pureed;mixed consistency;mechanical ground textures;regular textures  -PF       MBS/VFSS Interpretation    VFSS Summary VFSS completed this date. The patient was standing upright at a 90-degree angle and viewed in the lateral projection. Pt self-fed all trials as provided by SLP respectively: NT by cup x2, puree (applesauce) x2, mech soft/mixed consistency (peaches) x2, regular (cracker) x1, thin by cup x2 (single and sequential sips), and esophageal scan to complete the study.     Complete labial seal. Premature spillage to the BOT on puree and to the pyriform sinus on thin, mech soft, and NT. Extended and mildly disorganized mastication on regular solid and more adequate on mech soft solid. Intermittent tremulous lingual movement. Oral transit and A-P propulsion reduced. Reduced laryngeal elevation and forward hyoid movement.  Min to mod vallecular and pharyngeal residue noted following majority of consistencies assessed. Anatomical variation noted along PPW between C3-4, which material appeared to frequently become \"hung up\" along this anatomical variation (see image below). Epiglottic inversion complete and laryngeal vestibular closure adequate on NT, puree, mech soft, regular, and single sip of thin by cup. No penetration or aspiration. Material does not enter airway (a 1 on the Rosenbeck Penetration-Aspiration Scale). On large and sequential sip of thin by cup, pt demonstrated deep penetration above the vocal folds, which mostly clears after subsequent swallow. No penetration or aspiration observed " when pt modified bolus size to single cup sip of thin liquid. Esophageal scan revealed mod esophageal retention in mid and lower esophagus at the end of the study (see image below).     VFSS findings via PH[facility] images were reviewed and discussed w/ pt. She demonstrated and verbalized understanding of all discussed and recommendations.     RECOMMENDATIONS & REFERRALS:     - Mech soft and thin liquids, utilize extra sauces/gravies, double swallow     - General aspiration and reflux precautions     - Meds: whole or crushed in puree     - Safe swallow strategies: upright during all PO/meds, chew thoroughly, slow rate of intake, small bites/sips, alternate liquids and solids, avoid dry/hard foods     - Pt may benefit from further GI workup d/t  possible tightening of UES, mod esophageal retention/decreased emptying (see image below) observed at the end of the study     - ENT referral to further assess cricopharyngeal bar (see image below)      Anatomical variation noted along PPW between C3-4        Possible tightening of UES        CP Bar        Esophageal retention and decreased esophageal emptying     -PF       SLP Evaluation Clinical Impression    SLP Swallowing Diagnosis --  mild/mod oral dysphagia and grossly WFL pharyngeal swallow  -PF    Functional Impact risk of aspiration/pneumonia  -PF       Recommendations    Therapy Frequency (Swallow) evaluation only  -PF    SLP Diet Recommendation mechanical ground textures;thin liquids  -PF    Recommended Diagnostics No further SLP services recommended  -PF    Recommended Precautions and Strategies upright posture during/after eating;small bites of food and sips of liquid;multiple swallows per bite of food;multiple swallows per sip of liquid;alternate between small bites of food and sips of liquid  -PF    Oral Care Recommendations Oral Care BID/PRN;Oral Care before breakfast, after meals and PRN  -PF    SLP Rec. for Method of Medication Administration meds whole;meds  crushed;with puree;as tolerated  -PF    Monitor for Signs of Aspiration yes;notify SLP if any concerns  -PF              User Key  (r) = Recorded By, (t) = Taken By, (c) = Cosigned By      Initials Name Provider Type    PF Fakto, [place], SLP Speech and Language Pathologist               JANE Xiong  3/12/2025

## 2025-03-20 RX ORDER — IBUPROFEN 800 MG/1
TABLET, FILM COATED ORAL
Qty: 60 TABLET | Refills: 0 | Status: SHIPPED | OUTPATIENT
Start: 2025-03-20

## 2025-03-20 RX ORDER — ERGOCALCIFEROL 1.25 MG/1
50000 CAPSULE, LIQUID FILLED ORAL WEEKLY
Qty: 12 CAPSULE | Refills: 0 | Status: SHIPPED | OUTPATIENT
Start: 2025-03-20

## 2025-03-20 RX ORDER — CYCLOBENZAPRINE HCL 10 MG
10 TABLET ORAL NIGHTLY PRN
Qty: 30 TABLET | Refills: 0 | Status: SHIPPED | OUTPATIENT
Start: 2025-03-20

## 2025-03-26 ENCOUNTER — PRIOR AUTHORIZATION (OUTPATIENT)
Dept: FAMILY MEDICINE CLINIC | Facility: CLINIC | Age: 70
End: 2025-03-26
Payer: MEDICARE

## 2025-03-26 NOTE — TELEPHONE ENCOUNTER
HUB to read    PA approved for Cyclobenzaprine HCl 10MG tablets    PA approval was faxed to [facility] in [place]     Outcome  [facility] [employer] [facility]  PA Case: [id number], Status: Approved, Coverage Starts on: 1/1/2025 12:00:00 AM, Coverage Ends on: 3/26/2026 12:00:00 AM.  Effective Date: 1/1/2025  Authorization Expiration Date: 3/26/2026

## 2025-03-26 NOTE — TELEPHONE ENCOUNTER
HUB to read    PA was sent for Cyclobenzaprine HCl 10MG tablets    Waiting on the outcome from insurance.

## 2025-04-01 ENCOUNTER — OFFICE VISIT (OUTPATIENT)
Dept: FAMILY MEDICINE CLINIC | Facility: CLINIC | Age: 70
End: 2025-04-01
Payer: MEDICARE

## 2025-04-01 VITALS
DIASTOLIC BLOOD PRESSURE: 73 MMHG | BODY MASS INDEX: 27 KG/M2 | HEART RATE: 63 BPM | WEIGHT: 143 LBS | RESPIRATION RATE: 18 BRPM | SYSTOLIC BLOOD PRESSURE: 179 MMHG | OXYGEN SATURATION: 96 % | HEIGHT: 61 IN | TEMPERATURE: 98.6 F

## 2025-04-01 DIAGNOSIS — R13.10 ABNORMAL SWALLOWING: ICD-10-CM

## 2025-04-01 DIAGNOSIS — R13.14 PHARYNGOESOPHAGEAL DYSPHAGIA: ICD-10-CM

## 2025-04-01 DIAGNOSIS — J44.1 COPD WITH EXACERBATION: Primary | ICD-10-CM

## 2025-04-01 RX ORDER — METHYLPREDNISOLONE SODIUM SUCCINATE 40 MG/ML
80 INJECTION INTRAMUSCULAR; INTRAVENOUS ONCE
Status: COMPLETED | OUTPATIENT
Start: 2025-04-01 | End: 2025-04-01

## 2025-04-01 RX ORDER — FENOFIBRATE 160 MG/1
160 TABLET ORAL DAILY
Qty: 90 TABLET | Refills: 0 | Status: SHIPPED | OUTPATIENT
Start: 2025-04-01

## 2025-04-01 RX ORDER — ACYCLOVIR 400 MG/1
1 TABLET ORAL DAILY
Qty: 9 EACH | Refills: 3 | Status: ON HOLD | OUTPATIENT
Start: 2025-04-01 | End: 2025-04-09 | Stop reason: ALTCHOICE

## 2025-04-01 RX ORDER — IBANDRONATE SODIUM 150 MG/1
150 TABLET, FILM COATED ORAL
Qty: 3 TABLET | Refills: 3 | Status: SHIPPED | OUTPATIENT
Start: 2025-04-01

## 2025-04-01 RX ORDER — IPRATROPIUM BROMIDE AND ALBUTEROL SULFATE 2.5; .5 MG/3ML; MG/3ML
3 SOLUTION RESPIRATORY (INHALATION) EVERY 6 HOURS PRN
Qty: 360 ML | Refills: 1 | Status: SHIPPED | OUTPATIENT
Start: 2025-04-01 | End: 2025-04-13 | Stop reason: HOSPADM

## 2025-04-01 RX ORDER — METFORMIN HYDROCHLORIDE 500 MG/1
TABLET, EXTENDED RELEASE ORAL
Qty: 120 TABLET | Refills: 2 | Status: SHIPPED | OUTPATIENT
Start: 2025-04-01

## 2025-04-01 RX ORDER — PREDNISONE 20 MG/1
TABLET ORAL
Qty: 20 TABLET | Refills: 0 | Status: SHIPPED | OUTPATIENT
Start: 2025-04-01 | End: 2025-04-13 | Stop reason: HOSPADM

## 2025-04-01 RX ORDER — ATORVASTATIN CALCIUM 80 MG/1
80 TABLET, FILM COATED ORAL DAILY
Qty: 90 TABLET | Refills: 0 | Status: SHIPPED | OUTPATIENT
Start: 2025-04-01

## 2025-04-01 RX ORDER — FLUTICASONE FUROATE, UMECLIDINIUM BROMIDE AND VILANTEROL TRIFENATATE 200; 62.5; 25 UG/1; UG/1; UG/1
1 POWDER RESPIRATORY (INHALATION) DAILY
Qty: 60 EACH | Refills: 3 | Status: SHIPPED | OUTPATIENT
Start: 2025-04-01

## 2025-04-01 RX ADMIN — METHYLPREDNISOLONE SODIUM SUCCINATE 80 MG: 40 INJECTION INTRAMUSCULAR; INTRAVENOUS at 15:03

## 2025-04-01 NOTE — PROGRESS NOTES
Subjective   Darleen Stallings is a 69 y.o. female.     Chief Complaint   Patient presents with    Shortness of Breath     Shortness of breath and a little pain in her chest since last Thursday.    Injections     Patient was given a SoluMedrol 40 2ML to equal 80 total today        No current facility-administered medications for this visit.  No current outpatient medications on file.    Facility-Administered Medications Ordered in Other Visits:     acetaminophen (TYLENOL) tablet 650 mg, 650 mg, Oral, Q4H PRN, 650 mg at 04/08/25 1146 **OR** acetaminophen (TYLENOL) 160 MG/5ML oral solution 650 mg, 650 mg, Oral, Q4H PRN **OR** acetaminophen (TYLENOL) suppository 650 mg, 650 mg, Rectal, Q4H PRN, Luis Carlos Hay MD    amLODIPine (NORVASC) tablet 5 mg, 5 mg, Oral, Daily, Luis Carlos Hay MD, 5 mg at 04/10/25 1112    aspirin EC tablet 81 mg, 81 mg, Oral, Daily, Luis Carlos Hay MD, 81 mg at 04/10/25 1112    atorvastatin (LIPITOR) tablet 80 mg, 80 mg, Oral, Daily, Luis Carlos Hya MD, 80 mg at 04/10/25 1112    sennosides-docusate (PERICOLACE) 8.6-50 MG per tablet 2 tablet, 2 tablet, Oral, BID, 2 tablet at 04/10/25 1112 **AND** polyethylene glycol (MIRALAX) packet 17 g, 17 g, Oral, Daily PRN **AND** bisacodyl (DULCOLAX) EC tablet 5 mg, 5 mg, Oral, Daily PRN **AND** bisacodyl (DULCOLAX) suppository 10 mg, 10 mg, Rectal, Daily PRN, Luis Carlos Hay MD    budesonide (PULMICORT) nebulizer solution 0.5 mg, 0.5 mg, Nebulization, BID - RT, Bryon Gerber MD, 0.5 mg at 04/09/25 2035    calcium carbonate (TUMS) chewable tablet 500 mg (200 mg elemental), 2 tablet, Oral, TID PRN, Justyn Ramirez MD, 2 tablet at 04/09/25 1245    Calcium Replacement - Follow Nurse / BPA Driven Protocol, , Not Applicable, PRN, Luis Carlos Hay MD    citalopram (CeleXA) tablet 20 mg, 20 mg, Oral, Daily, Luis Carlos Hay MD, 20 mg at 04/10/25 1112    cyclobenzaprine (FLEXERIL) tablet 10 mg, 10 mg, Oral, Nightly PRN, Luis Carlos Hay MD, 10 mg  at 04/08/25 2021    dextrose (D50W) (25 g/50 mL) IV injection 25 g, 25 g, Intravenous, Q15 Min PRN, Luis Carlos Hay MD    dextrose (GLUTOSE) oral gel 15 g, 15 g, Oral, Q15 Min PRN, Luis Carlos Hay MD    empagliflozin (JARDIANCE) tablet 25 mg, 25 mg, Oral, Daily, Luis Carlos Hay MD, 25 mg at 04/10/25 1112    fluconazole (DIFLUCAN) tablet 100 mg, 100 mg, Oral, Q24H, DrawMarissa MD, 100 mg at 04/10/25 1112    glucagon (GLUCAGEN) injection 1 mg, 1 mg, Intramuscular, Q15 Min PRN, Luis Carlos Hay MD    guaiFENesin (MUCINEX) 12 hr tablet 1,200 mg, 1,200 mg, Oral, Q12H, Bryon Gerber MD, 1,200 mg at 04/10/25 1112    HYDROmorphone (DILAUDID) injection 0.5 mg, 0.5 mg, Intravenous, Q2H PRN, Luis Carlos Hay MD, 0.5 mg at 04/10/25 1127    insulin lispro (HUMALOG/ADMELOG) injection 2-7 Units, 2-7 Units, Subcutaneous, 4x Daily AC & at Bedtime, Luis Carlos Hay MD, 3 Units at 04/09/25 2129    ipratropium-albuterol (DUO-NEB) nebulizer solution 3 mL, 3 mL, Nebulization, 4x Daily - RT, Bryon Gerber MD, 3 mL at 04/10/25 1510    ipratropium-albuterol (DUO-NEB) nebulizer solution 3 mL, 3 mL, Nebulization, Q4H PRN, Екатерина Glover APRN, 3 mL at 04/09/25 0445    Magnesium Standard Dose Replacement - Follow Nurse / BPA Driven Protocol, , Not Applicable, PRN, Luis Carlos Hay MD    naloxone (NARCAN) injection 0.4 mg, 0.4 mg, Intravenous, PRN, Luis Carlos Hay MD    nicotine (NICODERM CQ) 21 MG/24HR patch 1 patch, 1 patch, Transdermal, Q24H, Justyn Ramirez MD, 1 patch at 04/10/25 1111    nitroglycerin (NITROSTAT) SL tablet 0.4 mg, 0.4 mg, Sublingual, Q5 Min PRN, Luis Carlos Hay MD    nystatin (MYCOSTATIN) 100,000 unit/mL suspension 500,000 Units, 5 mL, Swish & Swallow, 4x Daily, Draw, MD Marissa, 500,000 Units at 04/10/25 1803    ondansetron (ZOFRAN) injection 4 mg, 4 mg, Intravenous, Q6H PRN, Екатерина Glover APRN    oxyCODONE (ROXICODONE) immediate release tablet 5 mg, 5 mg, Oral, Q6H PRN, Ashley  Justyn NOEL MD    pantoprazole (PROTONIX) EC tablet 40 mg, 40 mg, Oral, Q AM, Luis Carlos Hay MD, 40 mg at 04/09/25 0556    Phosphorus Replacement - Follow Nurse / BPA Driven Protocol, , Not Applicable, Satnam BEST Nicholas T, MD    Potassium Replacement - Follow Nurse / BPA Driven Protocol, , Not Applicable, Satnam BEST Nicholas T, MD    predniSONE (DELTASONE) tablet 20 mg, 20 mg, Oral, Daily With Breakfast, Bryon Gerber MD, 20 mg at 04/10/25 1112    [COMPLETED] Insert Peripheral IV, , , Once **AND** sodium chloride 0.9 % flush 10 mL, 10 mL, Intravenous, PRN, Tonio Lucas MD    sulfamethoxazole-trimethoprim (BACTRIM DS,SEPTRA DS) 800-160 MG per tablet 1 tablet, 1 tablet, Oral, Q12H, Draw, MD Marissa, 1 tablet at 04/10/25 1112    [Held by provider] valsartan (DIOVAN) tablet 40 mg, 40 mg, Oral, Daily, Luis Carlos Hay MD    Past Medical History:   Diagnosis Date    CAD     Cervical disc disorder 2019    After fall    CHOL     Chronic pain disorder 2020    After fall down stairs    COPD     Dementia     Depression     DEXA     2023= (1.1/ -2.3)    DMII     Extremity pain 2019    Maybe longer    Fractures 2022    GERD     Hypertension     Joint pain 2020    Low back pain 2019    After fall    MAMMO     NEG = 2018/ 2023/ 2024    Myocardial infarction     Neck pain 2015    Maybe longer    Neuropathy in diabetes 2018    Osteoarthritis 2023    Diagnosed    PAP     NEG =2021    Spinal stenosis 2019    Tremor        Past Surgical History:   Procedure Laterality Date    CATARACT EXTRACTION W/ INTRAOCULAR LENS  IMPLANT, BILATERAL      COLONOSCOPY      2023= NEG, rech 2026   GSI    COMPRESSION HIP SCREW Right 03/25/2022    Procedure: HIP COMPRESSION SCREW, right Bertrand hip screw from [facility];  Surgeon: Loco Cortes MD;  Location: [facility] MAIN OR;  Service: Orthopedics;  Laterality: Right;    CORONARY ARTERY BYPASS GRAFT      x 3    FRACTURE SURGERY      TONSILLECTOMY      TUBAL ABDOMINAL LIGATION         Family History    Problem Relation Age of Onset    Stroke Mother     COPD Mother     Hypertension Mother     Tremor Father     Heart disease Father         CHF    COPD Father     Depression Father     Tremor Sister     Fibromyalgia Sister     Hypertension Sister     Post-traumatic stress disorder Sister     Heart disease Sister     Hypertension Sister     Depression Sister     Tremor Brother     COPD Brother     Depression Brother     Hypertension Brother     Cancer Brother         Undiagnosed lung cancer    Tremor Paternal Grandmother     Depression Sister     Hypertension Brother        Social History     Socioeconomic History    Marital status: Single   Tobacco Use    Smoking status: Every Day     Current packs/day: 0.00     Average packs/day: 0.5 packs/day for 48.0 years (24.0 ttl pk-yrs)     Types: Cigarettes     Start date: 1975     Last attempt to quit:      Years since quittin.2     Passive exposure: Past    Smokeless tobacco: Never    Tobacco comments:     Started when i was 18 quit a few times. Used vape but i think it gave me pneumonia   Vaping Use    Vaping status: Every Day    Substances: Nicotine   Substance and Sexual Activity    Alcohol use: No    Drug use: No    Sexual activity: Not Currently     Partners: Male     Birth control/protection: None     Comment: [place] to get pregnant..hahaa!       History of Present Illness  68 y/o C female here w/ c/o's ZULEIKA     Pt states she has been having more SOB for the last week or so despite using her daily and prn inhalers; pt states she also tried her nebulizer w/o success    Pt also states she is having some trouble swallowing and thinks she may need dilated        The following portions of the patient's history were reviewed and updated as appropriate: allergies, current medications, past family history, past medical history, past social history, past surgical history and problem list.    Review of Systems   Constitutional:  Positive for activity change.  Negative for appetite change, fever, unexpected weight gain and unexpected weight loss.   HENT:  Positive for trouble swallowing.    Respiratory:  Positive for shortness of breath and wheezing. Negative for cough.    Cardiovascular:  Positive for chest pain.   Gastrointestinal:  Negative for abdominal pain.       Vitals:    04/01/25 1414   BP: 179/73   Pulse: 63   Resp: 18   Temp: 98.6 °F (37 °C)   SpO2: 96%       Objective   Physical Exam  Vitals and nursing note reviewed.   Constitutional:       General: She is not in acute distress.     Appearance: She is well-developed. She is not ill-appearing or toxic-appearing.   HENT:      Head: Normocephalic and atraumatic.   Cardiovascular:      Rate and Rhythm: Normal rate and regular rhythm.      Heart sounds: Normal heart sounds. No murmur heard.  Pulmonary:      Effort: Pulmonary effort is normal.      Breath sounds: Decreased breath sounds present. No wheezing, rhonchi or rales.   Musculoskeletal:      Cervical back: Normal range of motion and neck supple.   Skin:     General: Skin is warm and dry.      Findings: No rash.   Neurological:      Mental Status: She is alert and oriented to person, place, and time.      Cranial Nerves: No cranial nerve deficit.   Psychiatric:         Attention and Perception: Attention and perception normal.         Mood and Affect: Mood and affect normal.         Speech: Speech normal.         Behavior: Behavior normal. Behavior is cooperative.         Thought Content: Thought content normal.         Cognition and Memory: Cognition and memory normal.         Judgment: Judgment normal.       Physical Exam      Assessment & Plan   Diagnoses and all orders for this visit:    1. COPD with exacerbation (Primary)  -     methylPREDNISolone sodium succinate (SOLU-Medrol) injection 80 mg    2. Pharyngoesophageal dysphagia  -     Ambulatory Referral to Gastroenterology    3. Abnormal swallowing  -     Ambulatory Referral to  Gastroenterology    Other orders  -     predniSONE (DELTASONE) 20 MG tablet; 3 po qam x 3 days then 2 po qam x 3 days then 1 po qam x 3 days then 1/2 tab po qam x 4 days  Dispense: 20 tablet; Refill: 0  -     Discontinue: Continuous Glucose Sensor (Dexcom G7 Sensor) misc; Use 1 package Daily.  Dispense: 9 each; Refill: 3  -     atorvastatin (LIPITOR) 80 MG tablet; Take 1 tablet by mouth Daily.  Dispense: 90 tablet; Refill: 0  -     fenofibrate 160 MG tablet; Take 1 tablet by mouth Daily.  Dispense: 90 tablet; Refill: 0  -     Fluticasone-Umeclidin-Vilant (Trelegy Ellipta) 200-62.5-25 MCG/ACT inhaler; Inhale 1 puff Daily.  Dispense: 60 each; Refill: 3  -     ibandronate (Boniva) 150 MG tablet; Take 1 tablet by mouth Every 30 (Thirty) Days.  Dispense: 3 tablet; Refill: 3  -     metFORMIN ER (GLUCOPHAGE-XR) 500 MG 24 hr tablet; 1 po bid w/ meals  Dispense: 120 tablet; Refill: 2  -     ipratropium-albuterol (DUO-NEB) 0.5-2.5 mg/3 ml nebulizer; Take 3 mL by nebulization Every 6 (Six) Hours As Needed for Wheezing or Shortness of Air.  Dispense: 360 mL; Refill: 1      Assessment & Plan       Results            Patient or patient representative verbalized consent for the use of Ambient Listening during the visit with  Elen Jameson DO for chart documentation. 4/10/2025  19:07 EDT

## 2025-04-03 ENCOUNTER — HOSPITAL ENCOUNTER (INPATIENT)
Facility: HOSPITAL | Age: 70
LOS: 2 days | Discharge: HOME | DRG: 200 | End: 2025-04-06
Attending: EMERGENCY MEDICINE | Admitting: STUDENT IN AN ORGANIZED HEALTH CARE EDUCATION/TRAINING PROGRAM
Payer: MEDICARE

## 2025-04-03 ENCOUNTER — APPOINTMENT (OUTPATIENT)
Dept: GENERAL RADIOLOGY | Facility: HOSPITAL | Age: 70
DRG: 200 | End: 2025-04-03
Payer: MEDICARE

## 2025-04-03 DIAGNOSIS — J93.83 SPONTANEOUS PNEUMOTHORAX: Primary | ICD-10-CM

## 2025-04-03 LAB
ANION GAP SERPL CALCULATED.3IONS-SCNC: 13.3 MMOL/L (ref 5–15)
ARTERIAL PATENCY WRIST A: POSITIVE
ATMOSPHERIC PRESS: ABNORMAL MM[HG]
B PARAPERT DNA SPEC QL NAA+PROBE: NOT DETECTED
B PERT DNA SPEC QL NAA+PROBE: NOT DETECTED
BASE EXCESS BLDA CALC-SCNC: -3.8 MMOL/L (ref 0–3)
BASOPHILS # BLD AUTO: 0.03 10*3/MM3 (ref 0–0.2)
BASOPHILS NFR BLD AUTO: 0.2 % (ref 0–1.5)
BDY SITE: ABNORMAL
BUN SERPL-MCNC: 37 MG/DL (ref 8–23)
BUN/CREAT SERPL: 37.8 (ref 7–25)
C PNEUM DNA NPH QL NAA+NON-PROBE: NOT DETECTED
CALCIUM SPEC-SCNC: 9.8 MG/DL (ref 8.6–10.5)
CHLORIDE SERPL-SCNC: 103 MMOL/L (ref 98–107)
CO2 BLDA-SCNC: 23.7 MMOL/L (ref 22–29)
CO2 SERPL-SCNC: 20.7 MMOL/L (ref 22–29)
CREAT SERPL-MCNC: 0.98 MG/DL (ref 0.57–1)
DEPRECATED RDW RBC AUTO: 41.2 FL (ref 37–54)
EGFRCR SERPLBLD CKD-EPI 2021: 63.4 ML/MIN/1.73
EOSINOPHIL # BLD AUTO: 0 10*3/MM3 (ref 0–0.4)
EOSINOPHIL NFR BLD AUTO: 0 % (ref 0.3–6.2)
ERYTHROCYTE [DISTWIDTH] IN BLOOD BY AUTOMATED COUNT: 12.8 % (ref 12.3–15.4)
FLUAV SUBTYP SPEC NAA+PROBE: NOT DETECTED
FLUBV RNA ISLT QL NAA+PROBE: NOT DETECTED
GLUCOSE SERPL-MCNC: 179 MG/DL (ref 65–99)
HADV DNA SPEC NAA+PROBE: NOT DETECTED
HCO3 BLDA-SCNC: 22.3 MMOL/L (ref 21–28)
HCOV 229E RNA SPEC QL NAA+PROBE: NOT DETECTED
HCOV HKU1 RNA SPEC QL NAA+PROBE: NOT DETECTED
HCOV NL63 RNA SPEC QL NAA+PROBE: NOT DETECTED
HCOV OC43 RNA SPEC QL NAA+PROBE: NOT DETECTED
HCT VFR BLD AUTO: 49.4 % (ref 34–46.6)
HEMODILUTION: NO
HGB BLD-MCNC: 15.8 G/DL (ref 12–15.9)
HMPV RNA NPH QL NAA+NON-PROBE: NOT DETECTED
HPIV1 RNA ISLT QL NAA+PROBE: NOT DETECTED
HPIV2 RNA SPEC QL NAA+PROBE: NOT DETECTED
HPIV3 RNA NPH QL NAA+PROBE: NOT DETECTED
HPIV4 P GENE NPH QL NAA+PROBE: NOT DETECTED
IMM GRANULOCYTES # BLD AUTO: 0.12 10*3/MM3 (ref 0–0.05)
IMM GRANULOCYTES NFR BLD AUTO: 0.7 % (ref 0–0.5)
INHALED O2 CONCENTRATION: 21 %
LYMPHOCYTES # BLD AUTO: 2.56 10*3/MM3 (ref 0.7–3.1)
LYMPHOCYTES NFR BLD AUTO: 15.1 % (ref 19.6–45.3)
M PNEUMO IGG SER IA-ACNC: NOT DETECTED
MCH RBC QN AUTO: 27.9 PG (ref 26.6–33)
MCHC RBC AUTO-ENTMCNC: 32 G/DL (ref 31.5–35.7)
MCV RBC AUTO: 87.3 FL (ref 79–97)
MODALITY: ABNORMAL
MONOCYTES # BLD AUTO: 1.24 10*3/MM3 (ref 0.1–0.9)
MONOCYTES NFR BLD AUTO: 7.3 % (ref 5–12)
NEUTROPHILS NFR BLD AUTO: 13.01 10*3/MM3 (ref 1.7–7)
NEUTROPHILS NFR BLD AUTO: 76.7 % (ref 42.7–76)
NRBC BLD AUTO-RTO: 0 /100 WBC (ref 0–0.2)
PCO2 BLDA: 43.2 MM HG (ref 35–48)
PH BLDA: 7.32 PH UNITS (ref 7.35–7.45)
PLATELET # BLD AUTO: 435 10*3/MM3 (ref 140–450)
PMV BLD AUTO: 9.6 FL (ref 6–12)
PO2 BLD: 296 MM[HG] (ref 0–500)
PO2 BLDA: 62.1 MM HG (ref 83–108)
POTASSIUM SERPL-SCNC: 4.2 MMOL/L (ref 3.5–5.2)
RBC # BLD AUTO: 5.66 10*6/MM3 (ref 3.77–5.28)
RHINOVIRUS RNA SPEC NAA+PROBE: NOT DETECTED
RSV RNA NPH QL NAA+NON-PROBE: DETECTED
SAO2 % BLDCOA: 89.4 % (ref 94–98)
SARS-COV-2 RNA RESP QL NAA+PROBE: NOT DETECTED
SODIUM SERPL-SCNC: 137 MMOL/L (ref 136–145)
WBC NRBC COR # BLD AUTO: 16.96 10*3/MM3 (ref 3.4–10.8)

## 2025-04-03 PROCEDURE — 36600 WITHDRAWAL OF ARTERIAL BLOOD: CPT | Performed by: EMERGENCY MEDICINE

## 2025-04-03 PROCEDURE — 82803 BLOOD GASES ANY COMBINATION: CPT | Performed by: EMERGENCY MEDICINE

## 2025-04-03 PROCEDURE — 99285 EMERGENCY DEPT VISIT HI MDM: CPT

## 2025-04-03 PROCEDURE — 25010000002 MIDAZOLAM PER 1 MG: Performed by: EMERGENCY MEDICINE

## 2025-04-03 PROCEDURE — 93005 ELECTROCARDIOGRAM TRACING: CPT | Performed by: EMERGENCY MEDICINE

## 2025-04-03 PROCEDURE — 25010000002 MORPHINE PER 10 MG: Performed by: EMERGENCY MEDICINE

## 2025-04-03 PROCEDURE — 25010000002 METHYLPREDNISOLONE PER 125 MG: Performed by: EMERGENCY MEDICINE

## 2025-04-03 PROCEDURE — 71045 X-RAY EXAM CHEST 1 VIEW: CPT

## 2025-04-03 PROCEDURE — 80048 BASIC METABOLIC PNL TOTAL CA: CPT | Performed by: EMERGENCY MEDICINE

## 2025-04-03 PROCEDURE — 0202U NFCT DS 22 TRGT SARS-COV-2: CPT | Performed by: EMERGENCY MEDICINE

## 2025-04-03 PROCEDURE — 85025 COMPLETE CBC W/AUTO DIFF WBC: CPT | Performed by: EMERGENCY MEDICINE

## 2025-04-03 PROCEDURE — 93005 ELECTROCARDIOGRAM TRACING: CPT

## 2025-04-03 RX ORDER — SODIUM CHLORIDE 0.9 % (FLUSH) 0.9 %
10 SYRINGE (ML) INJECTION AS NEEDED
Status: DISCONTINUED | OUTPATIENT
Start: 2025-04-03 | End: 2025-04-06 | Stop reason: HOSPADM

## 2025-04-03 RX ORDER — MIDAZOLAM HYDROCHLORIDE 1 MG/ML
2 INJECTION, SOLUTION INTRAMUSCULAR; INTRAVENOUS ONCE
Status: COMPLETED | OUTPATIENT
Start: 2025-04-03 | End: 2025-04-03

## 2025-04-03 RX ORDER — METHYLPREDNISOLONE SODIUM SUCCINATE 125 MG/2ML
125 INJECTION, POWDER, LYOPHILIZED, FOR SOLUTION INTRAMUSCULAR; INTRAVENOUS ONCE
Status: COMPLETED | OUTPATIENT
Start: 2025-04-03 | End: 2025-04-03

## 2025-04-03 RX ORDER — MORPHINE SULFATE 2 MG/ML
2 INJECTION, SOLUTION INTRAMUSCULAR; INTRAVENOUS ONCE
Status: COMPLETED | OUTPATIENT
Start: 2025-04-03 | End: 2025-04-03

## 2025-04-03 RX ORDER — IPRATROPIUM BROMIDE AND ALBUTEROL SULFATE 2.5; .5 MG/3ML; MG/3ML
3 SOLUTION RESPIRATORY (INHALATION) ONCE
Status: COMPLETED | OUTPATIENT
Start: 2025-04-03 | End: 2025-04-04

## 2025-04-03 RX ADMIN — MIDAZOLAM 2 MG: 1 INJECTION INTRAMUSCULAR; INTRAVENOUS at 23:48

## 2025-04-03 RX ADMIN — MORPHINE SULFATE 2 MG: 2 INJECTION, SOLUTION INTRAMUSCULAR; INTRAVENOUS at 23:48

## 2025-04-03 RX ADMIN — METHYLPREDNISOLONE SODIUM SUCCINATE 125 MG: 125 INJECTION, POWDER, FOR SOLUTION INTRAMUSCULAR; INTRAVENOUS at 22:58

## 2025-04-04 ENCOUNTER — APPOINTMENT (OUTPATIENT)
Dept: GENERAL RADIOLOGY | Facility: HOSPITAL | Age: 70
DRG: 200 | End: 2025-04-04
Payer: MEDICARE

## 2025-04-04 PROBLEM — J93.9 PNEUMOTHORAX: Status: ACTIVE | Noted: 2025-04-04

## 2025-04-04 LAB
GLUCOSE BLDC GLUCOMTR-MCNC: 159 MG/DL (ref 70–105)
GLUCOSE BLDC GLUCOMTR-MCNC: 236 MG/DL (ref 70–105)
GLUCOSE BLDC GLUCOMTR-MCNC: 245 MG/DL (ref 70–105)
GLUCOSE BLDC GLUCOMTR-MCNC: 281 MG/DL (ref 70–105)
GLUCOSE BLDC GLUCOMTR-MCNC: 303 MG/DL (ref 70–105)
QT INTERVAL: 375 MS
QTC INTERVAL: 432 MS

## 2025-04-04 PROCEDURE — 94761 N-INVAS EAR/PLS OXIMETRY MLT: CPT

## 2025-04-04 PROCEDURE — 94799 UNLISTED PULMONARY SVC/PX: CPT

## 2025-04-04 PROCEDURE — 25010000002 MORPHINE PER 10 MG: Performed by: NURSE PRACTITIONER

## 2025-04-04 PROCEDURE — 71045 X-RAY EXAM CHEST 1 VIEW: CPT

## 2025-04-04 PROCEDURE — 82948 REAGENT STRIP/BLOOD GLUCOSE: CPT

## 2025-04-04 PROCEDURE — 63710000001 INSULIN LISPRO (HUMAN) PER 5 UNITS: Performed by: NURSE PRACTITIONER

## 2025-04-04 PROCEDURE — 94664 DEMO&/EVAL PT USE INHALER: CPT

## 2025-04-04 PROCEDURE — 63710000001 PREDNISONE PER 1 MG: Performed by: NURSE PRACTITIONER

## 2025-04-04 PROCEDURE — 63710000001 PREDNISONE PER 1 MG: Performed by: INTERNAL MEDICINE

## 2025-04-04 PROCEDURE — 94640 AIRWAY INHALATION TREATMENT: CPT

## 2025-04-04 RX ORDER — IPRATROPIUM BROMIDE AND ALBUTEROL SULFATE 2.5; .5 MG/3ML; MG/3ML
3 SOLUTION RESPIRATORY (INHALATION)
Status: DISCONTINUED | OUTPATIENT
Start: 2025-04-04 | End: 2025-04-06 | Stop reason: HOSPADM

## 2025-04-04 RX ORDER — NICOTINE 21 MG/24HR
1 PATCH, TRANSDERMAL 24 HOURS TRANSDERMAL
Status: DISCONTINUED | OUTPATIENT
Start: 2025-04-04 | End: 2025-04-06 | Stop reason: HOSPADM

## 2025-04-04 RX ORDER — MORPHINE SULFATE 2 MG/ML
2 INJECTION, SOLUTION INTRAMUSCULAR; INTRAVENOUS
Status: DISCONTINUED | OUTPATIENT
Start: 2025-04-04 | End: 2025-04-04

## 2025-04-04 RX ORDER — ACETAMINOPHEN 325 MG/1
650 TABLET ORAL EVERY 4 HOURS PRN
Status: DISCONTINUED | OUTPATIENT
Start: 2025-04-04 | End: 2025-04-06 | Stop reason: HOSPADM

## 2025-04-04 RX ORDER — CITALOPRAM HYDROBROMIDE 20 MG/1
20 TABLET ORAL DAILY
Status: DISCONTINUED | OUTPATIENT
Start: 2025-04-04 | End: 2025-04-06 | Stop reason: HOSPADM

## 2025-04-04 RX ORDER — ATORVASTATIN CALCIUM 40 MG/1
80 TABLET, FILM COATED ORAL DAILY
Status: DISCONTINUED | OUTPATIENT
Start: 2025-04-04 | End: 2025-04-06 | Stop reason: HOSPADM

## 2025-04-04 RX ORDER — SODIUM CHLORIDE 0.9 % (FLUSH) 0.9 %
10 SYRINGE (ML) INJECTION EVERY 12 HOURS SCHEDULED
Status: DISCONTINUED | OUTPATIENT
Start: 2025-04-04 | End: 2025-04-06 | Stop reason: HOSPADM

## 2025-04-04 RX ORDER — ERGOCALCIFEROL 1.25 MG/1
50000 CAPSULE, LIQUID FILLED ORAL WEEKLY
Status: DISCONTINUED | OUTPATIENT
Start: 2025-04-07 | End: 2025-04-06 | Stop reason: HOSPADM

## 2025-04-04 RX ORDER — SODIUM CHLORIDE 9 MG/ML
40 INJECTION, SOLUTION INTRAVENOUS AS NEEDED
Status: DISCONTINUED | OUTPATIENT
Start: 2025-04-04 | End: 2025-04-06 | Stop reason: HOSPADM

## 2025-04-04 RX ORDER — DEXTROSE MONOHYDRATE 25 G/50ML
25 INJECTION, SOLUTION INTRAVENOUS
Status: DISCONTINUED | OUTPATIENT
Start: 2025-04-04 | End: 2025-04-06 | Stop reason: HOSPADM

## 2025-04-04 RX ORDER — VALSARTAN 80 MG/1
40 TABLET ORAL DAILY
Status: DISCONTINUED | OUTPATIENT
Start: 2025-04-04 | End: 2025-04-06 | Stop reason: HOSPADM

## 2025-04-04 RX ORDER — PREDNISONE 20 MG/1
20 TABLET ORAL
Status: DISCONTINUED | OUTPATIENT
Start: 2025-04-04 | End: 2025-04-04

## 2025-04-04 RX ORDER — IBUPROFEN 600 MG/1
1 TABLET ORAL
Status: DISCONTINUED | OUTPATIENT
Start: 2025-04-04 | End: 2025-04-06 | Stop reason: HOSPADM

## 2025-04-04 RX ORDER — OXYCODONE HYDROCHLORIDE 5 MG/1
5 TABLET ORAL EVERY 6 HOURS PRN
Status: DISCONTINUED | OUTPATIENT
Start: 2025-04-04 | End: 2025-04-06 | Stop reason: HOSPADM

## 2025-04-04 RX ORDER — ALUMINA, MAGNESIA, AND SIMETHICONE 2400; 2400; 240 MG/30ML; MG/30ML; MG/30ML
15 SUSPENSION ORAL EVERY 6 HOURS PRN
Status: DISCONTINUED | OUTPATIENT
Start: 2025-04-04 | End: 2025-04-06 | Stop reason: HOSPADM

## 2025-04-04 RX ORDER — BISACODYL 5 MG/1
5 TABLET, DELAYED RELEASE ORAL DAILY PRN
Status: DISCONTINUED | OUTPATIENT
Start: 2025-04-04 | End: 2025-04-06 | Stop reason: HOSPADM

## 2025-04-04 RX ORDER — ASPIRIN 81 MG/1
81 TABLET ORAL DAILY
Status: DISCONTINUED | OUTPATIENT
Start: 2025-04-04 | End: 2025-04-06 | Stop reason: HOSPADM

## 2025-04-04 RX ORDER — FENOFIBRATE 145 MG/1
145 TABLET, COATED ORAL DAILY
Status: DISCONTINUED | OUTPATIENT
Start: 2025-04-04 | End: 2025-04-05

## 2025-04-04 RX ORDER — ALPRAZOLAM 0.25 MG
0.25 TABLET ORAL NIGHTLY PRN
Status: DISCONTINUED | OUTPATIENT
Start: 2025-04-04 | End: 2025-04-06 | Stop reason: HOSPADM

## 2025-04-04 RX ORDER — ONDANSETRON 4 MG/1
4 TABLET, ORALLY DISINTEGRATING ORAL EVERY 6 HOURS PRN
Status: DISCONTINUED | OUTPATIENT
Start: 2025-04-04 | End: 2025-04-06 | Stop reason: HOSPADM

## 2025-04-04 RX ORDER — SODIUM CHLORIDE 0.9 % (FLUSH) 0.9 %
10 SYRINGE (ML) INJECTION AS NEEDED
Status: DISCONTINUED | OUTPATIENT
Start: 2025-04-04 | End: 2025-04-06 | Stop reason: HOSPADM

## 2025-04-04 RX ORDER — PANTOPRAZOLE SODIUM 40 MG/1
40 TABLET, DELAYED RELEASE ORAL
Status: DISCONTINUED | OUTPATIENT
Start: 2025-04-04 | End: 2025-04-06 | Stop reason: HOSPADM

## 2025-04-04 RX ORDER — BISACODYL 10 MG
10 SUPPOSITORY, RECTAL RECTAL DAILY PRN
Status: DISCONTINUED | OUTPATIENT
Start: 2025-04-04 | End: 2025-04-06 | Stop reason: HOSPADM

## 2025-04-04 RX ORDER — IPRATROPIUM BROMIDE AND ALBUTEROL SULFATE 2.5; .5 MG/3ML; MG/3ML
3 SOLUTION RESPIRATORY (INHALATION) EVERY 4 HOURS PRN
Status: DISCONTINUED | OUTPATIENT
Start: 2025-04-04 | End: 2025-04-06 | Stop reason: HOSPADM

## 2025-04-04 RX ORDER — BUDESONIDE 0.5 MG/2ML
0.5 INHALANT ORAL
Status: DISCONTINUED | OUTPATIENT
Start: 2025-04-04 | End: 2025-04-06 | Stop reason: HOSPADM

## 2025-04-04 RX ORDER — POLYETHYLENE GLYCOL 3350 17 G/17G
17 POWDER, FOR SOLUTION ORAL DAILY PRN
Status: DISCONTINUED | OUTPATIENT
Start: 2025-04-04 | End: 2025-04-06 | Stop reason: HOSPADM

## 2025-04-04 RX ORDER — INSULIN LISPRO 100 [IU]/ML
2-7 INJECTION, SOLUTION INTRAVENOUS; SUBCUTANEOUS
Status: DISCONTINUED | OUTPATIENT
Start: 2025-04-04 | End: 2025-04-06 | Stop reason: HOSPADM

## 2025-04-04 RX ORDER — AMLODIPINE BESYLATE 5 MG/1
5 TABLET ORAL DAILY
Status: DISCONTINUED | OUTPATIENT
Start: 2025-04-04 | End: 2025-04-06 | Stop reason: HOSPADM

## 2025-04-04 RX ORDER — CYCLOBENZAPRINE HCL 10 MG
10 TABLET ORAL NIGHTLY PRN
Status: DISCONTINUED | OUTPATIENT
Start: 2025-04-04 | End: 2025-04-06 | Stop reason: HOSPADM

## 2025-04-04 RX ORDER — TRAMADOL HYDROCHLORIDE 50 MG/1
50 TABLET ORAL EVERY 8 HOURS PRN
Status: DISCONTINUED | OUTPATIENT
Start: 2025-04-04 | End: 2025-04-06 | Stop reason: HOSPADM

## 2025-04-04 RX ORDER — AMOXICILLIN 250 MG
2 CAPSULE ORAL 2 TIMES DAILY PRN
Status: DISCONTINUED | OUTPATIENT
Start: 2025-04-04 | End: 2025-04-06 | Stop reason: HOSPADM

## 2025-04-04 RX ORDER — NICOTINE POLACRILEX 4 MG
15 LOZENGE BUCCAL
Status: DISCONTINUED | OUTPATIENT
Start: 2025-04-04 | End: 2025-04-06 | Stop reason: HOSPADM

## 2025-04-04 RX ORDER — ONDANSETRON 2 MG/ML
4 INJECTION INTRAMUSCULAR; INTRAVENOUS EVERY 6 HOURS PRN
Status: DISCONTINUED | OUTPATIENT
Start: 2025-04-04 | End: 2025-04-06 | Stop reason: HOSPADM

## 2025-04-04 RX ORDER — GUAIFENESIN 600 MG/1
1200 TABLET, EXTENDED RELEASE ORAL 2 TIMES DAILY
Status: DISCONTINUED | OUTPATIENT
Start: 2025-04-04 | End: 2025-04-06 | Stop reason: HOSPADM

## 2025-04-04 RX ORDER — ACETAMINOPHEN 160 MG/5ML
650 SOLUTION ORAL EVERY 4 HOURS PRN
Status: DISCONTINUED | OUTPATIENT
Start: 2025-04-04 | End: 2025-04-06 | Stop reason: HOSPADM

## 2025-04-04 RX ORDER — ACETAMINOPHEN 650 MG/1
650 SUPPOSITORY RECTAL EVERY 4 HOURS PRN
Status: DISCONTINUED | OUTPATIENT
Start: 2025-04-04 | End: 2025-04-06 | Stop reason: HOSPADM

## 2025-04-04 RX ORDER — PREDNISONE 20 MG/1
20 TABLET ORAL 2 TIMES DAILY WITH MEALS
Status: DISCONTINUED | OUTPATIENT
Start: 2025-04-04 | End: 2025-04-06 | Stop reason: HOSPADM

## 2025-04-04 RX ADMIN — INSULIN LISPRO 4 UNITS: 100 INJECTION, SOLUTION INTRAVENOUS; SUBCUTANEOUS at 17:58

## 2025-04-04 RX ADMIN — GUAIFENESIN 1200 MG: 600 TABLET, MULTILAYER, EXTENDED RELEASE ORAL at 20:29

## 2025-04-04 RX ADMIN — EMPAGLIFLOZIN 25 MG: 25 TABLET, FILM COATED ORAL at 09:16

## 2025-04-04 RX ADMIN — VALSARTAN 40 MG: 80 TABLET ORAL at 09:16

## 2025-04-04 RX ADMIN — GUAIFENESIN 1200 MG: 600 TABLET, MULTILAYER, EXTENDED RELEASE ORAL at 09:16

## 2025-04-04 RX ADMIN — INSULIN LISPRO 3 UNITS: 100 INJECTION, SOLUTION INTRAVENOUS; SUBCUTANEOUS at 20:29

## 2025-04-04 RX ADMIN — INSULIN LISPRO 2 UNITS: 100 INJECTION, SOLUTION INTRAVENOUS; SUBCUTANEOUS at 13:54

## 2025-04-04 RX ADMIN — Medication 10 ML: at 09:18

## 2025-04-04 RX ADMIN — Medication 10 ML: at 20:29

## 2025-04-04 RX ADMIN — Medication 10 ML: at 03:13

## 2025-04-04 RX ADMIN — PREDNISONE 20 MG: 20 TABLET ORAL at 09:16

## 2025-04-04 RX ADMIN — IPRATROPIUM BROMIDE AND ALBUTEROL SULFATE 3 ML: .5; 3 SOLUTION RESPIRATORY (INHALATION) at 00:53

## 2025-04-04 RX ADMIN — MORPHINE SULFATE 2 MG: 2 INJECTION, SOLUTION INTRAMUSCULAR; INTRAVENOUS at 02:41

## 2025-04-04 RX ADMIN — CITALOPRAM 20 MG: 20 TABLET, FILM COATED ORAL at 09:16

## 2025-04-04 RX ADMIN — IPRATROPIUM BROMIDE AND ALBUTEROL SULFATE 3 ML: .5; 3 SOLUTION RESPIRATORY (INHALATION) at 14:52

## 2025-04-04 RX ADMIN — ASPIRIN 81 MG: 81 TABLET, COATED ORAL at 09:16

## 2025-04-04 RX ADMIN — INSULIN LISPRO 5 UNITS: 100 INJECTION, SOLUTION INTRAVENOUS; SUBCUTANEOUS at 09:16

## 2025-04-04 RX ADMIN — MORPHINE SULFATE 2 MG: 2 INJECTION, SOLUTION INTRAMUSCULAR; INTRAVENOUS at 07:04

## 2025-04-04 RX ADMIN — NICOTINE 1 PATCH: 21 PATCH TRANSDERMAL at 02:40

## 2025-04-04 RX ADMIN — PANTOPRAZOLE SODIUM 40 MG: 40 TABLET, DELAYED RELEASE ORAL at 05:24

## 2025-04-04 RX ADMIN — FENOFIBRATE 145 MG: 145 TABLET ORAL at 09:16

## 2025-04-04 RX ADMIN — ATORVASTATIN CALCIUM 80 MG: 40 TABLET, FILM COATED ORAL at 09:16

## 2025-04-04 RX ADMIN — PREDNISONE 20 MG: 20 TABLET ORAL at 17:58

## 2025-04-04 RX ADMIN — BUDESONIDE 0.5 MG: 0.5 INHALANT RESPIRATORY (INHALATION) at 10:17

## 2025-04-04 RX ADMIN — OXYCODONE 5 MG: 5 TABLET ORAL at 18:00

## 2025-04-04 RX ADMIN — IPRATROPIUM BROMIDE AND ALBUTEROL SULFATE 3 ML: .5; 3 SOLUTION RESPIRATORY (INHALATION) at 10:17

## 2025-04-04 RX ADMIN — OXYCODONE 5 MG: 5 TABLET ORAL at 09:16

## 2025-04-04 RX ADMIN — IPRATROPIUM BROMIDE AND ALBUTEROL SULFATE 3 ML: .5; 3 SOLUTION RESPIRATORY (INHALATION) at 20:51

## 2025-04-04 RX ADMIN — AMLODIPINE BESYLATE 5 MG: 5 TABLET ORAL at 09:16

## 2025-04-04 RX ADMIN — BUDESONIDE 0.5 MG: 0.5 INHALANT RESPIRATORY (INHALATION) at 20:55

## 2025-04-04 NOTE — PLAN OF CARE
Problem: Adult Inpatient Plan of Care  Goal: Optimal Comfort and Wellbeing  Intervention: Monitor Pain and Promote Comfort  Description: Assess pain level, treatment efficacy and patient response at regular intervals using a consistent pain scale.Consider the presence and impact of preexisting chronic pain.Encourage patient and caregiver involvement in pain assessment, interventions and safety measures.Promote activity; balance with sleep and rest to enhance healing.  Recent Flowsheet Documentation  Taken 4/4/2025 0241 by Jayda Schmitz, RN  Pain Management Interventions:   position adjusted   pillow support provided   pain management plan reviewed with patient/caregiver   pain medication given  Goal: Readiness for Transition of Care  Intervention: Mutually Develop Transition Plan  Description: Identify available resources for support (e.g., family, friends, community).Identify and address barriers to ongoing treatment and home management (e.g., environmental, financial).Provide opportunities to practice self-management skills.Assess and monitor emotional readiness for transition.Establish or reconnect linkage with outpatient providers or community-based services.  Recent Flowsheet Documentation  Taken 4/4/2025 0314 by Jayda Schmitz, RN  Transportation Anticipated: family or friend will provide  Patient/Family Anticipated Services at Transition: none  Patient/Family Anticipates Transition to: home with family  Taken 4/4/2025 0254 by Jayda Schmitz, RN  Equipment Currently Used at Home:   oxygen   respiratory supplies   nebulizer   Goal Outcome Evaluation:

## 2025-04-04 NOTE — CONSULTS
Group: [facility]         CONSULT NOTE    Patient Identification:  Darleen Stallings  69 y.o.  female  1955  [id number]            Requesting physician: Attending physician    Reason for Consultation: Pneumothorax      History of Present Illness:  69-year-old female with history of COPD, CAD and HLD who presented for 3/20/2025 with right-sided chest pain and increased shortness of breath which did not improve with oral steroids prescribed by PCP 2 days earlier.  Chest x-ray showed moderate right pneumothorax and chest tube was placed by ED physician.  She tested positive for RSV    XR Chest 1 View  Result Date: 4/4/2025  Near complete resolution of right-sided pneumothorax following placement of small bore chest tube. Tiny apical pneumothorax persists. Electronically Signed: Bonifacio Munoz MD  4/4/2025 12:21 AM EDT  Workstation ID: [id number]    XR Chest 1 View  Result Date: 4/3/2025  Impression: Moderate size right pneumothorax. Electronically Signed: Braxton Mac MD  4/3/2025 11:27 PM EDT  Workstation ID: [id number]        Assessment:  Pneumothorax, spontaneous in the right side, status post chest tube placement 4/3/2025  COPD with acute exacerbation  RSV infection    Right upper lobe bronchiectasis  Lung nodules on CT scan 2023 2.3 cm right upper lobe, 1.3 cm right upper lobe and 2.2 cm subcarinal lymph node  Hypoxia  HTN  HLD  DM  CAD status post CABG 2019  Essential tremors  Tobacco smoker: Quit 2023 but returned to smoking again    PFTs 2022: Severe obstructive pattern FEV1/FVC 50%, FVC 61%, FEV1 40% without significant improvement with bronchodilators      Recommendations:  Chest tube management: Lock chest tube and repeat chest x-ray in anticipation of removal of the chest tube    Nebulized Pulmicort  Mucinex  Prednisone 20 mg bid  Oxygen supplement and titration to maintain saturation 90 to 95%, currently on 2 L per nasal cannula  Bronchodilators    Aspirin, atorvastatin, amlodipine,  valsartan  Jardiance  Insulin sliding scale  Protonix    Smoking cessation counseling, nicotine patch    I personally reviewed the radiological studies      Review of Sytems:  Constitutional: Negative for chills, and fever and positive for malaise/fatigue.   HENT: Negative.    Eyes: Negative.    Cardiovascular: Negative.    Respiratory: Positive for cough and shortness of breath.    Skin: Negative.    Musculoskeletal: Negative.    Gastrointestinal: Negative.    Genitourinary: Negative.    Neurological: Negative.    Psychiatric/Behavioral: Negative.    Past Medical History:  Past Medical History:   Diagnosis Date    CAD     Cervical disc disorder 2019    After fall    CHOL     Chronic pain disorder 2020    After fall down stairs    COPD     Dementia     Depression     DEXA     2023= (1.1/ -2.3)    DMII     Extremity pain 2019    Maybe longer    Fractures 2022    GERD     Hypertension     Joint pain 2020    Low back pain 2019    After fall    MAMMO     NEG = 2018/ 2023/ 2024    Myocardial infarction     Neck pain 2015    Maybe longer    Neuropathy in diabetes 2018    Osteoarthritis 2023    Diagnosed    PAP     NEG =2021    Spinal stenosis 2019    Tremor        Past Surgical History:  Past Surgical History:   Procedure Laterality Date    CATARACT EXTRACTION W/ INTRAOCULAR LENS  IMPLANT, BILATERAL      COLONOSCOPY      2023= NEG, rech 2026   GSI    COMPRESSION HIP SCREW Right 03/25/2022    Procedure: HIP COMPRESSION SCREW, right Wales hip screw from [employer];  Surgeon: Loco Cortes MD;  Location: [facility];  Service: Orthopedics;  Laterality: Right;    CORONARY ARTERY BYPASS GRAFT      x 3    FRACTURE SURGERY      TONSILLECTOMY      TUBAL ABDOMINAL LIGATION          Home Meds:  Medications Prior to Admission   Medication Sig Dispense Refill Last Dose/Taking    ALPRAZolam (XANAX) 0.25 MG tablet TAKE 1 TABLET BY MOUTH EVERY DAY AS NEEDED FOR SLEEP FOR ANXIETY 25 tablet 0 Taking    amLODIPine (NORVASC) 5 MG tablet  TAKE 1 TABLET BY MOUTH EVERY DAY 90 tablet 1 4/2/2025    aspirin 81 MG tablet Take 1 tablet by mouth Daily.   4/2/2025    atorvastatin (LIPITOR) 80 MG tablet Take 1 tablet by mouth Daily. 90 tablet 0 4/2/2025    Continuous Blood Gluc  (Dexcom G7 ) device Use 1 package Daily. 1 each 0 4/3/2025    CRANBERRY PO Take 1 tablet by mouth Daily As Needed. 1 po qd   Taking As Needed    cyclobenzaprine (FLEXERIL) 10 MG tablet TAKE 1 TABLET BY MOUTH AT NIGHT AS NEEDED FOR MUSCLE SPASM 30 tablet 0 Taking As Needed    empagliflozin (Jardiance) 25 MG tablet tablet Take 1 tablet by mouth Daily. 90 tablet 1 4/2/2025    fenofibrate 160 MG tablet Take 1 tablet by mouth Daily. 90 tablet 0 4/2/2025    Fluticasone-Umeclidin-Vilant (Trelegy Ellipta) 200-62.5-25 MCG/ACT inhaler Inhale 1 puff Daily. 60 each 3 4/3/2025 Evening    guaiFENesin (MUCINEX) 600 MG 12 hr tablet Take 2 tablets by mouth 2 (Two) Times a Day.   4/2/2025    ibandronate (Boniva) 150 MG tablet Take 1 tablet by mouth Every 30 (Thirty) Days. 3 tablet 3 Past Month    ibuprofen (ADVIL,MOTRIN) 800 MG tablet TAKE 1 TABLET BY MOUTH 2 TIMES A DAY AS NEEDED FOR MODERATE PAIN 60 tablet 0 Taking    ipratropium-albuterol (Combivent Respimat)  MCG/ACT inhaler INHALE 1 PUFF BY MOUTH 4 TIMES A DAY AS NEEDED FOR WHEEZING 4 g 2 4/3/2025 Evening    ipratropium-albuterol (DUO-NEB) 0.5-2.5 mg/3 ml nebulizer Take 3 mL by nebulization Every 6 (Six) Hours As Needed for Wheezing or Shortness of Air. 360 mL 1 4/3/2025 Evening    metFORMIN ER (GLUCOPHAGE-XR) 500 MG 24 hr tablet 1 po bid w/ meals 120 tablet 2 4/3/2025 Morning    omeprazole (priLOSEC) 40 MG capsule TAKE 1 CAPSULE BY MOUTH EVERY DAY 90 capsule 3 4/2/2025    predniSONE (DELTASONE) 20 MG tablet 3 po qam x 3 days then 2 po qam x 3 days then 1 po qam x 3 days then 1/2 tab po qam x 4 days 20 tablet 0 4/3/2025 Morning    traMADol (ULTRAM) 50 MG tablet Take 1 tablet by mouth Every 8 (Eight) Hours As Needed for  Moderate Pain. 60 tablet 0 Taking As Needed    valsartan (Diovan) 40 MG tablet Take 1 tablet by mouth Daily. 90 tablet 0 2025    vitamin D (ERGOCALCIFEROL) 1.25 MG (92584 UT) capsule capsule TAKE 1 CAPSULE BY MOUTH 1 TIME A WEEK (Patient taking differently: Take 1 capsule by mouth 1 (One) Time Per Week. ) 12 capsule 0 3/31/2025    Blood Glucose Monitoring Suppl (Accu-Chek Guide) w/Device kit 1 kit Daily. 1 kit 0     citalopram (CeleXA) 20 MG tablet Take 1 tablet by mouth Daily. 30 tablet 0     Continuous Glucose Sensor (Dexcom G7 Sensor) misc Use 1 package Daily. 9 each 3     nitroglycerin (NITROSTAT) 0.4 MG SL tablet Place 1 tablet under the tongue Every 5 (Five) Minutes As Needed for Chest Pain. Take no more than 3 doses in 15 minutes. 25 tablet 2        Allergies:  Allergies   Allergen Reactions    Ace Inhibitors Cough    Pregabalin Confusion    Aricept [Donepezil] Mental Status Change    Codeine Nausea And Vomiting    Paxil [Paroxetine] Other (See Comments)     TREMOR WORSENING       Social History:   Social History     Socioeconomic History    Marital status: Single   Tobacco Use    Smoking status: Every Day     Current packs/day: 0.00     Average packs/day: 0.5 packs/day for 48.0 years (24.0 ttl pk-yrs)     Types: Cigarettes     Start date: 1975     Last attempt to quit:      Years since quittin.2     Passive exposure: Past    Smokeless tobacco: Never    Tobacco comments:     Started when i was 18 quit a few times. Used vape but i think it gave me pneumonia   Vaping Use    Vaping status: Every Day    Substances: Nicotine   Substance and Sexual Activity    Alcohol use: No    Drug use: No    Sexual activity: Not Currently     Partners: Male     Birth control/protection: None     Comment: [place] to get pregnant..hahaa!       Family History:  Family History   Problem Relation Age of Onset    Stroke Mother     COPD Mother     Hypertension Mother     Tremor Father     Heart disease Father      "    CHF    COPD Father     Depression Father     Tremor Sister     Fibromyalgia Sister     Hypertension Sister     Post-traumatic stress disorder Sister     Heart disease Sister     Hypertension Sister     Depression Sister     Tremor Brother     COPD Brother     Depression Brother     Hypertension Brother     Cancer Brother         Undiagnosed lung cancer    Tremor Paternal Grandmother     Depression Sister     Hypertension Brother        Physical Exam:  /91 (BP Location: Left arm, Patient Position: Lying)   Pulse 75   Temp 98.3 °F (36.8 °C) (Oral)   Resp 20   Ht 152.4 cm (60\")   Wt 64 kg (141 lb 1.5 oz)   SpO2 95%   BMI 27.56 kg/m²  Body mass index is 27.56 kg/m². 95% 64 kg (141 lb 1.5 oz)  General Appearance:  Alert   HEENT:  Normocephalic, without obvious abnormality, Conjunctiva/corneas clear,.   Nares normal, no drainage     Neck:  Supple, symmetrical, trachea midline. No JVD.  Lungs /Chest wall: wheezing and mild  Bilateral basal rhonchi, respirations unlabored, symmetrical wall movement.     Heart:  Regular rate and rhythm, S1 S2 normal  Abdomen: Soft, non-tender, no masses, no organomegaly.    Extremities: No edema, no clubbing or cyanosis    LABS:  Lab Results   Component Value Date    CALCIUM 9.8 04/03/2025    PHOS 2.8 02/17/2023     Results from last 7 days   Lab Units 04/03/25 2306   SODIUM mmol/L 137   POTASSIUM mmol/L 4.2   CHLORIDE mmol/L 103   CO2 mmol/L 20.7*   BUN mg/dL 37*   CREATININE mg/dL 0.98   GLUCOSE mg/dL 179*   CALCIUM mg/dL 9.8   WBC 10*3/mm3 16.96*   HEMOGLOBIN g/dL 15.8   PLATELETS 10*3/mm3 435     Lab Results   Component Value Date    CKTOTAL 44 06/21/2022    TROPONINT 70 (C) 02/15/2023                 Results from last 7 days   Lab Units 04/03/25 2302   PH, ARTERIAL pH units 7.322*   PCO2, ARTERIAL mm Hg 43.2   PO2 ART mm Hg 62.1*   O2 SATURATION ART % 89.4*   MODALITY  Room Air     Results from last 7 days   Lab Units 04/03/25 2306   ADENOVIRUS DETECTION BY PCR  Not " Detected   CORONAVIRUS 229E  Not Detected   CORONAVIRUS HKU1  Not Detected   CORONAVIRUS NL63  Not Detected   CORONAVIRUS OC43  Not Detected   HUMAN METAPNEUMOVIRUS  Not Detected   HUMAN RHINOVIRUS/ENTEROVIRUS  Not Detected   INFLUENZA B PCR  Not Detected   PARAINFLUENZA 1  Not Detected   PARAINFLUENZA VIRUS 2  Not Detected   PARAINFLUENZA VIRUS 3  Not Detected   PARAINFLUENZA VIRUS 4  Not Detected   BORDETELLA PERTUSSIS PCR  Not Detected   CHLAMYDOPHILA PNEUMONIAE PCR  Not Detected   MYCOPLAMA PNEUMO PCR  Not Detected   INFLUENZA A PCR  Not Detected   RSV, PCR  Detected*             Lab Results   Component Value Date    TSH 0.79 08/27/2018     Estimated Creatinine Clearance: 46.5 mL/min (by C-G formula based on SCr of 0.98 mg/dL).         Imaging:  Imaging Results (Last 24 Hours)       Procedure Component Value Units Date/Time    XR Chest 1 View [[id number]] Collected: 04/04/25 0020     Updated: 04/04/25 0024    Narrative:      XR CHEST 1 VW    Date of Exam: 4/4/2025 12:08 AM EDT    Indication: post procedure- chest tube    Comparison: 4/3/2025    Findings:  Cardiac and mediastinal contours are normal status post CABG. Small bore right chest tube has been placed. There has been near complete resolution of the previously identified right pneumothorax. There is only a tiny apical component remaining. Mild   atelectatic changes are noted in the right lung. Left lung is clear. Pulmonary vascularity is normal.      Impression:      Near complete resolution of right-sided pneumothorax following placement of small bore chest tube. Tiny apical pneumothorax persists.        Electronically Signed: Bonifacio Munoz MD    4/4/2025 12:21 AM EDT    Workstation ID: [id number]    XR Chest 1 View [[id number]] Collected: 04/03/25 2325     Updated: 04/03/25 2329    Narrative:      XR CHEST 1 VW    Date of Exam: 4/3/2025 11:10 PM EDT    Indication: Dyspnea    Comparison: Chest radiograph 11/3/2024    Findings:  There are postop changes  from midline sternotomy and coronary artery bypass grafting. There is a moderate size right pneumothorax up to 2 cm along the lateral right mid and lower chest. There is mild right basilar atelectasis. The pulmonary vascular   markings are normal. There are no focal consolidations.      Impression:      Impression:  Moderate size right pneumothorax.          Electronically Signed: Braxton Mac MD    4/3/2025 11:27 PM EDT    Workstation ID: [id number]              Current Meds:   SCHEDULE  amLODIPine, 5 mg, Oral, Daily  aspirin, 81 mg, Oral, Daily  atorvastatin, 80 mg, Oral, Daily  budesonide, 0.5 mg, Nebulization, BID - RT  citalopram, 20 mg, Oral, Daily  empagliflozin, 25 mg, Oral, Daily  fenofibrate, 145 mg, Oral, Daily  guaiFENesin, 1,200 mg, Oral, BID  insulin lispro, 2-7 Units, Subcutaneous, 4x Daily AC & at Bedtime  ipratropium-albuterol, 3 mL, Nebulization, Q6H While Awake - RT  nicotine, 1 patch, Transdermal, Q24H  pantoprazole, 40 mg, Oral, Q AM  predniSONE, 20 mg, Oral, Daily With Breakfast  sodium chloride, 10 mL, Intravenous, Q12H  valsartan, 40 mg, Oral, Daily  [START ON 4/7/2025] vitamin D, 50,000 Units, Oral, Weekly      Infusions     PRNs    acetaminophen **OR** acetaminophen **OR** acetaminophen    ALPRAZolam    aluminum-magnesium hydroxide-simethicone    senna-docusate sodium **AND** polyethylene glycol **AND** bisacodyl **AND** bisacodyl    Calcium Replacement - Follow Nurse / BPA Driven Protocol    cyclobenzaprine    dextrose    dextrose    glucagon (human recombinant)    ipratropium-albuterol    Magnesium Standard Dose Replacement - Follow Nurse / BPA Driven Protocol    melatonin    ondansetron ODT **OR** ondansetron    oxyCODONE    Phosphorus Replacement - Follow Nurse / BPA Driven Protocol    Potassium Replacement - Follow Nurse / BPA Driven Protocol    [COMPLETED] Insert Peripheral IV **AND** sodium chloride    sodium chloride    sodium chloride    traMADol        Bryon Gerber  MD  4/4/2025  08:16 EDT      Much of this encounter note is an electronic transcription/translation of spoken language to printed text using Dragon Software.

## 2025-04-04 NOTE — PLAN OF CARE
Problem: Adult Inpatient Plan of Care  Goal: Plan of Care Review  Outcome: Progressing  Goal: Patient-Specific Goal (Individualized)  Outcome: Progressing  Goal: Absence of Hospital-Acquired Illness or Injury  Outcome: Progressing  Intervention: Identify and Manage Fall Risk  Recent Flowsheet Documentation  Taken 4/4/2025 1400 by Anh Patterson RN  Safety Promotion/Fall Prevention: safety round/check completed  Taken 4/4/2025 1259 by Anh Patterson RN  Safety Promotion/Fall Prevention: safety round/check completed  Taken 4/4/2025 1017 by Anh Patterson RN  Safety Promotion/Fall Prevention: safety round/check completed  Taken 4/4/2025 0859 by Anh Patterson RN  Safety Promotion/Fall Prevention: safety round/check completed  Intervention: Prevent Skin Injury  Recent Flowsheet Documentation  Taken 4/4/2025 0859 by Anh Patterson RN  Body Position: position changed independently  Skin Protection: incontinence pads utilized  Intervention: Prevent and Manage VTE (Venous Thromboembolism) Risk  Recent Flowsheet Documentation  Taken 4/4/2025 0859 by Anh Patterson RN  VTE Prevention/Management:   bilateral   SCDs (sequential compression devices) off  Intervention: Prevent Infection  Recent Flowsheet Documentation  Taken 4/4/2025 0859 by Anh Patterson RN  Infection Prevention: single patient room provided  Goal: Optimal Comfort and Wellbeing  Outcome: Progressing  Intervention: Monitor Pain and Promote Comfort  Recent Flowsheet Documentation  Taken 4/4/2025 0916 by Anh Patterson RN  Pain Management Interventions: pain medication given  Taken 4/4/2025 0704 by Anh Patterson RN  Pain Management Interventions: pain medication given  Intervention: Provide Person-Centered Care  Recent Flowsheet Documentation  Taken 4/4/2025 0859 by Anh Patterson RN  Trust Relationship/Rapport:   care explained   choices provided   emotional support provided   empathic listening  provided  Goal: Readiness for Transition of Care  Outcome: Progressing     Problem: Respiratory Compromise (Pneumothorax)  Goal: Optimal Oxygenation and Ventilation  Outcome: Progressing     Problem: Comorbidity Management  Goal: Blood Pressure in Desired Range  Outcome: Progressing  Intervention: Maintain Blood Pressure Management  Recent Flowsheet Documentation  Taken 4/4/2025 9949 by Anh Patterson RN  Medication Review/Management: medications reviewed   Goal Outcome Evaluation:   Pt accidentally pulled out her chest tube when turning in her bed; provider notified- provider gave verbal orders to keep chest tube removed, complete a CXR, take out sutures and cover site with gauze and Tegaderm. Pt remains on 2L NC PRN. Her BG have been manage via SSI as well. No concerns at this time and call light within reach.

## 2025-04-04 NOTE — ED PROVIDER NOTES
Subjective   History of Present Illness  Patient is a 69-year-old female but increasing shortness of breath over the past 1 to 2 days.  She has an occasional cough.  Denies fever chest pain vomiting or other complaint.      Review of Systems    Past Medical History:   Diagnosis Date    CAD     Cervical disc disorder 2019    After fall    CHOL     Chronic pain disorder 2020    After fall down stairs    COPD     Dementia     Depression     DEXA     2023= (1.1/ -2.3)    DMII     Extremity pain 2019    Maybe longer    Fractures 2022    GERD     Hypertension     Joint pain 2020    Low back pain 2019    After fall    MAMMO     NEG = 2018/ 2023/ 2024    Myocardial infarction     Neck pain 2015    Maybe longer    Neuropathy in diabetes 2018    Osteoarthritis 2023    Diagnosed    PAP     NEG =2021    Spinal stenosis 2019    Tremor        Allergies   Allergen Reactions    Ace Inhibitors Cough    Pregabalin Confusion    Aricept [Donepezil] Mental Status Change    Codeine Nausea And Vomiting    Paxil [Paroxetine] Other (See Comments)     TREMOR WORSENING       Past Surgical History:   Procedure Laterality Date    CATARACT EXTRACTION W/ INTRAOCULAR LENS  IMPLANT, BILATERAL      COLONOSCOPY      2023= NEG, rech 2026   GSI    COMPRESSION HIP SCREW Right 03/25/2022    Procedure: HIP COMPRESSION SCREW, right Lincoln hip screw from [place];  Surgeon: Loco Cortes MD;  Location: [facility];  Service: Orthopedics;  Laterality: Right;    CORONARY ARTERY BYPASS GRAFT      x 3    FRACTURE SURGERY      TONSILLECTOMY      TUBAL ABDOMINAL LIGATION         Family History   Problem Relation Age of Onset    Stroke Mother     COPD Mother     Hypertension Mother     Tremor Father     Heart disease Father         CHF    COPD Father     Depression Father     Tremor Sister     Fibromyalgia Sister     Hypertension Sister     Post-traumatic stress disorder Sister     Heart disease Sister     Hypertension Sister     Depression Sister     Tremor  Brother     COPD Brother     Depression Brother     Hypertension Brother     Cancer Brother         Undiagnosed lung cancer    Tremor Paternal Grandmother     Depression Sister     Hypertension Brother        Social History     Socioeconomic History    Marital status: Single   Tobacco Use    Smoking status: Every Day     Current packs/day: 0.00     Average packs/day: 0.5 packs/day for 48.0 years (24.0 ttl pk-yrs)     Types: Cigarettes     Start date: 1975     Last attempt to quit:      Years since quittin.2     Passive exposure: Past    Smokeless tobacco: Never    Tobacco comments:     Started when i was 18 quit a few times. Used vape but i think it gave me pneumonia   Vaping Use    Vaping status: Every Day    Substances: Nicotine   Substance and Sexual Activity    Alcohol use: No    Drug use: No    Sexual activity: Not Currently     Partners: Male     Birth control/protection: None     Comment: [place] to get pregnant..hahaa!           Objective   Physical Exam  Neck has no adenopathy JVD or bruits.  Lungs have decreased breath sounds on the right.  Heart has regular rate rhythm without murmur rub or gallop.  Chest nontender.  Abdomen soft.  Extremities I am unremarkable.  Procedures     My EKG interpretation shows normal sinus rhythm at a rate of 80 with no acute ST change.    Under sterile prep and drape an Arrow catheter was placed in the right lateral chest after a timeout was initiated.  Confirmation of right pneumothorax with the nurse was noted.  Post placement of the catheter shows good location with reinflation of the pneumothorax.  Sterile dressing was applied.      ED Course      Results for orders placed or performed during the hospital encounter of 25   ECG 12 Lead Dyspnea    Collection Time: 25 10:45 PM   Result Value Ref Range    QT Interval 375 ms    QTC Interval 432 ms   Blood Gas, Arterial -    Collection Time: 25 11:02 PM    Specimen: Arterial Blood   Result Value  Ref Range    Site Right Radial     Lonnie's Test Positive     pH, Arterial 7.322 (L) 7.350 - 7.450 pH units    pCO2, Arterial 43.2 35.0 - 48.0 mm Hg    pO2, Arterial 62.1 (L) 83.0 - 108.0 mm Hg    HCO3, Arterial 22.3 21.0 - 28.0 mmol/L    Base Excess, Arterial -3.8 (L) 0.0 - 3.0 mmol/L    O2 Saturation, Arterial 89.4 (L) 94.0 - 98.0 %    CO2 Content 23.7 22 - 29 mmol/L    Barometric Pressure for Blood Gas      Modality Room Air     FIO2 21 %    Hemodilution No     PO2/FIO2 296 0 - 500   Respiratory Panel PCR w/COVID-19(SARS-CoV-2) MANJIT/THEO/AUREA/PAD/COR/PHILIP In-House, NP Swab in [facility], 2 HR TAT - Swab, Nasopharynx    Collection Time: 04/03/25 11:06 PM    Specimen: Nasopharynx; Swab   Result Value Ref Range    ADENOVIRUS, PCR Not Detected Not Detected    Coronavirus 229E Not Detected Not Detected    Coronavirus HKU1 Not Detected Not Detected    Coronavirus NL63 Not Detected Not Detected    Coronavirus OC43 Not Detected Not Detected    COVID19 Not Detected Not Detected - Ref. Range    Human Metapneumovirus Not Detected Not Detected    Human Rhinovirus/Enterovirus Not Detected Not Detected    Influenza A PCR Not Detected Not Detected    Influenza B PCR Not Detected Not Detected    Parainfluenza Virus 1 Not Detected Not Detected    Parainfluenza Virus 2 Not Detected Not Detected    Parainfluenza Virus 3 Not Detected Not Detected    Parainfluenza Virus 4 Not Detected Not Detected    RSV, PCR Detected (A) Not Detected    Bordetella pertussis pcr Not Detected Not Detected    Bordetella parapertussis PCR Not Detected Not Detected    Chlamydophila pneumoniae PCR Not Detected Not Detected    Mycoplasma pneumo by PCR Not Detected Not Detected   Basic Metabolic Panel    Collection Time: 04/03/25 11:06 PM    Specimen: Blood   Result Value Ref Range    Glucose 179 (H) 65 - 99 mg/dL    BUN 37 (H) 8 - 23 mg/dL    Creatinine 0.98 0.57 - 1.00 mg/dL    Sodium 137 136 - 145 mmol/L    Potassium 4.2 3.5 - 5.2 mmol/L    Chloride 103 98 - 107  mmol/L    CO2 20.7 (L) 22.0 - 29.0 mmol/L    Calcium 9.8 8.6 - 10.5 mg/dL    BUN/Creatinine Ratio 37.8 (H) 7.0 - 25.0    Anion Gap 13.3 5.0 - 15.0 mmol/L    eGFR 63.4 >60.0 mL/min/1.73   CBC Auto Differential    Collection Time: 04/03/25 11:06 PM    Specimen: Blood   Result Value Ref Range    WBC 16.96 (H) 3.40 - 10.80 10*3/mm3    RBC 5.66 (H) 3.77 - 5.28 10*6/mm3    Hemoglobin 15.8 12.0 - 15.9 g/dL    Hematocrit 49.4 (H) 34.0 - 46.6 %    MCV 87.3 79.0 - 97.0 fL    MCH 27.9 26.6 - 33.0 pg    MCHC 32.0 31.5 - 35.7 g/dL    RDW 12.8 12.3 - 15.4 %    RDW-SD 41.2 37.0 - 54.0 fl    MPV 9.6 6.0 - 12.0 fL    Platelets 435 140 - 450 10*3/mm3    Neutrophil % 76.7 (H) 42.7 - 76.0 %    Lymphocyte % 15.1 (L) 19.6 - 45.3 %    Monocyte % 7.3 5.0 - 12.0 %    Eosinophil % 0.0 (L) 0.3 - 6.2 %    Basophil % 0.2 0.0 - 1.5 %    Immature Grans % 0.7 (H) 0.0 - 0.5 %    Neutrophils, Absolute 13.01 (H) 1.70 - 7.00 10*3/mm3    Lymphocytes, Absolute 2.56 0.70 - 3.10 10*3/mm3    Monocytes, Absolute 1.24 (H) 0.10 - 0.90 10*3/mm3    Eosinophils, Absolute 0.00 0.00 - 0.40 10*3/mm3    Basophils, Absolute 0.03 0.00 - 0.20 10*3/mm3    Immature Grans, Absolute 0.12 (H) 0.00 - 0.05 10*3/mm3    nRBC 0.0 0.0 - 0.2 /100 WBC     XR Chest 1 View  Result Date: 4/4/2025  Near complete resolution of right-sided pneumothorax following placement of small bore chest tube. Tiny apical pneumothorax persists. Electronically Signed: Bonifacio Munoz MD  4/4/2025 12:21 AM EDT  Workstation ID: [id number]    XR Chest 1 View  Result Date: 4/3/2025  Impression: Moderate size right pneumothorax. Electronically Signed: Braxton Mac MD  4/3/2025 11:27 PM EDT  Workstation ID: [id number]                                                       Medical Decision Making  My chest x-ray interpretation shows a right pneumothorax.  Postprocedure chest x-ray shows catheter in proper position with near resolution of the pneumothorax.  Blood gas showed pH be 7.32 pCO2 of 43 pO2 of 62 Rester  panel was negative.  BMP shows mild renal sufficiency with no electrolyte abnormality.  CBC shows leukocytosis with no left shift and no anemia.  Patient was [place] initially given albuterol mini neb as well as Cymetra upon arrival.  Patient will be admitted for further pulmonary care.  I did speak the on-call hospitalist.  Pulmonology was consulted as well.    Amount and/or Complexity of Data Reviewed  Labs: ordered. Decision-making details documented in ED Course.  Radiology: ordered and independent interpretation performed.  ECG/medicine tests: ordered and independent interpretation performed.    Risk  Prescription drug management.  Decision regarding hospitalization.        Final diagnoses:   Spontaneous pneumothorax       ED Disposition  ED Disposition       ED Disposition   Decision to Admit    Condition   --    Comment   --               No follow-up provider specified.       Medication List      No changes were made to your prescriptions during this visit.            Braxton Zhu MD  04/04/25 0048

## 2025-04-04 NOTE — CASE MANAGEMENT/SOCIAL WORK
Discharge Planning Assessment   [place]     Patient Name: Darleen Stallings  MRN: [id number]  Today's Date: 4/4/2025    Admit Date: 4/3/2025    Plan: From home with son. Home O2 2L per concentrator (unknown Supplier   Discharge Needs Assessment       Row Name 04/04/25 1124       Living Environment    People in Home child(jabier), adult    Name(s) of People in Home Giovani Hill    Current Living Arrangements home    Potentially Unsafe Housing Conditions none    In the past 12 months has the electric, gas, oil, or water company threatened to shut off services in your home? No    Primary Care Provided by self    Provides Primary Care For no one    Family Caregiver if Needed child(jabier), adult    Family Caregiver Names Giovani Hill    Quality of Family Relationships helpful;involved;supportive    Able to Return to Prior Arrangements yes       Resource/Environmental Concerns    Resource/Environmental Concerns none    Transportation Concerns none       Transportation Needs    In the past 12 months, has lack of transportation kept you from medical appointments or from getting medications? no    In the past 12 months, has lack of transportation kept you from meetings, work, or from getting things needed for daily living? No       Food Insecurity    Within the past 12 months, you worried that your food would run out before you got the money to buy more. Never true    Within the past 12 months, the food you bought just didn't last and you didn't have money to get more. Never true       Transition Planning    Patient/Family Anticipates Transition to home with family    Patient/Family Anticipated Services at Transition none    Transportation Anticipated family or friend will provide       Discharge Needs Assessment    Readmission Within the Last 30 Days no previous admission in last 30 days    Equipment Currently Used at Home oxygen;respiratory supplies;nebulizer;walker, standard;cane, straight;wheelchair    Concerns to be Addressed no  discharge needs identified;denies needs/concerns at this time    Do you want help finding or keeping work or a job? I do not need or want help    Do you want help with school or training? For example, starting or completing job training or getting a high school diploma, GED or equivalent No    Anticipated Changes Related to Illness none    Equipment Needed After Discharge none    Provided Post Acute Provider List? N/A    Provided Post Acute Provider Quality & Resource List? N/A    Offered/Gave Vendor List no                   Discharge Plan       Row Name 04/04/25 1125       Plan    Plan From home with son. Home O2 2L per concentrator (unknown Supplier    Patient/Family in Agreement with Plan yes    Provided Post Acute Provider List? N/A    Provided Post Acute Provider Quality & Resource List? N/A    Plan Comments CM met with patient at the bedside to review discharge planning. Patient lives at home with her son Sergio whom is staying with her right now while he looks for an apartment, pt is IADLs and drives. Sister Cristin or son Sergio to transport patient at d/c. Confirmed PCP, insurance, and pharmacy. Patient accepts [facility]. Patient denies any difficulty affording food, utilities, or medications. Patient is not current with any HHC/PT services. Home O2 2L per concentrator - patient does not know who supplies O2. DC Barriers: Pulm following, elevated WBC, chest tube               Expected Discharge Date and Time       Expected Discharge Date Expected Discharge Time    Apr 7, 2025            Demographic Summary       Row Name 04/04/25 1124       General Information    Admission Type inpatient    Arrived From emergency department    Required Notices Provided Important Message from Medicare    Referral Source admission list    Reason for Consult discharge planning    Preferred Language English       Contact Information    Permission Granted to Share Info With     Contact Information Obtained for                     Functional Status       Row Name 04/04/25 1124       Functional Status    Usual Activity Tolerance moderate    Current Activity Tolerance fair       Functional Status, IADL    Medications independent    Meal Preparation independent    Housekeeping independent    Laundry independent    Shopping independent    If for any reason you need help with day-to-day activities such as bathing, preparing meals, shopping, managing finances, etc., do you get the help you need? I get all the help I need       Mental Status    General Appearance WDL WDL       Mental Status Summary    Recent Changes in Mental Status/Cognitive Functioning no changes             Lubna Galvez RN    [phone]  [email or web address]

## 2025-04-04 NOTE — H&P
[facility] Medicine Services    Hospitalist History and Physical     Darleen Stallings : 1955 MRN:[id number] LOS:0 ROOM: [facility]     Reason for admission: Pneumothorax     Assessment / Plan     Spontaneous right pneumothorax status post chest tube placement 4/3/2025 in the ER  RSV   COPD exacerbation   - initial CXR: moderate size right pneumothorax  - repeat CXR: near complete resolution of right-sided pneumothorax following placement of smallbore chest tube.  Tiny apical pneumothorax persists  - WBC 16.96  - ABG: pH 7.32, pCO2 43.2, pO2 62.1, HCO3 22.3 on 21% FiO2  - now on 2L O2 via NC  - duonebs, pulmicort, pain control   - continuous pulse oximetry   - pulmonary consulted    Hypertension  Hyperlipidemia  - norvasc, atorvastatin     DM type II  - hold oral meds while inpatient  - SSI    Depression   - cont home celexa, xanax       Nutrition:   Diet: Regular/House; Fluid Consistency: Thin (IDDSI 0)     VTE Prophylaxis:  Mechanical VTE prophylaxis orders are present.      History of Present illness     Darleen Stallings is a 69 y.o. female with PMH of COPD with as needed supplemental oxygen at home presented to [facility] ED 4/3/2025 with complaints of shortness of breath and right sided stabbing chest pain.  She states she has been short of breath for about a week.  She has been using her prn oxygen 2L and has been using nebulizer treatments. She has not really been coughing, some wheezing. No fever. She saw her PCP 2 days ago and was started on oral steroids.  She started having right sided chest pain for the last 24 hours that would not go away so she decided to seek treatment in the ER.    In the ED chest x-ray showed moderate size right pneumothorax.  Chest tube was placed by Dr. Santos in the ER and repeat chest x-ray showed near complete resolution of right-sided pneumothorax following placement of smallbore chest tube.  Tiny apical pneumothorax persists.  Patient states her pain is  improved from 9-10/10 to 5/10. ABG showed pH 7.32, pCO2 43.2, pO2 62.1, HCO3 22.3 on 21% FiO2. Labs showed BUN 37, glucose 179, WBC 16.96. RVP detected RSV. Pulmonary was consulted in the ER. She was admitted for further treatment right pneumothorax and RSV.    Subjective / Review of systems     Review of Systems   Constitutional: Negative.    HENT: Negative.     Eyes: Negative.    Respiratory:  Positive for shortness of breath.    Cardiovascular:  Positive for chest pain.   Gastrointestinal: Negative.    Genitourinary: Negative.    Musculoskeletal: Negative.    Skin: Negative.    Neurological: Negative.    Psychiatric/Behavioral: Negative.          Past Medical/Surgical/Social/Family History & Allergies     Past Medical History:   Diagnosis Date    CAD     Cervical disc disorder 2019    After fall    CHOL     Chronic pain disorder 2020    After fall down stairs    COPD     Dementia     Depression     DEXA     2023= (1.1/ -2.3)    DMII     Extremity pain 2019    Maybe longer    Fractures 2022    GERD     Hypertension     Joint pain 2020    Low back pain 2019    After fall    MAMMO     NEG = 2018/ 2023/ 2024    Myocardial infarction     Neck pain 2015    Maybe longer    Neuropathy in diabetes 2018    Osteoarthritis 2023    Diagnosed    PAP     NEG =2021    Spinal stenosis 2019    Tremor       Past Surgical History:   Procedure Laterality Date    CATARACT EXTRACTION W/ INTRAOCULAR LENS  IMPLANT, BILATERAL      COLONOSCOPY      2023= NEG, rech 2026   GSI    COMPRESSION HIP SCREW Right 03/25/2022    Procedure: HIP COMPRESSION SCREW, right [place] hip screw from [employer];  Surgeon: Loco Cortes MD;  Location: [facility];  Service: Orthopedics;  Laterality: Right;    CORONARY ARTERY BYPASS GRAFT      x 3    FRACTURE SURGERY      TONSILLECTOMY      TUBAL ABDOMINAL LIGATION        Social History     Socioeconomic History    Marital status: Single   Tobacco Use    Smoking status: Every Day     Current packs/day: 0.00      Average packs/day: 0.5 packs/day for 48.0 years (24.0 ttl pk-yrs)     Types: Cigarettes     Start date: 1975     Last attempt to quit:      Years since quittin.2     Passive exposure: Past    Smokeless tobacco: Never    Tobacco comments:     Started when i was 18 quit a few times. Used vape but i think it gave me pneumonia   Vaping Use    Vaping status: Every Day    Substances: Nicotine   Substance and Sexual Activity    Alcohol use: No    Drug use: No    Sexual activity: Not Currently     Partners: Male     Birth control/protection: None     Comment: [place] to get pregnant..hahaa!      Family History   Problem Relation Age of Onset    Stroke Mother     COPD Mother     Hypertension Mother     Tremor Father     Heart disease Father         CHF    COPD Father     Depression Father     Tremor Sister     Fibromyalgia Sister     Hypertension Sister     Post-traumatic stress disorder Sister     Heart disease Sister     Hypertension Sister     Depression Sister     Tremor Brother     COPD Brother     Depression Brother     Hypertension Brother     Cancer Brother         Undiagnosed lung cancer    Tremor Paternal Grandmother     Depression Sister     Hypertension Brother       Allergies   Allergen Reactions    Ace Inhibitors Cough    Pregabalin Confusion    Aricept [Donepezil] Mental Status Change    Codeine Nausea And Vomiting    Paxil [Paroxetine] Other (See Comments)     TREMOR WORSENING      Social Drivers of Health     Tobacco Use: High Risk (2025)    Patient History     Smoking Tobacco Use: Every Day     Smokeless Tobacco Use: Never     Passive Exposure: Past   Alcohol Use: Not At Risk (2025)    AUDIT-C     Frequency of Alcohol Consumption: Never     Average Number of Drinks: Patient does not drink     Frequency of Binge Drinking: Never   Financial Resource Strain: Low Risk  (2024)    Overall Financial Resource Strain (CARDIA)     Difficulty of Paying Living Expenses: Not hard at all   Food  Insecurity: No Food Insecurity (6/20/2024)    Hunger Vital Sign     Worried About Running Out of Food in the Last Year: Never true     Ran Out of Food in the Last Year: Never true   Transportation Needs: No Transportation Needs (6/20/2024)    PRAPARE - Transportation     Lack of Transportation (Medical): No     Lack of Transportation (Non-Medical): No   Physical Activity: Insufficiently Active (2/16/2023)    Exercise Vital Sign     Days of Exercise per Week: 4 days     Minutes of Exercise per Session: 20 min   Stress: Not on file   Social Connections: Not on file   Interpersonal Safety: Not At Risk (4/4/2025)    Abuse Screen     Unsafe at Home or Work/School: no     Feels Threatened by Someone?: no     Does Anyone Keep You from Contacting Others or Doint Things Outside the Home?: no     Physical Sign of Abuse Present: no   Depression: Not at risk (4/1/2025)    PHQ-2     PHQ-2 Score: 1   Housing Stability: Unknown (4/4/2025)    Housing Stability     Current Living Arrangements: home     Potentially Unsafe Housing Conditions: Not on file   Utilities: Not At Risk (6/20/2024)    [facility] Utilities     Threatened with loss of utilities: No   Health Literacy: Adequate Health Literacy (6/20/2024)    Amb Case Mgmt     How often do you have someone help you read facts about your health?: never     How often do you have trouble learning about your health because you don't know what the written words mean?: never     How confident are you filling out forms by yourself?: always   Employment: Not on file   Disabilities: Not At Risk (4/4/2025)    Disabilities     Concentrating, Remembering, or Making Decisions Difficulty: no     Doing Errands Independently Difficulty: no        Home Medications     Prior to Admission medications    Medication Sig Start Date End Date Taking? Authorizing Provider   ALPRAZolam (XANAX) 0.25 MG tablet TAKE 1 TABLET BY MOUTH EVERY DAY AS NEEDED FOR SLEEP FOR ANXIETY 3/12/25   Elen aJmeson, DO    amLODIPine (NORVASC) 5 MG tablet TAKE 1 TABLET BY MOUTH EVERY DAY 3/10/25   Elen Jameson DO   aspirin 81 MG tablet Take 1 tablet by mouth Daily.    Provider, MD Kaye   atorvastatin (LIPITOR) 80 MG tablet Take 1 tablet by mouth Daily. 4/1/25   Elen Jameson DO   Blood Glucose Monitoring Suppl (Accu-Chek Guide) w/Device kit 1 kit Daily. 11/29/22   Elen Jameson DO   citalopram (CeleXA) 20 MG tablet Take 1 tablet by mouth Daily. 2/27/25   Elen Jameson DO   Continuous Blood Gluc  (Dexcom G7 ) device Use 1 package Daily. 4/2/24   Elen Jameson DO   Continuous Glucose Sensor (Dexcom G7 Sensor) misc Use 1 package Daily. 4/1/25   Elen Jameson, DO   CRANBERRY PO Daily As Needed. 1 po qd    Provider, MD Kaye   Crisaborole (Eucrisa) 2 % ointment Apply 1 application  topically 2 (Two) Times a Day. Apply to Rt upper eyelid 7/10/23   Elen Jameson DO   cyclobenzaprine (FLEXERIL) 10 MG tablet TAKE 1 TABLET BY MOUTH AT NIGHT AS NEEDED FOR MUSCLE SPASM 3/20/25   Elen Jameson DO   empagliflozin (Jardiance) 25 MG tablet tablet Take 1 tablet by mouth Daily. 1/20/25   Elen Jameson DO   Estrogens Conjugated (Premarin) 0.625 MG/GM vaginal cream Apply to painful area qhs x 2 weeks then goto 2 times a week 2/21/25   Elen Jameson DO   fenofibrate 160 MG tablet Take 1 tablet by mouth Daily. 4/1/25   Elen Jameson DO   Fluticasone-Umeclidin-Vilant (Trelegy Ellipta) 200-62.5-25 MCG/ACT inhaler Inhale 1 puff Daily. 4/1/25   Elen Jameson DO   gabapentin (NEURONTIN) 300 MG capsule Take 1 capsule by mouth 3 (Three) Times a Day. 3/25/24   Elen Jameson DO   guaiFENesin (MUCINEX) 600 MG 12 hr tablet Take 2 tablets by mouth 2 (Two) Times a Day. 2/21/25   Elen Jameson DO   ibandronate (Boniva) 150 MG tablet Take 1 tablet by mouth Every 30 (Thirty) Days. 4/1/25   Elen Jameson DO   ibuprofen (ADVIL,MOTRIN) 800 MG tablet TAKE 1 TABLET BY MOUTH 2 TIMES A DAY AS NEEDED FOR  MODERATE PAIN 3/20/25   Elen Jameson DO   ipratropium-albuterol (Combivent Respimat)  MCG/ACT inhaler INHALE 1 PUFF BY MOUTH 4 TIMES A DAY AS NEEDED FOR WHEEZING 12/3/24   Elen Jameson DO   ipratropium-albuterol (DUO-NEB) 0.5-2.5 mg/3 ml nebulizer Take 3 mL by nebulization Every 6 (Six) Hours As Needed for Wheezing or Shortness of Air. 4/1/25   Elen Jameson DO   meclizine (ANTIVERT) 12.5 MG tablet 1-2 po q6 hrs prn vertigo 8/8/24   Elen Jameson, DO   metFORMIN ER (GLUCOPHAGE-XR) 500 MG 24 hr tablet 1 po bid w/ meals 4/1/25   Elen Jameson DO   nitroglycerin (NITROSTAT) 0.4 MG SL tablet Place 1 tablet under the tongue Every 5 (Five) Minutes As Needed for Chest Pain. Take no more than 3 doses in 15 minutes. 9/10/24   Elen Jameson DO   ofloxacin (OCUFLOX) 0.3 % Use 10 gtts in affected ear once daily x 7 days 2/9/25   Lia Longoria, PA   omeprazole (priLOSEC) 40 MG capsule TAKE 1 CAPSULE BY MOUTH EVERY DAY 6/9/23   Elen Jameson DO   predniSONE (DELTASONE) 20 MG tablet 3 po qam x 3 days then 2 po qam x 3 days then 1 po qam x 3 days then 1/2 tab po qam x 4 days 4/1/25   Elen Jameson DO   silver sulfadiazine (Silvadene) 1 % cream Apply 1 Application topically to the appropriate area as directed Daily. Apply to blistered areas of digits 1/17/25   Elen Jameson DO   traMADol (ULTRAM) 50 MG tablet Take 1 tablet by mouth Every 8 (Eight) Hours As Needed for Moderate Pain. 2/25/25   Elen Jameson,    valsartan (Diovan) 40 MG tablet Take 1 tablet by mouth Daily. 2/17/25   Elen Jameson,    vitamin D (ERGOCALCIFEROL) 1.25 MG (14018 UT) capsule capsule TAKE 1 CAPSULE BY MOUTH 1 TIME A WEEK 3/20/25   Elen Jameson,         Objective / Physical Exam     Vital signs:  Temp: 97.8 °F (36.6 °C)  BP: 165/85  Heart Rate: 70  Resp: 14  SpO2: 96 %  Weight: 64 kg (141 lb 1.5 oz)    Admission Weight: Weight: 64 kg (141 lb 1.5 oz)    Physical Exam  Vitals and nursing note reviewed.   HENT:       "Head: Normocephalic and atraumatic.   Eyes:      Extraocular Movements: Extraocular movements intact.      Pupils: Pupils are equal, round, and reactive to light.   Cardiovascular:      Rate and Rhythm: Normal rate and regular rhythm.      Pulses: Normal pulses.      Heart sounds: Normal heart sounds.   Pulmonary:      Effort: Pulmonary effort is normal. No tachypnea or respiratory distress.      Breath sounds: Examination of the right-upper field reveals rhonchi. Examination of the right-middle field reveals rhonchi. Examination of the right-lower field reveals decreased breath sounds and rhonchi.      Comments: Right chest tube  Abdominal:      General: Bowel sounds are normal.      Palpations: Abdomen is soft.      Tenderness: There is no abdominal tenderness.   Musculoskeletal:         General: Normal range of motion.   Skin:     General: Skin is warm and dry.   Neurological:      Mental Status: She is alert and oriented to person, place, and time.   Psychiatric:         Mood and Affect: Mood normal.         Behavior: Behavior normal.          Labs     Results from last 7 days   Lab Units 04/03/25  2306   WBC 10*3/mm3 16.96*   HEMOGLOBIN g/dL 15.8   HEMATOCRIT % 49.4*   PLATELETS 10*3/mm3 435            Invalid input(s): \"BILI TOTAL\"        Results from last 7 days   Lab Units 04/03/25  2306   SODIUM mmol/L 137   POTASSIUM mmol/L 4.2   CHLORIDE mmol/L 103   CO2 mmol/L 20.7*   BUN mg/dL 37*   CREATININE mg/dL 0.98   GLUCOSE mg/dL 179*        Imaging     XR Chest 1 View  Result Date: 4/4/2025  XR CHEST 1 VW Date of Exam: 4/4/2025 12:08 AM EDT Indication: post procedure- chest tube Comparison: 4/3/2025 Findings: Cardiac and mediastinal contours are normal status post CABG. Small bore right chest tube has been placed. There has been near complete resolution of the previously identified right pneumothorax. There is only a tiny apical component remaining. Mild atelectatic changes are noted in the right lung. Left lung " is clear. Pulmonary vascularity is normal.     Near complete resolution of right-sided pneumothorax following placement of small bore chest tube. Tiny apical pneumothorax persists. Electronically Signed: Bonifacio Munoz MD  4/4/2025 12:21 AM EDT  Workstation ID: [id number]    XR Chest 1 View  Result Date: 4/3/2025  XR CHEST 1 VW Date of Exam: 4/3/2025 11:10 PM EDT Indication: Dyspnea Comparison: Chest radiograph 11/3/2024 Findings: There are postop changes from midline sternotomy and coronary artery bypass grafting. There is a moderate size right pneumothorax up to 2 cm along the lateral right mid and lower chest. There is mild right basilar atelectasis. The pulmonary vascular markings are normal. There are no focal consolidations.     Impression: Moderate size right pneumothorax. Electronically Signed: Braxton Mac MD  4/3/2025 11:27 PM EDT  Workstation ID: [id number]         Current Medications     Scheduled Meds:  amLODIPine, 5 mg, Oral, Daily  aspirin, 81 mg, Oral, Daily  atorvastatin, 80 mg, Oral, Daily  budesonide, 0.5 mg, Nebulization, BID - RT  citalopram, 20 mg, Oral, Daily  empagliflozin, 25 mg, Oral, Daily  fenofibrate, 145 mg, Oral, Daily  guaiFENesin, 1,200 mg, Oral, BID  insulin lispro, 2-7 Units, Subcutaneous, 4x Daily AC & at Bedtime  ipratropium-albuterol, 3 mL, Nebulization, Q6H While Awake - RT  nicotine, 1 patch, Transdermal, Q24H  pantoprazole, 40 mg, Oral, Q AM  sodium chloride, 10 mL, Intravenous, Q12H  valsartan, 40 mg, Oral, Daily  [START ON 4/7/2025] vitamin D, 50,000 Units, Oral, Weekly           Екатерина GloverCosho[facility]  04/04/25   03:48 EDT

## 2025-04-04 NOTE — PROGRESS NOTES
[facility] MEDICINE SERVICE  TRANSFER OF CARE/ACCEPTANCE NOTE    PATIENT NAME: Darleen Stallings  : 1955  MRN: [id number]     Active Hospital Problems    Diagnosis  POA    **Pneumothorax [J93.9]  Yes      Resolved Hospital Problems   No resolved problems to display.       Patient seen and examined by me on 25.  Interim History:  Patient reports improved breathing but still having intermittent right chest wall pain worse with deep inspiration. Chest tube in place. Pulmonology consult pending. Patient remains on NC for supplemental oxygen to maintain sats >90%.     I have reviewed the H&P, diagnostic data and plan of care for Darleen Stallings.  I will be taking over care of this patient during the current hospitalization.      For complete assessment and plan, please see H&P from earlier today.     Signature: Electronically signed by Jade Osullivan MD, 25, 07:12 EDT.  [facility] Hospitalist Team

## 2025-04-05 ENCOUNTER — APPOINTMENT (OUTPATIENT)
Dept: GENERAL RADIOLOGY | Facility: HOSPITAL | Age: 70
DRG: 200 | End: 2025-04-05
Payer: MEDICARE

## 2025-04-05 ENCOUNTER — APPOINTMENT (OUTPATIENT)
Dept: CT IMAGING | Facility: HOSPITAL | Age: 70
DRG: 200 | End: 2025-04-05
Payer: MEDICARE

## 2025-04-05 LAB
ANION GAP SERPL CALCULATED.3IONS-SCNC: 10.3 MMOL/L (ref 5–15)
BASOPHILS # BLD AUTO: 0.03 10*3/MM3 (ref 0–0.2)
BASOPHILS NFR BLD AUTO: 0.1 % (ref 0–1.5)
BUN SERPL-MCNC: 39 MG/DL (ref 8–23)
BUN/CREAT SERPL: 35.1 (ref 7–25)
CALCIUM SPEC-SCNC: 9.8 MG/DL (ref 8.6–10.5)
CHLORIDE SERPL-SCNC: 106 MMOL/L (ref 98–107)
CO2 SERPL-SCNC: 24.7 MMOL/L (ref 22–29)
CREAT SERPL-MCNC: 1.11 MG/DL (ref 0.57–1)
DEPRECATED RDW RBC AUTO: 42 FL (ref 37–54)
EGFRCR SERPLBLD CKD-EPI 2021: 54.6 ML/MIN/1.73
EOSINOPHIL # BLD AUTO: 0.01 10*3/MM3 (ref 0–0.4)
EOSINOPHIL NFR BLD AUTO: 0 % (ref 0.3–6.2)
ERYTHROCYTE [DISTWIDTH] IN BLOOD BY AUTOMATED COUNT: 13 % (ref 12.3–15.4)
GLUCOSE BLDC GLUCOMTR-MCNC: 157 MG/DL (ref 70–105)
GLUCOSE BLDC GLUCOMTR-MCNC: 324 MG/DL (ref 70–105)
GLUCOSE BLDC GLUCOMTR-MCNC: 330 MG/DL (ref 70–105)
GLUCOSE SERPL-MCNC: 176 MG/DL (ref 65–99)
HCT VFR BLD AUTO: 51.9 % (ref 34–46.6)
HGB BLD-MCNC: 16.3 G/DL (ref 12–15.9)
IMM GRANULOCYTES # BLD AUTO: 0.12 10*3/MM3 (ref 0–0.05)
IMM GRANULOCYTES NFR BLD AUTO: 0.6 % (ref 0–0.5)
LYMPHOCYTES # BLD AUTO: 2.77 10*3/MM3 (ref 0.7–3.1)
LYMPHOCYTES NFR BLD AUTO: 13.3 % (ref 19.6–45.3)
MCH RBC QN AUTO: 27.7 PG (ref 26.6–33)
MCHC RBC AUTO-ENTMCNC: 31.4 G/DL (ref 31.5–35.7)
MCV RBC AUTO: 88.1 FL (ref 79–97)
MONOCYTES # BLD AUTO: 1.25 10*3/MM3 (ref 0.1–0.9)
MONOCYTES NFR BLD AUTO: 6 % (ref 5–12)
NEUTROPHILS NFR BLD AUTO: 16.68 10*3/MM3 (ref 1.7–7)
NEUTROPHILS NFR BLD AUTO: 80 % (ref 42.7–76)
NRBC BLD AUTO-RTO: 0 /100 WBC (ref 0–0.2)
PLATELET # BLD AUTO: 417 10*3/MM3 (ref 140–450)
PMV BLD AUTO: 9.7 FL (ref 6–12)
POTASSIUM SERPL-SCNC: 5.1 MMOL/L (ref 3.5–5.2)
RBC # BLD AUTO: 5.89 10*6/MM3 (ref 3.77–5.28)
SODIUM SERPL-SCNC: 141 MMOL/L (ref 136–145)
WBC NRBC COR # BLD AUTO: 20.86 10*3/MM3 (ref 3.4–10.8)

## 2025-04-05 PROCEDURE — 94799 UNLISTED PULMONARY SVC/PX: CPT

## 2025-04-05 PROCEDURE — 0W9930Z DRAINAGE OF RIGHT PLEURAL CAVITY WITH DRAINAGE DEVICE, PERCUTANEOUS APPROACH: ICD-10-PCS | Performed by: INTERNAL MEDICINE

## 2025-04-05 PROCEDURE — 71045 X-RAY EXAM CHEST 1 VIEW: CPT

## 2025-04-05 PROCEDURE — 82948 REAGENT STRIP/BLOOD GLUCOSE: CPT | Performed by: NURSE PRACTITIONER

## 2025-04-05 PROCEDURE — 82948 REAGENT STRIP/BLOOD GLUCOSE: CPT

## 2025-04-05 PROCEDURE — 85025 COMPLETE CBC W/AUTO DIFF WBC: CPT | Performed by: NURSE PRACTITIONER

## 2025-04-05 PROCEDURE — 94761 N-INVAS EAR/PLS OXIMETRY MLT: CPT

## 2025-04-05 PROCEDURE — 80048 BASIC METABOLIC PNL TOTAL CA: CPT | Performed by: NURSE PRACTITIONER

## 2025-04-05 PROCEDURE — 25010000002 HYDROMORPHONE PER 4 MG: Performed by: NURSE PRACTITIONER

## 2025-04-05 PROCEDURE — 63710000001 INSULIN LISPRO (HUMAN) PER 5 UNITS: Performed by: NURSE PRACTITIONER

## 2025-04-05 PROCEDURE — 71250 CT THORAX DX C-: CPT

## 2025-04-05 PROCEDURE — 63710000001 PREDNISONE PER 1 MG: Performed by: INTERNAL MEDICINE

## 2025-04-05 RX ORDER — HYDROMORPHONE HYDROCHLORIDE 1 MG/ML
0.5 INJECTION, SOLUTION INTRAMUSCULAR; INTRAVENOUS; SUBCUTANEOUS EVERY 4 HOURS PRN
Status: COMPLETED | OUTPATIENT
Start: 2025-04-05 | End: 2025-04-06

## 2025-04-05 RX ADMIN — HYDROMORPHONE HYDROCHLORIDE 0.5 MG: 1 INJECTION, SOLUTION INTRAMUSCULAR; INTRAVENOUS; SUBCUTANEOUS at 20:02

## 2025-04-05 RX ADMIN — PREDNISONE 20 MG: 20 TABLET ORAL at 08:08

## 2025-04-05 RX ADMIN — INSULIN LISPRO 5 UNITS: 100 INJECTION, SOLUTION INTRAVENOUS; SUBCUTANEOUS at 20:07

## 2025-04-05 RX ADMIN — Medication 10 ML: at 09:28

## 2025-04-05 RX ADMIN — IPRATROPIUM BROMIDE AND ALBUTEROL SULFATE 3 ML: .5; 3 SOLUTION RESPIRATORY (INHALATION) at 20:35

## 2025-04-05 RX ADMIN — GUAIFENESIN 1200 MG: 600 TABLET, MULTILAYER, EXTENDED RELEASE ORAL at 20:02

## 2025-04-05 RX ADMIN — ASPIRIN 81 MG: 81 TABLET, COATED ORAL at 08:08

## 2025-04-05 RX ADMIN — ATORVASTATIN CALCIUM 80 MG: 40 TABLET, FILM COATED ORAL at 08:09

## 2025-04-05 RX ADMIN — OXYCODONE 5 MG: 5 TABLET ORAL at 18:07

## 2025-04-05 RX ADMIN — PREDNISONE 20 MG: 20 TABLET ORAL at 17:32

## 2025-04-05 RX ADMIN — INSULIN LISPRO 2 UNITS: 100 INJECTION, SOLUTION INTRAVENOUS; SUBCUTANEOUS at 08:09

## 2025-04-05 RX ADMIN — CITALOPRAM 20 MG: 20 TABLET, FILM COATED ORAL at 08:09

## 2025-04-05 RX ADMIN — FENOFIBRATE 145 MG: 145 TABLET ORAL at 08:08

## 2025-04-05 RX ADMIN — OXYCODONE 5 MG: 5 TABLET ORAL at 11:49

## 2025-04-05 RX ADMIN — VALSARTAN 40 MG: 80 TABLET ORAL at 08:08

## 2025-04-05 RX ADMIN — SENNOSIDES AND DOCUSATE SODIUM 2 TABLET: 50; 8.6 TABLET ORAL at 20:14

## 2025-04-05 RX ADMIN — AMLODIPINE BESYLATE 5 MG: 5 TABLET ORAL at 08:09

## 2025-04-05 RX ADMIN — PANTOPRAZOLE SODIUM 40 MG: 40 TABLET, DELAYED RELEASE ORAL at 05:37

## 2025-04-05 RX ADMIN — TRAMADOL HYDROCHLORIDE 50 MG: 50 TABLET, COATED ORAL at 16:28

## 2025-04-05 RX ADMIN — GUAIFENESIN 1200 MG: 600 TABLET, MULTILAYER, EXTENDED RELEASE ORAL at 08:08

## 2025-04-05 RX ADMIN — BUDESONIDE 0.5 MG: 0.5 INHALANT RESPIRATORY (INHALATION) at 20:41

## 2025-04-05 RX ADMIN — NICOTINE 1 PATCH: 21 PATCH TRANSDERMAL at 08:09

## 2025-04-05 RX ADMIN — INSULIN LISPRO 5 UNITS: 100 INJECTION, SOLUTION INTRAVENOUS; SUBCUTANEOUS at 17:39

## 2025-04-05 RX ADMIN — EMPAGLIFLOZIN 25 MG: 25 TABLET, FILM COATED ORAL at 08:08

## 2025-04-05 RX ADMIN — Medication 10 ML: at 20:02

## 2025-04-05 NOTE — DISCHARGE SUMMARY
[facility] Medicine Services  Discharge Summary    Date of Service: 2025  Patient Name: Darleen Stallings  : 1955  MRN: [id number]    Date of Admission: 4/3/2025  Discharge Diagnosis: Pneumothorax  Date of Discharge: 2025  Primary Care Physician: Elen Jameson DO      Presenting Problem:   Spontaneous pneumothorax [J93.83]  Pneumothorax [J93.9]    Active and Resolved Hospital Problems:  Active Hospital Problems    Diagnosis POA    **Pneumothorax [J93.9] Yes      Resolved Hospital Problems   No resolved problems to display.       Spontaneous right pneumothorax status post chest tube placement 4/3/2025 in the ER  RSV   COPD exacerbation   - initial CXR: moderate size right pneumothorax  - repeat CXR: near complete resolution of right-sided pneumothorax following placement of smallbore chest tube.  Tiny apical pneumothorax persists  - WBC 16.96  - ABG: pH 7.32, pCO2 43.2, pO2 62.1, HCO3 22.3 on 21% FiO2  - now on 2L O2 via NC  - duonebs, pulmicort, pain control   - continuous pulse oximetry   - pulmonary consulted     Hypertension  Hyperlipidemia  - norvasc, atorvastatin      DM type II  - hold oral meds while inpatient  - SSI     Depression   - cont home celexa, xanax     Hospital Course     HPI:  Darleen Stallings is a 69 y.o. female with PMH of COPD with as needed supplemental oxygen at home presented to [facility] ED 4/3/2025 with complaints of shortness of breath and right sided stabbing chest pain.  She states she has been short of breath for about a week.  She has been using her prn oxygen 2L and has been using nebulizer treatments. She has not really been coughing, some wheezing. No fever. She saw her PCP 2 days ago and was started on oral steroids.  She started having right sided chest pain for the last 24 hours that would not go away so she decided to seek treatment in the ER.     In the ED chest x-ray showed moderate size right pneumothorax.  Chest tube was placed  "by Dr. Santos in the ER and repeat chest x-ray showed near complete resolution of right-sided pneumothorax following placement of smallbore chest tube.  Tiny apical pneumothorax persists.  Patient states her pain is improved from 9-10/10 to 5/10. ABG showed pH 7.32, pCO2 43.2, pO2 62.1, HCO3 22.3 on 21% FiO2. Labs showed BUN 37, glucose 179, WBC 16.96. RVP detected RSV. Pulmonary was consulted in the ER. She was admitted for further treatment right pneumothorax and RSV.   \"  4/5 seen in bed NAD, Doing better today anxious to go home, condition on dc stable      DISCHARGE Follow Up Recommendations for labs and diagnostics: follow with pcp in one week        Day of Discharge     Vital Signs:  Temp:  [98.1 °F (36.7 °C)-98.7 °F (37.1 °C)] 98.1 °F (36.7 °C)  Heart Rate:  [76-96] 76  Resp:  [13-25] 14  BP: (136-171)/(73-82) 136/80  Flow (L/min) (Oxygen Therapy):  [2] 2    Physical Exam   Vitals and nursing note reviewed.   HENT:      Head: Normocephalic and atraumatic.   Eyes:      Extraocular Movements: Extraocular movements intact.      Pupils: Pupils are equal, round, and reactive to light.   Cardiovascular:      Rate and Rhythm: Normal rate and regular rhythm.      Pulses: Normal pulses.      Heart sounds: Normal heart sounds.   Pulmonary:      Effort: Pulmonary effort is normal. No tachypnea or respiratory distress.      Breath sounds: Examination of the right-upper field reveals rhonchi. Examination of the right-middle field reveals rhonchi. Examination of the right-lower field reveals decreased breath sounds and rhonchi.      Comments: Right chest tube  Abdominal:      General: Bowel sounds are normal.      Palpations: Abdomen is soft.      Tenderness: There is no abdominal tenderness.   Musculoskeletal:         General: Normal range of motion.   Skin:     General: Skin is warm and dry.   Neurological:      Mental Status: She is alert and oriented to person, place, and time.   Psychiatric:         Mood and Affect: " Mood normal.         Behavior: Behavior normal.       Pertinent  and/or Most Recent Results     LAB RESULTS:      Lab 04/05/25  0541 04/03/25 2306   WBC 20.86* 16.96*   HEMOGLOBIN 16.3* 15.8   HEMATOCRIT 51.9* 49.4*   PLATELETS 417 435   NEUTROS ABS 16.68* 13.01*   IMMATURE GRANS (ABS) 0.12* 0.12*   LYMPHS ABS 2.77 2.56   MONOS ABS 1.25* 1.24*   EOS ABS 0.01 0.00   MCV 88.1 87.3         Lab 04/05/25  0541 04/03/25 2306   SODIUM 141 137   POTASSIUM 5.1 4.2   CHLORIDE 106 103   CO2 24.7 20.7*   ANION GAP 10.3 13.3   BUN 39* 37*   CREATININE 1.11* 0.98   EGFR 54.6* 63.4   GLUCOSE 176* 179*   CALCIUM 9.8 9.8                         Lab 04/03/25 2302   PH, ARTERIAL 7.322*   PCO2, ARTERIAL 43.2   PO2 ART 62.1*   O2 SATURATION ART 89.4*   FIO2 21   HCO3 ART 22.3   BASE EXCESS ART -3.8*     Brief Urine Lab Results  (Last result in the past 365 days)        Color   Clarity   Blood   Leuk Est   Nitrite   Protein   CREAT   Urine HCG        01/17/25 1401 Yellow   Clear   Negative   Negative   Negative   Negative                 Microbiology Results (last 10 days)       Procedure Component Value - Date/Time    Respiratory Panel PCR w/COVID-19(SARS-CoV-2) MANJIT/THEO/[facility]/PAD/COR/PHILIP In-House, NP Swab in UTM/VTM, 2 HR TAT - Swab, Nasopharynx [[id number]]  (Abnormal) Collected: 04/03/25 2306    Lab Status: Final result Specimen: Swab from Nasopharynx Updated: 04/03/25 7945     ADENOVIRUS, PCR Not Detected     Coronavirus 229E Not Detected     Coronavirus HKU1 Not Detected     Coronavirus NL63 Not Detected     Coronavirus OC43 Not Detected     COVID19 Not Detected     Human Metapneumovirus Not Detected     Human Rhinovirus/Enterovirus Not Detected     Influenza A PCR Not Detected     Influenza B PCR Not Detected     Parainfluenza Virus 1 Not Detected     Parainfluenza Virus 2 Not Detected     Parainfluenza Virus 3 Not Detected     Parainfluenza Virus 4 Not Detected     RSV, PCR Detected     Bordetella pertussis pcr Not Detected      Bordetella parapertussis PCR Not Detected     Chlamydophila pneumoniae PCR Not Detected     Mycoplasma pneumo by PCR Not Detected    Narrative:      In the setting of a positive respiratory panel with a viral infection PLUS a negative procalcitonin without other underlying concern for bacterial infection, consider observing off antibiotics or discontinuation of antibiotics and continue supportive care. If the respiratory panel is positive for atypical bacterial infection (Bordetella pertussis, Chlamydophila pneumoniae, or Mycoplasma pneumoniae), consider antibiotic de-escalation to target atypical bacterial infection.            XR Chest 1 View  Result Date: 4/4/2025  Impression: Impression: Interval removal of a right small bore thoracostomy tube with a small pneumothorax noted, as above. Irregular density in the mid right lung is chronic, probably representing scarring and/or atelectasis. Small right basilar density could represent pneumonia or atelectasis, similar to the most recent exam. Electronically Signed: Jonah Loredo MD  4/4/2025 12:46 PM EDT  Workstation ID: [id number]    XR Chest 1 View  Result Date: 4/4/2025  Impression: Impression: Increasing small right pneumothorax. Thoracotomy tube appears slightly retracted with small portion remaining in the pleural space. Electronically Signed: Omar Hi MD  4/4/2025 11:48 AM EDT  Workstation ID: [id number]    XR Chest 1 View  Result Date: 4/4/2025  Impression: Near complete resolution of right-sided pneumothorax following placement of small bore chest tube. Tiny apical pneumothorax persists. Electronically Signed: Bonifacio Munoz MD  4/4/2025 12:21 AM EDT  Workstation ID: [id number]    XR Chest 1 View  Result Date: 4/3/2025  Impression: Impression: Moderate size right pneumothorax. Electronically Signed: Braxton Mac MD  4/3/2025 11:27 PM EDT  Workstation ID: [id number]      Results for orders placed during the hospital encounter of 01/21/25    Duplex Carotid  Ultrasound CAR    Interpretation Summary    Right internal carotid artery demonstrates a less than 50% stenosis.    Antegrade right vertebral flow.    Left internal carotid artery demonstrates a less than 50% stenosis.    Antegrade left vertebral flow.      Results for orders placed during the hospital encounter of 01/21/25    Duplex Carotid Ultrasound CAR    Interpretation Summary    Right internal carotid artery demonstrates a less than 50% stenosis.    Antegrade right vertebral flow.    Left internal carotid artery demonstrates a less than 50% stenosis.    Antegrade left vertebral flow.      Results for orders placed during the hospital encounter of 06/20/22    Adult Transthoracic Echo Complete W/ Cont if Necessary Per Protocol    Interpretation Summary  · Left ventricular wall thickness is consistent with mild to moderate concentric hypertrophy.  · Estimated left ventricular EF = 55% Estimated left ventricular EF was in agreement with the calculated left ventricular EF. Left ventricular systolic function is normal.  · There is moderate calcification of the aortic valve mainly affecting the left coronary and right coronary cusp(s).  · Left ventricular diastolic function is consistent with (grade I) impaired relaxation.  · Estimated right ventricular systolic pressure from tricuspid regurgitation is normal (<35 mmHg).      Labs Pending at Discharge:  Pending Results       None            Procedures Performed           Consults:   Consults       Date and Time Order Name Status Description    4/4/2025 12:47 AM Pulmonology (on-call MD unless specified) Completed     4/4/2025 12:35 AM Hospitalist (on-call MD unless specified)            Spontaneous right pneumothorax status post chest tube placement 4/3/2025 in the ER  RSV   COPD exacerbation   - initial CXR: moderate size right pneumothorax  - repeat CXR: near complete resolution of right-sided pneumothorax following placement of smallbore chest tube.  Tiny apical  pneumothorax persists  - WBC 16.96  - ABG: pH 7.32, pCO2 43.2, pO2 62.1, HCO3 22.3 on 21% FiO2  - now on 2L O2 via NC  - duonebs, pulmicort, pain control   - continuous pulse oximetry   - pulmonary consulted     Hypertension  Hyperlipidemia  - norvasc, atorvastatin      DM type II  - hold oral meds while inpatient  - SSI     Depression   - cont home celexa, xanax       Discharge Details        Discharge Medications        Changes to Medications        Instructions Start Date   vitamin D 1.25 MG (54311 UT) capsule capsule  Commonly known as: ERGOCALCIFEROL  What changed: additional instructions   50,000 Units, Oral, Weekly             Continue These Medications        Instructions Start Date   Accu-Chek Guide w/Device kit   1 kit, Not Applicable, Daily      ALPRAZolam 0.25 MG tablet  Commonly known as: XANAX   TAKE 1 TABLET BY MOUTH EVERY DAY AS NEEDED FOR SLEEP FOR ANXIETY      amLODIPine 5 MG tablet  Commonly known as: NORVASC   5 mg, Oral, Daily      aspirin 81 MG tablet   81 mg, Daily      atorvastatin 80 MG tablet  Commonly known as: LIPITOR   80 mg, Oral, Daily      citalopram 20 MG tablet  Commonly known as: CeleXA   20 mg, Oral, Daily      Combivent Respimat  MCG/ACT inhaler  Generic drug: ipratropium-albuterol   INHALE 1 PUFF BY MOUTH 4 TIMES A DAY AS NEEDED FOR WHEEZING      ipratropium-albuterol 0.5-2.5 mg/3 ml nebulizer  Commonly known as: DUO-NEB   3 mL, Nebulization, Every 6 Hours PRN      CRANBERRY PO   1 tablet, Daily PRN      cyclobenzaprine 10 MG tablet  Commonly known as: FLEXERIL   10 mg, Oral, Nightly PRN      Dexcom G7  device   1 package, Not Applicable, Daily      Dexcom G7 Sensor misc   1 package, Not Applicable, Daily      empagliflozin 25 MG tablet tablet  Commonly known as: Jardiance   25 mg, Oral, Daily      fenofibrate 160 MG tablet   160 mg, Oral, Daily      guaiFENesin 600 MG 12 hr tablet  Commonly known as: MUCINEX   1,200 mg, Oral, 2 Times Daily      ibandronate 150 MG  tablet  Commonly known as: Boniva   150 mg, Oral, Every 30 Days      metFORMIN  MG 24 hr tablet  Commonly known as: GLUCOPHAGE-XR   1 po bid w/ meals      nitroglycerin 0.4 MG SL tablet  Commonly known as: NITROSTAT   0.4 mg, Sublingual, Every 5 Minutes PRN, Take no more than 3 doses in 15 minutes.      omeprazole 40 MG capsule  Commonly known as: priLOSEC   TAKE 1 CAPSULE BY MOUTH EVERY DAY      predniSONE 20 MG tablet  Commonly known as: DELTASONE   3 po qam x 3 days then 2 po qam x 3 days then 1 po qam x 3 days then 1/2 tab po qam x 4 days      traMADol 50 MG tablet  Commonly known as: ULTRAM   50 mg, Oral, Every 8 Hours PRN      Trelegy Ellipta 200-62.5-25 MCG/ACT inhaler  Generic drug: Fluticasone-Umeclidin-Vilant   1 puff, Inhalation, Daily      valsartan 40 MG tablet  Commonly known as: Diovan   40 mg, Oral, Daily             Stop These Medications      ibuprofen 800 MG tablet  Commonly known as: ADVIL,MOTRIN              Allergies   Allergen Reactions    Ace Inhibitors Cough    Pregabalin Confusion    Aricept [Donepezil] Mental Status Change    Codeine Nausea And Vomiting    Paxil [Paroxetine] Other (See Comments)     TREMOR WORSENING         Discharge Disposition:   Home or Self Care    Diet:  Hospital:  Diet Order   Procedures    Diet: Regular/House; Fluid Consistency: Thin (IDDSI 0)         Discharge Activity:   Activity Instructions       Gradually Increase Activity Until at Pre-Hospitalization Level                CODE STATUS:  Code Status and Medical Interventions: CPR (Attempt to Resuscitate); Full Support   Ordered at: 04/04/25 0141     Code Status (Patient has no pulse and is not breathing):    CPR (Attempt to Resuscitate)     Medical Interventions (Patient has pulse or is breathing):    Full Support     Level Of Support Discussed With:    Patient         Future Appointments   Date Time Provider Department Center   4/7/2025  2:00 PM Elisa Soto MD [facility]   5/1/2025  1:30 PM  Elen Jameson DO [facility]   7/17/2025  3:00 PM Elen Jameson DO [facility]       Additional Instructions for the Follow-ups that You Need to Schedule       Discharge Follow-up with PCP   As directed       Currently Documented PCP:    Elen Jameson DO    PCP Phone Number:    [phone]     Follow Up Details: 1 week        Discharge Follow-up with Specified Provider: pulmonary; 2 Weeks   As directed      To: pulmonary   Follow Up: 2 Weeks                Time spent on Discharge including face to face service:  35 minutes    Signature: Electronically signed by Justyn Ramirez MD, 04/05/25, 07:45 EDT.  [facility] Hospitalist Team

## 2025-04-05 NOTE — PROGRESS NOTES
Daily Progress Note          Assessment    Pneumothorax, spontaneous in the right side, status post chest tube placement 4/3/2025  COPD with acute exacerbation  RSV infection     Right upper lobe bronchiectasis  Lung nodules on CT scan 2023 2.3 cm right upper lobe, 1.3 cm right upper lobe and 2.2 cm subcarinal lymph node  Hypoxia  HTN  HLD  DM  CAD status post CABG 2019  Essential tremors  Tobacco smoker: Quit 2023 but returned to smoking again     PFTs 2022: Severe obstructive pattern FEV1/FVC 50%, FVC 61%, FEV1 40% without significant improvement with bronchodilators        Recommendations:  Addendum the CT scan is showing moderate-sized pneumothorax on the right side, I will place a chest tube     chest tube management: The chest tube was accidentally removed yesterday, chest x-ray yesterday showed there is a small pneumothorax, PT chest x-ray today has a questionable right pleural line versus skinfold, I will check CT scan of the chest, clinically she is asymptomatic     Nebulized Pulmicort  Mucinex  Prednisone 20 mg bid  Oxygen supplement and titration to maintain saturation 90 to 95%, currently on 2 L per nasal cannula  Bronchodilators     Aspirin, atorvastatin, amlodipine, valsartan  Jardiance  Insulin sliding scale  Protonix     Smoking cessation counseling, nicotine patch     I personally reviewed the radiological studies                 LOS: 1 day     Subjective     Mild shortness of breath    Objective     Vital signs for last 24 hours:  Vitals:    04/04/25 2055 04/04/25 2058 04/05/25 0043 04/05/25 0808   BP:   136/80 137/92   BP Location:   Left arm    Patient Position:   Lying    Pulse: 90 90 76 88   Resp: 18 16 14    Temp:       TempSrc:       SpO2: 94% 95% 94%    Weight:       Height:           Intake/Output last 3 shifts:  I/O last 3 completed shifts:  In: 240 [P.O.:240]  Out: 300 [Urine:300]  Intake/Output this shift:  No intake/output data recorded.      Radiology  Imaging Results (Last 24 Hours)        Procedure Component Value Units Date/Time    XR Chest 1 View [[id number]] Collected: 04/04/25 1242     Updated: 04/04/25 1249    Narrative:      XR CHEST 1 VW    Date of Exam: 4/4/2025 12:21 PM EDT    Indication: chest tube removed    Comparison: Chest AP dated 4/4/2025    Findings:  Small bore right thoracostomy tube has been removed in the interval. There is a small residual right pneumothorax measuring 0.8 cm in thickness at the apex, overall smaller in the interval. In the mid right lung field near the hilum is a 2.0 cm x 0.9 cm   irregular density a small opacity projects over the right lung base, unchanged.    There is a 2.2 cm chondroid lesion in the right humeral head, chronic and unchanged. The left lung is clear. The cardiac mediastinal silhouettes appear normal. A CABG has been performed.      Impression:      Impression:  Interval removal of a right small bore thoracostomy tube with a small pneumothorax noted, as above.  Irregular density in the mid right lung is chronic, probably representing scarring and/or atelectasis.  Small right basilar density could represent pneumonia or atelectasis, similar to the most recent exam.      Electronically Signed: Jonah Loredo MD    4/4/2025 12:46 PM EDT    Workstation ID: [id number]    XR Chest 1 View [[id number]] Collected: 04/04/25 1146     Updated: 04/04/25 1150    Narrative:      XR CHEST 1 VW    Date of Exam: 4/4/2025 11:18 AM EDT    Indication: R pneumothorax    Comparison: Chest radiograph 4/4/2025    Findings:  Increasing now small right pneumothorax measuring up to 8 mm at apex previously 3 mm. Right thoracotomy tube appears slightly retracted with approximately 3 cm of the catheter remaining deep to the rib cage. Left lung clear. No new consolidation, edema   or effusion. Prior median sternotomy and CABG. Aortic atherosclerotic disease. Heart size normal. Degenerative related osseous change. Stable asymmetric sclerosis in the proximal right humeral  head.      Impression:      Impression:  Increasing small right pneumothorax. Thoracotomy tube appears slightly retracted with small portion remaining in the pleural space.      Electronically Signed: Omar Hi MD    4/4/2025 11:48 AM EDT    Workstation ID: [id number]            Labs:  Results from last 7 days   Lab Units 04/05/25  0541   WBC 10*3/mm3 20.86*   HEMOGLOBIN g/dL 16.3*   HEMATOCRIT % 51.9*   PLATELETS 10*3/mm3 417     Results from last 7 days   Lab Units 04/05/25  0541   SODIUM mmol/L 141   POTASSIUM mmol/L 5.1   CHLORIDE mmol/L 106   CO2 mmol/L 24.7   BUN mg/dL 39*   CREATININE mg/dL 1.11*   CALCIUM mg/dL 9.8   GLUCOSE mg/dL 176*     Results from last 7 days   Lab Units 04/03/25  2302   PH, ARTERIAL pH units 7.322*   PO2 ART mm Hg 62.1*   PCO2, ARTERIAL mm Hg 43.2   HCO3 ART mmol/L 22.3                                   Meds:   SCHEDULE  amLODIPine, 5 mg, Oral, Daily  aspirin, 81 mg, Oral, Daily  atorvastatin, 80 mg, Oral, Daily  budesonide, 0.5 mg, Nebulization, BID - RT  citalopram, 20 mg, Oral, Daily  empagliflozin, 25 mg, Oral, Daily  fenofibrate, 145 mg, Oral, Daily  guaiFENesin, 1,200 mg, Oral, BID  insulin lispro, 2-7 Units, Subcutaneous, 4x Daily AC & at Bedtime  ipratropium-albuterol, 3 mL, Nebulization, Q6H While Awake - RT  nicotine, 1 patch, Transdermal, Q24H  pantoprazole, 40 mg, Oral, Q AM  predniSONE, 20 mg, Oral, BID With Meals  sodium chloride, 10 mL, Intravenous, Q12H  valsartan, 40 mg, Oral, Daily  [START ON 4/7/2025] vitamin D, 50,000 Units, Oral, Weekly      Infusions     PRNs    acetaminophen **OR** acetaminophen **OR** acetaminophen    ALPRAZolam    aluminum-magnesium hydroxide-simethicone    senna-docusate sodium **AND** polyethylene glycol **AND** bisacodyl **AND** bisacodyl    Calcium Replacement - Follow Nurse / BPA Driven Protocol    cyclobenzaprine    dextrose    dextrose    glucagon (human recombinant)    ipratropium-albuterol    Magnesium Standard Dose Replacement -  Follow Nurse / BPA Driven Protocol    melatonin    ondansetron ODT **OR** ondansetron    oxyCODONE    Phosphorus Replacement - Follow Nurse / BPA Driven Protocol    Potassium Replacement - Follow Nurse / BPA Driven Protocol    [COMPLETED] Insert Peripheral IV **AND** sodium chloride    sodium chloride    sodium chloride    traMADol    Physical Exam:  General Appearance:  Alert   HEENT:  Normocephalic, without obvious abnormality, Conjunctiva/corneas clear,.   Nares normal, no drainage     Neck:  Supple, symmetrical, trachea midline.   Lungs /Chest wall:   Good equal air entry bilaterally with minimal wheezing, respirations unlabored, symmetrical wall movement.     Heart:  Regular rate and rhythm, S1 S2 normal  Abdomen: Soft, non-tender, no masses, no organomegaly.    Extremities: No edema, no clubbing or cyanosis     ROS  Constitutional: Negative for chills, fever and malaise/fatigue.   HENT: Negative.    Eyes: Negative.    Cardiovascular: Negative.    Respiratory: Positive for mild cough and shortness of breath.    Skin: Negative.    Musculoskeletal: Negative.    Gastrointestinal: Negative.    Genitourinary: Negative.    Neurological: Negative.    Psychiatric/Behavioral: Negative.      I reviewed the recent clinical results  I personally reviewed the latest radiological studies    Part of this note may be an electronic transcription/translation of spoken language to printed text using the Dragon Dictation System.

## 2025-04-05 NOTE — PLAN OF CARE
Goal Outcome Evaluation:           Progress: no change  Outcome Evaluation: patient in bed with call light in reach, able to make needs known

## 2025-04-05 NOTE — PROCEDURES
Right chest tube placement    Indications:  Clinically significant Pneumothorax    Pre-operative Diagnosis: Pneumothorax    Post-operative Diagnosis: Pneumothorax    Procedure Details   Informed consent was obtained for the procedure, including sedation/analgesia if needed.  Risks of lung perforation, hemorrhage, arrhythmia, and adverse drug reaction were discussed.   Timeout was done on the standard manner.    After sterile skin prep, using standard technique,  Lidocaine 1% local skin infiltration was used,  the needle was introduced above the rib identifying the air bubbles.  Using Seldinger technique: The insertion needle was introduced and then a guidewire was passed through the needle which was removed, the dilator was used over the guidewire, after removing the dilator the pigtail catheter was placed and the guidewire was removed.  The catheter was secured to place with  dressing.     A 14 [place] tube was placed in the right anterior second rib space.    Findings:  Air was noted to come back to the right atrium through the waterseal    Estimated Blood Loss:  Minimal           Specimens:  None                Complications:  None; patient tolerated the procedure well.                    Condition: stable    Post op plan:  CXR   -20 cm H2O suction    Attending Attestation: I performed the procedure.

## 2025-04-05 NOTE — PLAN OF CARE
Goal Outcome Evaluation:              Outcome Evaluation: chest tube placed today, alert and oriented,able to make needs known, call light in reach.

## 2025-04-06 ENCOUNTER — APPOINTMENT (OUTPATIENT)
Dept: GENERAL RADIOLOGY | Facility: HOSPITAL | Age: 70
DRG: 200 | End: 2025-04-06
Payer: MEDICARE

## 2025-04-06 ENCOUNTER — READMISSION MANAGEMENT (OUTPATIENT)
Dept: CALL CENTER | Facility: HOSPITAL | Age: 70
End: 2025-04-06
Payer: MEDICARE

## 2025-04-06 VITALS
OXYGEN SATURATION: 95 % | WEIGHT: 141.09 LBS | BODY MASS INDEX: 27.7 KG/M2 | HEIGHT: 60 IN | SYSTOLIC BLOOD PRESSURE: 145 MMHG | HEART RATE: 94 BPM | TEMPERATURE: 98 F | DIASTOLIC BLOOD PRESSURE: 62 MMHG | RESPIRATION RATE: 19 BRPM

## 2025-04-06 LAB
ANION GAP SERPL CALCULATED.3IONS-SCNC: 10.9 MMOL/L (ref 5–15)
BASOPHILS # BLD AUTO: 0.03 10*3/MM3 (ref 0–0.2)
BASOPHILS NFR BLD AUTO: 0.2 % (ref 0–1.5)
BUN SERPL-MCNC: 40 MG/DL (ref 8–23)
BUN/CREAT SERPL: 33.1 (ref 7–25)
CALCIUM SPEC-SCNC: 9.4 MG/DL (ref 8.6–10.5)
CHLORIDE SERPL-SCNC: 102 MMOL/L (ref 98–107)
CO2 SERPL-SCNC: 20.1 MMOL/L (ref 22–29)
CREAT SERPL-MCNC: 1.21 MG/DL (ref 0.57–1)
DEPRECATED RDW RBC AUTO: 41.8 FL (ref 37–54)
EGFRCR SERPLBLD CKD-EPI 2021: 49.2 ML/MIN/1.73
EOSINOPHIL # BLD AUTO: 0 10*3/MM3 (ref 0–0.4)
EOSINOPHIL NFR BLD AUTO: 0 % (ref 0.3–6.2)
ERYTHROCYTE [DISTWIDTH] IN BLOOD BY AUTOMATED COUNT: 13.2 % (ref 12.3–15.4)
GLUCOSE BLDC GLUCOMTR-MCNC: 189 MG/DL (ref 70–105)
GLUCOSE BLDC GLUCOMTR-MCNC: 220 MG/DL (ref 70–105)
GLUCOSE BLDC GLUCOMTR-MCNC: 308 MG/DL (ref 70–105)
GLUCOSE SERPL-MCNC: 269 MG/DL (ref 65–99)
HCT VFR BLD AUTO: 49.2 % (ref 34–46.6)
HGB BLD-MCNC: 16 G/DL (ref 12–15.9)
IMM GRANULOCYTES # BLD AUTO: 0.1 10*3/MM3 (ref 0–0.05)
IMM GRANULOCYTES NFR BLD AUTO: 0.5 % (ref 0–0.5)
LYMPHOCYTES # BLD AUTO: 1.58 10*3/MM3 (ref 0.7–3.1)
LYMPHOCYTES NFR BLD AUTO: 8.5 % (ref 19.6–45.3)
MCH RBC QN AUTO: 28.1 PG (ref 26.6–33)
MCHC RBC AUTO-ENTMCNC: 32.5 G/DL (ref 31.5–35.7)
MCV RBC AUTO: 86.5 FL (ref 79–97)
MONOCYTES # BLD AUTO: 0.62 10*3/MM3 (ref 0.1–0.9)
MONOCYTES NFR BLD AUTO: 3.3 % (ref 5–12)
NEUTROPHILS NFR BLD AUTO: 16.3 10*3/MM3 (ref 1.7–7)
NEUTROPHILS NFR BLD AUTO: 87.5 % (ref 42.7–76)
NRBC BLD AUTO-RTO: 0 /100 WBC (ref 0–0.2)
PLATELET # BLD AUTO: 414 10*3/MM3 (ref 140–450)
PMV BLD AUTO: 9.7 FL (ref 6–12)
POTASSIUM SERPL-SCNC: 4.4 MMOL/L (ref 3.5–5.2)
RBC # BLD AUTO: 5.69 10*6/MM3 (ref 3.77–5.28)
SODIUM SERPL-SCNC: 133 MMOL/L (ref 136–145)
WBC NRBC COR # BLD AUTO: 18.63 10*3/MM3 (ref 3.4–10.8)

## 2025-04-06 PROCEDURE — 94799 UNLISTED PULMONARY SVC/PX: CPT

## 2025-04-06 PROCEDURE — 85025 COMPLETE CBC W/AUTO DIFF WBC: CPT | Performed by: NURSE PRACTITIONER

## 2025-04-06 PROCEDURE — 25010000002 HYDROMORPHONE PER 4 MG: Performed by: NURSE PRACTITIONER

## 2025-04-06 PROCEDURE — 94664 DEMO&/EVAL PT USE INHALER: CPT

## 2025-04-06 PROCEDURE — 82948 REAGENT STRIP/BLOOD GLUCOSE: CPT

## 2025-04-06 PROCEDURE — 63710000001 PREDNISONE PER 1 MG: Performed by: INTERNAL MEDICINE

## 2025-04-06 PROCEDURE — 63710000001 INSULIN LISPRO (HUMAN) PER 5 UNITS: Performed by: NURSE PRACTITIONER

## 2025-04-06 PROCEDURE — 94761 N-INVAS EAR/PLS OXIMETRY MLT: CPT

## 2025-04-06 PROCEDURE — 80048 BASIC METABOLIC PNL TOTAL CA: CPT | Performed by: NURSE PRACTITIONER

## 2025-04-06 PROCEDURE — 82948 REAGENT STRIP/BLOOD GLUCOSE: CPT | Performed by: NURSE PRACTITIONER

## 2025-04-06 PROCEDURE — 71045 X-RAY EXAM CHEST 1 VIEW: CPT

## 2025-04-06 RX ADMIN — GUAIFENESIN 1200 MG: 600 TABLET, MULTILAYER, EXTENDED RELEASE ORAL at 08:20

## 2025-04-06 RX ADMIN — NICOTINE 1 PATCH: 21 PATCH TRANSDERMAL at 08:24

## 2025-04-06 RX ADMIN — ATORVASTATIN CALCIUM 80 MG: 40 TABLET, FILM COATED ORAL at 08:20

## 2025-04-06 RX ADMIN — PANTOPRAZOLE SODIUM 40 MG: 40 TABLET, DELAYED RELEASE ORAL at 06:00

## 2025-04-06 RX ADMIN — HYDROMORPHONE HYDROCHLORIDE 0.5 MG: 1 INJECTION, SOLUTION INTRAMUSCULAR; INTRAVENOUS; SUBCUTANEOUS at 06:20

## 2025-04-06 RX ADMIN — IPRATROPIUM BROMIDE AND ALBUTEROL SULFATE 3 ML: .5; 3 SOLUTION RESPIRATORY (INHALATION) at 06:11

## 2025-04-06 RX ADMIN — ASPIRIN 81 MG: 81 TABLET, COATED ORAL at 08:21

## 2025-04-06 RX ADMIN — INSULIN LISPRO 2 UNITS: 100 INJECTION, SOLUTION INTRAVENOUS; SUBCUTANEOUS at 08:19

## 2025-04-06 RX ADMIN — BUDESONIDE 0.5 MG: 0.5 INHALANT RESPIRATORY (INHALATION) at 06:08

## 2025-04-06 RX ADMIN — EMPAGLIFLOZIN 25 MG: 25 TABLET, FILM COATED ORAL at 08:23

## 2025-04-06 RX ADMIN — INSULIN LISPRO 5 UNITS: 100 INJECTION, SOLUTION INTRAVENOUS; SUBCUTANEOUS at 12:27

## 2025-04-06 RX ADMIN — VALSARTAN 40 MG: 80 TABLET ORAL at 08:20

## 2025-04-06 RX ADMIN — PREDNISONE 20 MG: 20 TABLET ORAL at 08:21

## 2025-04-06 RX ADMIN — OXYCODONE 5 MG: 5 TABLET ORAL at 00:09

## 2025-04-06 RX ADMIN — HYDROMORPHONE HYDROCHLORIDE 0.5 MG: 1 INJECTION, SOLUTION INTRAMUSCULAR; INTRAVENOUS; SUBCUTANEOUS at 02:02

## 2025-04-06 RX ADMIN — IPRATROPIUM BROMIDE AND ALBUTEROL SULFATE 3 ML: .5; 3 SOLUTION RESPIRATORY (INHALATION) at 11:01

## 2025-04-06 RX ADMIN — Medication 10 ML: at 08:23

## 2025-04-06 RX ADMIN — AMLODIPINE BESYLATE 5 MG: 5 TABLET ORAL at 08:21

## 2025-04-06 RX ADMIN — OXYCODONE 5 MG: 5 TABLET ORAL at 12:32

## 2025-04-06 RX ADMIN — CITALOPRAM 20 MG: 20 TABLET, FILM COATED ORAL at 08:20

## 2025-04-06 RX ADMIN — ALPRAZOLAM 0.25 MG: 0.25 TABLET ORAL at 02:02

## 2025-04-06 NOTE — PROGRESS NOTES
[facility] MEDICINE SERVICE  DAILY PROGRESS NOTE    NAME: Darleen Stallings  : 1955  MRN: [id number]      LOS: 2 days     PROVIDER OF SERVICE: Justyn Ramirez MD    Chief Complaint: Pneumothorax    Subjective:     Interval History:  History taken from: patient       Darleen Stallings is a 69 y.o. female with PMH of COPD with as needed supplemental oxygen at home presented to [facility] ED 4/3/2025 with complaints of shortness of breath and right sided stabbing chest pain.  She states she has been short of breath for about a week.  She has been using her prn oxygen 2L and has been using nebulizer treatments. She has not really been coughing, some wheezing. No fever. She saw her PCP 2 days ago and was started on oral steroids.  She started having right sided chest pain for the last 24 hours that would not go away so she decided to seek treatment in the ER.     In the ED chest x-ray showed moderate size right pneumothorax.  Chest tube was placed by Dr. Santos in the ER and repeat chest x-ray showed near complete resolution of right-sided pneumothorax following placement of smallbore chest tube.  Tiny apical pneumothorax persists.  Patient states her pain is improved from 9-10/10 to 5/10. ABG showed pH 7.32, pCO2 43.2, pO2 62.1, HCO3 22.3 on 21% FiO2. Labs showed BUN 37, glucose 179, WBC 16.96. RVP detected RSV. Pulmonary was consulted in the ER. She was admitted for further treatment right pneumothorax and RSV.       4/5 seen in bed NAD, VSS anxious to go home. Chest tube came out on its own, Carlos RN    Review of Systems  Constitutional: Negative.    HENT: Negative.     Eyes: Negative.    Respiratory:  Positive for shortness of breath.    Cardiovascular:  Positive for chest pain.   Gastrointestinal: Negative.    Genitourinary: Negative.    Musculoskeletal: Negative.    Skin: Negative.    Neurological: Negative.    Psychiatric/Behavioral: Negative  Objective:     Vital Signs  Temp:  [97.4 °F  (36.3 °C)-98 °F (36.7 °C)] 97.4 °F (36.3 °C)  Heart Rate:  [75-96] 86  Resp:  [16-21] 19  BP: (120-184)/() 120/104  Flow (L/min) (Oxygen Therapy):  [2] 2   Body mass index is 27.56 kg/m².    Physical Exam   Vitals and nursing note reviewed.   HENT:      Head: Normocephalic and atraumatic.   Eyes:      Extraocular Movements: Extraocular movements intact.      Pupils: Pupils are equal, round, and reactive to light.   Cardiovascular:      Rate and Rhythm: Normal rate and regular rhythm.      Pulses: Normal pulses.      Heart sounds: Normal heart sounds.   Pulmonary:      Effort: Pulmonary effort is normal. No tachypnea or respiratory distress.      Breath sounds: Examination of the right-upper field reveals rhonchi. Examination of the right-middle field reveals rhonchi. Examination of the right-lower field reveals decreased breath sounds and rhonchi.      Comments: Right chest tube  Abdominal:      General: Bowel sounds are normal.      Palpations: Abdomen is soft.      Tenderness: There is no abdominal tenderness.   Musculoskeletal:         General: Normal range of motion.   Skin:     General: Skin is warm and dry.   Neurological:      Mental Status: She is alert and oriented to person, place, and time.   Psychiatric:         Mood and Affect: Mood normal.         Behavior: Behavior normal.        Diagnostic Data    Results from last 7 days   Lab Units 04/06/25  0254   WBC 10*3/mm3 18.63*   HEMOGLOBIN g/dL 16.0*   HEMATOCRIT % 49.2*   PLATELETS 10*3/mm3 414   GLUCOSE mg/dL 269*   CREATININE mg/dL 1.21*   BUN mg/dL 40*   SODIUM mmol/L 133*   POTASSIUM mmol/L 4.4   ANION GAP mmol/L 10.9       XR Chest 1 View  Result Date: 4/6/2025  Impression: No evidence of pneumothorax Electronically Signed: Tan Mar  4/6/2025 7:55 AM EDT  Workstation ID: [id number]    XR Chest 1 View  Result Date: 4/5/2025  Impression: Near complete resolution of pneumothorax status post pleural catheter placement Electronically Signed:  Tan Mar  4/5/2025 3:35 PM EDT  Workstation ID: [id number]    XR Chest 1 View  Result Date: 4/5/2025  Impression: Small to moderate right pneumothorax and right chest wall subcutaneous emphysema, enlarged since the prior chest x-ray. The above findings were discussed with Gary Hoang via telephone on 4/5/2025 10:52 AM EDT. Electronically Signed: Christopher Ferreira MD  4/5/2025 10:53 AM EDT  Workstation ID: [id number]    CT Chest Without Contrast Diagnostic  Result Date: 4/5/2025  1.Small to moderate right pneumothorax appears enlarged since the 4/4/2025 chest x-ray. 2.Scattered atelectasis within the right lung. The above findings were discussed with Gary Hoang via telephone on 4/5/2025 10:52 AM EDT. Electronically Signed: Christopher Ferreira MD  4/5/2025 10:53 AM EDT  Workstation ID: [id number]    XR Chest 1 View  Result Date: 4/4/2025  Impression: Interval removal of a right small bore thoracostomy tube with a small pneumothorax noted, as above. Irregular density in the mid right lung is chronic, probably representing scarring and/or atelectasis. Small right basilar density could represent pneumonia or atelectasis, similar to the most recent exam. Electronically Signed: Jonah Loredo MD  4/4/2025 12:46 PM EDT  Workstation ID: [id number]    XR Chest 1 View  Result Date: 4/4/2025  Impression: Increasing small right pneumothorax. Thoracotomy tube appears slightly retracted with small portion remaining in the pleural space. Electronically Signed: Omar Hi MD  4/4/2025 11:48 AM EDT  Workstation ID: [id number]    No growth aerobic bottleScheduled Meds:amLODIPine, 5 mg, Oral, Daily  aspirin, 81 mg, Oral, Daily  atorvastatin, 80 mg, Oral, Daily  budesonide, 0.5 mg, Nebulization, BID - RT  citalopram, 20 mg, Oral, Daily  empagliflozin, 25 mg, Oral, Daily  guaiFENesin, 1,200 mg, Oral, BID  insulin lispro, 2-7 Units, Subcutaneous, 4x Daily AC & at Bedtime  ipratropium-albuterol, 3 mL, Nebulization, Q6H While Awake -  RT  nicotine, 1 patch, Transdermal, Q24H  pantoprazole, 40 mg, Oral, Q AM  predniSONE, 20 mg, Oral, BID With Meals  sodium chloride, 10 mL, Intravenous, Q12H  valsartan, 40 mg, Oral, Daily  [START ON 4/7/2025] vitamin D, 50,000 Units, Oral, Weekly      Continuous Infusions:   PRN Meds:.  acetaminophen **OR** acetaminophen **OR** acetaminophen    ALPRAZolam    aluminum-magnesium hydroxide-simethicone    senna-docusate sodium **AND** polyethylene glycol **AND** bisacodyl **AND** bisacodyl    Calcium Replacement - Follow Nurse / BPA Driven Protocol    cyclobenzaprine    dextrose    dextrose    glucagon (human recombinant)    ipratropium-albuterol    Magnesium Standard Dose Replacement - Follow Nurse / BPA Driven Protocol    melatonin    ondansetron ODT **OR** ondansetron    oxyCODONE    Phosphorus Replacement - Follow Nurse / BPA Driven Protocol    Potassium Replacement - Follow Nurse / BPA Driven Protocol    [COMPLETED] Insert Peripheral IV **AND** sodium chloride    sodium chloride    sodium chloride    traMADol     I reviewed the patient's new clinical results.    Assessment/Plan:     Active and Resolved Problems  Active Hospital Problems    Diagnosis  POA    **Pneumothorax [J93.9]  Yes      Resolved Hospital Problems   No resolved problems to display.     Spontaneous right pneumothorax status post chest tube placement 4/3/2025 in the ER  RSV   COPD exacerbation   - initial CXR: moderate size right pneumothorax  - repeat CXR: near complete resolution of right-sided pneumothorax following placement of smallbore chest tube.  Tiny apical pneumothorax persists  - WBC 16.96  - ABG: pH 7.32, pCO2 43.2, pO2 62.1, HCO3 22.3 on 21% FiO2  - now on 2L O2 via NC-Oxygen supplement and titration to maintain saturation 90 to 95%, currently on 2 L per nasal cannula  -Bronchodilators- duonebs, pulmicort, pain control   - continuous pulse oximetry   - pulmonary consulted-Addendum the CT scan is showing moderate-sized pneumothorax on  the right side, I will place a chest tube    pulmonary consulted -chest tube management: The chest tube was accidentally removed yesterday, chest x-ray yesterday showed there is a small pneumothorax, PT chest x-ray today has a questionable right pleural line versus skinfold,  check CT scan of the chest, clinically she is asymptomatic  - Mucinex  -Prednisone 20 mg bid    Hypertension  Hyperlipidemia  - norvasc, atorvastatin  valsartan     DM type II  - Jardiance  - SSI-Insulin sliding scale  -Aspirin,      Depression   - cont home celexa, xanax     Gi prophylaxis-  Protonix     Smoking cessation counseling, nicotine patch    VTE Prophylaxis:  Mechanical VTE prophylaxis orders are present.    Disposition Planning: home  Barriers to Discharge:pneumothorax  Anticipated Date of Discharge: 4/8  Place of Discharge: home  Time: 35 minutes     Code Status and Medical Interventions: CPR (Attempt to Resuscitate); Full Support   Ordered at: 04/04/25 0141     Code Status (Patient has no pulse and is not breathing):    CPR (Attempt to Resuscitate)     Medical Interventions (Patient has pulse or is breathing):    Full Support     Level Of Support Discussed With:    Patient       Signature: Electronically signed by Justyn Ramirez MD, 04/06/25, 10:05 EDT.  [facility] Hospitalist Team

## 2025-04-06 NOTE — OUTREACH NOTE
Prep Survey      Flowsheet Row Responses   [facility] patient discharged from? [facility]   Is LACE score < 7 ? No   Eligibility [facility]   Date of Admission 04/03/25   Date of Discharge 04/06/25   Discharge Disposition Home or Self Care   Discharge diagnosis Pneumothorax   Does the patient have one of the following disease processes/diagnoses(primary or secondary)? Other   Does the patient have Home health ordered? No   Is there a DME ordered? No   Prep survey completed? Yes            Kadie SON - Registered Nurse

## 2025-04-06 NOTE — PROGRESS NOTES
[facility] MEDICINE SERVICE  DAILY PROGRESS NOTE    NAME: Darleen Stallings  : 1955  MRN: [id number]      LOS: 2 days     PROVIDER OF SERVICE: Justyn Ramirez MD    Chief Complaint: Pneumothorax    Subjective:     Interval History:  History taken from: patient       Darleen Stallings is a 69 y.o. female with PMH of COPD with as needed supplemental oxygen at home presented to [facility] ED 4/3/2025 with complaints of shortness of breath and right sided stabbing chest pain.  She states she has been short of breath for about a week.  She has been using her prn oxygen 2L and has been using nebulizer treatments. She has not really been coughing, some wheezing. No fever. She saw her PCP 2 days ago and was started on oral steroids.  She started having right sided chest pain for the last 24 hours that would not go away so she decided to seek treatment in the ER.     In the ED chest x-ray showed moderate size right pneumothorax.  Chest tube was placed by Dr. Santos in the ER and repeat chest x-ray showed near complete resolution of right-sided pneumothorax following placement of smallbore chest tube.  Tiny apical pneumothorax persists.  Patient states her pain is improved from 9-10/10 to 5/10. ABG showed pH 7.32, pCO2 43.2, pO2 62.1, HCO3 22.3 on 21% FiO2. Labs showed BUN 37, glucose 179, WBC 16.96. RVP detected RSV. Pulmonary was consulted in the ER. She was admitted for further treatment right pneumothorax and RSV.       / seen in bed NAD, VSS anxious to go home. Chest tube came out on its own, Carlos RN   patient seen examined in bed no acute distress, no new complaints, vital signs stable, chest tube placed, discussed with RN.  She stable    Review of Systems  Constitutional: Negative.    HENT: Negative.     Eyes: Negative.    Respiratory:  Positive for shortness of breath.    Cardiovascular:  Positive for chest pain.   Gastrointestinal: Negative.    Genitourinary: Negative.     Musculoskeletal: Negative.    Skin: Negative.    Neurological: Negative.    Psychiatric/Behavioral: Negative  Objective:     Vital Signs  Temp:  [97.4 °F (36.3 °C)-98 °F (36.7 °C)] 97.4 °F (36.3 °C)  Heart Rate:  [75-96] 86  Resp:  [16-21] 19  BP: (120-184)/() 120/104  Flow (L/min) (Oxygen Therapy):  [2] 2   Body mass index is 27.56 kg/m².    Physical Exam   Vitals and nursing note reviewed.   HENT:      Head: Normocephalic and atraumatic.   Eyes:      Extraocular Movements: Extraocular movements intact.      Pupils: Pupils are equal, round, and reactive to light.   Cardiovascular:      Rate and Rhythm: Normal rate and regular rhythm.      Pulses: Normal pulses.      Heart sounds: Normal heart sounds.   Pulmonary:      Effort: Pulmonary effort is normal. No tachypnea or respiratory distress.      Breath sounds: Examination of the right-upper field reveals rhonchi. Examination of the right-middle field reveals rhonchi. Examination of the right-lower field reveals decreased breath sounds and rhonchi.      Comments: Right chest tube  Abdominal:      General: Bowel sounds are normal.      Palpations: Abdomen is soft.      Tenderness: There is no abdominal tenderness.   Musculoskeletal:         General: Normal range of motion.   Skin:     General: Skin is warm and dry.   Neurological:      Mental Status: She is alert and oriented to person, place, and time.   Psychiatric:         Mood and Affect: Mood normal.         Behavior: Behavior normal.        Diagnostic Data    Results from last 7 days   Lab Units 04/06/25  0254   WBC 10*3/mm3 18.63*   HEMOGLOBIN g/dL 16.0*   HEMATOCRIT % 49.2*   PLATELETS 10*3/mm3 414   GLUCOSE mg/dL 269*   CREATININE mg/dL 1.21*   BUN mg/dL 40*   SODIUM mmol/L 133*   POTASSIUM mmol/L 4.4   ANION GAP mmol/L 10.9       XR Chest 1 View  Result Date: 4/6/2025  Impression: No evidence of pneumothorax Electronically Signed: Tan Mar  4/6/2025 7:55 AM EDT  Workstation ID: [id number]    XR  Chest 1 View  Result Date: 4/5/2025  Impression: Near complete resolution of pneumothorax status post pleural catheter placement Electronically Signed: Tan Isabela  4/5/2025 3:35 PM EDT  Workstation ID: [id number]    XR Chest 1 View  Result Date: 4/5/2025  Impression: Small to moderate right pneumothorax and right chest wall subcutaneous emphysema, enlarged since the prior chest x-ray. The above findings were discussed with Gary Hoang via telephone on 4/5/2025 10:52 AM EDT. Electronically Signed: Christopher Ferreira MD  4/5/2025 10:53 AM EDT  Workstation ID: [id number]    CT Chest Without Contrast Diagnostic  Result Date: 4/5/2025  1.Small to moderate right pneumothorax appears enlarged since the 4/4/2025 chest x-ray. 2.Scattered atelectasis within the right lung. The above findings were discussed with Gary Hoang via telephone on 4/5/2025 10:52 AM EDT. Electronically Signed: Christopher Ferreira MD  4/5/2025 10:53 AM EDT  Workstation ID: [id number]    XR Chest 1 View  Result Date: 4/4/2025  Impression: Interval removal of a right small bore thoracostomy tube with a small pneumothorax noted, as above. Irregular density in the mid right lung is chronic, probably representing scarring and/or atelectasis. Small right basilar density could represent pneumonia or atelectasis, similar to the most recent exam. Electronically Signed: Jonah Loredo MD  4/4/2025 12:46 PM EDT  Workstation ID: [id number]    XR Chest 1 View  Result Date: 4/4/2025  Impression: Increasing small right pneumothorax. Thoracotomy tube appears slightly retracted with small portion remaining in the pleural space. Electronically Signed: Omar Hi MD  4/4/2025 11:48 AM EDT  Workstation ID: [id number]    No growth aerobic bottleScheduled Meds:amLODIPine, 5 mg, Oral, Daily  aspirin, 81 mg, Oral, Daily  atorvastatin, 80 mg, Oral, Daily  budesonide, 0.5 mg, Nebulization, BID - RT  citalopram, 20 mg, Oral, Daily  empagliflozin, 25 mg, Oral, Daily  guaiFENesin, 1,200  mg, Oral, BID  insulin lispro, 2-7 Units, Subcutaneous, 4x Daily AC & at Bedtime  ipratropium-albuterol, 3 mL, Nebulization, Q6H While Awake - RT  nicotine, 1 patch, Transdermal, Q24H  pantoprazole, 40 mg, Oral, Q AM  predniSONE, 20 mg, Oral, BID With Meals  sodium chloride, 10 mL, Intravenous, Q12H  valsartan, 40 mg, Oral, Daily  [START ON 4/7/2025] vitamin D, 50,000 Units, Oral, Weekly      Continuous Infusions:   PRN Meds:.  acetaminophen **OR** acetaminophen **OR** acetaminophen    ALPRAZolam    aluminum-magnesium hydroxide-simethicone    senna-docusate sodium **AND** polyethylene glycol **AND** bisacodyl **AND** bisacodyl    Calcium Replacement - Follow Nurse / BPA Driven Protocol    cyclobenzaprine    dextrose    dextrose    glucagon (human recombinant)    ipratropium-albuterol    Magnesium Standard Dose Replacement - Follow Nurse / BPA Driven Protocol    melatonin    ondansetron ODT **OR** ondansetron    oxyCODONE    Phosphorus Replacement - Follow Nurse / BPA Driven Protocol    Potassium Replacement - Follow Nurse / BPA Driven Protocol    [COMPLETED] Insert Peripheral IV **AND** sodium chloride    sodium chloride    sodium chloride    traMADol     I reviewed the patient's new clinical results.    Assessment/Plan:     Active and Resolved Problems  Active Hospital Problems    Diagnosis  POA    **Pneumothorax [J93.9]  Yes      Resolved Hospital Problems   No resolved problems to display.     Spontaneous right pneumothorax status post chest tube placement 4/3/2025 in the ER  RSV   COPD exacerbation   - initial CXR: moderate size right pneumothorax  - repeat CXR: near complete resolution of right-sided pneumothorax following placement of smallbore chest tube.  Tiny apical pneumothorax persists  - WBC 16.96  - ABG: pH 7.32, pCO2 43.2, pO2 62.1, HCO3 22.3 on 21% FiO2  - now on 2L O2 via NC-Oxygen supplement and titration to maintain saturation 90 to 95%, currently on 2 L per nasal cannula  -Bronchodilators-  elyse pulmicort, pain control   - continuous pulse oximetry   - pulmonary consulted-Addendum the CT scan is showing moderate-sized pneumothorax on the right side, I will place a chest tube    pulmonary consulted -chest tube management: The chest tube was accidentally removed yesterday, chest x-ray yesterday showed there is a small pneumothorax, PT chest x-ray today has a questionable right pleural line versus skinfold,  check CT scan of the chest, clinically she is asymptomatic  - Mucinex  -Prednisone 20 mg bid    Hypertension  Hyperlipidemia  - norvasc, atorvastatin  valsartan     DM type II  - Jardiance  - SSI-Insulin sliding scale  -Aspirin,      Depression   - cont home celexa, xanax     Gi prophylaxis-  Protonix     Smoking cessation counseling, nicotine patch    VTE Prophylaxis:  Mechanical VTE prophylaxis orders are present.    Disposition Planning: home  Barriers to Discharge:pneumothorax  Anticipated Date of Discharge: 4/8  Place of Discharge: home  Time: 35 minutes     Code Status and Medical Interventions: CPR (Attempt to Resuscitate); Full Support   Ordered at: 04/04/25 0141     Code Status (Patient has no pulse and is not breathing):    CPR (Attempt to Resuscitate)     Medical Interventions (Patient has pulse or is breathing):    Full Support     Level Of Support Discussed With:    Patient       Signature: Electronically signed by Justyn Ramirez MD, 04/06/25, 10:18 EDT.  [facility] Hospitalist Team

## 2025-04-06 NOTE — PROGRESS NOTES
Daily Progress Note          Assessment    Pneumothorax, spontaneous in the right side, status post chest tube placement 4/3/2025  COPD with acute exacerbation  RSV infection     Right upper lobe bronchiectasis  Lung nodules on CT scan 2023 2.3 cm right upper lobe, 1.3 cm right upper lobe and 2.2 cm subcarinal lymph node  Hypoxia  HTN  HLD  DM  CAD status post CABG 2019  Essential tremors  Tobacco smoker: Quit 2023 but returned to smoking again     PFTs 2022: Severe obstructive pattern FEV1/FVC 50%, FVC 61%, FEV1 40% without significant improvement with bronchodilators        Recommendations:    chest tube management: The chest tube was accidentally removed 4/4/2025, and new chest tube placed 4/5/2025, today there is no air leak and the lungs will expand on chest x-ray, I will lock the chest tube and repeat the chest x-ray in anticipation of removal of the chest tube    Nebulized Pulmicort  Mucinex  Prednisone 20 mg bid  Oxygen supplement and titration to maintain saturation 90 to 95%, currently on 2 L per nasal cannula  Bronchodilators     Aspirin, atorvastatin, amlodipine, valsartan  Jardiance  Insulin sliding scale  Protonix     Smoking cessation counseling, nicotine patch     I personally reviewed the radiological studies                   LOS: 2 days     Subjective     Mild shortness of breath    Objective     Vital signs for last 24 hours:  Vitals:    04/06/25 0611 04/06/25 0612 04/06/25 0615 04/06/25 0813   BP:    (!) 120/104   BP Location:    Left arm   Patient Position:    Lying   Pulse: 75 78 79 86   Resp: 18 18 16 19   Temp:    97.4 °F (36.3 °C)   TempSrc:    Oral   SpO2: 95% 94% 95% 92%   Weight:       Height:           Intake/Output last 3 shifts:  I/O last 3 completed shifts:  In: -   Out: 306 [Urine:300; Chest Tube:6]  Intake/Output this shift:  No intake/output data recorded.      Radiology  Imaging Results (Last 24 Hours)       Procedure Component Value Units Date/Time    XR Chest 1 View [[id number]]  Collected: 04/06/25 0753     Updated: 04/06/25 0757    Narrative:      XR CHEST 1 VW    Date of Exam: 4/6/2025 2:41 AM EDT    Indication: Right pneumothorax    Comparison: 4/5/2025    Findings:  Right pleural catheter remains in place. No pneumothorax demonstrated. Lungs clear. Heart size and pulmonary vasculature are within normal limits      Impression:      Impression:  No evidence of pneumothorax      Electronically Signed: Tan Mar    4/6/2025 7:55 AM EDT    Workstation ID: [id number]    XR Chest 1 View [[id number]] Collected: 04/05/25 1531     Updated: 04/05/25 1537    Narrative:      XR CHEST 1 VW    Date of Exam: 4/5/2025 12:15 PM EDT    Indication: Right pneumothorax    Comparison: 4/5/2025    Findings:  Interval placement of a right pleural catheter. Near complete resolution of right pneumothorax, with minimal air adjacent to the catheter laterally. Lungs clear. Heart size and pulmonary vasculature are within normal limits      Impression:      Impression:  Near complete resolution of pneumothorax status post pleural catheter placement      Electronically Signed: Tan Mar    4/5/2025 3:35 PM EDT    Workstation ID: [id number]    XR Chest 1 View [[id number]] Collected: 04/05/25 1044     Updated: 04/05/25 1055    Narrative:      XR CHEST 1 VW    Date of Exam: 4/5/2025 8:30 AM EDT    Indication: Pneumothorax    Comparison: 4/4/2025    Findings:  Small to moderate right pneumothorax and right chest wall subcutaneous emphysema, enlarged since 4//25. Status post median sternotomy and CABG. Left lung appears adequately aerated. No acute osseous lesion is seen. No acute infiltrate is identified.      Impression:      Impression:  Small to moderate right pneumothorax and right chest wall subcutaneous emphysema, enlarged since the prior chest x-ray.    The above findings were discussed with Gary Hoang via telephone on 4/5/2025 10:52 AM EDT.      Electronically Signed: Christopher Ferreira MD    4/5/2025 10:53 AM EDT     Workstation ID: [id number]    CT Chest Without Contrast Diagnostic [[id number]] Collected: 04/05/25 1048     Updated: 04/05/25 1055    Narrative:      CT CHEST WO CONTRAST DIAGNOSTIC    Date of Exam: 4/5/2025 9:40 AM EDT    Indication: R pneumothorax.    Comparison: Chest x-ray from today    Technique: Axial CT images were obtained of the chest without contrast administration.  Sagittal and coronal reconstructions were performed.  Automated exposure control and iterative reconstruction methods were used.    FINDINGS:    Thoracic inlet: Unremarkable.    Great vessels: Atherosclerotic plaque is seen within the thoracic aorta and proximal arch vessels.    Mediastinum/Alessia: No pathologically enlarged mediastinal lymph nodes are seen. Small calcified subcarinal/right hilar lymph nodes noted. The esophagus appears unremarkable.    Lung parenchyma/pleura: Small to moderate right pneumothorax appears enlarged since the 4/4/2025 chest x-ray. Scattered atelectasis within the right lung. Left lung appears adequately aerated. No significant pleural effusion is seen.    Trachea and airways: The trachea and central airways appear unremarkable.    Heart and pericardium: Coronary artery calcifications. Status post CABG.    Chest wall: No acute or suspicious osseous or soft tissue lesion is identified. Status post median sternotomy and CABG. Right chest wall subcutaneous emphysema is seen.    Upper abdomen: No acute abnormality is identified within the visualized upper abdomen.      Impression:      1.Small to moderate right pneumothorax appears enlarged since the 4/4/2025 chest x-ray.  2.Scattered atelectasis within the right lung.    The above findings were discussed with Gary Hoang via telephone on 4/5/2025 10:52 AM EDT.            Electronically Signed: Christopher Ferreira MD    4/5/2025 10:53 AM EDT    Workstation ID: [id number]            Labs:  Results from last 7 days   Lab Units 04/06/25  0254   WBC 10*3/mm3 18.63*   HEMOGLOBIN  g/dL 16.0*   HEMATOCRIT % 49.2*   PLATELETS 10*3/mm3 414     Results from last 7 days   Lab Units 04/06/25  0254   SODIUM mmol/L 133*   POTASSIUM mmol/L 4.4   CHLORIDE mmol/L 102   CO2 mmol/L 20.1*   BUN mg/dL 40*   CREATININE mg/dL 1.21*   CALCIUM mg/dL 9.4   GLUCOSE mg/dL 269*     Results from last 7 days   Lab Units 04/03/25  2302   PH, ARTERIAL pH units 7.322*   PO2 ART mm Hg 62.1*   PCO2, ARTERIAL mm Hg 43.2   HCO3 ART mmol/L 22.3                                   Meds:   SCHEDULE  amLODIPine, 5 mg, Oral, Daily  aspirin, 81 mg, Oral, Daily  atorvastatin, 80 mg, Oral, Daily  budesonide, 0.5 mg, Nebulization, BID - RT  citalopram, 20 mg, Oral, Daily  empagliflozin, 25 mg, Oral, Daily  guaiFENesin, 1,200 mg, Oral, BID  insulin lispro, 2-7 Units, Subcutaneous, 4x Daily AC & at Bedtime  ipratropium-albuterol, 3 mL, Nebulization, Q6H While Awake - RT  nicotine, 1 patch, Transdermal, Q24H  pantoprazole, 40 mg, Oral, Q AM  predniSONE, 20 mg, Oral, BID With Meals  sodium chloride, 10 mL, Intravenous, Q12H  valsartan, 40 mg, Oral, Daily  [START ON 4/7/2025] vitamin D, 50,000 Units, Oral, Weekly      Infusions     PRNs    acetaminophen **OR** acetaminophen **OR** acetaminophen    ALPRAZolam    aluminum-magnesium hydroxide-simethicone    senna-docusate sodium **AND** polyethylene glycol **AND** bisacodyl **AND** bisacodyl    Calcium Replacement - Follow Nurse / BPA Driven Protocol    cyclobenzaprine    dextrose    dextrose    glucagon (human recombinant)    ipratropium-albuterol    Magnesium Standard Dose Replacement - Follow Nurse / BPA Driven Protocol    melatonin    ondansetron ODT **OR** ondansetron    oxyCODONE    Phosphorus Replacement - Follow Nurse / BPA Driven Protocol    Potassium Replacement - Follow Nurse / BPA Driven Protocol    [COMPLETED] Insert Peripheral IV **AND** sodium chloride    sodium chloride    sodium chloride    traMADol    Physical Exam:  General Appearance:  Alert   HEENT:  Normocephalic,  without obvious abnormality, Conjunctiva/corneas clear,.   Nares normal, no drainage     Neck:  Supple, symmetrical, trachea midline.   Lungs /Chest wall:   Good equal air entry bilaterally with minimal wheezing, respirations unlabored, symmetrical wall movement.     Heart:  Regular rate and rhythm, S1 S2 normal  Abdomen: Soft, non-tender, no masses, no organomegaly.    Extremities: No edema, no clubbing or cyanosis     ROS  Constitutional: Negative for chills, fever and malaise/fatigue.   HENT: Negative.    Eyes: Negative.    Cardiovascular: Negative.    Respiratory: Positive for mild cough and shortness of breath.    Skin: Negative.    Musculoskeletal: Negative.    Gastrointestinal: Negative.    Genitourinary: Negative.    Neurological: Negative.    Psychiatric/Behavioral: Negative.      I reviewed the recent clinical results  I personally reviewed the latest radiological studies    Part of this note may be an electronic transcription/translation of spoken language to printed text using the Dragon Dictation System.

## 2025-04-06 NOTE — PLAN OF CARE
Goal Outcome Evaluation:              Chest tube still place, minimal drainage, MD aware. Able to make needs know, call light in reach.

## 2025-04-07 ENCOUNTER — TRANSITIONAL CARE MANAGEMENT TELEPHONE ENCOUNTER (OUTPATIENT)
Dept: CALL CENTER | Facility: HOSPITAL | Age: 70
End: 2025-04-07
Payer: MEDICARE

## 2025-04-07 NOTE — OUTREACH NOTE
Call Center TCM Note      Flowsheet Row Responses   [facility] patient discharged from? [facility]   Does the patient have one of the following disease processes/diagnoses(primary or secondary)? Other   TCM attempt successful? Yes   Call start time 1201   Call end time 1208   Discharge diagnosis Pneumothorax   Person spoke with today (if not patient) and relationship pt   Meds reviewed with patient/caregiver? Yes   Is the patient having any side effects they believe may be caused by any medication additions or changes? No   Does the patient have all medications ordered at discharge? N/A   Is the patient taking all medications as directed (includes completed medication regime)? Yes   Does the patient have an appointment with their PCP within 7-14 days of discharge? No appointments available   Nursing Interventions PCP office requested to make appointment - message sent, Routed TCM call to PCP office   Psychosocial issues? No   Did the patient receive a copy of their discharge instructions? Yes   Nursing interventions Reviewed instructions with patient   What is the patient's perception of their health status since discharge? Same   Is the patient/caregiver able to teach back signs and symptoms related to disease process for when to call PCP? Yes   Is the patient/caregiver able to teach back signs and symptoms related to disease process for when to call 911? Yes   Is the patient/caregiver able to teach back the hierarchy of who to call/visit for symptoms/problems? PCP, Specialist, Home health nurse, Urgent Care, ED, 911 Yes   If the patient is a current smoker, are they able to teach back resources for cessation? Not a smoker   TCM call completed? Yes   Wrap up additional comments Pt states she is feeling weak, and still having some SOB. No medication changes. Will route message to PCP office to help with TCM appt as there are no appts available that satisfy TCM.   Call end time 1208   Would this patient benefit from  a Referral to [facility] Social Work? No   Is the patient interested in additional calls from an ambulatory ? No            Danielle SON - Registered Nurse    4/7/2025, 12:09 EDT

## 2025-04-07 NOTE — DISCHARGE SUMMARY
[facility] Medicine Services  Discharge Summary    Date of Service: 2025  Patient Name: Darleen Stallings  : 1955  MRN: [id number]    Date of Admission: 4/3/2025  Discharge Diagnosis: Pneumothorax  Date of Discharge: 2025  Primary Care Physician: Elen Jameson DO      Presenting Problem:   Spontaneous pneumothorax [J93.83]  Pneumothorax [J93.9]    Active and Resolved Hospital Problems:  Active Hospital Problems    Diagnosis POA    **Pneumothorax [J93.9] Yes      Resolved Hospital Problems   No resolved problems to display.       Spontaneous right pneumothorax status post chest tube placement 4/3/2025 in the ER  RSV   COPD exacerbation   - initial CXR: moderate size right pneumothorax  - repeat CXR: near complete resolution of right-sided pneumothorax following placement of smallbore chest tube.  Tiny apical pneumothorax persists  - WBC 16.96  - ABG: pH 7.32, pCO2 43.2, pO2 62.1, HCO3 22.3 on 21% FiO2  - now on 2L O2 via [place]-Oxygen supplement and titration to maintain saturation 90 to 95%, currently on 2 L per nasal cannula  -Bronchodilators- duonebs, pulmicort, pain control   - continuous pulse oximetry   - pulmonary consulted-Addendum the CT scan is showing moderate-sized pneumothorax on the right side, I will place a chest tube    pulmonary consulted -chest tube management: The chest tube was accidentally removed yesterday, chest x-ray yesterday showed there is a small pneumothorax, PT chest x-ray today has a questionable right pleural line versus skinfold,  check CT scan of the chest, clinically she is asymptomatic  - Mucinex  -Prednisone 20 mg bid     Hypertension  Hyperlipidemia  - norvasc, atorvastatin  valsartan     DM type II  - Jardiance  - SSI-Insulin sliding scale  -Aspirin,      Depression   - cont home celexa, xanax     Hospital Course     HPI:  Dalreen Stallings is a 69 y.o. female with PMH of COPD with as needed supplemental oxygen at home presented to [facility] 4/3/2025 with complaints of shortness of breath and right sided stabbing chest pain.  She states she has been short of breath for about a week.  She has been using her prn oxygen 2L and has been using nebulizer treatments. She has not really been coughing, some wheezing. No fever. She saw her PCP 2 days ago and was started on oral steroids.  She started having right sided chest pain for the last 24 hours that would not go away so she decided to seek treatment in the ER.     In the ED chest x-ray showed moderate size right pneumothorax.  Chest tube was placed by Dr. Santos in the ER and repeat chest x-ray showed near complete resolution of right-sided pneumothorax following placement of smallbore chest tube.  Tiny apical pneumothorax persists.  Patient states her pain is improved from 9-10/10 to 5/10. ABG showed pH 7.32, pCO2 43.2, pO2 62.1, HCO3 22.3 on 21% FiO2. Labs showed BUN 37, glucose 179, WBC 16.96. RVP detected RSV. Pulmonary was consulted in the ER. She was admitted for further treatment right pneumothorax and RSV.        4/5 seen in bed NAD, VSS anxious to go home. Chest tube came out on its own, Dw RN  4/6 patient seen examined in bed no acute distress, no new complaints, vital signs stable, chest tube placed, discussed with RN.    Discharge patient home.  Conditional discharge stable.  Discharge disposition: Home or Self Care  Condition on discharge stable.        DISCHARGE Follow Up Recommendations for labs and diagnostics: Follow-up with PCP in a week        Day of Discharge     Vital Signs:       Physical Exam   Vitals and nursing note reviewed.   HENT:      Head: Normocephalic and atraumatic.   Eyes:      Extraocular Movements: Extraocular movements intact.      Pupils: Pupils are equal, round, and reactive to light.   Cardiovascular:      Rate and Rhythm: Normal rate and regular rhythm.      Pulses: Normal pulses.      Heart sounds: Normal heart sounds.   Pulmonary:      Effort: Pulmonary  effort is normal. No tachypnea or respiratory distress.      Breath sounds: Examination of the right-upper field reveals rhonchi. Examination of the right-middle field reveals rhonchi. Examination of the right-lower field reveals decreased breath sounds and rhonchi.      Comments: Right chest tube  Abdominal:      General: Bowel sounds are normal.      Palpations: Abdomen is soft.      Tenderness: There is no abdominal tenderness.   Musculoskeletal:         General: Normal range of motion.   Skin:     General: Skin is warm and dry.   Neurological:      Mental Status: She is alert and oriented to person, place, and time.   Psychiatric:         Mood and Affect: Mood normal.         Behavior: Behavior normal.       Pertinent  and/or Most Recent Results     LAB RESULTS:      Lab 04/06/25  0254 04/05/25  0541 04/03/25  2306   WBC 18.63* 20.86* 16.96*   HEMOGLOBIN 16.0* 16.3* 15.8   HEMATOCRIT 49.2* 51.9* 49.4*   PLATELETS 414 417 435   NEUTROS ABS 16.30* 16.68* 13.01*   IMMATURE GRANS (ABS) 0.10* 0.12* 0.12*   LYMPHS ABS 1.58 2.77 2.56   MONOS ABS 0.62 1.25* 1.24*   EOS ABS 0.00 0.01 0.00   MCV 86.5 88.1 87.3         Lab 04/06/25  0254 04/05/25  0541 04/03/25  2306   SODIUM 133* 141 137   POTASSIUM 4.4 5.1 4.2   CHLORIDE 102 106 103   CO2 20.1* 24.7 20.7*   ANION GAP 10.9 10.3 13.3   BUN 40* 39* 37*   CREATININE 1.21* 1.11* 0.98   EGFR 49.2* 54.6* 63.4   GLUCOSE 269* 176* 179*   CALCIUM 9.4 9.8 9.8                         Lab 04/03/25  2302   PH, ARTERIAL 7.322*   PCO2, ARTERIAL 43.2   PO2 ART 62.1*   O2 SATURATION ART 89.4*   FIO2 21   HCO3 ART 22.3   BASE EXCESS ART -3.8*     Brief Urine Lab Results  (Last result in the past 365 days)        Color   Clarity   Blood   Leuk Est   Nitrite   Protein   CREAT   Urine HCG        01/17/25 1401 Yellow   Clear   Negative   Negative   Negative   Negative                 Microbiology Results (last 10 days)       Procedure Component Value - Date/Time    Respiratory Panel PCR  w/COVID-19(SARS-CoV-2) [facility]O/[facility]/COR/PHILIP In-House, NP Swab in UTM/VTM, 2 HR TAT - Swab, Nasopharynx [[id number]]  (Abnormal) Collected: 04/03/25 2305    Lab Status: Final result Specimen: Swab from Nasopharynx Updated: 04/03/25 7243     ADENOVIRUS, PCR Not Detected     Coronavirus 229E Not Detected     Coronavirus HKU1 Not Detected     Coronavirus NL63 Not Detected     Coronavirus OC43 Not Detected     COVID19 Not Detected     Human Metapneumovirus Not Detected     Human Rhinovirus/Enterovirus Not Detected     Influenza A PCR Not Detected     Influenza B PCR Not Detected     Parainfluenza Virus 1 Not Detected     Parainfluenza Virus 2 Not Detected     Parainfluenza Virus 3 Not Detected     Parainfluenza Virus 4 Not Detected     RSV, PCR Detected     Bordetella pertussis pcr Not Detected     Bordetella parapertussis PCR Not Detected     Chlamydophila pneumoniae PCR Not Detected     Mycoplasma pneumo by PCR Not Detected    Narrative:      In the setting of a positive respiratory panel with a viral infection PLUS a negative procalcitonin without other underlying concern for bacterial infection, consider observing off antibiotics or discontinuation of antibiotics and continue supportive care. If the respiratory panel is positive for atypical bacterial infection (Bordetella pertussis, Chlamydophila pneumoniae, or Mycoplasma pneumoniae), consider antibiotic de-escalation to target atypical bacterial infection.            XR Chest 1 View  Result Date: 4/6/2025  Impression: Impression: 1. Right pleural chest tube without demonstrable right pneumothorax. 2. Mild right chest wall subcutaneous emphysema. Electronically Signed: Milena Trent MD  4/6/2025 11:49 AM EDT  Workstation ID: [id number]    XR Chest 1 View  Result Date: 4/6/2025  Impression: Impression: No evidence of pneumothorax Electronically Signed: Tan Mar  4/6/2025 7:55 AM EDT  Workstation ID: [id number]    XR Chest 1 View  Result Date:  4/5/2025  Impression: Impression: Near complete resolution of pneumothorax status post pleural catheter placement Electronically Signed: Tan Mar  4/5/2025 3:35 PM EDT  Workstation ID: [id number]    XR Chest 1 View  Result Date: 4/5/2025  Impression: Impression: Small to moderate right pneumothorax and right chest wall subcutaneous emphysema, enlarged since the prior chest x-ray. The above findings were discussed with Gary Hoang via telephone on 4/5/2025 10:52 AM EDT. Electronically Signed: Christopher Ferreira MD  4/5/2025 10:53 AM EDT  Workstation ID: [id number]    CT Chest Without Contrast Diagnostic  Result Date: 4/5/2025  Impression: 1.Small to moderate right pneumothorax appears enlarged since the 4/4/2025 chest x-ray. 2.Scattered atelectasis within the right lung. The above findings were discussed with Gary Hoang via telephone on 4/5/2025 10:52 AM EDT. Electronically Signed: Christopher Ferreira MD  4/5/2025 10:53 AM EDT  Workstation ID: [id number]    XR Chest 1 View  Result Date: 4/4/2025  Impression: Impression: Interval removal of a right small bore thoracostomy tube with a small pneumothorax noted, as above. Irregular density in the mid right lung is chronic, probably representing scarring and/or atelectasis. Small right basilar density could represent pneumonia or atelectasis, similar to the most recent exam. Electronically Signed: Jonah Loredo MD  4/4/2025 12:46 PM EDT  Workstation ID: [id number]    XR Chest 1 View  Result Date: 4/4/2025  Impression: Impression: Increasing small right pneumothorax. Thoracotomy tube appears slightly retracted with small portion remaining in the pleural space. Electronically Signed: Omar Hi MD  4/4/2025 11:48 AM EDT  Workstation ID: [id number]    XR Chest 1 View  Result Date: 4/4/2025  Impression: Near complete resolution of right-sided pneumothorax following placement of small bore chest tube. Tiny apical pneumothorax persists. Electronically Signed: Bonifacio Munoz MD   4/4/2025 12:21 AM EDT  Workstation ID: [id number]    XR Chest 1 View  Result Date: 4/3/2025  Impression: Impression: Moderate size right pneumothorax. Electronically Signed: Braxton Mac MD  4/3/2025 11:27 PM EDT  Workstation ID: [id number]      Results for orders placed during the hospital encounter of 01/21/25    Duplex Carotid Ultrasound CAR    Interpretation Summary    Right internal carotid artery demonstrates a less than 50% stenosis.    Antegrade right vertebral flow.    Left internal carotid artery demonstrates a less than 50% stenosis.    Antegrade left vertebral flow.      Results for orders placed during the hospital encounter of 01/21/25    Duplex Carotid Ultrasound CAR    Interpretation Summary    Right internal carotid artery demonstrates a less than 50% stenosis.    Antegrade right vertebral flow.    Left internal carotid artery demonstrates a less than 50% stenosis.    Antegrade left vertebral flow.      Results for orders placed during the hospital encounter of 06/20/22    Adult Transthoracic Echo Complete W/ Cont if Necessary Per Protocol    Interpretation Summary  · Left ventricular wall thickness is consistent with mild to moderate concentric hypertrophy.  · Estimated left ventricular EF = 55% Estimated left ventricular EF was in agreement with the calculated left ventricular EF. Left ventricular systolic function is normal.  · There is moderate calcification of the aortic valve mainly affecting the left coronary and right coronary cusp(s).  · Left ventricular diastolic function is consistent with (grade I) impaired relaxation.  · Estimated right ventricular systolic pressure from tricuspid regurgitation is normal (<35 mmHg).      Labs Pending at Discharge:  Pending Results       None            Procedures Performed    04/05 1139 Chest Tube Insertion      Consults:   Consults       Date and Time Order Name Status Description    4/4/2025 12:47 AM Pulmonology (on-call MD unless specified) Completed                Discharge Details        Discharge Medications        Changes to Medications        Instructions Start Date   vitamin D 1.25 MG (64952 UT) capsule capsule  Commonly known as: ERGOCALCIFEROL  What changed: additional instructions   50,000 Units, Oral, Weekly             Continue These Medications        Instructions Start Date   Accu-Chek Guide w/Device kit   1 kit, Not Applicable, Daily      ALPRAZolam 0.25 MG tablet  Commonly known as: XANAX   TAKE 1 TABLET BY MOUTH EVERY DAY AS NEEDED FOR SLEEP FOR ANXIETY      amLODIPine 5 MG tablet  Commonly known as: NORVASC   5 mg, Oral, Daily      aspirin 81 MG tablet   81 mg, Daily      atorvastatin 80 MG tablet  Commonly known as: LIPITOR   80 mg, Oral, Daily      citalopram 20 MG tablet  Commonly known as: CeleXA   20 mg, Oral, Daily      Combivent Respimat  MCG/ACT inhaler  Generic drug: ipratropium-albuterol   INHALE 1 PUFF BY MOUTH 4 TIMES A DAY AS NEEDED FOR WHEEZING      ipratropium-albuterol 0.5-2.5 mg/3 ml nebulizer  Commonly known as: DUO-NEB   3 mL, Nebulization, Every 6 Hours PRN      CRANBERRY PO   1 tablet, Daily PRN      cyclobenzaprine 10 MG tablet  Commonly known as: FLEXERIL   10 mg, Oral, Nightly PRN      Dexcom G7  device   1 package, Not Applicable, Daily      Dexcom G7 Sensor misc   1 package, Not Applicable, Daily      empagliflozin 25 MG tablet tablet  Commonly known as: Jardiance   25 mg, Oral, Daily      fenofibrate 160 MG tablet   160 mg, Oral, Daily      guaiFENesin 600 MG 12 hr tablet  Commonly known as: MUCINEX   1,200 mg, Oral, 2 Times Daily      ibandronate 150 MG tablet  Commonly known as: Boniva   150 mg, Oral, Every 30 Days      metFORMIN  MG 24 hr tablet  Commonly known as: GLUCOPHAGE-XR   1 po bid w/ meals      nitroglycerin 0.4 MG SL tablet  Commonly known as: NITROSTAT   0.4 mg, Sublingual, Every 5 Minutes PRN, Take no more than 3 doses in 15 minutes.      omeprazole 40 MG capsule  Commonly known as:  priLOSEC   TAKE 1 CAPSULE BY MOUTH EVERY DAY      predniSONE 20 MG tablet  Commonly known as: DELTASONE   3 po qam x 3 days then 2 po qam x 3 days then 1 po qam x 3 days then 1/2 tab po qam x 4 days      traMADol 50 MG tablet  Commonly known as: ULTRAM   50 mg, Oral, Every 8 Hours PRN      Trelegy Ellipta 200-62.5-25 MCG/ACT inhaler  Generic drug: Fluticasone-Umeclidin-Vilant   1 puff, Inhalation, Daily      valsartan 40 MG tablet  Commonly known as: Diovan   40 mg, Oral, Daily             Stop These Medications      ibuprofen 800 MG tablet  Commonly known as: ADVIL,MOTRIN              Allergies   Allergen Reactions    Ace Inhibitors Cough    Pregabalin Confusion    Aricept [Donepezil] Mental Status Change    Codeine Nausea And Vomiting    Paxil [Paroxetine] Other (See Comments)     TREMOR WORSENING         Discharge Disposition:   Home or Self Care    Diet:  Hospital:No active diet order        Discharge Activity:   Activity Instructions       Gradually Increase Activity Until at Pre-Hospitalization Level                CODE STATUS:  Code Status and Medical Interventions: CPR (Attempt to Resuscitate); Full Support   Ordered at: 04/04/25 0141     Code Status (Patient has no pulse and is not breathing):    CPR (Attempt to Resuscitate)     Medical Interventions (Patient has pulse or is breathing):    Full Support     Level Of Support Discussed With:    Patient         Future Appointments   Date Time Provider Department Center   4/28/2025  1:40 PM Elisa Soto MD [facility]   5/1/2025  1:30 PM Elen Jameson DO [facility]   7/17/2025  3:00 PM Elen Jameson DO [facility]       Additional Instructions for the Follow-ups that You Need to Schedule       Discharge Follow-up with PCP   As directed       Currently Documented PCP:    Elen Jameson DO    PCP Phone Number:    [phone]     Follow Up Details: 1 week        Discharge Follow-up with Specified Provider: pulmonary; 2 Weeks   As  directed      To: pulmonary   Follow Up: 2 Weeks                Time spent on Discharge including face to face service:  35 minutes    Signature: Electronically signed by Justyn Ramirez MD, 04/07/25, 14:40 EDT.  [facility] Hospitalist Team

## 2025-04-07 NOTE — CASE MANAGEMENT/SOCIAL WORK
Case Management Discharge Note      Final Note: Routine home    Provided Post Acute Provider List?: N/A  Provided Post Acute Provider Quality & Resource List?: N/A    Selected Continued Care - Discharged on 4/6/2025 Admission date: 4/3/2025 - Discharge disposition: Home or Self Care         Transportation Services  Private: Car    Final Discharge Disposition Code: 01 - home or self-care

## 2025-04-08 ENCOUNTER — APPOINTMENT (OUTPATIENT)
Dept: GENERAL RADIOLOGY | Facility: HOSPITAL | Age: 70
DRG: 200 | End: 2025-04-08
Payer: MEDICARE

## 2025-04-08 ENCOUNTER — HOSPITAL ENCOUNTER (INPATIENT)
Facility: HOSPITAL | Age: 70
LOS: 5 days | Discharge: HOME | DRG: 200 | End: 2025-04-13
Attending: EMERGENCY MEDICINE | Admitting: STUDENT IN AN ORGANIZED HEALTH CARE EDUCATION/TRAINING PROGRAM
Payer: MEDICARE

## 2025-04-08 ENCOUNTER — READMISSION MANAGEMENT (OUTPATIENT)
Dept: CALL CENTER | Facility: HOSPITAL | Age: 70
End: 2025-04-08
Payer: MEDICARE

## 2025-04-08 DIAGNOSIS — R06.00 DYSPNEA, UNSPECIFIED TYPE: ICD-10-CM

## 2025-04-08 DIAGNOSIS — J93.9 PNEUMOTHORAX ON RIGHT: Primary | ICD-10-CM

## 2025-04-08 DIAGNOSIS — J93.9 PNEUMOTHORAX, UNSPECIFIED TYPE: ICD-10-CM

## 2025-04-08 LAB
ANION GAP SERPL CALCULATED.3IONS-SCNC: 10 MMOL/L (ref 5–15)
ANISOCYTOSIS BLD QL: ABNORMAL
APTT PPP: 22.3 SECONDS (ref 22.7–35.4)
BUN SERPL-MCNC: 28 MG/DL (ref 8–23)
BUN/CREAT SERPL: 30.1 (ref 7–25)
CALCIUM SPEC-SCNC: 9.7 MG/DL (ref 8.6–10.5)
CHLORIDE SERPL-SCNC: 104 MMOL/L (ref 98–107)
CO2 SERPL-SCNC: 28 MMOL/L (ref 22–29)
CREAT SERPL-MCNC: 0.93 MG/DL (ref 0.57–1)
DEPRECATED RDW RBC AUTO: 44.4 FL (ref 37–54)
EGFRCR SERPLBLD CKD-EPI 2021: 67.5 ML/MIN/1.73
EOSINOPHIL # BLD MANUAL: 0.98 10*3/MM3 (ref 0–0.4)
EOSINOPHIL NFR BLD MANUAL: 4 % (ref 0.3–6.2)
ERYTHROCYTE [DISTWIDTH] IN BLOOD BY AUTOMATED COUNT: 13.7 % (ref 12.3–15.4)
GLUCOSE BLDC GLUCOMTR-MCNC: 125 MG/DL (ref 70–105)
GLUCOSE BLDC GLUCOMTR-MCNC: 157 MG/DL (ref 70–105)
GLUCOSE BLDC GLUCOMTR-MCNC: 190 MG/DL (ref 70–105)
GLUCOSE BLDC GLUCOMTR-MCNC: 232 MG/DL (ref 70–105)
GLUCOSE SERPL-MCNC: 171 MG/DL (ref 65–99)
HCT VFR BLD AUTO: 54.9 % (ref 34–46.6)
HGB BLD-MCNC: 17.3 G/DL (ref 12–15.9)
HOLD SPECIMEN: NORMAL
INR PPP: 1.05 (ref 0.9–1.1)
LYMPHOCYTES # BLD MANUAL: 7.09 10*3/MM3 (ref 0.7–3.1)
LYMPHOCYTES NFR BLD MANUAL: 6 % (ref 5–12)
MCH RBC QN AUTO: 28.1 PG (ref 26.6–33)
MCHC RBC AUTO-ENTMCNC: 31.5 G/DL (ref 31.5–35.7)
MCV RBC AUTO: 89.1 FL (ref 79–97)
MONOCYTES # BLD: 1.47 10*3/MM3 (ref 0.1–0.9)
NEUTROPHILS # BLD AUTO: 14.91 10*3/MM3 (ref 1.7–7)
NEUTROPHILS NFR BLD MANUAL: 61 % (ref 42.7–76)
NT-PROBNP SERPL-MCNC: 690 PG/ML (ref 0–900)
PLAT MORPH BLD: NORMAL
PLATELET # BLD AUTO: 477 10*3/MM3 (ref 140–450)
PMV BLD AUTO: 9.7 FL (ref 6–12)
POTASSIUM SERPL-SCNC: 4.4 MMOL/L (ref 3.5–5.2)
PROTHROMBIN TIME: 13.6 SECONDS (ref 11.7–14.2)
RBC # BLD AUTO: 6.16 10*6/MM3 (ref 3.77–5.28)
SCAN SLIDE: NORMAL
SODIUM SERPL-SCNC: 142 MMOL/L (ref 136–145)
VARIANT LYMPHS NFR BLD MANUAL: 29 % (ref 19.6–45.3)
WBC MORPH BLD: NORMAL
WBC NRBC COR # BLD AUTO: 24.45 10*3/MM3 (ref 3.4–10.8)

## 2025-04-08 PROCEDURE — 94799 UNLISTED PULMONARY SVC/PX: CPT

## 2025-04-08 PROCEDURE — 85025 COMPLETE CBC W/AUTO DIFF WBC: CPT | Performed by: EMERGENCY MEDICINE

## 2025-04-08 PROCEDURE — 85730 THROMBOPLASTIN TIME PARTIAL: CPT | Performed by: EMERGENCY MEDICINE

## 2025-04-08 PROCEDURE — 25010000002 HYDROMORPHONE 1 MG/ML SOLUTION: Performed by: INTERNAL MEDICINE

## 2025-04-08 PROCEDURE — 82948 REAGENT STRIP/BLOOD GLUCOSE: CPT

## 2025-04-08 PROCEDURE — 83880 ASSAY OF NATRIURETIC PEPTIDE: CPT | Performed by: EMERGENCY MEDICINE

## 2025-04-08 PROCEDURE — 71045 X-RAY EXAM CHEST 1 VIEW: CPT

## 2025-04-08 PROCEDURE — 99285 EMERGENCY DEPT VISIT HI MDM: CPT

## 2025-04-08 PROCEDURE — 25010000002 HYDROMORPHONE PER 4 MG: Performed by: STUDENT IN AN ORGANIZED HEALTH CARE EDUCATION/TRAINING PROGRAM

## 2025-04-08 PROCEDURE — 94640 AIRWAY INHALATION TREATMENT: CPT

## 2025-04-08 PROCEDURE — 85610 PROTHROMBIN TIME: CPT | Performed by: EMERGENCY MEDICINE

## 2025-04-08 PROCEDURE — 63710000001 INSULIN LISPRO (HUMAN) PER 5 UNITS: Performed by: STUDENT IN AN ORGANIZED HEALTH CARE EDUCATION/TRAINING PROGRAM

## 2025-04-08 PROCEDURE — 93005 ELECTROCARDIOGRAM TRACING: CPT | Performed by: EMERGENCY MEDICINE

## 2025-04-08 PROCEDURE — 85007 BL SMEAR W/DIFF WBC COUNT: CPT | Performed by: EMERGENCY MEDICINE

## 2025-04-08 PROCEDURE — 80048 BASIC METABOLIC PNL TOTAL CA: CPT | Performed by: EMERGENCY MEDICINE

## 2025-04-08 PROCEDURE — 0W9930Z DRAINAGE OF RIGHT PLEURAL CAVITY WITH DRAINAGE DEVICE, PERCUTANEOUS APPROACH: ICD-10-PCS | Performed by: INTERNAL MEDICINE

## 2025-04-08 PROCEDURE — 94761 N-INVAS EAR/PLS OXIMETRY MLT: CPT

## 2025-04-08 RX ORDER — VALSARTAN 40 MG/1
40 TABLET ORAL DAILY
Status: DISCONTINUED | OUTPATIENT
Start: 2025-04-09 | End: 2025-04-13 | Stop reason: HOSPADM

## 2025-04-08 RX ORDER — CYCLOBENZAPRINE HCL 10 MG
10 TABLET ORAL NIGHTLY PRN
Status: DISCONTINUED | OUTPATIENT
Start: 2025-04-08 | End: 2025-04-13 | Stop reason: HOSPADM

## 2025-04-08 RX ORDER — IBUPROFEN 600 MG/1
1 TABLET ORAL
Status: DISCONTINUED | OUTPATIENT
Start: 2025-04-08 | End: 2025-04-13 | Stop reason: HOSPADM

## 2025-04-08 RX ORDER — GUAIFENESIN 600 MG/1
1200 TABLET, EXTENDED RELEASE ORAL EVERY 12 HOURS SCHEDULED
Status: DISCONTINUED | OUTPATIENT
Start: 2025-04-08 | End: 2025-04-13 | Stop reason: HOSPADM

## 2025-04-08 RX ORDER — INSULIN LISPRO 100 [IU]/ML
2-7 INJECTION, SOLUTION INTRAVENOUS; SUBCUTANEOUS
Status: DISCONTINUED | OUTPATIENT
Start: 2025-04-08 | End: 2025-04-13 | Stop reason: HOSPADM

## 2025-04-08 RX ORDER — NICOTINE 21 MG/24HR
1 PATCH, TRANSDERMAL 24 HOURS TRANSDERMAL DAILY PRN
Status: DISCONTINUED | OUTPATIENT
Start: 2025-04-08 | End: 2025-04-09

## 2025-04-08 RX ORDER — BISACODYL 5 MG/1
5 TABLET, DELAYED RELEASE ORAL DAILY PRN
Status: DISCONTINUED | OUTPATIENT
Start: 2025-04-08 | End: 2025-04-13 | Stop reason: HOSPADM

## 2025-04-08 RX ORDER — NITROGLYCERIN 0.4 MG/1
0.4 TABLET SUBLINGUAL
Status: DISCONTINUED | OUTPATIENT
Start: 2025-04-08 | End: 2025-04-13 | Stop reason: HOSPADM

## 2025-04-08 RX ORDER — ACETAMINOPHEN 325 MG/1
650 TABLET ORAL EVERY 4 HOURS PRN
Status: DISCONTINUED | OUTPATIENT
Start: 2025-04-08 | End: 2025-04-13 | Stop reason: HOSPADM

## 2025-04-08 RX ORDER — PANTOPRAZOLE SODIUM 40 MG/1
40 TABLET, DELAYED RELEASE ORAL
Status: DISCONTINUED | OUTPATIENT
Start: 2025-04-09 | End: 2025-04-13 | Stop reason: HOSPADM

## 2025-04-08 RX ORDER — NICOTINE POLACRILEX 4 MG
15 LOZENGE BUCCAL
Status: DISCONTINUED | OUTPATIENT
Start: 2025-04-08 | End: 2025-04-13 | Stop reason: HOSPADM

## 2025-04-08 RX ORDER — SODIUM CHLORIDE 0.9 % (FLUSH) 0.9 %
10 SYRINGE (ML) INJECTION AS NEEDED
Status: DISCONTINUED | OUTPATIENT
Start: 2025-04-08 | End: 2025-04-13 | Stop reason: HOSPADM

## 2025-04-08 RX ORDER — OXYCODONE HYDROCHLORIDE 5 MG/1
5 TABLET ORAL EVERY 6 HOURS PRN
Refills: 0 | Status: DISPENSED | OUTPATIENT
Start: 2025-04-08 | End: 2025-04-10

## 2025-04-08 RX ORDER — PREDNISONE 20 MG/1
20 TABLET ORAL
Status: DISPENSED | OUTPATIENT
Start: 2025-04-08 | End: 2025-04-13

## 2025-04-08 RX ORDER — LABETALOL HYDROCHLORIDE 5 MG/ML
10 INJECTION, SOLUTION INTRAVENOUS ONCE
Status: COMPLETED | OUTPATIENT
Start: 2025-04-08 | End: 2025-04-08

## 2025-04-08 RX ORDER — HYDROMORPHONE HYDROCHLORIDE 1 MG/ML
0.5 INJECTION, SOLUTION INTRAMUSCULAR; INTRAVENOUS; SUBCUTANEOUS
Refills: 0 | Status: DISPENSED | OUTPATIENT
Start: 2025-04-08 | End: 2025-04-13

## 2025-04-08 RX ORDER — CITALOPRAM HYDROBROMIDE 20 MG/1
20 TABLET ORAL DAILY
Status: DISCONTINUED | OUTPATIENT
Start: 2025-04-09 | End: 2025-04-13 | Stop reason: HOSPADM

## 2025-04-08 RX ORDER — POLYETHYLENE GLYCOL 3350 17 G/17G
17 POWDER, FOR SOLUTION ORAL DAILY PRN
Status: DISCONTINUED | OUTPATIENT
Start: 2025-04-08 | End: 2025-04-13 | Stop reason: HOSPADM

## 2025-04-08 RX ORDER — IPRATROPIUM BROMIDE AND ALBUTEROL SULFATE 2.5; .5 MG/3ML; MG/3ML
3 SOLUTION RESPIRATORY (INHALATION)
Status: DISCONTINUED | OUTPATIENT
Start: 2025-04-08 | End: 2025-04-13 | Stop reason: HOSPADM

## 2025-04-08 RX ORDER — NALOXONE HCL 0.4 MG/ML
0.4 VIAL (ML) INJECTION AS NEEDED
Status: DISCONTINUED | OUTPATIENT
Start: 2025-04-08 | End: 2025-04-13 | Stop reason: HOSPADM

## 2025-04-08 RX ORDER — ACETAMINOPHEN 160 MG/5ML
650 SOLUTION ORAL EVERY 4 HOURS PRN
Status: DISCONTINUED | OUTPATIENT
Start: 2025-04-08 | End: 2025-04-13 | Stop reason: HOSPADM

## 2025-04-08 RX ORDER — ATORVASTATIN CALCIUM 40 MG/1
80 TABLET, FILM COATED ORAL DAILY
Status: DISCONTINUED | OUTPATIENT
Start: 2025-04-09 | End: 2025-04-13 | Stop reason: HOSPADM

## 2025-04-08 RX ORDER — BISACODYL 10 MG
10 SUPPOSITORY, RECTAL RECTAL DAILY PRN
Status: DISCONTINUED | OUTPATIENT
Start: 2025-04-08 | End: 2025-04-13 | Stop reason: HOSPADM

## 2025-04-08 RX ORDER — DEXTROSE MONOHYDRATE 25 G/50ML
25 INJECTION, SOLUTION INTRAVENOUS
Status: DISCONTINUED | OUTPATIENT
Start: 2025-04-08 | End: 2025-04-13 | Stop reason: HOSPADM

## 2025-04-08 RX ORDER — ASPIRIN 81 MG/1
81 TABLET ORAL DAILY
Status: DISCONTINUED | OUTPATIENT
Start: 2025-04-09 | End: 2025-04-13 | Stop reason: HOSPADM

## 2025-04-08 RX ORDER — AMOXICILLIN 250 MG
2 CAPSULE ORAL 2 TIMES DAILY
Status: DISCONTINUED | OUTPATIENT
Start: 2025-04-08 | End: 2025-04-13 | Stop reason: HOSPADM

## 2025-04-08 RX ORDER — ACETAMINOPHEN 650 MG/1
650 SUPPOSITORY RECTAL EVERY 4 HOURS PRN
Status: DISCONTINUED | OUTPATIENT
Start: 2025-04-08 | End: 2025-04-13 | Stop reason: HOSPADM

## 2025-04-08 RX ORDER — BUDESONIDE 0.5 MG/2ML
0.5 INHALANT ORAL
Status: DISCONTINUED | OUTPATIENT
Start: 2025-04-08 | End: 2025-04-13 | Stop reason: HOSPADM

## 2025-04-08 RX ORDER — AMLODIPINE BESYLATE 5 MG/1
5 TABLET ORAL DAILY
Status: DISCONTINUED | OUTPATIENT
Start: 2025-04-08 | End: 2025-04-13 | Stop reason: HOSPADM

## 2025-04-08 RX ADMIN — SENNOSIDES AND DOCUSATE SODIUM 2 TABLET: 50; 8.6 TABLET ORAL at 11:46

## 2025-04-08 RX ADMIN — OXYCODONE HYDROCHLORIDE 5 MG: 5 TABLET ORAL at 20:21

## 2025-04-08 RX ADMIN — GUAIFENESIN 1200 MG: 600 TABLET, EXTENDED RELEASE ORAL at 20:21

## 2025-04-08 RX ADMIN — SENNOSIDES AND DOCUSATE SODIUM 2 TABLET: 50; 8.6 TABLET ORAL at 20:21

## 2025-04-08 RX ADMIN — BUDESONIDE 0.5 MG: 0.5 INHALANT RESPIRATORY (INHALATION) at 10:56

## 2025-04-08 RX ADMIN — HYDROMORPHONE HYDROCHLORIDE 1 MG: 1 INJECTION, SOLUTION INTRAMUSCULAR; INTRAVENOUS; SUBCUTANEOUS at 09:48

## 2025-04-08 RX ADMIN — IPRATROPIUM BROMIDE AND ALBUTEROL SULFATE 3 ML: .5; 3 SOLUTION RESPIRATORY (INHALATION) at 19:10

## 2025-04-08 RX ADMIN — Medication 10 MG: at 08:48

## 2025-04-08 RX ADMIN — CYCLOBENZAPRINE HYDROCHLORIDE 10 MG: 10 TABLET, FILM COATED ORAL at 20:21

## 2025-04-08 RX ADMIN — HYDROMORPHONE HYDROCHLORIDE 0.5 MG: 1 INJECTION, SOLUTION INTRAMUSCULAR; INTRAVENOUS; SUBCUTANEOUS at 21:39

## 2025-04-08 RX ADMIN — NICOTINE 1 PATCH: 14 PATCH TRANSDERMAL at 12:10

## 2025-04-08 RX ADMIN — OXYCODONE HYDROCHLORIDE 5 MG: 5 TABLET ORAL at 12:10

## 2025-04-08 RX ADMIN — IPRATROPIUM BROMIDE AND ALBUTEROL SULFATE 3 ML: .5; 3 SOLUTION RESPIRATORY (INHALATION) at 10:58

## 2025-04-08 RX ADMIN — GUAIFENESIN 1200 MG: 600 TABLET, EXTENDED RELEASE ORAL at 11:46

## 2025-04-08 RX ADMIN — ACETAMINOPHEN 650 MG: 325 TABLET, FILM COATED ORAL at 11:46

## 2025-04-08 RX ADMIN — INSULIN LISPRO 2 UNITS: 100 INJECTION, SOLUTION INTRAVENOUS; SUBCUTANEOUS at 13:19

## 2025-04-08 RX ADMIN — BUDESONIDE 0.5 MG: 0.5 INHALANT RESPIRATORY (INHALATION) at 19:05

## 2025-04-08 RX ADMIN — AMLODIPINE BESYLATE 5 MG: 5 TABLET ORAL at 18:33

## 2025-04-08 RX ADMIN — IPRATROPIUM BROMIDE AND ALBUTEROL SULFATE 3 ML: .5; 3 SOLUTION RESPIRATORY (INHALATION) at 15:16

## 2025-04-08 RX ADMIN — INSULIN LISPRO 3 UNITS: 100 INJECTION, SOLUTION INTRAVENOUS; SUBCUTANEOUS at 20:21

## 2025-04-08 RX ADMIN — HYDROMORPHONE HYDROCHLORIDE 0.5 MG: 1 INJECTION, SOLUTION INTRAMUSCULAR; INTRAVENOUS; SUBCUTANEOUS at 16:39

## 2025-04-08 NOTE — CASE MANAGEMENT/SOCIAL WORK
Discharge Planning Assessment   [place]     Patient Name: Darleen Stallings  MRN: [id number]  Today's Date: 4/8/2025    Admit Date: 4/8/2025    Plan: Home alone   Discharge Needs Assessment       Row Name 04/08/25 1239       Living Environment    People in Home alone    Current Living Arrangements home    Potentially Unsafe Housing Conditions none    In the past 12 months has the electric, gas, oil, or water company threatened to shut off services in your home? No    Primary Care Provided by self    Provides Primary Care For no one    Family Caregiver if Needed sibling(s)    Family Caregiver Names Sister Cristin    Quality of Family Relationships helpful;involved;supportive    Able to Return to Prior Arrangements yes       Resource/Environmental Concerns    Resource/Environmental Concerns none    Transportation Concerns none       Transportation Needs    In the past 12 months, has lack of transportation kept you from medical appointments or from getting medications? no    In the past 12 months, has lack of transportation kept you from meetings, work, or from getting things needed for daily living? No       Food Insecurity    Within the past 12 months, you worried that your food would run out before you got the money to buy more. Never true    Within the past 12 months, the food you bought just didn't last and you didn't have money to get more. Never true       Transition Planning    Patient/Family Anticipates Transition to home    Patient/Family Anticipated Services at Transition none    Transportation Anticipated family or friend will provide  Sister Cristin can transport at d/c.       Discharge Needs Assessment    Readmission Within the Last 30 Days no previous admission in last 30 days    Equipment Currently Used at Home oxygen;nebulizer;cane, straight;walker, rolling;wheelchair  O2 2L prn per [place]    Do you want help finding or keeping work or a job? I do not need or want help    Do you want help with school or  training? For example, starting or completing job training or getting a high school diploma, GED or equivalent No                   Discharge Plan       Row Name 04/08/25 1246       Plan    Plan Home alone    Patient/Family in Agreement with Plan yes    Plan Comments CM spoke to pt. Darleen at bedside. PCP and pharmacy confirmed. Pt. denies financial, transportation, or medication issues. Pt's sister Cristin can transport at d/c. DC barriers: Pulmonology consult pending                Demographic Summary       Row Name 04/08/25 1227       General Information    Admission Type inpatient    Arrived From home    Required Notices Provided Important Message from Medicare    Referral Source admission list    Reason for Consult discharge planning    Preferred Language English       Contact Information    Permission Granted to Share Info With     Contact Information Obtained for                 Functional Status       Row Name 04/08/25 1231       Functional Status    Usual Activity Tolerance good    Current Activity Tolerance good       Physical Activity    On average, how many days per week do you engage in moderate to strenuous exercise (like a brisk walk)? 7 days    On average, how many minutes do you engage in exercise at this level? 30 min    Number of minutes of exercise per week 210       Functional Status, IADL    Medications independent    Meal Preparation independent    Housekeeping independent    Laundry independent    Shopping independent    If for any reason you need help with day-to-day activities such as bathing, preparing meals, shopping, managing finances, etc., do you get the help you need? I don't need any help                  Patient Forms       Row Name 04/08/25 1250       Patient Forms    Important Message from Medicare (IMM) Delivered  IMM per registration 4/8/2025             Social Drivers of Health     Tobacco Use: High Risk (4/4/2025)    Patient History     Smoking Tobacco  Use: Every Day     Smokeless Tobacco Use: Never     Passive Exposure: Past   Alcohol Use: Not At Risk (4/8/2025)    AUDIT-C     Frequency of Alcohol Consumption: Never     Average Number of Drinks: Patient does not drink     Frequency of Binge Drinking: Never   Financial Resource Strain: Low Risk  (4/8/2025)    Overall Financial Resource Strain (CARDIA)     Difficulty of Paying Living Expenses: Not hard at all   Food Insecurity: No Food Insecurity (4/8/2025)    Hunger Vital Sign     Worried About Running Out of Food in the Last Year: Never true     Ran Out of Food in the Last Year: Never true   Transportation Needs: No Transportation Needs (4/8/2025)    PRAPARE - Transportation     Lack of Transportation (Medical): No     Lack of Transportation (Non-Medical): No   Physical Activity: Sufficiently Active (4/8/2025)    Exercise Vital Sign     Days of Exercise per Week: 7 days     Minutes of Exercise per Session: 30 min   Stress: No Stress Concern Present (4/8/2025)    [employer] [employer] - Occupational Stress Questionnaire     Feeling of Stress : Not at all   Social Connections: Not At Risk (4/8/2025)    Family and Community Support     Help with Day-to-Day Activities: I don't need any help     Lonely or Isolated: Never   Interpersonal Safety: Not At Risk (4/8/2025)    Abuse Screen     Unsafe at Home or Work/School: no     Feels Threatened by Someone?: no     Does Anyone Keep You from Contacting Others or Doint Things Outside the Home?: no     Physical Sign of Abuse Present: no   Depression: Not at risk (4/8/2025)    PHQ-2     PHQ-2 Score: 0   Housing Stability: Not At Risk (4/8/2025)    Housing Stability     Current Living Arrangements: home     Potentially Unsafe Housing Conditions: none   Utilities: Not At Risk (4/8/2025)    [facility] Utilities     Threatened with loss of utilities: No   Health Literacy: Not At Risk (4/8/2025)    Education     Help with school or training?: No     Preferred Language:  English   Employment: Not At Risk (4/8/2025)    Employment     Do you want help finding or keeping work or a job?: I do not need or want help   Disabilities: Not At Risk (4/8/2025)    Disabilities     Concentrating, Remembering, or Making Decisions Difficulty: no     Doing Errands Independently Difficulty: no       Ruchi Swann RN    Office: [phone]  Fax: [phone]  [email or web address]

## 2025-04-08 NOTE — PROCEDURES
Right chest tube placement     Indications:  Clinically significant Pneumothorax     Pre-operative Diagnosis: Pneumothorax     Post-operative Diagnosis: Pneumothorax     Procedure Details   Informed consent was obtained for the procedure, including sedation/analgesia if needed.  Risks of lung perforation, hemorrhage, arrhythmia, and adverse drug reaction were discussed.   Timeout was done on the standard manner.     After sterile skin prep, using standard technique,  Lidocaine 1% local skin infiltration was used,  the needle was introduced above the rib identifying the air bubbles.  Using Seldinger technique: The insertion needle was introduced and then a guidewire was passed through the needle which was removed, the dilator was used over the guidewire, after removing the dilator the pigtail catheter was placed and the guidewire was removed.  The catheter was secured to place with  dressing.      A 14 [place] tube was placed in the right anterior second rib space.     Findings:  Air was noted to come back to the right atrium through the waterseal     Estimated Blood Loss:  Minimal           Specimens:  None                Complications:  None; patient tolerated the procedure well.                    Condition: stable     Post op plan:  CXR   -20 cm H2O suction  CT in am, possible chemical pleurodesis tomorrow

## 2025-04-08 NOTE — H&P
[facility] Medicine Services  History & Physical    Patient Name: Darleen Stallings  : 1955  MRN: [id number]  Primary Care Physician:  Elen Jameson DO  Date of admission: 2025  Date and Time of Service: 2025 at 1130    Subjective      Chief Complaint: SOA    History of Present Illness: Darleen Stallings is a 69 y.o. female with a CMH of COPD with home O2 PRN, CAD s/p CABG, htn, hld, T2DM, essential tremor, tobacco use who presented to [facility] on 2025 with  SOA. Pt was recently admitted here - for right sided pneumothorax, was also positive for RSV at that time. Had chest tube placed and then removed prior to discharge.  She reports that when she got home she still had some continued shortness of air, worse with exertion, and her shortness of air progressed, prompting her to present to the ED again this morning. She otherwise denies any new symptoms, no fever/chills/sore throat/congestion/chest pain. Endorses mild dry cough and baseline wheezing.   In ED workup done notable for WBC 24.45 and chest xray showed moderate right pneumothorax. Pulmonology consulted in ED and chest tube placed. Hospitalist service consulted for admission.       Review of Systems   Constitutional:  Negative for chills and fever.   HENT:  Negative for congestion, rhinorrhea and sore throat.    Eyes: Negative.    Respiratory:  Positive for shortness of breath.         Mild dry cough, baseline wheezing   Cardiovascular:  Negative for chest pain, palpitations and leg swelling.   Gastrointestinal:  Negative for abdominal pain, constipation, diarrhea and nausea.   Genitourinary:  Negative for dysuria, frequency and urgency.   Musculoskeletal:  Negative for myalgias.   Neurological:  Positive for tremors. Negative for dizziness, syncope, weakness, light-headedness and headaches.       Personal History     Past Medical History:   Diagnosis Date    CAD     Cervical disc disorder 2019    After fall     CHOL     Chronic pain disorder 2020    After fall down stairs    COPD     Dementia     Depression     DEXA     2023= (1.1/ -2.3)    DMII     Extremity pain 2019    Maybe longer    Fractures 2022    GERD     Hypertension     Joint pain 2020    Low back pain 2019    After fall    MAMMO     NEG = 2018/ 2023/ 2024    Myocardial infarction     Neck pain 2015    Maybe longer    Neuropathy in diabetes 2018    Osteoarthritis 2023    Diagnosed    PAP     NEG =2021    Spinal stenosis 2019    Tremor        Past Surgical History:   Procedure Laterality Date    CATARACT EXTRACTION W/ INTRAOCULAR LENS  IMPLANT, BILATERAL      COLONOSCOPY      2023= NEG, rech 2026   GSI    COMPRESSION HIP SCREW Right 03/25/2022    Procedure: HIP COMPRESSION SCREW, right [place] hip screw from [place];  Surgeon: Loco Cortes MD;  Location: [facility] MAIN OR;  Service: Orthopedics;  Laterality: Right;    CORONARY ARTERY BYPASS GRAFT      x 3    FRACTURE SURGERY      TONSILLECTOMY      TUBAL ABDOMINAL LIGATION         Family History: family history includes COPD in her brother, father, and mother; Cancer in her brother; Depression in her brother, father, sister, and sister; Fibromyalgia in her sister; Heart disease in her father and sister; Hypertension in her brother, brother, mother, sister, and sister; Post-traumatic stress disorder in her sister; Stroke in her mother; Tremor in her brother, father, paternal grandmother, and sister. Otherwise pertinent FHx was reviewed and not pertinent to current issue.    Social History:  reports that she has been smoking cigarettes. She started smoking about 50 years ago. She has a 24 pack-year smoking history. She has been exposed to tobacco smoke. She has never used smokeless tobacco. She reports that she does not drink alcohol and does not use drugs.    Home Medications:  Prior to Admission Medications       Prescriptions Last Dose Informant Patient Reported? Taking?    ALPRAZolam (XANAX) 0.25 MG tablet   No  No    TAKE 1 TABLET BY MOUTH EVERY DAY AS NEEDED FOR SLEEP FOR ANXIETY    amLODIPine (NORVASC) 5 MG tablet   No No    TAKE 1 TABLET BY MOUTH EVERY DAY    aspirin 81 MG tablet  Self Yes No    Take 1 tablet by mouth Daily.    atorvastatin (LIPITOR) 80 MG tablet   No No    Take 1 tablet by mouth Daily.    Blood Glucose Monitoring Suppl (Accu-Chek Guide) w/Device kit   No No    1 kit Daily.    citalopram (CeleXA) 20 MG tablet   No No    Take 1 tablet by mouth Daily.    Continuous Blood Gluc  (Dexcom G7 ) device   No No    Use 1 package Daily.    Continuous Glucose Sensor (Dexcom G7 Sensor) misc   No No    Use 1 package Daily.    CRANBERRY PO   Yes No    Take 1 tablet by mouth Daily As Needed. 1 po qd    cyclobenzaprine (FLEXERIL) 10 MG tablet   No No    TAKE 1 TABLET BY MOUTH AT NIGHT AS NEEDED FOR MUSCLE SPASM    empagliflozin (Jardiance) 25 MG tablet tablet   No No    Take 1 tablet by mouth Daily.    fenofibrate 160 MG tablet   No No    Take 1 tablet by mouth Daily.    Fluticasone-Umeclidin-Vilant (Trelegy Ellipta) 200-62.5-25 MCG/ACT inhaler   No No    Inhale 1 puff Daily.    guaiFENesin (MUCINEX) 600 MG 12 hr tablet   No No    Take 2 tablets by mouth 2 (Two) Times a Day.    ibandronate (Boniva) 150 MG tablet   No No    Take 1 tablet by mouth Every 30 (Thirty) Days.    ipratropium-albuterol (Combivent Respimat)  MCG/ACT inhaler   No No    INHALE 1 PUFF BY MOUTH 4 TIMES A DAY AS NEEDED FOR WHEEZING    ipratropium-albuterol (DUO-NEB) 0.5-2.5 mg/3 ml nebulizer   No No    Take 3 mL by nebulization Every 6 (Six) Hours As Needed for Wheezing or Shortness of Air.    metFORMIN ER (GLUCOPHAGE-XR) 500 MG 24 hr tablet   No No    1 po bid w/ meals    nitroglycerin (NITROSTAT) 0.4 MG SL tablet   No No    Place 1 tablet under the tongue Every 5 (Five) Minutes As Needed for Chest Pain. Take no more than 3 doses in 15 minutes.    omeprazole (priLOSEC) 40 MG capsule   No No    TAKE 1 CAPSULE BY MOUTH EVERY DAY     predniSONE (DELTASONE) 20 MG tablet   No No    3 po qam x 3 days then 2 po qam x 3 days then 1 po qam x 3 days then 1/2 tab po qam x 4 days    traMADol (ULTRAM) 50 MG tablet   No No    Take 1 tablet by mouth Every 8 (Eight) Hours As Needed for Moderate Pain.    valsartan (Diovan) 40 MG tablet   No No    Take 1 tablet by mouth Daily.    vitamin D (ERGOCALCIFEROL) 1.25 MG (29056 UT) capsule capsule   No No    TAKE 1 CAPSULE BY MOUTH 1 TIME A WEEK    Patient taking differently:  Take 1 capsule by mouth 1 (One) Time Per Week. Mondays              Allergies:  Allergies   Allergen Reactions    Ace Inhibitors Cough    Pregabalin Confusion    Aricept [Donepezil] Mental Status Change    Codeine Nausea And Vomiting    Paxil [Paroxetine] Other (See Comments)     TREMOR WORSENING       Objective      Vitals:   Temp:  [97.9 °F (36.6 °C)-98 °F (36.7 °C)] 97.9 °F (36.6 °C)  Heart Rate:  [73-97] 77  Resp:  [18-25] 18  BP: ()/() 131/79  Body mass index is 27.56 kg/m².  Physical Exam  Constitutional:       General: She is not in acute distress.     Appearance: Normal appearance.   HENT:      Head: Normocephalic and atraumatic.      Mouth/Throat:      Mouth: Mucous membranes are moist.      Pharynx: Oropharynx is clear.   Eyes:      Extraocular Movements: Extraocular movements intact.      Conjunctiva/sclera: Conjunctivae normal.   Cardiovascular:      Rate and Rhythm: Normal rate and regular rhythm.      Heart sounds: Normal heart sounds.   Pulmonary:      Comments: No incr wob on RA  Breath sounds present in all lung fields    Abdominal:      General: Abdomen is flat. Bowel sounds are normal.      Palpations: Abdomen is soft.      Tenderness: There is no abdominal tenderness. There is no guarding or rebound.   Musculoskeletal:         General: No swelling or tenderness.      Cervical back: Normal range of motion and neck supple.      Right lower leg: No edema.      Left lower leg: No edema.   Skin:     General: Skin  is warm and dry.      Comments: Chest tube with dressing in place right anterior chest   Neurological:      General: No focal deficit present.      Mental Status: She is alert and oriented to person, place, and time. Mental status is at baseline.      Cranial Nerves: No cranial nerve deficit.      Motor: No weakness.      Comments: + baseline upper extremity tremor         Diagnostic Data:  Lab Results (last 24 hours)       Procedure Component Value Units Date/Time    CBC & Differential [[id number]]  (Abnormal) Collected: 04/08/25 0847    Specimen: Blood from Arm, Right Updated: 04/08/25 0930    Narrative:      The following orders were created for panel order CBC & Differential.  Procedure                               Abnormality         Status                     ---------                               -----------         ------                     CBC Auto Differential[[id number]]        Abnormal            Final result               Scan Slide[[id number]]                                       Final result                 Please view results for these tests on the individual orders.    CBC Auto Differential [[id number]]  (Abnormal) Collected: 04/08/25 0847    Specimen: Blood from Arm, Right Updated: 04/08/25 0930     WBC 24.45 10*3/mm3      RBC 6.16 10*6/mm3      Hemoglobin 17.3 g/dL      Hematocrit 54.9 %      MCV 89.1 fL      MCH 28.1 pg      MCHC 31.5 g/dL      RDW 13.7 %      RDW-SD 44.4 fl      MPV 9.7 fL      Platelets 477 10*3/mm3     Scan Slide [[id number]] Collected: 04/08/25 0847    Specimen: Blood from Arm, Right Updated: 04/08/25 0930     Scan Slide --     Comment: See Manual Differential Results       Manual Differential [[id number]]  (Abnormal) Collected: 04/08/25 0847    Specimen: Blood from Arm, Right Updated: 04/08/25 0930     Neutrophil % 61.0 %      Lymphocyte % 29.0 %      Monocyte % 6.0 %      Eosinophil % 4.0 %      Neutrophils Absolute 14.91 10*3/mm3      Lymphocytes Absolute 7.09 10*3/mm3       Monocytes Absolute 1.47 10*3/mm3      Eosinophils Absolute 0.98 10*3/mm3      Anisocytosis Slight/1+     WBC Morphology Normal     Platelet Morphology Normal    Basic Metabolic Panel [[id number]]  (Abnormal) Collected: 04/08/25 0847    Specimen: Blood from Arm, Right Updated: 04/08/25 0929     Glucose 171 mg/dL      BUN 28 mg/dL      Creatinine 0.93 mg/dL      Sodium 142 mmol/L      Potassium 4.4 mmol/L      Chloride 104 mmol/L      CO2 28.0 mmol/L      Calcium 9.7 mg/dL      BUN/Creatinine Ratio 30.1     Anion Gap 10.0 mmol/L      eGFR 67.5 mL/min/1.73     Narrative:      GFR Categories in Chronic Kidney Disease (CKD)      GFR Category          GFR (mL/min/1.73)    Interpretation  G1                     90 or greater         Normal or high (1)  G2                      60-89                Mild decrease (1)  G3a                   45-59                Mild to moderate decrease  G3b                   30-44                Moderate to severe decrease  G4                    15-29                Severe decrease  G5                    14 or less           Kidney failure          (1)In the absence of evidence of kidney disease, neither GFR category G1 or G2 fulfill the criteria for CKD.    eGFR calculation 2021 CKD-EPI creatinine equation, which does not include race as a factor    BNP [[id number]]  (Normal) Collected: 04/08/25 0847    Specimen: Blood from Arm, Right Updated: 04/08/25 0929     proBNP 690.0 pg/mL     Narrative:      This assay is used as an aid in the diagnosis of individuals suspected of having heart failure. It can be used as an aid in the diagnosis of acute decompensated heart failure (ADHF) in patients presenting with signs and symptoms of ADHF to the emergency department (ED). In addition, NT-proBNP of <300 pg/mL indicates ADHF is not likely.    Age Range Result Interpretation  NT-proBNP Concentration (pg/mL:      <50             Positive            >450                   Gray                 300-450                     Negative             <300    50-75           Positive            >900                  Gray                300-900                  Negative            <300      >75             Positive            >1800                  Gray                300-1800                  Negative            <300    Protime-INR [[id number]]  (Normal) Collected: 04/08/25 0847    Specimen: Blood from Arm, Right Updated: 04/08/25 0918     Protime 13.6 Seconds      INR 1.05    aPTT [[id number]]  (Abnormal) Collected: 04/08/25 0847    Specimen: Blood from Arm, Right Updated: 04/08/25 0918     PTT 22.3 seconds     POC Glucose Once [[id number]]  (Abnormal) Collected: 04/08/25 0913    Specimen: Blood Updated: 04/08/25 0915     Glucose 190 mg/dL      Comment: Serial Number: [id number]:  [id number]       Extra Tubes [[id number]] Collected: 04/08/25 0847    Specimen: Blood, Venous Line Updated: 04/08/25 0915    Narrative:      The following orders were created for panel order Extra Tubes.  Procedure                               Abnormality         Status                     ---------                               -----------         ------                     Gold Top - SST[[id number]]                                   Final result                 Please view results for these tests on the individual orders.    Gold Top - SST [[id number]] Collected: 04/08/25 0847    Specimen: Blood Updated: 04/08/25 0915     Extra Tube Hold for add-ons.     Comment: Auto resulted.                Imaging Results (Last 24 Hours)       Procedure Component Value Units Date/Time    XR Chest 1 View [[id number]] Collected: 04/08/25 1021     Updated: 04/08/25 1025    Narrative:      XR CHEST 1 VW    Date of Exam: 4/8/2025 10:15 AM EDT    Indication: Post RT Lung chest tube insertion    Comparison: 4/8/2025    Findings:  Cardiomediastinal silhouette is unchanged. There has been interval placement of a right apical chest tube with evacuation of an apical  pneumothorax. No pneumothorax identified currently. No airspace disease, left pneumothorax, nor pleural effusion. There   is similar right thoracic subcutaneous emphysema. No acute osseous abnormality identified.      Impression:      Impression:  Interval placement of a right apical chest tube with evacuation of an apical pneumothorax.        Electronically Signed: Tristan Trent MD    4/8/2025 10:22 AM EDT    Workstation ID: [id number]    XR Chest 1 View [[id number]] Collected: 04/08/25 0903     Updated: 04/08/25 0907    Narrative:      XR CHEST 1 VW    Date of Exam: 4/8/2025 8:50 AM EDT    Indication: shortness of breath    Comparison: AP chest 4/6/2025.    Findings:  Right pigtail pleural drain has been removed. Moderate right pneumothorax has developed. Increased right chest wall subcutaneous gas. The right lung appears partially atelectatic. No mediastinal shift. Left lung remains clear. Median sternotomy and CABG   changes are present.      Impression:      Impression:  Moderate right pneumothorax. I spoke with the ER physician, who is already aware of this finding.      Electronically Signed: Jessi Enriquez MD    4/8/2025 9:05 AM EDT    Workstation ID: [id number]              Assessment & Plan        This is a 69 y.o. female with:    Active and Resolved Problems  Active Hospital Problems    Diagnosis  POA    **Pneumothorax [J93.9]  Yes      Resolved Hospital Problems   No resolved problems to display.       Recurrent right sided pneumothorax  - spontaneous, no recent trauma  - s/p chest tube with interval improvement, currently to LWS.   - Pulmonology managing  - CT Chest pending to ensure adequate lung expansion, anticipate chemical pleurodesis 4/9.  - NPO midnight  - PRN pain control    COPD not in acute exacerbation  RSV infection  Leukocytosis  Tobacco use  - WBC elevated at 24.45, was 18.6 at recent discharge. Pt has been on steroid therapy, also found to be RSV+ 4/3. Afebrile, without infectious Sx, imaging  without opacity/consolidation appreciated. Low suspicion for bacterial infection at this time, will defer abx. Re-evaluate if pt status worsens.   - Pulm following as above  - Cont home medications, pulmicort, duonebs  - Cont steroid course, guaifenesin   - Cont pulse ox, supplemental O2 as needed  - Incentive spirometry  - Droplet precautions  - tobacco cessation counseled  - nicotine patch prn    Hypertension  - BP initially elevated in the setting of stress as above, improved  - cont home medications, hold ARB 4/9 am in case of possible general anesthesia, resume when appropriate    T2DM  - cont jardiance, hold home metformin  - SSI  - CCD    CAD s/p CABG 2019  HLD  Depression  Anxiety  - cont home medications      VTE Prophylaxis:  Mechanical VTE prophylaxis orders are present.        The patient desires to be as follows:    CODE STATUS:    Code Status (Patient has no pulse and is not breathing): CPR (Attempt to Resuscitate)  Medical Interventions (Patient has pulse or is breathing): Full Support  Level Of Support Discussed With: Patient        Cristin Stallings, who can be contacted at [phone] , is the designated person to make medical decisions on the patient's behalf if She is incapable of doing so. This was clarified with patient and/or next of kin on 4/8/2025 during the course of this H&P.    Admission Status:  I believe this patient meets inpatient status.    Expected Length of Stay: >2 midnights    PDMP and Medication Dispenses via Sidebar reviewed and consistent with patient reported medications.    I discussed the patient's findings and my recommendations with patient.      Signature:     This document has been electronically signed by Luis Carlos Hay MD on April 8, 2025 11:44 EDT   [facility] Hospitalist Team

## 2025-04-08 NOTE — ED PROVIDER NOTES
Subjective   History of Present Illness  Chief complaint: Shortness of breath    69-year-old female presents with shortness of breath.  Patient was just discharged on Sunday after being admitted for a right-sided pneumothorax.  She had 2 different chest tubes placed during that visit.  She states she still felt like she was short of breath when she was discharged and her symptoms have gotten progressively worse since being home.  She denies chest pain.  She has had no fever.  She denies any other complaints.    History provided by:  Patient      Review of Systems   Constitutional:  Negative for fever.   HENT:  Negative for congestion.    Respiratory:  Positive for shortness of breath.    Cardiovascular:  Negative for chest pain.   Gastrointestinal:  Negative for abdominal pain and vomiting.   Neurological:  Negative for headaches.       Past Medical History:   Diagnosis Date    CAD     Cervical disc disorder 2019    After fall    CHOL     Chronic pain disorder 2020    After fall down stairs    COPD     Dementia     Depression     DEXA     2023= (1.1/ -2.3)    DMII     Extremity pain 2019    Maybe longer    Fractures 2022    GERD     Hypertension     Joint pain 2020    Low back pain 2019    After fall    MAMMO     NEG = 2018/ 2023/ 2024    Myocardial infarction     Neck pain 2015    Maybe longer    Neuropathy in diabetes 2018    Osteoarthritis 2023    Diagnosed    PAP     NEG =2021    Spinal stenosis 2019    Tremor        Allergies   Allergen Reactions    Ace Inhibitors Cough    Pregabalin Confusion    Aricept [Donepezil] Mental Status Change    Codeine Nausea And Vomiting    Paxil [Paroxetine] Other (See Comments)     TREMOR WORSENING       Past Surgical History:   Procedure Laterality Date    CATARACT EXTRACTION W/ INTRAOCULAR LENS  IMPLANT, BILATERAL      COLONOSCOPY      2023= NEG, rech 2026   GSI    COMPRESSION HIP SCREW Right 03/25/2022    Procedure: HIP COMPRESSION SCREW, right [place] hip screw from [place];   "Surgeon: Loco Cortes MD;  Location: [facility] MAIN OR;  Service: Orthopedics;  Laterality: Right;    CORONARY ARTERY BYPASS GRAFT      x 3    FRACTURE SURGERY      TONSILLECTOMY      TUBAL ABDOMINAL LIGATION         Family History   Problem Relation Age of Onset    Stroke Mother     COPD Mother     Hypertension Mother     Tremor Father     Heart disease Father         CHF    COPD Father     Depression Father     Tremor Sister     Fibromyalgia Sister     Hypertension Sister     Post-traumatic stress disorder Sister     Heart disease Sister     Hypertension Sister     Depression Sister     Tremor Brother     COPD Brother     Depression Brother     Hypertension Brother     Cancer Brother         Undiagnosed lung cancer    Tremor Paternal Grandmother     Depression Sister     Hypertension Brother        Social History     Socioeconomic History    Marital status: Single   Tobacco Use    Smoking status: Every Day     Current packs/day: 0.00     Average packs/day: 0.5 packs/day for 48.0 years (24.0 ttl pk-yrs)     Types: Cigarettes     Start date: 1975     Last attempt to quit:      Years since quittin.2     Passive exposure: Past    Smokeless tobacco: Never    Tobacco comments:     Started when i was 18 quit a few times. Used vape but i think it gave me pneumonia   Vaping Use    Vaping status: Every Day    Substances: Nicotine   Substance and Sexual Activity    Alcohol use: No    Drug use: No    Sexual activity: Not Currently     Partners: Male     Birth control/protection: None     Comment: [place] to get pregnant..hahaa!       /84   Pulse 86   Temp 98 °F (36.7 °C) (Oral)   Resp 25   Ht 152.4 cm (60\")   Wt 64 kg (141 lb 1.5 oz)   SpO2 94%   BMI 27.56 kg/m²       Objective   Physical Exam  Vitals and nursing note reviewed.   Constitutional:       Appearance: She is well-developed.   HENT:      Head: Normocephalic and atraumatic.      Mouth/Throat:      Mouth: Mucous membranes are moist. "   Cardiovascular:      Rate and Rhythm: Normal rate and regular rhythm.   Pulmonary:      Effort: Tachypnea and respiratory distress present.      Breath sounds: Decreased breath sounds present.   Abdominal:      General: Bowel sounds are normal.      Palpations: Abdomen is soft.      Tenderness: There is no abdominal tenderness.   Musculoskeletal:      Right lower leg: No edema.      Left lower leg: No edema.   Skin:     General: Skin is warm and dry.   Neurological:      Mental Status: She is alert and oriented to person, place, and time.         Procedures           ED Course      My interpretation of EKG shows sinus rhythm, rate of 86, no ST elevation                                     Results for orders placed or performed during the hospital encounter of 04/08/25   ECG 12 Lead Dyspnea    Collection Time: 04/08/25  8:39 AM   Result Value Ref Range    QT Interval 343 ms    QTC Interval 411 ms   Basic Metabolic Panel    Collection Time: 04/08/25  8:47 AM    Specimen: Arm, Right; Blood   Result Value Ref Range    Glucose 171 (H) 65 - 99 mg/dL    BUN 28 (H) 8 - 23 mg/dL    Creatinine 0.93 0.57 - 1.00 mg/dL    Sodium 142 136 - 145 mmol/L    Potassium 4.4 3.5 - 5.2 mmol/L    Chloride 104 98 - 107 mmol/L    CO2 28.0 22.0 - 29.0 mmol/L    Calcium 9.7 8.6 - 10.5 mg/dL    BUN/Creatinine Ratio 30.1 (H) 7.0 - 25.0    Anion Gap 10.0 5.0 - 15.0 mmol/L    eGFR 67.5 >60.0 mL/min/1.73   BNP    Collection Time: 04/08/25  8:47 AM    Specimen: Arm, Right; Blood   Result Value Ref Range    proBNP 690.0 0.0 - 900.0 pg/mL   Protime-INR    Collection Time: 04/08/25  8:47 AM    Specimen: Arm, Right; Blood   Result Value Ref Range    Protime 13.6 11.7 - 14.2 Seconds    INR 1.05 0.90 - 1.10   aPTT    Collection Time: 04/08/25  8:47 AM    Specimen: Arm, Right; Blood   Result Value Ref Range    PTT 22.3 (L) 22.7 - 35.4 seconds   CBC Auto Differential    Collection Time: 04/08/25  8:47 AM    Specimen: Arm, Right; Blood   Result Value Ref  Range    WBC 24.45 (H) 3.40 - 10.80 10*3/mm3    RBC 6.16 (H) 3.77 - 5.28 10*6/mm3    Hemoglobin 17.3 (H) 12.0 - 15.9 g/dL    Hematocrit 54.9 (H) 34.0 - 46.6 %    MCV 89.1 79.0 - 97.0 fL    MCH 28.1 26.6 - 33.0 pg    MCHC 31.5 31.5 - 35.7 g/dL    RDW 13.7 12.3 - 15.4 %    RDW-SD 44.4 37.0 - 54.0 fl    MPV 9.7 6.0 - 12.0 fL    Platelets 477 (H) 140 - 450 10*3/mm3   Scan Slide    Collection Time: 04/08/25  8:47 AM    Specimen: Arm, Right; Blood   Result Value Ref Range    Scan Slide     Manual Differential    Collection Time: 04/08/25  8:47 AM    Specimen: Arm, Right; Blood   Result Value Ref Range    Neutrophil % 61.0 42.7 - 76.0 %    Lymphocyte % 29.0 19.6 - 45.3 %    Monocyte % 6.0 5.0 - 12.0 %    Eosinophil % 4.0 0.3 - 6.2 %    Neutrophils Absolute 14.91 (H) 1.70 - 7.00 10*3/mm3    Lymphocytes Absolute 7.09 (H) 0.70 - 3.10 10*3/mm3    Monocytes Absolute 1.47 (H) 0.10 - 0.90 10*3/mm3    Eosinophils Absolute 0.98 (H) 0.00 - 0.40 10*3/mm3    Anisocytosis Slight/1+ None Seen    WBC Morphology Normal Normal    Platelet Morphology Normal Normal   Gold Top - SST    Collection Time: 04/08/25  8:47 AM   Result Value Ref Range    Extra Tube Hold for add-ons.    POC Glucose Once    Collection Time: 04/08/25  9:13 AM    Specimen: Blood   Result Value Ref Range    Glucose 190 (H) 70 - 105 mg/dL     XR Chest 1 View  Result Date: 4/8/2025  Impression: Moderate right pneumothorax. I spoke with the ER physician, who is already aware of this finding. Electronically Signed: Jessi Enriquez MD  4/8/2025 9:05 AM EDT  Workstation ID: [id number]    XR Chest 1 View  Result Date: 4/6/2025  Impression: 1. Right pleural chest tube without demonstrable right pneumothorax. 2. Mild right chest wall subcutaneous emphysema. Electronically Signed: Milena Trent MD  4/6/2025 11:49 AM EDT  Workstation ID: [id number]                  Medical Decision Making  Amount and/or Complexity of Data Reviewed  Labs: ordered.  Radiology: ordered.  ECG/medicine  tests: ordered.    Risk  Prescription drug management.      Patient had the above valuation.  Results were discussed with the patient.  White blood cell count is significantly elevated at 24.45.  This is fairly consistent with recent levels when she was in the hospital.  She is afebrile.  I see no evidence of infection.  BMP is unremarkable.  BNP is normal.  My interpretation of chest x-ray shows right-sided pneumothorax.  I discussed with Dr. Gerber with pulmonology since he was managing her previous chest tube and he states he will come place a new chest tube.  I discussed with the hospitalist and patient will be admitted for further evaluation and management.      Final diagnoses:   Pneumothorax on right   Dyspnea, unspecified type       ED Disposition  ED Disposition       ED Disposition   Decision to Admit    Condition   --    Comment   Level of Care: Med/Surg [1]   Admitting Physician: LADI LEE [[id number]]   Attending Physician: LADI LEE [[id number]]                 No follow-up provider specified.       Medication List      No changes were made to your prescriptions during this visit.            Tonio Lucas MD  04/08/25 [id number]

## 2025-04-08 NOTE — CONSULTS
[facility]: [facility]         CONSULT NOTE    Patient Identification:  Darleen Stallings  69 y.o.  female  1955  [id number]            Requesting physician: Attending physician    Reason for Consultation: Pneumothorax      History of Present Illness:  69-year-old female with history of COPD, CAD and HLD who presented 4/3/2025 with right-sided chest pain and increased shortness of breath which did not improve with oral steroids prescribed by PCP 2 days earlier.  Chest x-ray showed moderate right pneumothorax and chest tube was placed by ED physician.  She tested positive for RSV  The chest tube was accidentally removed by the patient and I inserted a new tube 4/5/2025 and when the air leak is stopped the chest tube was removed next day and she was discharged home however she started to feel short of breath again yesterday and today with generalized weakness and mild right-sided chest pain.    XR Chest 1 View  Result Date: 4/8/2025  Impression: Moderate right pneumothorax. I spoke with the ER physician, who is already aware of this finding. Electronically Signed: Jessi Enriquez MD  4/8/2025 9:05 AM EDT  Workstation ID: [id number]    XR Chest 1 View  Result Date: 4/6/2025  Impression: 1. Right pleural chest tube without demonstrable right pneumothorax. 2. Mild right chest wall subcutaneous emphysema. Electronically Signed: Milena Trent MD  4/6/2025 11:49 AM EDT  Workstation ID: [id number]        Assessment:  Recurrent but not in acute exacerbation spontaneous in the right side, status post chest tube placement 4/3/2025, 4/5/2025  COPD but not in acute exacerbation  RSV infection    Right upper lobe bronchiectasis  Lung nodules on CT scan 2023 2.3 cm right upper lobe, 1.3 cm right upper lobe and 2.2 cm subcarinal lymph node  Hypoxia  HTN  HLD  DM  CAD status post CABG 2019  Essential tremors  Tobacco smoker: Quit 2023 but returned to smoking again    PFTs 2022: Severe obstructive pattern FEV1/FVC 50%, FVC  61%, FEV1 40% without significant improvement with bronchodilators      Recommendations:  Chest tube management: New chest tube placed today with plans for chemical pleurodesis tomorrow after CT scan of the chest to ensure adequate expansion of the lung    Nebulized Pulmicort  Mucinex  Prednisone 20 mg qd  Oxygen supplement and titration to maintain saturation 90 to 95%, currently on 4 L per nasal cannula  Bronchodilators    Aspirin, atorvastatin, amlodipine, valsartan  Jardiance  Insulin sliding scale  Protonix    Smoking cessation counseling, nicotine patch    I personally reviewed the radiological studies      Review of Sytems:  Constitutional: Negative for chills, and fever and positive for malaise/fatigue.   HENT: Negative.    Eyes: Negative.    Cardiovascular: Negative.    Respiratory: Positive for cough and shortness of breath.    Skin: Negative.    Musculoskeletal: Negative.    Gastrointestinal: Negative.    Genitourinary: Negative.    Neurological: Negative.    Psychiatric/Behavioral: Negative.    Past Medical History:  Past Medical History:   Diagnosis Date    CAD     Cervical disc disorder 2019    After fall    CHOL     Chronic pain disorder 2020    After fall down stairs    COPD     Dementia     Depression     DEXA     2023= (1.1/ -2.3)    DMII     Extremity pain 2019    Maybe longer    Fractures 2022    GERD     Hypertension     Joint pain 2020    Low back pain 2019    After fall    MAMMO     NEG = 2018/ 2023/ 2024    Myocardial infarction     Neck pain 2015    Maybe longer    Neuropathy in diabetes 2018    Osteoarthritis 2023    Diagnosed    PAP     NEG =2021    Spinal stenosis 2019    Tremor        Past Surgical History:  Past Surgical History:   Procedure Laterality Date    CATARACT EXTRACTION W/ INTRAOCULAR LENS  IMPLANT, BILATERAL      COLONOSCOPY      2023= NEG, rech 2026   GSI    COMPRESSION HIP SCREW Right 03/25/2022    Procedure: HIP COMPRESSION SCREW, right [place] hip screw from [employer];   Surgeon: Loco Cortes MD;  Location: [facility] MAIN OR;  Service: Orthopedics;  Laterality: Right;    CORONARY ARTERY BYPASS GRAFT      x 3    FRACTURE SURGERY      TONSILLECTOMY      TUBAL ABDOMINAL LIGATION          Home Meds:  (Not in a hospital admission)      Allergies:  Allergies   Allergen Reactions    Ace Inhibitors Cough    Pregabalin Confusion    Aricept [Donepezil] Mental Status Change    Codeine Nausea And Vomiting    Paxil [Paroxetine] Other (See Comments)     TREMOR WORSENING       Social History:   Social History     Socioeconomic History    Marital status: Single   Tobacco Use    Smoking status: Every Day     Current packs/day: 0.00     Average packs/day: 0.5 packs/day for 48.0 years (24.0 ttl pk-yrs)     Types: Cigarettes     Start date: 1975     Last attempt to quit:      Years since quittin.2     Passive exposure: Past    Smokeless tobacco: Never    Tobacco comments:     Started when i was 18 quit a few times. Used vape but i think it gave me pneumonia   Vaping Use    Vaping status: Every Day    Substances: Nicotine   Substance and Sexual Activity    Alcohol use: No    Drug use: No    Sexual activity: Not Currently     Partners: Male     Birth control/protection: None     Comment: [place] to get pregnant..hahaa!       Family History:  Family History   Problem Relation Age of Onset    Stroke Mother     COPD Mother     Hypertension Mother     Tremor Father     Heart disease Father         CHF    COPD Father     Depression Father     Tremor Sister     Fibromyalgia Sister     Hypertension Sister     Post-traumatic stress disorder Sister     Heart disease Sister     Hypertension Sister     Depression Sister     Tremor Brother     COPD Brother     Depression Brother     Hypertension Brother     Cancer Brother         Undiagnosed lung cancer    Tremor Paternal Grandmother     Depression Sister     Hypertension Brother        Physical Exam:  /84   Pulse 86   Temp 98 °F (36.7 °C) (Oral)  "  Resp 25   Ht 152.4 cm (60\")   Wt 64 kg (141 lb 1.5 oz)   SpO2 94%   BMI 27.56 kg/m²  Body mass index is 27.56 kg/m². 94% 64 kg (141 lb 1.5 oz)  General Appearance:  Alert   HEENT:  Normocephalic, without obvious abnormality, Conjunctiva/corneas clear,.   Nares normal, no drainage     Neck:  Supple, symmetrical, trachea midline. No JVD.  Lungs /Chest wall: mild  Bilateral basal rhonchi, respirations unlabored, symmetrical wall movement.     Heart:  Regular rate and rhythm, S1 S2 normal  Abdomen: Soft, non-tender, no masses, no organomegaly.    Extremities: No edema, no clubbing or cyanosis    LABS:  Lab Results   Component Value Date    CALCIUM 9.7 04/08/2025    PHOS 2.8 02/17/2023     Results from last 7 days   Lab Units 04/08/25  0847 04/06/25  0254 04/05/25  0541   SODIUM mmol/L 142 133* 141   POTASSIUM mmol/L 4.4 4.4 5.1   CHLORIDE mmol/L 104 102 106   CO2 mmol/L 28.0 20.1* 24.7   BUN mg/dL 28* 40* 39*   CREATININE mg/dL 0.93 1.21* 1.11*   GLUCOSE mg/dL 171* 269* 176*   CALCIUM mg/dL 9.7 9.4 9.8   WBC 10*3/mm3 24.45* 18.63* 20.86*   HEMOGLOBIN g/dL 17.3* 16.0* 16.3*   PLATELETS 10*3/mm3 477* 414 417   PROBNP pg/mL 690.0  --   --      Lab Results   Component Value Date    CKTOTAL 44 06/21/2022    TROPONINT 70 (C) 02/15/2023                 Results from last 7 days   Lab Units 04/03/25  2302   PH, ARTERIAL pH units 7.322*   PCO2, ARTERIAL mm Hg 43.2   PO2 ART mm Hg 62.1*   O2 SATURATION ART % 89.4*   MODALITY  Room Air     Results from last 7 days   Lab Units 04/03/25 2306   ADENOVIRUS DETECTION BY PCR  Not Detected   CORONAVIRUS 229E  Not Detected   CORONAVIRUS HKU1  Not Detected   CORONAVIRUS NL63  Not Detected   CORONAVIRUS OC43  Not Detected   HUMAN METAPNEUMOVIRUS  Not Detected   HUMAN RHINOVIRUS/ENTEROVIRUS  Not Detected   INFLUENZA B PCR  Not Detected   PARAINFLUENZA 1  Not Detected   PARAINFLUENZA VIRUS 2  Not Detected   PARAINFLUENZA VIRUS 3  Not Detected   PARAINFLUENZA VIRUS 4  Not Detected "   BORDETELLA PERTUSSIS PCR  Not Detected   CHLAMYDOPHILA PNEUMONIAE PCR  Not Detected   MYCOPLAMA PNEUMO PCR  Not Detected   INFLUENZA A PCR  Not Detected   RSV, PCR  Detected*     Results from last 7 days   Lab Units 04/08/25  0847   INR  1.05         Lab Results   Component Value Date    TSH 0.79 08/27/2018     Estimated Creatinine Clearance: 49 mL/min (by C-G formula based on SCr of 0.93 mg/dL).         Imaging:  Imaging Results (Last 24 Hours)       Procedure Component Value Units Date/Time    XR Chest 1 View [[id number]] Collected: 04/08/25 0903     Updated: 04/08/25 0907    Narrative:      XR CHEST 1 VW    Date of Exam: 4/8/2025 8:50 AM EDT    Indication: shortness of breath    Comparison: AP chest 4/6/2025.    Findings:  Right pigtail pleural drain has been removed. Moderate right pneumothorax has developed. Increased right chest wall subcutaneous gas. The right lung appears partially atelectatic. No mediastinal shift. Left lung remains clear. Median sternotomy and CABG   changes are present.      Impression:      Impression:  Moderate right pneumothorax. I spoke with the ER physician, who is already aware of this finding.      Electronically Signed: Jessi Enriquez MD    4/8/2025 9:05 AM EDT    Workstation ID: [id number]              Current Meds:   SCHEDULE       Infusions     PRNs    [COMPLETED] Insert Peripheral IV **AND** sodium chloride        Bryon Gerber MD  4/8/2025  10:15 EDT      Much of this encounter note is an electronic transcription/translation of spoken language to printed text using Dragon Software.

## 2025-04-08 NOTE — Clinical Note
Level of Care: Med/Surg [1]   Admitting Physician: LADI LEE [[id number]]   Attending Physician: LADI LEE [[id number]]

## 2025-04-09 ENCOUNTER — APPOINTMENT (OUTPATIENT)
Dept: CT IMAGING | Facility: HOSPITAL | Age: 70
DRG: 200 | End: 2025-04-09
Payer: MEDICARE

## 2025-04-09 PROBLEM — J93.9 PNEUMOTHORAX ON RIGHT: Status: ACTIVE | Noted: 2025-04-08

## 2025-04-09 PROBLEM — R06.00 DYSPNEA: Status: ACTIVE | Noted: 2025-04-08

## 2025-04-09 LAB
ANION GAP SERPL CALCULATED.3IONS-SCNC: 8.1 MMOL/L (ref 5–15)
BUN SERPL-MCNC: 28 MG/DL (ref 8–23)
BUN/CREAT SERPL: 27.5 (ref 7–25)
CALCIUM SPEC-SCNC: 9.7 MG/DL (ref 8.6–10.5)
CHLORIDE SERPL-SCNC: 102 MMOL/L (ref 98–107)
CO2 SERPL-SCNC: 25.9 MMOL/L (ref 22–29)
CREAT SERPL-MCNC: 1.02 MG/DL (ref 0.57–1)
DACRYOCYTES BLD QL SMEAR: ABNORMAL
DEPRECATED RDW RBC AUTO: 44.6 FL (ref 37–54)
EGFRCR SERPLBLD CKD-EPI 2021: 60.4 ML/MIN/1.73
EOSINOPHIL # BLD MANUAL: 1.05 10*3/MM3 (ref 0–0.4)
EOSINOPHIL NFR BLD MANUAL: 5 % (ref 0.3–6.2)
ERYTHROCYTE [DISTWIDTH] IN BLOOD BY AUTOMATED COUNT: 13.5 % (ref 12.3–15.4)
GLUCOSE BLDC GLUCOMTR-MCNC: 153 MG/DL (ref 70–105)
GLUCOSE BLDC GLUCOMTR-MCNC: 209 MG/DL (ref 70–105)
GLUCOSE BLDC GLUCOMTR-MCNC: 246 MG/DL (ref 70–105)
GLUCOSE BLDC GLUCOMTR-MCNC: 252 MG/DL (ref 70–105)
GLUCOSE SERPL-MCNC: 87 MG/DL (ref 65–99)
HCT VFR BLD AUTO: 53.1 % (ref 34–46.6)
HGB BLD-MCNC: 16.2 G/DL (ref 12–15.9)
LARGE PLATELETS: ABNORMAL
LYMPHOCYTES # BLD MANUAL: 5.25 10*3/MM3 (ref 0.7–3.1)
LYMPHOCYTES NFR BLD MANUAL: 3 % (ref 5–12)
MCH RBC QN AUTO: 27.4 PG (ref 26.6–33)
MCHC RBC AUTO-ENTMCNC: 30.5 G/DL (ref 31.5–35.7)
MCV RBC AUTO: 89.7 FL (ref 79–97)
MONOCYTES # BLD: 0.63 10*3/MM3 (ref 0.1–0.9)
NEUTROPHILS # BLD AUTO: 14.06 10*3/MM3 (ref 1.7–7)
NEUTROPHILS NFR BLD MANUAL: 66 % (ref 42.7–76)
NEUTS BAND NFR BLD MANUAL: 1 % (ref 0–5)
PLATELET # BLD AUTO: 398 10*3/MM3 (ref 140–450)
PMV BLD AUTO: 9.6 FL (ref 6–12)
POIKILOCYTOSIS BLD QL SMEAR: ABNORMAL
POTASSIUM SERPL-SCNC: 4.3 MMOL/L (ref 3.5–5.2)
QT INTERVAL: 343 MS
QTC INTERVAL: 411 MS
RBC # BLD AUTO: 5.92 10*6/MM3 (ref 3.77–5.28)
SCAN SLIDE: NORMAL
SMALL PLATELETS BLD QL SMEAR: ADEQUATE
SODIUM SERPL-SCNC: 136 MMOL/L (ref 136–145)
VARIANT LYMPHS NFR BLD MANUAL: 20 % (ref 19.6–45.3)
VARIANT LYMPHS NFR BLD MANUAL: 5 % (ref 0–5)
WBC MORPH BLD: NORMAL
WBC NRBC COR # BLD AUTO: 20.98 10*3/MM3 (ref 3.4–10.8)

## 2025-04-09 PROCEDURE — 99223 1ST HOSP IP/OBS HIGH 75: CPT

## 2025-04-09 PROCEDURE — 25010000002 HYDROMORPHONE PER 4 MG: Performed by: STUDENT IN AN ORGANIZED HEALTH CARE EDUCATION/TRAINING PROGRAM

## 2025-04-09 PROCEDURE — 71250 CT THORAX DX C-: CPT

## 2025-04-09 PROCEDURE — 63710000001 PREDNISONE PER 1 MG: Performed by: INTERNAL MEDICINE

## 2025-04-09 PROCEDURE — 94664 DEMO&/EVAL PT USE INHALER: CPT

## 2025-04-09 PROCEDURE — 85025 COMPLETE CBC W/AUTO DIFF WBC: CPT | Performed by: STUDENT IN AN ORGANIZED HEALTH CARE EDUCATION/TRAINING PROGRAM

## 2025-04-09 PROCEDURE — 63710000001 INSULIN LISPRO (HUMAN) PER 5 UNITS: Performed by: STUDENT IN AN ORGANIZED HEALTH CARE EDUCATION/TRAINING PROGRAM

## 2025-04-09 PROCEDURE — 94799 UNLISTED PULMONARY SVC/PX: CPT

## 2025-04-09 PROCEDURE — 85007 BL SMEAR W/DIFF WBC COUNT: CPT | Performed by: STUDENT IN AN ORGANIZED HEALTH CARE EDUCATION/TRAINING PROGRAM

## 2025-04-09 PROCEDURE — 94761 N-INVAS EAR/PLS OXIMETRY MLT: CPT

## 2025-04-09 PROCEDURE — 80048 BASIC METABOLIC PNL TOTAL CA: CPT | Performed by: STUDENT IN AN ORGANIZED HEALTH CARE EDUCATION/TRAINING PROGRAM

## 2025-04-09 PROCEDURE — 97162 PT EVAL MOD COMPLEX 30 MIN: CPT

## 2025-04-09 PROCEDURE — 82948 REAGENT STRIP/BLOOD GLUCOSE: CPT

## 2025-04-09 PROCEDURE — 82948 REAGENT STRIP/BLOOD GLUCOSE: CPT | Performed by: STUDENT IN AN ORGANIZED HEALTH CARE EDUCATION/TRAINING PROGRAM

## 2025-04-09 RX ORDER — CALCIUM CARBONATE 500 MG/1
2 TABLET, CHEWABLE ORAL 3 TIMES DAILY PRN
Status: DISCONTINUED | OUTPATIENT
Start: 2025-04-09 | End: 2025-04-13 | Stop reason: HOSPADM

## 2025-04-09 RX ORDER — IPRATROPIUM BROMIDE AND ALBUTEROL SULFATE 2.5; .5 MG/3ML; MG/3ML
3 SOLUTION RESPIRATORY (INHALATION) EVERY 4 HOURS PRN
Status: DISCONTINUED | OUTPATIENT
Start: 2025-04-09 | End: 2025-04-13 | Stop reason: HOSPADM

## 2025-04-09 RX ORDER — NICOTINE 21 MG/24HR
1 PATCH, TRANSDERMAL 24 HOURS TRANSDERMAL
Status: DISCONTINUED | OUTPATIENT
Start: 2025-04-09 | End: 2025-04-13 | Stop reason: HOSPADM

## 2025-04-09 RX ORDER — ONDANSETRON 2 MG/ML
4 INJECTION INTRAMUSCULAR; INTRAVENOUS EVERY 6 HOURS PRN
Status: ACTIVE | OUTPATIENT
Start: 2025-04-09 | End: 2025-04-11

## 2025-04-09 RX ADMIN — INSULIN LISPRO 3 UNITS: 100 INJECTION, SOLUTION INTRAVENOUS; SUBCUTANEOUS at 17:00

## 2025-04-09 RX ADMIN — SENNOSIDES AND DOCUSATE SODIUM 2 TABLET: 50; 8.6 TABLET ORAL at 11:23

## 2025-04-09 RX ADMIN — IPRATROPIUM BROMIDE AND ALBUTEROL SULFATE 3 ML: .5; 3 SOLUTION RESPIRATORY (INHALATION) at 15:25

## 2025-04-09 RX ADMIN — PANTOPRAZOLE SODIUM 40 MG: 40 TABLET, DELAYED RELEASE ORAL at 05:56

## 2025-04-09 RX ADMIN — IPRATROPIUM BROMIDE AND ALBUTEROL SULFATE 3 ML: .5; 3 SOLUTION RESPIRATORY (INHALATION) at 20:29

## 2025-04-09 RX ADMIN — HYDROMORPHONE HYDROCHLORIDE 0.5 MG: 1 INJECTION, SOLUTION INTRAMUSCULAR; INTRAVENOUS; SUBCUTANEOUS at 00:40

## 2025-04-09 RX ADMIN — IPRATROPIUM BROMIDE AND ALBUTEROL SULFATE 3 ML: .5; 3 SOLUTION RESPIRATORY (INHALATION) at 04:45

## 2025-04-09 RX ADMIN — GUAIFENESIN 1200 MG: 600 TABLET, EXTENDED RELEASE ORAL at 11:23

## 2025-04-09 RX ADMIN — BUDESONIDE 0.5 MG: 0.5 INHALANT RESPIRATORY (INHALATION) at 20:35

## 2025-04-09 RX ADMIN — CITALOPRAM HYDROBROMIDE 20 MG: 20 TABLET ORAL at 11:23

## 2025-04-09 RX ADMIN — HYDROMORPHONE HYDROCHLORIDE 0.5 MG: 1 INJECTION, SOLUTION INTRAMUSCULAR; INTRAVENOUS; SUBCUTANEOUS at 04:08

## 2025-04-09 RX ADMIN — CALCIUM CARBONATE (ANTACID) CHEW TAB 500 MG 2 TABLET: 500 CHEW TAB at 12:45

## 2025-04-09 RX ADMIN — IPRATROPIUM BROMIDE AND ALBUTEROL SULFATE 3 ML: .5; 3 SOLUTION RESPIRATORY (INHALATION) at 11:00

## 2025-04-09 RX ADMIN — OXYCODONE HYDROCHLORIDE 5 MG: 5 TABLET ORAL at 14:00

## 2025-04-09 RX ADMIN — GUAIFENESIN 1200 MG: 600 TABLET, EXTENDED RELEASE ORAL at 21:29

## 2025-04-09 RX ADMIN — PREDNISONE 20 MG: 20 TABLET ORAL at 11:23

## 2025-04-09 RX ADMIN — OXYCODONE HYDROCHLORIDE 5 MG: 5 TABLET ORAL at 21:29

## 2025-04-09 RX ADMIN — HYDROMORPHONE HYDROCHLORIDE 0.5 MG: 1 INJECTION, SOLUTION INTRAMUSCULAR; INTRAVENOUS; SUBCUTANEOUS at 09:23

## 2025-04-09 RX ADMIN — ASPIRIN 81 MG: 81 TABLET, COATED ORAL at 11:23

## 2025-04-09 RX ADMIN — IPRATROPIUM BROMIDE AND ALBUTEROL SULFATE 3 ML: .5; 3 SOLUTION RESPIRATORY (INHALATION) at 07:55

## 2025-04-09 RX ADMIN — EMPAGLIFLOZIN 25 MG: 25 TABLET, FILM COATED ORAL at 11:23

## 2025-04-09 RX ADMIN — NICOTINE 1 PATCH: 21 PATCH TRANSDERMAL at 12:45

## 2025-04-09 RX ADMIN — ATORVASTATIN CALCIUM 80 MG: 40 TABLET, FILM COATED ORAL at 11:23

## 2025-04-09 RX ADMIN — INSULIN LISPRO 3 UNITS: 100 INJECTION, SOLUTION INTRAVENOUS; SUBCUTANEOUS at 21:29

## 2025-04-09 RX ADMIN — SENNOSIDES AND DOCUSATE SODIUM 2 TABLET: 50; 8.6 TABLET ORAL at 21:29

## 2025-04-09 RX ADMIN — INSULIN LISPRO 4 UNITS: 100 INJECTION, SOLUTION INTRAVENOUS; SUBCUTANEOUS at 12:45

## 2025-04-09 RX ADMIN — BUDESONIDE 0.5 MG: 0.5 INHALANT RESPIRATORY (INHALATION) at 07:59

## 2025-04-09 RX ADMIN — AMLODIPINE BESYLATE 5 MG: 5 TABLET ORAL at 11:23

## 2025-04-09 NOTE — PLAN OF CARE
Goal Outcome Evaluation:  Plan of Care Reviewed With: patient        Progress: no change        Pt resting in bed overnight alternating between RA and 2L NC for comfort. Pt SOA and tachypneic with ambulation. Pt c/o pain at anterior chest tube insertion site, radiates to back. Pain treated with PRN dustin and dilaudid. BG elevated, pt request DM educator consult. Chest tube with minimal output. VSS. Plan for CT chest this morning followed by chemical pleurodesis. Pt NPO since mn.

## 2025-04-09 NOTE — PROGRESS NOTES
Daily Progress Note        Pneumothorax         Assessment:    Recurrent pneumothorax right side,   status post chest tube placement 4/8/25 and  4/3/2025 and  4/5/2025    COPD but not in acute exacerbation  RSV infection     Right upper lobe bronchiectasis  Lung nodules on CT scan 2023 2.3 cm right upper lobe, 1.3 cm right upper lobe and 2.2 cm subcarinal lymph node  Hypoxia  HTN  HLD  DM  CAD status post CABG 2019  Essential tremors  Tobacco smoker: Quit 2023 but returned to smoking again     PFTs 2022: Severe obstructive pattern FEV1/FVC 50%, FVC 61%, FEV1 40% without significant improvement with bronchodilators        Recommendations:    Chest tube management:      Consult thoracic surgery     Needs bronch to r/o infections such as MAC/PCP that can lead to pneumothorax     Nebulized Pulmicort  Mucinex  Prednisone 20 mg qd    Oxygen supplement and titration to maintain saturation 90 to 95%, currently on 4 L per nasal cannula  Bronchodilators     Aspirin, atorvastatin, amlodipine, valsartan  Jardiance  Insulin sliding scale  Protonix     Smoking cessation counseling, nicotine patch     I personally reviewed the radiological studies              LOS: 1 day     Subjective         Objective     Vital signs for last 24 hours:  Vitals:    04/09/25 0348 04/09/25 0445 04/09/25 0450 04/09/25 0736   BP: 125/71   136/65   BP Location: Right arm      Patient Position: Lying      Pulse: 82 79 81 96   Resp: 18 16 18 26   Temp: 98 °F (36.7 °C)   98.3 °F (36.8 °C)   TempSrc: Oral   Oral   SpO2: 92% 96% 95% 91%   Weight: 62 kg (136 lb 11 oz)      Height:           Intake/Output last 3 shifts:  I/O last 3 completed shifts:  In: 840 [P.O.:840]  Out: -   Intake/Output this shift:  No intake/output data recorded.      Radiology  Imaging Results (Last 24 Hours)       Procedure Component Value Units Date/Time    CT Chest Without Contrast Diagnostic [[id number]] Collected: 04/09/25 0602     Updated: 04/09/25 0606    Narrative:      CT  CHEST WO CONTRAST DIAGNOSTIC    Date of Exam: 4/9/2025 5:38 AM EDT    Indication: pneumothorax.    Comparison: Chest radiograph 4/8/2025.    Technique: Axial CT images were obtained of the chest without contrast administration.  Sagittal and coronal reconstructions were performed.  Automated exposure control and iterative reconstruction methods were used.      Findings:  The thyroid and trachea appear within normal limits. There is a patulous esophagus with a fluid column extending to the thoracic inlet. There is evidence of prior median sternotomy and CABG. There is moderate aortic and aortic branch vessel   atherosclerosis. Main pulmonary artery is normal diameter. No pericardial effusion or mediastinal lymphadenopathy. There are calcified mediastinal granulomas.    There is a small-moderate right-sided pneumothorax. There is a large basilar component. Right apical pleural drain catheter is in place. There are mild areas of atelectasis in the right lung. Left lung is clear. There is mild diffuse peribronchial   thickening. There are a few small foci of airspace disease in the right lung that could be infectious or inflammatory. Peribronchial thickening is worse in the right lung.    There is subcutaneous emphysema throughout the right lateral chest wall. There are no acute findings in the limited images of the upper abdomen. No acute osseous abnormality or destructive bone lesion. There are mild thoracolumbar degenerative changes.      Impression:      Impression:  1.Small-moderate right-sided pneumothorax with a from basilar component. Right apical pleural drain catheter is in place.  2.Mild areas of atelectasis in the right lung. There are a few small foci of airspace disease in the right lung that could be infectious or inflammatory.  3.Patulous esophagus with a fluid column extending to the thoracic inlet.  4.Evidence of prior median sternotomy and CABG.          Electronically Signed: Brian Miranda MD     4/9/2025 6:04 AM EDT    Workstation ID: [id number]    XR Chest 1 View [[id number]] Collected: 04/08/25 1021     Updated: 04/08/25 1025    Narrative:      XR CHEST 1 VW    Date of Exam: 4/8/2025 10:15 AM EDT    Indication: Post RT Lung chest tube insertion    Comparison: 4/8/2025    Findings:  Cardiomediastinal silhouette is unchanged. There has been interval placement of a right apical chest tube with evacuation of an apical pneumothorax. No pneumothorax identified currently. No airspace disease, left pneumothorax, nor pleural effusion. There   is similar right thoracic subcutaneous emphysema. No acute osseous abnormality identified.      Impression:      Impression:  Interval placement of a right apical chest tube with evacuation of an apical pneumothorax.        Electronically Signed: Tristan Trent MD    4/8/2025 10:22 AM EDT    Workstation ID: [id number]    XR Chest 1 View [[id number]] Collected: 04/08/25 0903     Updated: 04/08/25 0907    Narrative:      XR CHEST 1 VW    Date of Exam: 4/8/2025 8:50 AM EDT    Indication: shortness of breath    Comparison: AP chest 4/6/2025.    Findings:  Right pigtail pleural drain has been removed. Moderate right pneumothorax has developed. Increased right chest wall subcutaneous gas. The right lung appears partially atelectatic. No mediastinal shift. Left lung remains clear. Median sternotomy and CABG   changes are present.      Impression:      Impression:  Moderate right pneumothorax. I spoke with the ER physician, who is already aware of this finding.      Electronically Signed: Jessi Enriquez MD    4/8/2025 9:05 AM EDT    Workstation ID: [id number]            Labs:  Results from last 7 days   Lab Units 04/09/25  0037   WBC 10*3/mm3 20.98*   HEMOGLOBIN g/dL 16.2*   HEMATOCRIT % 53.1*   PLATELETS 10*3/mm3 398     Results from last 7 days   Lab Units 04/09/25  0037   SODIUM mmol/L 136   POTASSIUM mmol/L 4.3   CHLORIDE mmol/L 102   CO2 mmol/L 25.9   BUN mg/dL 28*   CREATININE mg/dL  1.02*   CALCIUM mg/dL 9.7   GLUCOSE mg/dL 87     Results from last 7 days   Lab Units 04/03/25  2302   PH, ARTERIAL pH units 7.322*   PO2 ART mm Hg 62.1*   PCO2, ARTERIAL mm Hg 43.2   HCO3 ART mmol/L 22.3                     Results from last 7 days   Lab Units 04/08/25  0847   INR  1.05   APTT seconds 22.3*               Meds:   SCHEDULE  amLODIPine, 5 mg, Oral, Daily  aspirin, 81 mg, Oral, Daily  atorvastatin, 80 mg, Oral, Daily  budesonide, 0.5 mg, Nebulization, BID - RT  citalopram, 20 mg, Oral, Daily  empagliflozin, 25 mg, Oral, Daily  guaiFENesin, 1,200 mg, Oral, Q12H  insulin lispro, 2-7 Units, Subcutaneous, 4x Daily AC & at Bedtime  ipratropium-albuterol, 3 mL, Nebulization, 4x Daily - RT  pantoprazole, 40 mg, Oral, Q AM  predniSONE, 20 mg, Oral, Daily With Breakfast  senna-docusate sodium, 2 tablet, Oral, BID  [Held by provider] valsartan, 40 mg, Oral, Daily      Infusions     PRNs    acetaminophen **OR** acetaminophen **OR** acetaminophen    senna-docusate sodium **AND** polyethylene glycol **AND** bisacodyl **AND** bisacodyl    Calcium Replacement - Follow Nurse / BPA Driven Protocol    cyclobenzaprine    dextrose    dextrose    glucagon (human recombinant)    HYDROmorphone    ipratropium-albuterol    Magnesium Standard Dose Replacement - Follow Nurse / BPA Driven Protocol    naloxone    nicotine    nitroglycerin    ondansetron    oxyCODONE    Phosphorus Replacement - Follow Nurse / BPA Driven Protocol    Potassium Replacement - Follow Nurse / BPA Driven Protocol    [COMPLETED] Insert Peripheral IV **AND** sodium chloride    Physical Exam:  Physical Exam  Cardiovascular:      Heart sounds: Murmur heard.      No gallop.   Pulmonary:      Effort: No respiratory distress.      Breath sounds: No stridor. Rhonchi and rales present. No wheezing.   Chest:      Chest wall: No tenderness.         ROS  Review of Systems   Respiratory:  Positive for cough and shortness of breath. Negative for wheezing and stridor.     Cardiovascular:  Negative for chest pain, palpitations and leg swelling.             Total critical care time spent with patient greater than: 45 Minutes

## 2025-04-09 NOTE — PLAN OF CARE
Goal Outcome Evaluation:      Chest tube remains in place with minimal output. Plan for Bronch tomorrow AM. No complaints of shortness of air throughout shift.      Progress: improving

## 2025-04-09 NOTE — THERAPY EVALUATION
Patient Name: Darleen Stallings  : 1955    MRN: [id number]                              Today's Date: 2025       Admit Date: 2025    Visit Dx:     ICD-10-CM ICD-9-CM   1. Pneumothorax on right  J93.9 512.89   2. Dyspnea, unspecified type  R06.00 786.09     Patient Active Problem List   Diagnosis    S/P CABG x 3--Dr. Maria 2019    Benign essential hypertension    Confusion    Coronary artery disease    Depression    Generalized anxiety disorder    Gout    Hyperlipidemia    Essential tremor    Exertional dyspnea    Tobacco abuse    Diabetes mellitus    Visual changes    Memory loss    Chest pain    Urinary tract infection without hematuria    Nondisplaced fracture of base of neck of right femur, initial encounter for closed fracture    COPD    GERD (gastroesophageal reflux disease)    Concussion without loss of consciousness, initial encounter    Arthritis    Heart disease    High blood pressure    Hemorrhoids    Oropharyngeal dysphagia    Other fecal abnormalities    Polyp of colon    Pure hypercholesterolemia    Second degree hemorrhoids    Encounter for screening for malignant neoplasm of colon    Back pain    Chronic obstructive pulmonary disease, unspecified    Non-recurrent acute serous otitis media of left ear    Pneumothorax    Pneumothorax on right    Dyspnea     Past Medical History:   Diagnosis Date    CAD     Cervical disc disorder 2019    After fall    CHOL     Chronic pain disorder 2020    After fall down stairs    COPD     Dementia     Depression     DEXA     = (1.1/ -2.3)    DMII     Extremity pain 2019    Maybe longer    Fractures     GERD     Hypertension     Joint pain 2020    Low back pain 2019    After fall    MAMMO     NEG = 2024    Myocardial infarction     Neck pain 2015    Maybe longer    Neuropathy in diabetes 2018    Osteoarthritis     Diagnosed    PAP     NEG =    Spinal stenosis 2019    Tremor      Past Surgical History:   Procedure Laterality  Date    CATARACT EXTRACTION W/ INTRAOCULAR LENS  IMPLANT, BILATERAL      COLONOSCOPY      2023= NEG, rech 2026   GSI    COMPRESSION HIP SCREW Right 03/25/2022    Procedure: HIP COMPRESSION SCREW, right [place] hip screw from [place];  Surgeon: Loco Cortes MD;  Location: [facility] MAIN OR;  Service: Orthopedics;  Laterality: Right;    CORONARY ARTERY BYPASS GRAFT      x 3    FRACTURE SURGERY      TONSILLECTOMY      TUBAL ABDOMINAL LIGATION        General Information       Row Name 04/09/25 1458          Physical Therapy Time and Intention    Document Type evaluation  -CL     Mode of Treatment individual therapy;physical therapy  -CL       Row Name 04/09/25 1458          General Information    Patient Profile Reviewed yes  -CL     Prior Level of Function independent:;ADL's;driving;community mobility  -CL     Existing Precautions/Restrictions fall  -CL     Barriers to Rehab none identified  -CL       Row Name 04/09/25 1458          Living Environment    Current Living Arrangements home  -CL     People in Home alone  -CL       Row Name 04/09/25 1458          Home Main Entrance    Number of Stairs, Main Entrance three  -CL     Stair Railings, Main Entrance railings safe and in good condition  -CL       Row Name 04/09/25 1458          Stairs Within Home, Primary    Number of Stairs, Within Home, Primary none  -CL       Row Name 04/09/25 1458          Cognition    Orientation Status (Cognition) oriented x 4  -CL       Row Name 04/09/25 1458          Safety Issues/Impairments Affecting Functional Mobility    Safety Issues Affecting Function (Mobility) safety precaution awareness  -CL     Impairments Affecting Function (Mobility) pain  -CL     Comment, Safety Issues/Impairments (Mobility) pain from chest tube; pt has decreased awareness of line management related to chest tube line  -CL               User Key  (r) = Recorded By, (t) = Taken By, (c) = Cosigned By      Initials Name Provider Type    CL Conchita eCnteno, PT  Physical Therapist                   Mobility       Row Name 04/09/25 1533          Bed Mobility    Bed Mobility bed mobility (all) activities  -CL     All Activities, [place] (Bed Mobility) contact guard;1 person to manage equipment  -CL       Row Name 04/09/25 1533          Bed-Chair Transfer    Bed-Chair [place] (Transfers) 1 person to manage equipment;contact guard  -CL       Row Name 04/09/25 1533          Sit-Stand Transfer    Sit-Stand [place] (Transfers) contact guard  -CL       Row Name 04/09/25 1533          Gait/Stairs (Locomotion)    [place] Level (Gait) contact guard;1 person to manage equipment  -CL     Patient was able to Ambulate yes  -CL     Distance in Feet (Gait) 15  -CL               User Key  (r) = Recorded By, (t) = Taken By, (c) = Cosigned By      Initials Name Provider Type    Conchita Rebolledo, PT Physical Therapist                   Obj/Interventions       Row Name 04/09/25 1534          Range of Motion Comprehensive    General Range of Motion bilateral lower extremity ROM WNL;bilateral upper extremity ROM WFL  -CL       Row Name 04/09/25 1534          Strength Comprehensive (MMT)    General Manual Muscle Testing (MMT) Assessment no strength deficits identified  -CL       Row Name 04/09/25 1534          Balance    Balance Assessment sitting static balance;sitting dynamic balance;standing dynamic balance;standing static balance  -CL     Static Sitting Balance independent  -CL     Dynamic Sitting Balance supervision  -CL     Position, Sitting Balance unsupported;sitting edge of bed  -CL     Static Standing Balance supervision  -CL     Dynamic Standing Balance supervision  -CL       Row Name 04/09/25 1534          Sensory Assessment (Somatosensory)    Sensory Assessment (Somatosensory) sensation intact  -CL               User Key  (r) = Recorded By, (t) = Taken By, (c) = Cosigned By      Initials Name Provider Type    Conchita Rebolledo, PT Physical Therapist                    Goals/Plan       Row Name 04/09/25 1551          Transfer Goal 1 (PT)    Activity/Assistive Device (Transfer Goal 1, PT) sit-to-stand/stand-to-sit;bed-to-chair/chair-to-bed  -CL     [place] Level/Cues Needed (Transfer Goal 1, PT) independent  -CL     Time Frame (Transfer Goal 1, PT) long term goal (LTG);2 weeks  -CL       Row Name 04/09/25 1551          Gait Training Goal 1 (PT)    Activity/Assistive Device (Gait Training Goal 1, PT) gait (walking locomotion)  -CL     [place] Level (Gait Training Goal 1, PT) independent  -CL     Distance (Gait Training Goal 1, PT) 150'  -CL     Time Frame (Gait Training Goal 1, PT) long term goal (LTG);2 weeks  -CL       Row Name 04/09/25 1551          Stairs Goal 1 (PT)    Activity/Assistive Device (Stairs Goal 1, PT) ascending stairs;descending stairs  -CL     [place] Level/Cues Needed (Stairs Goal 1, PT) modified independence  -CL     Number of Stairs (Stairs Goal 1, PT) 3  -CL     Time Frame (Stairs Goal 1, PT) long term goal (LTG);2 weeks  -CL       Row Name 04/09/25 1551          Therapy Assessment/Plan (PT)    Planned Therapy Interventions (PT) balance training;bed mobility training;gait training;patient/family education;transfer training;stair training  -CL               User Key  (r) = Recorded By, (t) = Taken By, (c) = Cosigned By      Initials Name Provider Type    CL Conchita Centeno, PT Physical Therapist                   Clinical Impression       Row Name 04/09/25 1538          Pain    Pretreatment Pain Rating 10/10  -CL     Posttreatment Pain Rating 9/10  -CL     Pain Side/Orientation right;posterior  -CL     Pain Management Interventions exercise or physical activity utilized  -CL       Row Name 04/09/25 9873          Plan of Care Review    Plan of Care Reviewed With patient;child  -CL     Outcome Evaluation Darleen Stallings is a 69 y.o. female with medical hx of COPD on home O2 PRN, CAD s/p CABG, HTN, HLD, DM, essential tremor who  presents with SOA and is found to have Pneumothorax.  Pt had recent admission 4/4 to 4/6 with pneumothorax and RSV. At baseline, pt lives alone in a SSH with 2-3 MARION with handrail.  Pt is typically independent for all moblity, ADLs and IADLs without AD.  She reports no hx of falls in past 6 months and her daughter confirms this.  At time of PT evaluation, she is A&O x 4 with c/o 10/10 pain from chest tube.  Pt demonstrates independent bed mobility.  She requires CGA for transfers and short distance moblity due to need for assist in managing lines, especially for chest tube lines.  Pt with elevated RR throughout.  PT encouraged pt to attempt slower, deeper breaths and pt verbalized understanding.  Pt demonstrated use of Incentive spirometry and PT reinforced attempting to lengthen breaths.  Pt appears close to baseline but needs reminders and assist for line management.  We will continue to work with her while here to prevent decline in function and endurance but do not anticipate ongoing PT needs at discharge.  -CL       Row Name 04/09/25 1534          Therapy Assessment/Plan (PT)    Rehab Potential (PT) good  -CL     Criteria for Skilled Interventions Met (PT) yes;skilled treatment is necessary  -CL     Therapy Frequency (PT) 3 times/wk  -CL     Predicted Duration of Therapy Intervention (PT) Until discharge  -CL       Row Name 04/09/25 9153          Vital Signs    Pre Systolic BP Rehab 134  -CL     Pre Treatment Diastolic BP 79  -CL     Pretreatment Heart Rate (beats/min) 99  -CL     Posttreatment Heart Rate (beats/min) 99  -CL     Pretreatment Resp Rate (breaths/min) 34  -CL     Posttreatment Resp Rate (breaths/min) 26  -CL     Pre SpO2 (%) 90  -CL     O2 Delivery Pre Treatment room air  -CL     Post SpO2 (%) 93  -CL     O2 Delivery Post Treatment supplemental O2  -CL       Row Name 04/09/25 1458          Positioning and Restraints    Pre-Treatment Position in bed  -CL     Post Treatment Position chair  -CL      In Chair notified nsg;sitting;call light within reach;encouraged to call for assist;exit alarm on;with family/caregiver  -               User Key  (r) = Recorded By, (t) = Taken By, (c) = Cosigned By      Initials Name Provider Type    CL Conchita Centeno, PT Physical Therapist                   Outcome Measures       Row Name 04/09/25 0800          How much help from another person do you currently need...    Turning from your back to your side while in flat bed without using bedrails? 4  -MS     Moving from lying on back to sitting on the side of a flat bed without bedrails? 4  -MS     Moving to and from a bed to a chair (including a wheelchair)? 3  -MS     Standing up from a chair using your arms (e.g., wheelchair, bedside chair)? 3  -MS     Climbing 3-5 steps with a railing? 3  -MS     To walk in hospital room? 3  -MS     AM-PAC 6 Clicks Score (PT) 20  -MS               User Key  (r) = Recorded By, (t) = Taken By, (c) = Cosigned By      Initials Name Provider Type    Leti Stephens, RN Registered Nurse                                 Physical Therapy Education       Title: PT OT SLP Therapies (Done)       Topic: Physical Therapy (Done)       Point: Mobility training (Done)       Learning Progress Summary            Patient Acceptance, E,TB, VU by  at 4/9/2025 1552                      Point: Precautions (Done)       Learning Progress Summary            Patient Acceptance, E,TB, VU by  at 4/9/2025 1552                                      User Key       Initials Effective Dates Name Provider Type Discipline     03/02/22 -  Conchita Centeno, PT Physical Therapist PT                  PT Recommendation and Plan  Planned Therapy Interventions (PT): balance training, bed mobility training, gait training, patient/family education, transfer training, stair training  Outcome Evaluation: Darleen Stallings is a 69 y.o. female with medical hx of COPD on home O2 PRN, CAD s/p CABG, HTN, HLD, DM, essential  tremor who presents with SOA and is found to have Pneumothorax.  Pt had recent admission 4/4 to 4/6 with pneumothorax and RSV. At baseline, pt lives alone in a SSH with 2-3 MARION with handrail.  Pt is typically independent for all moblity, ADLs and IADLs without AD.  She reports no hx of falls in past 6 months and her daughter confirms this.  At time of PT evaluation, she is A&O x 4 with c/o 10/10 pain from chest tube.  Pt demonstrates independent bed mobility.  She requires CGA for transfers and short distance moblity due to need for assist in managing lines, especially for chest tube lines.  Pt with elevated RR throughout.  PT encouraged pt to attempt slower, deeper breaths and pt verbalized understanding.  Pt demonstrated use of Incentive spirometry and PT reinforced attempting to lengthen breaths.  Pt appears close to baseline but needs reminders and assist for line management.  We will continue to work with her while here to prevent decline in function and endurance but do not anticipate ongoing PT needs at discharge.     Time Calculation:   PT Evaluation Complexity  History, PT Evaluation Complexity: 3 or more personal factors and/or comorbidities  Examination of Body Systems (PT Eval Complexity): total of 3 or more elements  Clinical Presentation (PT Evaluation Complexity): evolving  Clinical Decision Making (PT Evaluation Complexity): moderate complexity  Overall Complexity (PT Evaluation Complexity): moderate complexity     PT Charges       Row Name 04/09/25 1552             Time Calculation    Start Time 1415  -CL      Stop Time 1444  -CL      Time Calculation (min) 29 min  -CL      PT Received On 04/09/25  -CL      PT - Next Appointment 04/10/25  -CL      PT Goal Re-Cert Due Date 04/23/25  -CL         Time Calculation- PT    Total Timed Code Minutes- PT 0 minute(s)  -CL                User Key  (r) = Recorded By, (t) = Taken By, (c) = Cosigned By      Initials Name Provider Type    Conchita Rebolledo, PT  Physical Therapist                  Therapy Charges for Today       Code Description Service Date Service Provider Modifiers Qty    [id number] HC PT EVAL MOD COMPLEXITY 4 4/9/2025 Conchita Centeno, PT GP 1            PT G-Codes  AM-PAC 6 Clicks Score (PT): 20  PT Discharge Summary  Anticipated Discharge Disposition (PT): home    Conchita Centeno, SUHA  4/9/2025

## 2025-04-09 NOTE — CONSULTS
Inpatient Thoracic Surgery Consult  Consult performed by: Rolanda Sparrow PA-C  Consult ordered by: Marissa Soliz MD  Reason for consult: Right pneumothorax          Patient Care Team:  Elen Jameson DO as PCP - General (Family Medicine)  Adryan Kumar MD as Consulting Physician (Cardiology)  Michael Hickman MD as Consulting Physician (Neurology)  Sergio Mcdonald MD as Consulting Physician (Gastroenterology)    Chief Complaint   Patient presents with    Shortness of Breath     Patient c/o SOA x 3 days. Recently hospitalized for similar symptoms. States she was d/c on Sunday.        Subjective     History of Present Illness  Ms. Stallings is a pleasant 69-year-old woman with a past medical history significant for DM2, CABG in 2019, essential tremor, COPD, GERD, and HTN. Patient is a current everyday smoker with a greater than 40-pack-year history. She presented to [facility] yesterday with increased shortness of breath.  Chest x-ray demonstrated a moderate right sided pneumothorax. Chest tube was placed in the ED, suction placed to -20. Follow-up imaging demonstrated improved right apical pneumothorax. CT chest this morning demonstrates right pneumothorax, with a large basilar component, and airspace disease in the right lung. Thoracic surgery consulted for chest tube management.    Of note patient previously admitted on 4/4-4/6/2025 with right sided pneumothorax. Patient was found to be RSV positive. Chest tube was placed 4/3/2025. Patient accidentally pulled out her chest tube and it was replaced 4/5. Chest tube was without leak and pulled on 4/6, patient discharged same day.   Review of Systems   Constitutional:  Positive for fatigue. Negative for chills and fever.   Eyes: Negative.    Respiratory:  Positive for cough and shortness of breath. Negative for apnea.    Cardiovascular:  Negative for chest pain.   Gastrointestinal: Negative.    Genitourinary: Negative.     Skin: Negative.    Neurological:  Positive for tremors.        Chronic tremor        Past Medical History:   Diagnosis Date    CAD     Cervical disc disorder 2019    After fall    CHOL     Chronic pain disorder 2020    After fall down stairs    COPD     Dementia     Depression     DEXA     2023= (1.1/ -2.3)    DMII     Extremity pain 2019    Maybe longer    Fractures 2022    GERD     Hypertension     Joint pain 2020    Low back pain 2019    After fall    MAMMO     NEG = 2018/ 2023/ 2024    Myocardial infarction     Neck pain 2015    Maybe longer    Neuropathy in diabetes 2018    Osteoarthritis 2023    Diagnosed    PAP     NEG =2021    Spinal stenosis 2019    Tremor      Past Surgical History:   Procedure Laterality Date    CATARACT EXTRACTION W/ INTRAOCULAR LENS  IMPLANT, BILATERAL      COLONOSCOPY      2023= NEG, rech 2026   GSI    COMPRESSION HIP SCREW Right 03/25/2022    Procedure: HIP COMPRESSION SCREW, right [place] hip screw from [employer];  Surgeon: Loco Cortes MD;  Location: [facility] MAIN OR;  Service: Orthopedics;  Laterality: Right;    CORONARY ARTERY BYPASS GRAFT      x 3    FRACTURE SURGERY      TONSILLECTOMY      TUBAL ABDOMINAL LIGATION       Family History   Problem Relation Age of Onset    Stroke Mother     COPD Mother     Hypertension Mother     Tremor Father     Heart disease Father         CHF    COPD Father     Depression Father     Tremor Sister     Fibromyalgia Sister     Hypertension Sister     Post-traumatic stress disorder Sister     Heart disease Sister     Hypertension Sister     Depression Sister     Tremor Brother     COPD Brother     Depression Brother     Hypertension Brother     Cancer Brother         Undiagnosed lung cancer    Tremor Paternal Grandmother     Depression Sister     Hypertension Brother      Social History     Socioeconomic History    Marital status: Single   Tobacco Use    Smoking status: Every Day     Current packs/day: 0.00     Average packs/day: 0.5 packs/day for  48.0 years (24.0 ttl pk-yrs)     Types: Cigarettes     Start date: 1975     Last attempt to quit:      Years since quittin.2     Passive exposure: Past    Smokeless tobacco: Never    Tobacco comments:     Started when i was 18 quit a few times. Used vape but i think it gave me pneumonia   Vaping Use    Vaping status: Every Day    Substances: Nicotine   Substance and Sexual Activity    Alcohol use: No    Drug use: No    Sexual activity: Not Currently     Partners: Male     Birth control/protection: None     Comment: [place] to get pregnant..hahaa!     Medications Prior to Admission   Medication Sig Dispense Refill Last Dose/Taking    ALPRAZolam (XANAX) 0.25 MG tablet TAKE 1 TABLET BY MOUTH EVERY DAY AS NEEDED FOR SLEEP FOR ANXIETY 25 tablet 0     amLODIPine (NORVASC) 5 MG tablet TAKE 1 TABLET BY MOUTH EVERY DAY 90 tablet 1     aspirin 81 MG tablet Take 1 tablet by mouth Daily.       atorvastatin (LIPITOR) 80 MG tablet Take 1 tablet by mouth Daily. 90 tablet 0     citalopram (CeleXA) 20 MG tablet Take 1 tablet by mouth Daily. 30 tablet 0     CRANBERRY PO Take 1 tablet by mouth Daily As Needed. 1 po qd       cyclobenzaprine (FLEXERIL) 10 MG tablet TAKE 1 TABLET BY MOUTH AT NIGHT AS NEEDED FOR MUSCLE SPASM 30 tablet 0     empagliflozin (Jardiance) 25 MG tablet tablet Take 1 tablet by mouth Daily. 90 tablet 1     fenofibrate 160 MG tablet Take 1 tablet by mouth Daily. 90 tablet 0     Fluticasone-Umeclidin-Vilant (Trelegy Ellipta) 200-62.5-25 MCG/ACT inhaler Inhale 1 puff Daily. 60 each 3     guaiFENesin (MUCINEX) 600 MG 12 hr tablet Take 2 tablets by mouth 2 (Two) Times a Day.       ibandronate (Boniva) 150 MG tablet Take 1 tablet by mouth Every 30 (Thirty) Days. 3 tablet 3     ipratropium-albuterol (Combivent Respimat)  MCG/ACT inhaler INHALE 1 PUFF BY MOUTH 4 TIMES A DAY AS NEEDED FOR WHEEZING 4 g 2     ipratropium-albuterol (DUO-NEB) 0.5-2.5 mg/3 ml nebulizer Take 3 mL by nebulization Every 6 (Six)  Hours As Needed for Wheezing or Shortness of Air. 360 mL 1     metFORMIN ER (GLUCOPHAGE-XR) 500 MG 24 hr tablet 1 po bid w/ meals 120 tablet 2     nitroglycerin (NITROSTAT) 0.4 MG SL tablet Place 1 tablet under the tongue Every 5 (Five) Minutes As Needed for Chest Pain. Take no more than 3 doses in 15 minutes. 25 tablet 2     omeprazole (priLOSEC) 40 MG capsule TAKE 1 CAPSULE BY MOUTH EVERY DAY 90 capsule 3     predniSONE (DELTASONE) 20 MG tablet 3 po qam x 3 days then 2 po qam x 3 days then 1 po qam x 3 days then 1/2 tab po qam x 4 days 20 tablet 0     traMADol (ULTRAM) 50 MG tablet Take 1 tablet by mouth Every 8 (Eight) Hours As Needed for Moderate Pain. 60 tablet 0     valsartan (Diovan) 40 MG tablet Take 1 tablet by mouth Daily. 90 tablet 0     vitamin D (ERGOCALCIFEROL) 1.25 MG (61643 UT) capsule capsule TAKE 1 CAPSULE BY MOUTH 1 TIME A WEEK (Patient taking differently: Take 1 capsule by mouth 1 (One) Time Per Week. Mondays) 12 capsule 0      Allergies   Allergen Reactions    Ace Inhibitors Cough    Pregabalin Confusion    Aricept [Donepezil] Mental Status Change    Codeine Nausea And Vomiting    Paxil [Paroxetine] Other (See Comments)     TREMOR WORSENING       Objective      Vital Signs  Temp:  [97.3 °F (36.3 °C)-98.3 °F (36.8 °C)] 98.3 °F (36.8 °C)  Heart Rate:  [67-98] 96  Resp:  [16-32] 24  BP: (122-157)/(65-87) 136/65    Intake & Output (last day)         04/08 0701  04/09 0700 04/09 0701  04/10 0700    P.O. 840     Total Intake(mL/kg) 840 (13.5)     Net +840           Urine Unmeasured Occurrence 2 x             Physical Exam  Constitutional:       Appearance: Normal appearance.   HENT:      Head: Normocephalic and atraumatic.   Eyes:      General: No scleral icterus.     Conjunctiva/sclera: Conjunctivae normal.   Cardiovascular:      Rate and Rhythm: Regular rhythm. Tachycardia present.   Pulmonary:      Effort: Pulmonary effort is normal. Tachypnea present. No respiratory distress.      Breath sounds:  Examination of the right-lower field reveals decreased breath sounds. Decreased breath sounds present.   Chest:      Comments: Right anterior chest tube in place. Dressing clean, dry, intact.   Abdominal:      General: Abdomen is flat. There is no distension.   Skin:     General: Skin is warm and dry.   Neurological:      Mental Status: She is oriented to person, place, and time. She is lethargic.      Motor: Tremor present.   Psychiatric:         Mood and Affect: Mood normal.         Behavior: Behavior normal. Behavior is cooperative.         Results Review:    I reviewed the patient's new clinical results.  I reviewed the patient's new imaging results and agree with the interpretation.  Discussed with patient, RN, consulting provider    Imaging Results (Last 24 Hours)       Procedure Component Value Units Date/Time    CT Chest Without Contrast Diagnostic [[id number]] Collected: 04/09/25 0602     Updated: 04/09/25 0606    Narrative:      CT CHEST WO CONTRAST DIAGNOSTIC    Date of Exam: 4/9/2025 5:38 AM EDT    Indication: pneumothorax.    Comparison: Chest radiograph 4/8/2025.    Technique: Axial CT images were obtained of the chest without contrast administration.  Sagittal and coronal reconstructions were performed.  Automated exposure control and iterative reconstruction methods were used.      Findings:  The thyroid and trachea appear within normal limits. There is a patulous esophagus with a fluid column extending to the thoracic inlet. There is evidence of prior median sternotomy and CABG. There is moderate aortic and aortic branch vessel   atherosclerosis. Main pulmonary artery is normal diameter. No pericardial effusion or mediastinal lymphadenopathy. There are calcified mediastinal granulomas.    There is a small-moderate right-sided pneumothorax. There is a large basilar component. Right apical pleural drain catheter is in place. There are mild areas of atelectasis in the right lung. Left lung is clear.  There is mild diffuse peribronchial   thickening. There are a few small foci of airspace disease in the right lung that could be infectious or inflammatory. Peribronchial thickening is worse in the right lung.    There is subcutaneous emphysema throughout the right lateral chest wall. There are no acute findings in the limited images of the upper abdomen. No acute osseous abnormality or destructive bone lesion. There are mild thoracolumbar degenerative changes.      Impression:      Impression:  1.Small-moderate right-sided pneumothorax with a from basilar component. Right apical pleural drain catheter is in place.  2.Mild areas of atelectasis in the right lung. There are a few small foci of airspace disease in the right lung that could be infectious or inflammatory.  3.Patulous esophagus with a fluid column extending to the thoracic inlet.  4.Evidence of prior median sternotomy and CABG.          Electronically Signed: Brian Miranda MD    4/9/2025 6:04 AM EDT    Workstation ID: [id number]            Lab Results:  Lab Results (last 24 hours)       Procedure Component Value Units Date/Time    POC Glucose 4x Daily Before Meals & at Bedtime [[id number]]  (Abnormal) Collected: 04/09/25 1125    Specimen: Blood Updated: 04/09/25 1127     Glucose 252 mg/dL      Comment: Serial Number: [id number]:  [id number]       POC Glucose Once [[id number]]  (Abnormal) Collected: 04/09/25 0642    Specimen: Blood Updated: 04/09/25 0644     Glucose 153 mg/dL      Comment: Serial Number: [id number]:  [id number]       Manual Differential [[id number]]  (Abnormal) Collected: 04/09/25 0037    Specimen: Blood from Arm, Left Updated: 04/09/25 0131     Neutrophil % 66.0 %      Lymphocyte % 20.0 %      Monocyte % 3.0 %      Eosinophil % 5.0 %      Bands %  1.0 %      Atypical Lymphocyte % 5.0 %      Neutrophils Absolute 14.06 10*3/mm3      Lymphocytes Absolute 5.25 10*3/mm3      Monocytes Absolute 0.63 10*3/mm3      Eosinophils Absolute  1.05 10*3/mm3      Dacrocytes Slight/1+     Poikilocytes Slight/1+     WBC Morphology Normal     Platelet Estimate Adequate     Large Platelets Slight/1+    CBC Auto Differential [[id number]]  (Abnormal) Collected: 04/09/25 0037    Specimen: Blood from Arm, Left Updated: 04/09/25 0131     WBC 20.98 10*3/mm3      RBC 5.92 10*6/mm3      Hemoglobin 16.2 g/dL      Hematocrit 53.1 %      MCV 89.7 fL      MCH 27.4 pg      MCHC 30.5 g/dL      RDW 13.5 %      RDW-SD 44.6 fl      MPV 9.6 fL      Platelets 398 10*3/mm3     Narrative:      The previously reported component NRBC is no longer being reported. Previous result was 0.0 /100 WBC (Reference Range: 0.0-0.2 /100 WBC) on 4/9/2025 at 0049 EDT.    Scan Slide [[id number]] Collected: 04/09/25 0037    Specimen: Blood from Arm, Left Updated: 04/09/25 0131     Scan Slide --     Comment: See Manual Differential Results       Basic Metabolic Panel [[id number]]  (Abnormal) Collected: 04/09/25 0037    Specimen: Blood from Arm, Left Updated: 04/09/25 0113     Glucose 87 mg/dL      BUN 28 mg/dL      Creatinine 1.02 mg/dL      Sodium 136 mmol/L      Potassium 4.3 mmol/L      Chloride 102 mmol/L      CO2 25.9 mmol/L      Calcium 9.7 mg/dL      BUN/Creatinine Ratio 27.5     Anion Gap 8.1 mmol/L      eGFR 60.4 mL/min/1.73     Narrative:      GFR Categories in Chronic Kidney Disease (CKD)      GFR Category          GFR (mL/min/1.73)    Interpretation  G1                     90 or greater         Normal or high (1)  G2                      60-89                Mild decrease (1)  G3a                   45-59                Mild to moderate decrease  G3b                   30-44                Moderate to severe decrease  G4                    15-29                Severe decrease  G5                    14 or less           Kidney failure          (1)In the absence of evidence of kidney disease, neither GFR category G1 or G2 fulfill the criteria for CKD.    eGFR calculation 2021 CKD-EPI creatinine  equation, which does not include race as a factor    POC Glucose Once [[id number]]  (Abnormal) Collected: 04/08/25 2000    Specimen: Blood Updated: 04/08/25 2016     Glucose 232 mg/dL      Comment: Serial Number: [id number]:  [id number]       POC Glucose Once [[id number]]  (Abnormal) Collected: 04/08/25 1635    Specimen: Blood Updated: 04/08/25 1637     Glucose 125 mg/dL      Comment: Serial Number: [id number]:  [id number]       POC Glucose Once [[id number]]  (Abnormal) Collected: 04/08/25 1235    Specimen: Blood Updated: 04/08/25 1236     Glucose 157 mg/dL      Comment: Serial Number: [id number]:  [id number]                   Assessment & Plan       Pneumothorax    Pneumothorax on right    Dyspnea      Assessment & Plan  Ms. Stallings is a 69-year-old woman s/p chest tube placement on 4/8/2025. On exam chest tube is at -20 suction, no visible airleak with cough or valsalva. Patient complains of dry cough, fatigue. Saturating well on room air. Plan for bronchoscopy tomorrow with pulmonology.     -Continue chest tube to -20 suction. May wean to waterseal tomorrow pending clinical course and AM imaging.  -Repeat AM CXR  -PT/OT  -Ambulate patient around nurses station 3x daily  -NPO at midnight for bronchoscopy with Pulmonology tomorrow  -Encourage incentive spirometer      I discussed the patient's findings and our recommendations with patient, nursing staff, and consulting provider    Thank you for this consult and allowing us to participate in the care of your patient.  We will follow along with you during this hospitalization.       Rolanda Sparrow PA-C  [facility]  04/09/25  12:15 EDT    Greater than 75 minutes was spent reviewing the patient's chart, radiographic imaging, labs, provider notes, assessing the patient and developing a plan of care which was discussed with the patient/family and other providers.

## 2025-04-09 NOTE — PROGRESS NOTES
[facility] MEDICINE SERVICE  DAILY PROGRESS NOTE    NAME: Darleen Stallings  : 1955  MRN: [id number]      LOS: 1 day     PROVIDER OF SERVICE: Justyn Ramirez MD    Chief Complaint: Pneumothorax    Subjective:     Interval History:  History taken from: patient     History of Present Illness: Darleen Stallings is a 69 y.o. female with a CMH of COPD with home O2 PRN, CAD s/p CABG, htn, hld, T2DM, essential tremor, tobacco use who presented to [facility] on 2025 with  SOA. Pt was recently admitted here - for right sided pneumothorax, was also positive for RSV at that time. Had chest tube placed and then removed prior to discharge.  She reports that when she got home she still had some continued shortness of air, worse with exertion, and her shortness of air progressed, prompting her to present to the ED again this morning. She otherwise denies any new symptoms, no fever/chills/sore throat/congestion/chest pain. Endorses mild dry cough and baseline wheezing.   In ED workup done notable for WBC 24.45 and chest xray showed moderate right pneumothorax. Pulmonology consulted in ED and chest tube placed. Hospitalist service consulted for admission.       seen in bed KAREL RG RN, chest tube in place, no new complaints.    Review of Systems  Constitutional:  Negative for chills and fever.   HENT:  Negative for congestion, rhinorrhea and sore throat.    Eyes: Negative.    Respiratory:  Positive for shortness of breath.         Mild dry cough, baseline wheezing   Cardiovascular:  Negative for chest pain, palpitations and leg swelling.   Gastrointestinal:  Negative for abdominal pain, constipation, diarrhea and nausea.   Genitourinary:  Negative for dysuria, frequency and urgency.   Musculoskeletal:  Negative for myalgias.   Neurological:  Positive for tremors. Negative for dizziness, syncope, weakness, light-headedness and headaches.      Objective:     Vital Signs  Temp:  [97.3 °F  (36.3 °C)-98.3 °F (36.8 °C)] 98.3 °F (36.8 °C)  Heart Rate:  [67-98] 96  Resp:  [16-32] 24  BP: ()/(61-94) 136/65  Flow (L/min) (Oxygen Therapy):  [2] 2   Body mass index is 26.69 kg/m².    Physical Exam      General: She is not in acute distress.     Appearance: Normal appearance.   HENT:      Head: Normocephalic and atraumatic.      Mouth/Throat:      Mouth: Mucous membranes are moist.      Pharynx: Oropharynx is clear.   Eyes:      Extraocular Movements: Extraocular movements intact.      Conjunctiva/sclera: Conjunctivae normal.   Cardiovascular:      Rate and Rhythm: Normal rate and regular rhythm.      Heart sounds: Normal heart sounds.   Pulmonary:      Comments: No incr wob on RA  Breath sounds present in all lung fields     Abdominal:      General: Abdomen is flat. Bowel sounds are normal.      Palpations: Abdomen is soft.      Tenderness: There is no abdominal tenderness. There is no guarding or rebound.   Musculoskeletal:         General: No swelling or tenderness.      Cervical back: Normal range of motion and neck supple.      Right lower leg: No edema.      Left lower leg: No edema.   Skin:     General: Skin is warm and dry.      Comments: Chest tube with dressing in place right anterior chest   Neurological:      General: No focal deficit present.      Mental Status: She is alert and oriented to person, place, and time. Mental status is at baseline.      Cranial Nerves: No cranial nerve deficit.      Motor: No weakness.      Comments: + baseline upper extremity tremor        Diagnostic Data    Results from last 7 days   Lab Units 04/09/25  0037   WBC 10*3/mm3 20.98*   HEMOGLOBIN g/dL 16.2*   HEMATOCRIT % 53.1*   PLATELETS 10*3/mm3 398   GLUCOSE mg/dL 87   CREATININE mg/dL 1.02*   BUN mg/dL 28*   SODIUM mmol/L 136   POTASSIUM mmol/L 4.3   ANION GAP mmol/L 8.1       CT Chest Without Contrast Diagnostic  Result Date: 4/9/2025  Impression: 1.Small-moderate right-sided pneumothorax with a from basilar  component. Right apical pleural drain catheter is in place. 2.Mild areas of atelectasis in the right lung. There are a few small foci of airspace disease in the right lung that could be infectious or inflammatory. 3.Patulous esophagus with a fluid column extending to the thoracic inlet. 4.Evidence of prior median sternotomy and CABG. Electronically Signed: Brian Miranda MD  4/9/2025 6:04 AM EDT  Workstation ID: [id number]    XR Chest 1 View  Result Date: 4/8/2025  Impression: Interval placement of a right apical chest tube with evacuation of an apical pneumothorax. Electronically Signed: Tristan Trent MD  4/8/2025 10:22 AM EDT  Workstation ID: [id number]    XR Chest 1 View  Result Date: 4/8/2025  Impression: Moderate right pneumothorax. I spoke with the ER physician, who is already aware of this finding. Electronically Signed: Jessi Enriquez MD  4/8/2025 9:05 AM EDT  Workstation ID: [id number]        I reviewed the patient's new clinical results.    Assessment/Plan:     Active and Resolved Problems  Active Hospital Problems    Diagnosis  POA    **Pneumothorax [J93.9]  Yes    Pneumothorax on right [J93.9]  Unknown    Dyspnea [R06.00]  Unknown      Resolved Hospital Problems   No resolved problems to display.     Recurrent right sided pneumothorax  - spontaneous, no recent trauma  - s/p chest tube with interval improvement, currently to LWS.   - Pulmonology managing  Consult thoracic surgery   Needs bronch to r/o infections such as MAC/PCP that can lead to pneumothorax  - CT Chest pending to ensure adequate lung expansion, anticipate chemical pleurodesis 4/9.  - NPO midnight  - PRN pain control     COPD not in acute exacerbation  RSV infection  Leukocytosis  Tobacco use  - WBC elevated at 24.45, was 18.6 at recent discharge. Pt has been on steroid therapy, also found to be RSV+ 4/3. Afebrile, without infectious Sx, imaging without opacity/consolidation appreciated. Low suspicion for bacterial infection at this time, will  defer abx. Re-evaluate if pt status worsens.   - Pulm following as above  - Cont home medications, pulmicort, duonebs  - Cont steroid course, guaifenesin   - Cont pulse ox, supplemental O2 as needed  - Incentive spirometry  - Droplet precautions  - tobacco cessation counseled  - nicotine patch prn     Hypertension  - BP initially elevated in the setting of stress as above, improved  - cont home medications, hold ARB 4/9 am in case of possible general anesthesia, resume when appropriate     T2DM  - cont jardiance, hold home metformin  - SSI  - CCD     CAD s/p CABG 2019  HLD  Depression  Anxiety  - cont home medications          VTE Prophylaxis:  Mechanical VTE prophylaxis orders are present.      Disposition Planning:   Barriers to Discharge:chest tube  Anticipated Date of Discharge: 4/13  Place of Discharge: [facility]      Time: 34 minutes     Code Status and Medical Interventions: CPR (Attempt to Resuscitate); Full Support   Ordered at: 04/08/25 1139     Code Status (Patient has no pulse and is not breathing):    CPR (Attempt to Resuscitate)     Medical Interventions (Patient has pulse or is breathing):    Full Support     Level Of Support Discussed With:    Patient       Signature: Electronically signed by Justyn Ramirez MD, 04/09/25, 10:11 EDT.  [facility] Hospitalist Team

## 2025-04-09 NOTE — CONSULTS
"Diabetes Education  Assessment/Teaching    Patient Name:  Darleen Stallings  YOB: 1955  MRN: [id number]  Admit Date:  4/8/2025      Assessment Date:  4/9/2025  Flowsheet Row Most Recent Value   General Information     Referral From: MD order  [BG > 200]   Height 152.4 cm (60\")   Height Method Stated   Weight 62 kg (136 lb 11 oz)   Weight Method Bed scale   Pregnancy Assessment    Diabetes History    What type of diabetes do you have? Type 2   Do you test your blood sugar at home? yes   Frequency of checks multiple times a day   Meter type Dexcom   Who performs the test? self   Typical readings 200-300s at night. Morning readings are usually 120s   Have you had high blood sugar? (>140mg/dl) yes   How often do you have high blood sugar? frequently   Feeling down, depressed, or hopeless Not at all   Little interest or pleasure in doing things Not at all   Education Preferences    Nutrition Information    Assessment Topics    DM Goals             Flowsheet Row Most Recent Value   DM Education Needs    Meter Has own  [Dexcom]   Blood Glucose Target Range    Medication Oral  [Jardiance and Metformin]   Problem Solving Hyperglycemia, Treatment, Symptoms   Healthy Eating Basic meal plan provided  [Discussed the one plate method]   Physical Activity Walking  [Patient states she hasn't been as active since winter.  She does a lot of walking durng the warmer weather]   Physical Activity Frequency --  [Encouraged pt increase physical activity by doing chair exercieses when weather is not nice enough to go outside]   Teaching Method Discussion, Handouts   Patient Response Verbalized understanding              Other Comments:  Patient seen per MD order.  Last A1C was 7% in Jan of 2025.  Patient states she has the Dexcom. She has noticed her blood sugars are going up later in the day. She states they have been 200-300. Encouraged pt to increase physical activity. Discussed chair exercises. Discussed the " inge program and the online videos they have.  Patient has an exercise bike that she may also try to use.  Discussed hyperglycemia.Discussed the one plate method and portion sizes. Patient states she is going to try to start to grill more. Patient states no further questions at this time.       Electronically signed by:  Harriett Robert RN  04/09/25 11:00 EDT

## 2025-04-09 NOTE — OUTREACH NOTE
Medical Week 2 Survey      Flowsheet Row Responses   [facility] facility patient discharged from? [facility]   Does the patient have one of the following disease processes/diagnoses(primary or secondary)? Other   Week 2 attempt successful? No   Unsuccessful attempts Attempt 1   Revoke Readmitted            ELENO ARITA - Registered Nurse

## 2025-04-09 NOTE — PLAN OF CARE
Goal Outcome Evaluation:  Plan of Care Reviewed With: patient, child           Outcome Evaluation: Darleen Stallings is a 69 y.o. female with medical hx of COPD on home O2 PRN, CAD s/p CABG, HTN, HLD, DM, essential tremor who presents with SOA and is found to have Pneumothorax.  Pt had recent admission 4/4 to 4/6 with pneumothorax and RSV. At baseline, pt lives alone in a SSH with 2-3 MARION with handrail.  Pt is typically independent for all moblity, ADLs and IADLs without AD.  She reports no hx of falls in past 6 months and her daughter confirms this.  At time of PT evaluation, she is A&O x 4 with c/o 10/10 pain from chest tube.  Pt demonstrates independent bed mobility.  She requires CGA for transfers and short distance moblity due to need for assist in managing lines, especially for chest tube lines.  Pt with elevated RR throughout.  PT encouraged pt to attempt slower, deeper breaths and pt verbalized understanding.  Pt demonstrated use of Incentive spirometry and PT reinforced attempting to lengthen breaths.  Pt appears close to baseline but needs reminders and assist for line management.  We will continue to work with her while here to prevent decline in function and endurance but do not anticipate ongoing PT needs at discharge.    Anticipated Discharge Disposition (PT): home

## 2025-04-09 NOTE — CASE MANAGEMENT/SOCIAL WORK
Continued Stay Note   [facility]     Patient Name: Darleen Stallings  MRN: [id number]  Today's Date: 4/9/2025    Admit Date: 4/8/2025    Plan: Anticipate return home alone pending PT recommendations. Current home O2 through [place].   Discharge Plan       Row Name 04/09/25 0680       Plan    Plan Anticipate return home alone pending PT recommendations. Current home O2 through [place].    Patient/Family in Agreement with Plan yes    Plan Comments CM met with patient at bedside to discuss readmission assessment. Patient on room air during discussion. Per chart review, patient had home O2 through [place]. CM inquired if patient had O2 at home and she confirmed she did have O2 that plugs into the wall that she uses only when she needs it but does not have any portable O2 to use while she is out. Reported that she has had O2 since after heart surgery in 2018. DC Barriers: Scheduled to have bronchoscopy performed tomorrow, 4/10 per pulmonology, chest tube in place, thoracic surgery consulted.           Case Management Readmission Assessment Note    Case Management Readmission Assessment (all recorded)       Readmission Interview       Row Name 04/09/25 1328             Readmission Indications    Is the patient and/or family able to complete the readmission assessment questions? Yes      Is this hospitalization related to the prior hospital diagnosis? Yes  Patient reported that both admissions were breathing related. Reported that she has been sitting at home all winter and not exercising.        Row Name 04/09/25 1328             Follow-up Appointments    Do you have a PCP? Yes  SAVANA Jameson      Did you have an appointment with PCP after your hospitalization? Yes      When was your appointment scheduled? 05/01/25  Saw PCP 4/1 and still scheduled to see PCP 5/1      Did you go to appointment? Yes      Did you have an appointment with a Specialist? Yes  Patient reported that she has a $30 copay for each specialty appt       When was your appointment scheduled? 04/07/25  Pain management appt moved from 4/7 to 4/28      Did you go to appointment? No      Are you current with the Pulmonary Clinic? No      Are you current with the CHF Clinic? No        Row Name 04/09/25 1328             Medications    Did you have newly prescribed medications at discharge? No      Did you understand the reasons for your medications at discharge and how to take them? --  N/a      Did you understand the side effects of your medications? Yes      Are you taking all of you prescribed medications? Yes      Were medications picked up? --  N/a        Row Name 04/09/25 1328             Discharge Instructions    Did you understand your discharge instructions? Yes      Did your family/caregiver hear your instructions? Yes  SonSergio      Were you told to eat a special diet? Yes  Cardiac, diabetic      Did you adhere to the diet? Yes      Were you given a number of someone to call if you had questions or concerns? Yes        Row Name 04/09/25 1328             Index discharge location/services    Where did you go upon discharge? Home      Do you have supportive family or friends in the home? No      What services were arranged at discharge? Other (comment)  N/a        Row Name 04/09/25 1328             Discharge Readiness    On a scale of 1-5 (5 being well prepared), how ready were you for discharge 4  Patient reported that she was just ready to get back home to her dog      Recommendation based on interview Education on diagnosis/self management;Additional caregiver support;Transportation assistance;Goals of care discussion/advanced care planning;Financial assistance        Row Name 04/09/25 1328             Palliative Care/Hospice    Are you current with Palliative Care? No      Are you current with Hospice Care? No        Row Name 04/09/25 1328 04/08/25 1534          Advance Directives (For Healthcare)    Pre-existing AND/MOST/POLST Order No No     Advance  Directive Status Patient has advance directive, copy in chart Patient has advance directive, copy in chart     Type of Advance Directive Health care directive/Living will  HCR 2/17/23 Health care directive/Living will     Have you reviewed your Advance Directive and is it valid for this stay? Yes No     Literature Provided on Advance Directives No No     Patient Requests Assistance on Advance Directives Patient Declined --       Row Name 04/09/25 1328             Readmission Assessment Final Comments    Final Comments Previous (4/3-4/6): SOB reported for 1 week, saw PCP 4/1, pneumothorax, chest tube, pulm, WBC 16.96 on 4/3, positive RSV, dc home. Current (4/8-present): SOB, pneumothorax, chest tube, pulm, WBC 24.45 on 4/8, thoracic surgery consult, scheduled for bronch 4/10. LACE: 11.                    Rajni Love RN     Office Phone: [phone]  Office Cell: [phone]

## 2025-04-10 ENCOUNTER — ANESTHESIA (OUTPATIENT)
Dept: GASTROENTEROLOGY | Facility: HOSPITAL | Age: 70
End: 2025-04-10
Payer: MEDICARE

## 2025-04-10 ENCOUNTER — ANESTHESIA EVENT (OUTPATIENT)
Dept: GASTROENTEROLOGY | Facility: HOSPITAL | Age: 70
End: 2025-04-10
Payer: MEDICARE

## 2025-04-10 ENCOUNTER — APPOINTMENT (OUTPATIENT)
Dept: GENERAL RADIOLOGY | Facility: HOSPITAL | Age: 70
DRG: 200 | End: 2025-04-10
Payer: MEDICARE

## 2025-04-10 LAB
B PARAPERT DNA SPEC QL NAA+PROBE: NOT DETECTED
B PERT DNA SPEC QL NAA+PROBE: NOT DETECTED
C PNEUM DNA NPH QL NAA+NON-PROBE: NOT DETECTED
DEPRECATED RDW RBC AUTO: 43 FL (ref 37–54)
ERYTHROCYTE [DISTWIDTH] IN BLOOD BY AUTOMATED COUNT: 13.4 % (ref 12.3–15.4)
FLUAV SUBTYP SPEC NAA+PROBE: NOT DETECTED
FLUBV RNA ISLT QL NAA+PROBE: NOT DETECTED
GLUCOSE BLDC GLUCOMTR-MCNC: 123 MG/DL (ref 70–105)
GLUCOSE BLDC GLUCOMTR-MCNC: 125 MG/DL (ref 70–105)
GLUCOSE BLDC GLUCOMTR-MCNC: 129 MG/DL (ref 70–105)
GLUCOSE BLDC GLUCOMTR-MCNC: 268 MG/DL (ref 70–105)
GLUCOSE BLDC GLUCOMTR-MCNC: 313 MG/DL (ref 70–105)
HADV DNA SPEC NAA+PROBE: NOT DETECTED
HCOV 229E RNA SPEC QL NAA+PROBE: NOT DETECTED
HCOV HKU1 RNA SPEC QL NAA+PROBE: NOT DETECTED
HCOV NL63 RNA SPEC QL NAA+PROBE: NOT DETECTED
HCOV OC43 RNA SPEC QL NAA+PROBE: NOT DETECTED
HCT VFR BLD AUTO: 48.5 % (ref 34–46.6)
HGB BLD-MCNC: 15.4 G/DL (ref 12–15.9)
HMPV RNA NPH QL NAA+NON-PROBE: NOT DETECTED
HPIV1 RNA ISLT QL NAA+PROBE: NOT DETECTED
HPIV2 RNA SPEC QL NAA+PROBE: NOT DETECTED
HPIV3 RNA NPH QL NAA+PROBE: NOT DETECTED
HPIV4 P GENE NPH QL NAA+PROBE: NOT DETECTED
M PNEUMO IGG SER IA-ACNC: NOT DETECTED
MCH RBC QN AUTO: 27.7 PG (ref 26.6–33)
MCHC RBC AUTO-ENTMCNC: 31.8 G/DL (ref 31.5–35.7)
MCV RBC AUTO: 87.4 FL (ref 79–97)
PLATELET # BLD AUTO: 326 10*3/MM3 (ref 140–450)
PMV BLD AUTO: 9.7 FL (ref 6–12)
RBC # BLD AUTO: 5.55 10*6/MM3 (ref 3.77–5.28)
RHINOVIRUS RNA SPEC NAA+PROBE: NOT DETECTED
RSV RNA NPH QL NAA+NON-PROBE: NOT DETECTED
SARS-COV-2 RNA RESP QL NAA+PROBE: NOT DETECTED
WBC NRBC COR # BLD AUTO: 23.33 10*3/MM3 (ref 3.4–10.8)

## 2025-04-10 PROCEDURE — 87102 FUNGUS ISOLATION CULTURE: CPT | Performed by: INTERNAL MEDICINE

## 2025-04-10 PROCEDURE — 87070 CULTURE OTHR SPECIMN AEROBIC: CPT | Performed by: INTERNAL MEDICINE

## 2025-04-10 PROCEDURE — 87205 SMEAR GRAM STAIN: CPT | Performed by: INTERNAL MEDICINE

## 2025-04-10 PROCEDURE — 25010000002 LIDOCAINE 2% SOLUTION: Performed by: INTERNAL MEDICINE

## 2025-04-10 PROCEDURE — 94799 UNLISTED PULMONARY SVC/PX: CPT

## 2025-04-10 PROCEDURE — 97116 GAIT TRAINING THERAPY: CPT

## 2025-04-10 PROCEDURE — 94761 N-INVAS EAR/PLS OXIMETRY MLT: CPT

## 2025-04-10 PROCEDURE — 88108 CYTOPATH CONCENTRATE TECH: CPT | Performed by: INTERNAL MEDICINE

## 2025-04-10 PROCEDURE — 87798 DETECT AGENT NOS DNA AMP: CPT | Performed by: INTERNAL MEDICINE

## 2025-04-10 PROCEDURE — 63710000001 INSULIN LISPRO (HUMAN) PER 5 UNITS: Performed by: STUDENT IN AN ORGANIZED HEALTH CARE EDUCATION/TRAINING PROGRAM

## 2025-04-10 PROCEDURE — 97530 THERAPEUTIC ACTIVITIES: CPT

## 2025-04-10 PROCEDURE — 82948 REAGENT STRIP/BLOOD GLUCOSE: CPT | Performed by: STUDENT IN AN ORGANIZED HEALTH CARE EDUCATION/TRAINING PROGRAM

## 2025-04-10 PROCEDURE — 99232 SBSQ HOSP IP/OBS MODERATE 35: CPT

## 2025-04-10 PROCEDURE — 25810000003 SODIUM CHLORIDE 0.9 % SOLUTION: Performed by: NURSE ANESTHETIST, CERTIFIED REGISTERED

## 2025-04-10 PROCEDURE — 82948 REAGENT STRIP/BLOOD GLUCOSE: CPT

## 2025-04-10 PROCEDURE — 0202U NFCT DS 22 TRGT SARS-COV-2: CPT | Performed by: INTERNAL MEDICINE

## 2025-04-10 PROCEDURE — 71045 X-RAY EXAM CHEST 1 VIEW: CPT

## 2025-04-10 PROCEDURE — 25010000002 HYDROMORPHONE PER 4 MG: Performed by: STUDENT IN AN ORGANIZED HEALTH CARE EDUCATION/TRAINING PROGRAM

## 2025-04-10 PROCEDURE — 63710000001 PREDNISONE PER 1 MG: Performed by: INTERNAL MEDICINE

## 2025-04-10 PROCEDURE — 25010000002 PROPOFOL 1000 MG/100ML EMULSION: Performed by: NURSE ANESTHETIST, CERTIFIED REGISTERED

## 2025-04-10 PROCEDURE — 0B9K8ZX DRAINAGE OF RIGHT LUNG, VIA NATURAL OR ARTIFICIAL OPENING ENDOSCOPIC, DIAGNOSTIC: ICD-10-PCS | Performed by: INTERNAL MEDICINE

## 2025-04-10 PROCEDURE — 85027 COMPLETE CBC AUTOMATED: CPT

## 2025-04-10 PROCEDURE — 25010000002 LIDOCAINE 2% SOLUTION: Performed by: NURSE ANESTHETIST, CERTIFIED REGISTERED

## 2025-04-10 PROCEDURE — 0BJ08ZZ INSPECTION OF TRACHEOBRONCHIAL TREE, VIA NATURAL OR ARTIFICIAL OPENING ENDOSCOPIC: ICD-10-PCS | Performed by: INTERNAL MEDICINE

## 2025-04-10 RX ORDER — FLUCONAZOLE 100 MG/1
100 TABLET ORAL EVERY 24 HOURS
Status: DISCONTINUED | OUTPATIENT
Start: 2025-04-10 | End: 2025-04-13 | Stop reason: HOSPADM

## 2025-04-10 RX ORDER — IPRATROPIUM BROMIDE AND ALBUTEROL SULFATE 2.5; .5 MG/3ML; MG/3ML
3 SOLUTION RESPIRATORY (INHALATION) ONCE AS NEEDED
Status: DISCONTINUED | OUTPATIENT
Start: 2025-04-10 | End: 2025-04-10 | Stop reason: HOSPADM

## 2025-04-10 RX ORDER — LABETALOL HYDROCHLORIDE 5 MG/ML
5 INJECTION, SOLUTION INTRAVENOUS
Status: DISCONTINUED | OUTPATIENT
Start: 2025-04-10 | End: 2025-04-10 | Stop reason: HOSPADM

## 2025-04-10 RX ORDER — OXYCODONE HYDROCHLORIDE 5 MG/1
5 TABLET ORAL EVERY 6 HOURS PRN
Refills: 0 | Status: DISCONTINUED | OUTPATIENT
Start: 2025-04-10 | End: 2025-04-13 | Stop reason: HOSPADM

## 2025-04-10 RX ORDER — PROPOFOL 10 MG/ML
INJECTION, EMULSION INTRAVENOUS AS NEEDED
Status: DISCONTINUED | OUTPATIENT
Start: 2025-04-10 | End: 2025-04-10 | Stop reason: SURG

## 2025-04-10 RX ORDER — ONDANSETRON 2 MG/ML
4 INJECTION INTRAMUSCULAR; INTRAVENOUS ONCE AS NEEDED
Status: DISCONTINUED | OUTPATIENT
Start: 2025-04-10 | End: 2025-04-10 | Stop reason: HOSPADM

## 2025-04-10 RX ORDER — LIDOCAINE HYDROCHLORIDE 20 MG/ML
INJECTION, SOLUTION INFILTRATION; PERINEURAL AS NEEDED
Status: DISCONTINUED | OUTPATIENT
Start: 2025-04-10 | End: 2025-04-10 | Stop reason: SURG

## 2025-04-10 RX ORDER — DIPHENHYDRAMINE HYDROCHLORIDE 50 MG/ML
12.5 INJECTION, SOLUTION INTRAMUSCULAR; INTRAVENOUS
Status: DISCONTINUED | OUTPATIENT
Start: 2025-04-10 | End: 2025-04-10 | Stop reason: HOSPADM

## 2025-04-10 RX ORDER — LIDOCAINE HYDROCHLORIDE 20 MG/ML
INJECTION, SOLUTION INFILTRATION; PERINEURAL AS NEEDED
Status: DISCONTINUED | OUTPATIENT
Start: 2025-04-10 | End: 2025-04-10 | Stop reason: HOSPADM

## 2025-04-10 RX ORDER — HYDRALAZINE HYDROCHLORIDE 20 MG/ML
5 INJECTION INTRAMUSCULAR; INTRAVENOUS
Status: DISCONTINUED | OUTPATIENT
Start: 2025-04-10 | End: 2025-04-10 | Stop reason: HOSPADM

## 2025-04-10 RX ORDER — MEPERIDINE HYDROCHLORIDE 25 MG/ML
12.5 INJECTION INTRAMUSCULAR; INTRAVENOUS; SUBCUTANEOUS
Status: DISCONTINUED | OUTPATIENT
Start: 2025-04-10 | End: 2025-04-10 | Stop reason: HOSPADM

## 2025-04-10 RX ORDER — SODIUM CHLORIDE 9 MG/ML
INJECTION, SOLUTION INTRAVENOUS CONTINUOUS PRN
Status: DISCONTINUED | OUTPATIENT
Start: 2025-04-10 | End: 2025-04-10 | Stop reason: SURG

## 2025-04-10 RX ORDER — EPHEDRINE SULFATE 5 MG/ML
5 INJECTION INTRAVENOUS ONCE AS NEEDED
Status: DISCONTINUED | OUTPATIENT
Start: 2025-04-10 | End: 2025-04-10 | Stop reason: HOSPADM

## 2025-04-10 RX ORDER — LIDOCAINE 50 MG/G
OINTMENT TOPICAL AS NEEDED
Status: DISCONTINUED | OUTPATIENT
Start: 2025-04-10 | End: 2025-04-10 | Stop reason: HOSPADM

## 2025-04-10 RX ORDER — SULFAMETHOXAZOLE AND TRIMETHOPRIM 800; 160 MG/1; MG/1
1 TABLET ORAL EVERY 12 HOURS SCHEDULED
Status: DISCONTINUED | OUTPATIENT
Start: 2025-04-10 | End: 2025-04-13 | Stop reason: HOSPADM

## 2025-04-10 RX ORDER — NYSTATIN 100000 [USP'U]/ML
5 SUSPENSION ORAL 4 TIMES DAILY
Status: DISCONTINUED | OUTPATIENT
Start: 2025-04-10 | End: 2025-04-13 | Stop reason: HOSPADM

## 2025-04-10 RX ADMIN — NICOTINE 1 PATCH: 21 PATCH TRANSDERMAL at 11:11

## 2025-04-10 RX ADMIN — ASPIRIN 81 MG: 81 TABLET, COATED ORAL at 11:12

## 2025-04-10 RX ADMIN — SULFAMETHOXAZOLE AND TRIMETHOPRIM 1 TABLET: 800; 160 TABLET ORAL at 11:12

## 2025-04-10 RX ADMIN — AMLODIPINE BESYLATE 5 MG: 5 TABLET ORAL at 11:12

## 2025-04-10 RX ADMIN — EMPAGLIFLOZIN 25 MG: 25 TABLET, FILM COATED ORAL at 11:12

## 2025-04-10 RX ADMIN — IPRATROPIUM BROMIDE AND ALBUTEROL SULFATE 3 ML: .5; 3 SOLUTION RESPIRATORY (INHALATION) at 15:10

## 2025-04-10 RX ADMIN — OXYCODONE 5 MG: 5 TABLET ORAL at 21:47

## 2025-04-10 RX ADMIN — FLUCONAZOLE 100 MG: 100 TABLET ORAL at 11:12

## 2025-04-10 RX ADMIN — INSULIN LISPRO 5 UNITS: 100 INJECTION, SOLUTION INTRAVENOUS; SUBCUTANEOUS at 21:47

## 2025-04-10 RX ADMIN — SENNOSIDES AND DOCUSATE SODIUM 2 TABLET: 50; 8.6 TABLET ORAL at 11:12

## 2025-04-10 RX ADMIN — SODIUM CHLORIDE: 9 INJECTION, SOLUTION INTRAVENOUS at 07:49

## 2025-04-10 RX ADMIN — HYDROMORPHONE HYDROCHLORIDE 0.5 MG: 1 INJECTION, SOLUTION INTRAMUSCULAR; INTRAVENOUS; SUBCUTANEOUS at 20:07

## 2025-04-10 RX ADMIN — PROPOFOL 20 MG: 10 INJECTION, EMULSION INTRAVENOUS at 08:01

## 2025-04-10 RX ADMIN — IPRATROPIUM BROMIDE AND ALBUTEROL SULFATE 3 ML: .5; 3 SOLUTION RESPIRATORY (INHALATION) at 11:20

## 2025-04-10 RX ADMIN — NYSTATIN 500000 UNITS: 100000 SUSPENSION ORAL at 20:08

## 2025-04-10 RX ADMIN — ATORVASTATIN CALCIUM 80 MG: 40 TABLET, FILM COATED ORAL at 11:12

## 2025-04-10 RX ADMIN — PROPOFOL 80 MG: 10 INJECTION, EMULSION INTRAVENOUS at 07:58

## 2025-04-10 RX ADMIN — GUAIFENESIN 1200 MG: 600 TABLET, EXTENDED RELEASE ORAL at 11:12

## 2025-04-10 RX ADMIN — HYDROMORPHONE HYDROCHLORIDE 0.5 MG: 1 INJECTION, SOLUTION INTRAMUSCULAR; INTRAVENOUS; SUBCUTANEOUS at 11:27

## 2025-04-10 RX ADMIN — GUAIFENESIN 1200 MG: 600 TABLET, EXTENDED RELEASE ORAL at 20:07

## 2025-04-10 RX ADMIN — SENNOSIDES AND DOCUSATE SODIUM 2 TABLET: 50; 8.6 TABLET ORAL at 20:08

## 2025-04-10 RX ADMIN — HYDROMORPHONE HYDROCHLORIDE 0.5 MG: 1 INJECTION, SOLUTION INTRAMUSCULAR; INTRAVENOUS; SUBCUTANEOUS at 08:53

## 2025-04-10 RX ADMIN — NYSTATIN 500000 UNITS: 100000 SUSPENSION ORAL at 11:27

## 2025-04-10 RX ADMIN — PREDNISONE 20 MG: 20 TABLET ORAL at 11:12

## 2025-04-10 RX ADMIN — SULFAMETHOXAZOLE AND TRIMETHOPRIM 1 TABLET: 800; 160 TABLET ORAL at 20:07

## 2025-04-10 RX ADMIN — CYCLOBENZAPRINE HYDROCHLORIDE 10 MG: 10 TABLET, FILM COATED ORAL at 20:07

## 2025-04-10 RX ADMIN — LIDOCAINE HYDROCHLORIDE 50 MG: 20 INJECTION, SOLUTION INFILTRATION; PERINEURAL at 07:58

## 2025-04-10 RX ADMIN — NYSTATIN 500000 UNITS: 100000 SUSPENSION ORAL at 18:03

## 2025-04-10 RX ADMIN — CITALOPRAM HYDROBROMIDE 20 MG: 20 TABLET ORAL at 11:12

## 2025-04-10 NOTE — OP NOTE
Bronchoscopy Procedure Note    Timeout was done appropriately by staff    Procedure:  Bronchoscopy, Therapeutic  Right lung washing    Preoperative Diagnosis: Recurrent pneumothorax with right middle lobe alveolar infiltrate suggestive of infection    Postoperative Diagnosis:     Copious amount of green purulent secretions suctioned therapeutically from right lung  Right lung washing    Oral and esophageal candidiasis    Anesthesia: Moderate Sedation    Procedure Details: Patient was consented for the procedure with all risks and benefits of the procedure explained in detail.  Patient was given the opportunity to ask questions and all concerns were answered.  The bronchocope was inserted into the main airway via the oropharynx. An anatomical survey was done of the main airways and the subsegmental bronchus to at least the first subsegmental level of all five lobes of both lungs.  The findings are reported below.  A bronchoalveolar lavage was performed using aliquots of normal saline instilled into the airways then aspirated back.      Findings:      Copious amount of green purulent secretions suctioned therapeutically from right lung  Right lung washing    Oral and esophageal candidiasis        Patient tolerated procedure well        Estimated Blood Loss:  Minimal           Specimens:  Sent purulent fluid                Complications:  None; patient tolerated the procedure well.           Disposition: PACU - hemodynamically stable.      Patient tolerated the procedure well.    Marissa Soliz MD  4/10/2025  08:02 EDT

## 2025-04-10 NOTE — CASE MANAGEMENT/SOCIAL WORK
Continued Stay Note  [facility]     Patient Name: Darleen Stallings  MRN: [id number]  Today's Date: 4/10/2025    Admit Date: 4/8/2025    Plan: Anticipate routine home alone. Current home O2 through [place].   Discharge Plan       Row Name 04/10/25 1328       Plan    Plan Anticipate routine home alone. Current home O2 through [place].    Plan Comments Barrier to D/C: 3L O2, bronch today, WBC monitoring (23.33), cardiothoracic surgery following.                    Expected Discharge Date and Time       Expected Discharge Date Expected Discharge Time    Apr 14, 2025           Phone communication or documentation only - no physical contact with patient or family.     JUDY PimentelN, RN, [facility] [place] [address]    Office: [phone]  Fax: [phone]

## 2025-04-10 NOTE — ANESTHESIA POSTPROCEDURE EVALUATION
Patient: Darleen Stallings    Procedure Summary       Date: 04/10/25 Room / Location: [facility] ENDOSCOPY 4 / [facility] ENDOSCOPY    Anesthesia Start: 0749 Anesthesia Stop: 0807    Procedure: BRONCHOSCOPY with right lung washing (Bronchus) Diagnosis:       Pneumothorax on right      Dyspnea, unspecified type      (Pneumothorax on right [J93.9])      (Dyspnea, unspecified type [R06.00])    Surgeons: Marissa Soliz MD Provider: Meka Brown MD    Anesthesia Type: general ASA Status: 3            Anesthesia Type: general    Vitals  Vitals Value Taken Time   /73 04/10/25 08:35   Temp     Pulse 104 04/10/25 08:35   Resp 21 04/10/25 08:25   SpO2 95 % 04/10/25 08:35   Vitals shown include unfiled device data.        Post Anesthesia Care and Evaluation    Patient location during evaluation: PACU  Patient participation: complete - patient participated  Level of consciousness: awake and alert  Pain management: satisfactory to patient    Airway patency: patent  Anesthetic complications: No anesthetic complications  PONV Status: none  Cardiovascular status: acceptable  Respiratory status: acceptable  Hydration status: acceptable    
- - -

## 2025-04-10 NOTE — PROGRESS NOTES
"    Chief Complaint: Recurrent right pneumothorax  S/P: Chest tube placement      Subjective:  Symptoms:  Improved.  She reports cough and anxiety.  No shortness of breath or chest pain.    Activity level: Returning to normal.    Resting in bed on exam. Improved effort of breathing. Some anxiety, hopeful for discharge soon. Ambulatory.     Vital Signs:  Temp:  [97.8 °F (36.6 °C)-98.3 °F (36.8 °C)] 98.1 °F (36.7 °C)  Heart Rate:  [] 94  Resp:  [15-31] 20  BP: ()/(50-86) 144/66    Intake & Output (last day)         04/09 0701  04/10 0700 04/10 0701 04/11 0700    P.O. 240 240    I.V. (mL/kg)  110 (1.8)    Total Intake(mL/kg) 240 (3.9) 350 (5.6)    Urine (mL/kg/hr) 450 (0.3)     Chest Tube 5     Total Output 455     Net -215 +350          Urine Unmeasured Occurrence 3 x             Objective:  General Appearance:  Well-appearing and in no acute distress.    Vital signs: (most recent): Blood pressure 144/66, pulse 94, temperature 98.1 °F (36.7 °C), temperature source Oral, resp. rate 20, height 152.4 cm (60\"), weight 62.1 kg (136 lb 14.5 oz), SpO2 92%.  No fever.    Lungs:  Normal effort and normal respiratory rate.  She is not in respiratory distress.    Heart: Normal rate.  Regular rhythm.    Abdomen: Abdomen is soft and non-distended.    Neurological: Patient is alert and oriented to person, place and time.    Skin:  Warm and dry.                Chest tube:   Site: Right, Clean, Dry, and Intact  Suction: -20 cm  24 Hour Total: 5    Results Review:     I reviewed the patient's new clinical results.  I reviewed the patient's new imaging results and agree with the interpretation.  I reviewed the patient's other test results and agree with the interpretation  Discussed with patient, family, RN, consulting provider    Imaging Results (Last 24 Hours)       Procedure Component Value Units Date/Time    XR Chest 1 View [[id number]] Collected: 04/10/25 0818     Updated: 04/10/25 0822    Narrative:      XR CHEST 1 " VW    Date of Exam: 4/10/2025 4:18 AM EDT    Indication: pneumothorax    Comparison: CT chest 4/9/2025. AP chest 4/8/2025.    FINDINGS:  Right pleural pigtail drain extends toward the apex, similarly positioned. Right chest wall subcutaneous gas appears unchanged. Suspected small right subpulmonic pneumothorax probably not significantly changed compared to the CT chest performed 1 day   prior. Left lung remains clear. No midline shift. Bandlike atelectatic changes in the right lower lobe medially and within the suprahilar right upper lobe. Heart size is normal with signs of prior CABG. No acute osseous abnormality.        Impression:      Impression:  Suspected small right subpulmonic pneumothorax, probably stable to improved compared to yesterday's of the CT chest study.  Subsegmental atelectasis in the right upper lobe and right lower lobe, unchanged.      Electronically Signed: Jessi Enriquez MD    4/10/2025 8:20 AM EDT    Workstation ID: [id number]            Lab Results:     Lab Results (last 24 hours)       Procedure Component Value Units Date/Time    POC Glucose 4x Daily Before Meals & at Bedtime [[id number]]  (Abnormal) Collected: 04/10/25 1101    Specimen: Blood Updated: 04/10/25 1104     Glucose 129 mg/dL      Comment: Serial Number: [id number]:  [id number]       Respiratory Culture - Wash, Lung, R [[id number]] Collected: 04/10/25 0757    Specimen: Wash from Lung, R Updated: 04/10/25 1052     Gram Stain Many (4+) WBCs per low power field      Rare (1+) Epithelial cells per low power field      Rare (1+) Bronchial epithelial cells      Moderate (3+) Mixed bacterial morphotypes seen on Gram Stain      Budding yeast with pseudohyphae    Respiratory Panel PCR w/COVID-19(SARS-CoV-2) MANJIT/THEO/AUREA/PAD/COR/PHILIP In-House, NP Swab in UTM/VTM, 2 HR TAT - Wash, Lung, R [[id number]]  (Normal) Collected: 04/10/25 0757    Specimen: Wash from Lung, R Updated: 04/10/25 1044     ADENOVIRUS, PCR Not Detected     Coronavirus  229E Not Detected     Coronavirus HKU1 Not Detected     Coronavirus NL63 Not Detected     Coronavirus OC43 Not Detected     COVID19 Not Detected     Human Metapneumovirus Not Detected     Human Rhinovirus/Enterovirus Not Detected     Influenza A PCR Not Detected     Influenza B PCR Not Detected     Parainfluenza Virus 1 Not Detected     Parainfluenza Virus 2 Not Detected     Parainfluenza Virus 3 Not Detected     Parainfluenza Virus 4 Not Detected     RSV, PCR Not Detected     Bordetella pertussis pcr Not Detected     Bordetella parapertussis PCR Not Detected     Chlamydophila pneumoniae PCR Not Detected     Mycoplasma pneumo by PCR Not Detected    Narrative:      In the setting of a positive respiratory panel with a viral infection PLUS a negative procalcitonin without other underlying concern for bacterial infection, consider observing off antibiotics or discontinuation of antibiotics and continue supportive care. If the respiratory panel is positive for atypical bacterial infection (Bordetella pertussis, Chlamydophila pneumoniae, or Mycoplasma pneumoniae), consider antibiotic de-escalation to target atypical bacterial infection.    Non-gynecologic Cytology [[id number]] Collected: 04/10/25 0757    Specimen: Wash from Lung, R Updated: 04/10/25 0940    Fungus Culture - Wash, Lung, R [[id number]] Collected: 04/10/25 0757    Specimen: Wash from Lung, R Updated: 04/10/25 0938    Pneumocystis PCR - Wash, Lung, R [[id number]] Collected: 04/10/25 0757    Specimen: Wash from Lung, R Updated: 04/10/25 0938    POC Glucose Once [[id number]]  (Abnormal) Collected: 04/10/25 0915    Specimen: Blood Updated: 04/10/25 0917     Glucose 123 mg/dL      Comment: Serial Number: [id number]:  [id number]       POC Glucose 4x Daily Before Meals & at Bedtime [[id number]]  (Abnormal) Collected: 04/10/25 0731    Specimen: Blood Updated: 04/10/25 0732     Glucose 125 mg/dL      Comment: Serial Number: [id number]:  [id number]       CBC  (No Diff) [[id number]]  (Abnormal) Collected: 04/10/25 0539    Specimen: Blood Updated: 04/10/25 0546     WBC 23.33 10*3/mm3      RBC 5.55 10*6/mm3      Hemoglobin 15.4 g/dL      Hematocrit 48.5 %      MCV 87.4 fL      MCH 27.7 pg      MCHC 31.8 g/dL      RDW 13.4 %      RDW-SD 43.0 fl      MPV 9.7 fL      Platelets 326 10*3/mm3     POC Glucose Once [[id number]]  (Abnormal) Collected: 04/09/25 2039    Specimen: Blood Updated: 04/09/25 2041     Glucose 246 mg/dL      Comment: Serial Number: [id number]:  [id number]       POC Glucose Once [[id number]]  (Abnormal) Collected: 04/09/25 1630    Specimen: Blood Updated: 04/09/25 1632     Glucose 209 mg/dL      Comment: Serial Number: [id number]:  [id number]                Assessment & Plan       Pneumothorax    Pneumothorax on right    Dyspnea       Assessment & Plan  Ms. Stallings is a 69-year-old woman s/p chest tube placement on 4/8/2025. On exam chest tube is at -20 suction, no visible airleak with cough or valsalva. Saturating well on room air. Bronchoscopy this AM demonstrated  oral and esophageal candidiasis with purulent secretions. Placed on Bactrim, nystatin and fluconazole per pulmonology. She is additionally being treated for COPD exacerbation with steroids. Plan to see patient outpatient in two weeks to allow for acute illness to improve. Will discuss plans for VATS pleurodesis at this time. Discussed results and plan with patient and she voices understanding and is in agreement. Defer chest tube management to pulmonology. Not much to add from a thoracic surgery standpoint at this time, will sign off. Please call with any questions.      Rolanda Sparrow PA-C  [facility]  04/10/25  14:55 EDT    Greater than 35 minutes was spent reviewing the patient's chart, radiographic imaging, labs, provider notes, assessing the patient and developing a plan of care.  This was discussed with the patient and RN.

## 2025-04-10 NOTE — ANESTHESIA PREPROCEDURE EVALUATION
Anesthesia Evaluation     Patient summary reviewed and Nursing notes reviewed   NPO Solid Status: > 8 hours  NPO Liquid Status: > 8 hours           Airway   Mallampati: II  TM distance: >3 FB  Neck ROM: full  Dental      Pulmonary - normal exam   (+) a smoker Former, COPD,shortness of breath    ROS comment: Assessment & Plan  Ms. Stallings is a 69-year-old woman s/p chest tube placement on 4/8/2025. On exam chest tube is at -20 suction, no visible airleak with cough or valsalva. Patient complains of dry cough, fatigue. Saturating well on room air. Plan for bronchoscopy tomorrow with pulmonology.      -Continue chest tube to -20 suction. May wean to waterseal tomorrow pending clinical course and AM imaging.  -Repeat AM CXR  -PT/OT  -Ambulate patient around nurses station 3x daily  -NPO at midnight for bronchoscopy with Pulmonology tomorrow  -Encourage incentive spirometer       Cardiovascular - normal exam    ECG reviewed    (+) hypertension, past MI , CAD, CABG, hyperlipidemia    ROS comment:  TTE 2019  Interpretation  Left ventricular systolic function is normal.  Ejection Fraction is 55-60%  The transmitral spectral Doppler flow pattern is suggestive of impaired LV  relaxation.  Akinetic basal inferior wall         Neuro/Psych  (+) psychiatric history Depression and Anxiety, dementia  GI/Hepatic/Renal/Endo    (+) GERD, diabetes mellitus    Musculoskeletal     Abdominal    Substance History      OB/GYN          Other                      Anesthesia Plan    ASA 3     general   total IV anesthesia  intravenous induction     Anesthetic plan, risks, benefits, and alternatives have been provided, discussed and informed consent has been obtained with: patient.    Plan discussed with CAA and CRNA.      CODE STATUS:    Code Status (Patient has no pulse and is not breathing): CPR (Attempt to Resuscitate)  Medical Interventions (Patient has pulse or is breathing): Full Support  Level Of Support Discussed With: Patient

## 2025-04-10 NOTE — PLAN OF CARE
No acute events over night.     Problem: Adult Inpatient Plan of Care  Goal: Plan of Care Review  Outcome: Progressing      Flowsheets (Taken 4/9/2025 1535 by Conchita Centeno, PT)  Outcome Evaluation: Darleen Stallings is a 69 y.o. female with medical hx of COPD on home O2 PRN, CAD s/p CABG, HTN, HLD, DM, essential tremor who presents with SOA and is found to have Pneumothorax.  Pt had recent admission 4/4 to 4/6 with pneumothorax and RSV. At baseline, pt lives alone in a H with 2-3 MARION with handrail.  Pt is typically independent for all moblity, ADLs and IADLs without AD.  She reports no hx of falls in past 6 months and her daughter confirms this.  At time of PT evaluation, she is A&O x 4 with c/o 10/10 pain from chest tube.  Pt demonstrates independent bed mobility.  She requires CGA for transfers and short distance moblity due to need for assist in managing lines, especially for chest tube lines.  Pt with elevated RR throughout.  PT encouraged pt to attempt slower, deeper breaths and pt verbalized understanding.  Pt demonstrated use of Incentive spirometry and PT reinforced attempting to lengthen breaths.  Pt appears close to baseline but needs reminders and assist for line management.  We will continue to work with her while here to prevent decline in function and endurance but do not anticipate ongoing PT needs at discharge.   Goal Outcome Evaluation:

## 2025-04-10 NOTE — SIGNIFICANT NOTE
04/10/25 1002   OTHER   Discipline physical therapist   Rehab Time/Intention   Session Not Performed other (see comments)  (Bronch this a.m.  Will attempt later as schedule permits)   Therapy Assessment/Plan (PT)   Criteria for Skilled Interventions Met (PT) yes;skilled treatment is necessary

## 2025-04-10 NOTE — PROGRESS NOTES
[facility] MEDICINE SERVICE  DAILY PROGRESS NOTE    NAME: Darleen Stallings  : 1955  MRN: [id number]      LOS: 2 days     PROVIDER OF SERVICE: Justyn Ramirez MD    Chief Complaint: Pneumothorax    Subjective:     Interval History:  History taken from: patient     History of Present Illness: Darleen Stallings is a 69 y.o. female with a CMH of COPD with home O2 PRN, CAD s/p CABG, htn, hld, T2DM, essential tremor, tobacco use who presented to [facility] on 2025 with  SOA. Pt was recently admitted here - for right sided pneumothorax, was also positive for RSV at that time. Had chest tube placed and then removed prior to discharge.  She reports that when she got home she still had some continued shortness of air, worse with exertion, and her shortness of air progressed, prompting her to present to the ED again this morning. She otherwise denies any new symptoms, no fever/chills/sore throat/congestion/chest pain. Endorses mild dry cough and baseline wheezing.   In ED workup done notable for WBC 24.45 and chest xray showed moderate right pneumothorax. Pulmonology consulted in ED and chest tube placed. Hospitalist service consulted for admission.       seen in bed NAD, VSS MELLY RN, chest tube in place, no new complaints.  4/10 seen in bed NAD, underwent bronchoscopy showed Copious amount of green purulent secretions suctioned therapeutically from right lung  Right lung washing. Started on empiric Bactrim and nystatin and flucanazole  pending culture results    Review of Systems  Constitutional:  Negative for chills and fever.   HENT:  Negative for congestion, rhinorrhea and sore throat.    Eyes: Negative.    Respiratory:  Positive for shortness of breath.         Mild dry cough, baseline wheezing   Cardiovascular:  Negative for chest pain, palpitations and leg swelling.   Gastrointestinal:  Negative for abdominal pain, constipation, diarrhea and nausea.   Genitourinary:  Negative for  dysuria, frequency and urgency.   Musculoskeletal:  Negative for myalgias.   Neurological:  Positive for tremors. Negative for dizziness, syncope, weakness, light-headedness and headaches.      Objective:     Vital Signs  Temp:  [97.8 °F (36.6 °C)-99.1 °F (37.3 °C)] 98.2 °F (36.8 °C)  Heart Rate:  [] 107  Resp:  [15-31] 21  BP: (105-141)/(50-86) 108/65  Flow (L/min) (Oxygen Therapy):  [2-10] 3   Body mass index is 26.74 kg/m².    Physical Exam      General: She is not in acute distress.     Appearance: Normal appearance.   HENT:      Head: Normocephalic and atraumatic.      Mouth/Throat:      Mouth: Mucous membranes are moist.      Pharynx: Oropharynx is clear.   Eyes:      Extraocular Movements: Extraocular movements intact.      Conjunctiva/sclera: Conjunctivae normal.   Cardiovascular:      Rate and Rhythm: Normal rate and regular rhythm.      Heart sounds: Normal heart sounds.   Pulmonary:      Comments: No incr wob on RA  Breath sounds present in all lung fields     Abdominal:      General: Abdomen is flat. Bowel sounds are normal.      Palpations: Abdomen is soft.      Tenderness: There is no abdominal tenderness. There is no guarding or rebound.   Musculoskeletal:         General: No swelling or tenderness.      Cervical back: Normal range of motion and neck supple.      Right lower leg: No edema.      Left lower leg: No edema.   Skin:     General: Skin is warm and dry.      Comments: Chest tube with dressing in place right anterior chest   Neurological:      General: No focal deficit present.      Mental Status: She is alert and oriented to person, place, and time. Mental status is at baseline.      Cranial Nerves: No cranial nerve deficit.      Motor: No weakness.      Comments: + baseline upper extremity tremor        Diagnostic Data    Results from last 7 days   Lab Units 04/10/25  0539 04/09/25  0037   WBC 10*3/mm3 23.33* 20.98*   HEMOGLOBIN g/dL 15.4 16.2*   HEMATOCRIT % 48.5* 53.1*   PLATELETS  10*3/mm3 326 398   GLUCOSE mg/dL  --  87   CREATININE mg/dL  --  1.02*   BUN mg/dL  --  28*   SODIUM mmol/L  --  136   POTASSIUM mmol/L  --  4.3   ANION GAP mmol/L  --  8.1       XR Chest 1 View  Result Date: 4/10/2025  Impression: Suspected small right subpulmonic pneumothorax, probably stable to improved compared to yesterday's of the CT chest study. Subsegmental atelectasis in the right upper lobe and right lower lobe, unchanged. Electronically Signed: Jessi Enriquez MD  4/10/2025 8:20 AM EDT  Workstation ID: [id number]    CT Chest Without Contrast Diagnostic  Result Date: 4/9/2025  Impression: 1.Small-moderate right-sided pneumothorax with a from basilar component. Right apical pleural drain catheter is in place. 2.Mild areas of atelectasis in the right lung. There are a few small foci of airspace disease in the right lung that could be infectious or inflammatory. 3.Patulous esophagus with a fluid column extending to the thoracic inlet. 4.Evidence of prior median sternotomy and CABG. Electronically Signed: Brian Miranda MD  4/9/2025 6:04 AM EDT  Workstation ID: [id number]    XR Chest 1 View  Result Date: 4/8/2025  Impression: Interval placement of a right apical chest tube with evacuation of an apical pneumothorax. Electronically Signed: Tristan Trent MD  4/8/2025 10:22 AM EDT  Workstation ID: [id number]    XR Chest 1 View  Result Date: 4/8/2025  Impression: Moderate right pneumothorax. I spoke with the ER physician, who is already aware of this finding. Electronically Signed: Jessi Enriquez MD  4/8/2025 9:05 AM EDT  Workstation ID: [id number]      Scheduled Meds:amLODIPine, 5 mg, Oral, Daily  aspirin, 81 mg, Oral, Daily  atorvastatin, 80 mg, Oral, Daily  budesonide, 0.5 mg, Nebulization, BID - RT  citalopram, 20 mg, Oral, Daily  empagliflozin, 25 mg, Oral, Daily  fluconazole, 100 mg, Oral, Q24H  guaiFENesin, 1,200 mg, Oral, Q12H  insulin lispro, 2-7 Units, Subcutaneous, 4x Daily AC & at Bedtime  ipratropium-albuterol,  3 mL, Nebulization, 4x Daily - RT  nicotine, 1 patch, Transdermal, Q24H  nystatin, 5 mL, Swish & Swallow, 4x Daily  pantoprazole, 40 mg, Oral, Q AM  predniSONE, 20 mg, Oral, Daily With Breakfast  senna-docusate sodium, 2 tablet, Oral, BID  sulfamethoxazole-trimethoprim, 1 tablet, Oral, Q12H  [Held by provider] valsartan, 40 mg, Oral, Daily      Continuous Infusions:   PRN Meds:.  acetaminophen **OR** acetaminophen **OR** acetaminophen    senna-docusate sodium **AND** polyethylene glycol **AND** bisacodyl **AND** bisacodyl    calcium carbonate    Calcium Replacement - Follow Nurse / BPA Driven Protocol    cyclobenzaprine    dextrose    dextrose    glucagon (human recombinant)    HYDROmorphone    ipratropium-albuterol    Magnesium Standard Dose Replacement - Follow Nurse / BPA Driven Protocol    naloxone    nitroglycerin    ondansetron    oxyCODONE    Phosphorus Replacement - Follow Nurse / BPA Driven Protocol    Potassium Replacement - Follow Nurse / BPA Driven Protocol    [COMPLETED] Insert Peripheral IV **AND** sodium chloride   I reviewed the patient's new clinical results.    Assessment/Plan:     Active and Resolved Problems  Active Hospital Problems    Diagnosis  POA    **Pneumothorax [J93.9]  Yes    Pneumothorax on right [J93.9]  Unknown    Dyspnea [R06.00]  Unknown      Resolved Hospital Problems   No resolved problems to display.     Recurrent right sided pneumothorax  - spontaneous, no recent trauma  - s/p chest tube with interval improvement, currently to LWS.   - Pulmonology managing  Consult thoracic surgery   -s/p bronch to r/o infections such as MAC/PCP that can lead to pneumothorax  - CT Chest pending to ensure adequate lung expansion, anticipate chemical pleurodesis 4/9.  - started empiric Bactrim and nystatin and flucanazole  Check culture results  - PRN pain control     COPD not in acute exacerbation  RSV infection  Leukocytosis  Tobacco use  - WBC elevated at 24.45, was 18.6 at recent discharge. Pt  has been on steroid therapy, also found to be RSV+ 4/3. Afebrile, without infectious Sx, imaging without opacity/consolidation appreciated. Low suspicion for bacterial infection at this time, will defer abx. Re-evaluate if pt status worsens.   - Pulm following as above  - Cont home medications, pulmicort, duonebs  - Cont steroid course, guaifenesin   - Cont pulse ox, supplemental O2 as needed  - Incentive spirometry  - Droplet precautions  - tobacco cessation counseled  - nicotine patch prn     Hypertension  - BP initially elevated in the setting of stress as above, improved  - cont home medications, hold ARB 4/9 am in case of possible general anesthesia, resume when appropriate     T2DM  - cont jardiance, hold home metformin  - SSI  - CCD     CAD s/p CABG 2019  HLD  Depression  Anxiety  - cont home medications     VTE Prophylaxis:  Mechanical VTE prophylaxis orders are present.    Disposition Planning:   Barriers to Discharge:chest tube  Anticipated Date of Discharge: 4/13  Place of Discharge: [facility]  Time: 34 minutes     Code Status and Medical Interventions: CPR (Attempt to Resuscitate); Full Support   Ordered at: 04/08/25 1139     Code Status (Patient has no pulse and is not breathing):    CPR (Attempt to Resuscitate)     Medical Interventions (Patient has pulse or is breathing):    Full Support     Level Of Support Discussed With:    Patient       Signature: Electronically signed by Justyn Ramirez MD, 04/10/25, 08:42 EDT.  [facility] Hospitalist Team

## 2025-04-10 NOTE — PROGRESS NOTES
Daily Progress Note        Pneumothorax    Pneumothorax on right    Dyspnea         Assessment:    Recurrent pneumothorax right side,   status post chest tube placement 4/8/25 and  4/3/2025 and  4/5/2025    COPD but not in acute exacerbation  RSV infection     Right upper lobe bronchiectasis  Lung nodules on CT scan 2023 2.3 cm right upper lobe, 1.3 cm right upper lobe and 2.2 cm subcarinal lymph node  Hypoxia  HTN  HLD  DM  CAD status post CABG 2019  Essential tremors  Tobacco smoker: Quit 2023 but returned to smoking again     PFTs 2022: Severe obstructive pattern FEV1/FVC 50%, FVC 61%, FEV1 40% without significant improvement with bronchodilators        Recommendations:    Bronchoscopy completed 4/10/2025   Copious amount of green purulent secretions right lung      Oral and esophageal candidiasis      Will start empiric Bactrim and nystatin and flucanazole  Check culture results    Certain infections can increase the risk for spontaneous pneumothorax such as PCP and MAC    chest tube management, suction -20    Will discuss with thoracic surgery for long-term management, if pleurodesis versus VATS is indicated for long-term management     Nebulized Pulmicort  Mucinex  Prednisone 20 mg qd    Oxygen supplement and titration to maintain saturation 90 to 95%, currently on 4 L per nasal cannula  Bronchodilators     Aspirin, atorvastatin, amlodipine, valsartan  Jardiance  Insulin sliding scale  Protonix     Smoking cessation counseling, nicotine patch     I personally reviewed the radiological studies              LOS: 2 days     Subjective         Objective     Vital signs for last 24 hours:  Vitals:    04/09/25 2035 04/09/25 2040 04/09/25 2043 04/10/25 0416   BP:   128/64 141/86   BP Location:   Right arm Right arm   Patient Position:   Lying Lying   Pulse: 95 96 91 88   Resp: 20 20 22 15   Temp:   97.8 °F (36.6 °C) 98.1 °F (36.7 °C)   TempSrc:   Oral Oral   SpO2: 97% 98% 96% 95%   Weight:    62.1 kg (136 lb 14.5 oz)    Height:           Intake/Output last 3 shifts:  I/O last 3 completed shifts:  In: 600 [P.O.:600]  Out: 455 [Urine:450; Chest Tube:5]  Intake/Output this shift:  No intake/output data recorded.      Radiology  Imaging Results (Last 24 Hours)       Procedure Component Value Units Date/Time    XR Chest 1 View [[id number]] Resulted: 04/10/25 0459     Updated: 04/10/25 0459            Labs:  Results from last 7 days   Lab Units 04/10/25  0539   WBC 10*3/mm3 23.33*   HEMOGLOBIN g/dL 15.4   HEMATOCRIT % 48.5*   PLATELETS 10*3/mm3 326     Results from last 7 days   Lab Units 04/09/25  0037   SODIUM mmol/L 136   POTASSIUM mmol/L 4.3   CHLORIDE mmol/L 102   CO2 mmol/L 25.9   BUN mg/dL 28*   CREATININE mg/dL 1.02*   CALCIUM mg/dL 9.7   GLUCOSE mg/dL 87     Results from last 7 days   Lab Units 04/03/25  2302   PH, ARTERIAL pH units 7.322*   PO2 ART mm Hg 62.1*   PCO2, ARTERIAL mm Hg 43.2   HCO3 ART mmol/L 22.3                     Results from last 7 days   Lab Units 04/08/25  0847   INR  1.05   APTT seconds 22.3*               Meds:   SCHEDULE  amLODIPine, 5 mg, Oral, Daily  aspirin, 81 mg, Oral, Daily  atorvastatin, 80 mg, Oral, Daily  budesonide, 0.5 mg, Nebulization, BID - RT  citalopram, 20 mg, Oral, Daily  empagliflozin, 25 mg, Oral, Daily  guaiFENesin, 1,200 mg, Oral, Q12H  insulin lispro, 2-7 Units, Subcutaneous, 4x Daily AC & at Bedtime  ipratropium-albuterol, 3 mL, Nebulization, 4x Daily - RT  nicotine, 1 patch, Transdermal, Q24H  pantoprazole, 40 mg, Oral, Q AM  predniSONE, 20 mg, Oral, Daily With Breakfast  senna-docusate sodium, 2 tablet, Oral, BID  [Held by provider] valsartan, 40 mg, Oral, Daily      Infusions     PRNs    acetaminophen **OR** acetaminophen **OR** acetaminophen    senna-docusate sodium **AND** polyethylene glycol **AND** bisacodyl **AND** bisacodyl    calcium carbonate    Calcium Replacement - Follow Nurse / BPA Driven Protocol    cyclobenzaprine    dextrose    dextrose    glucagon (human  recombinant)    HYDROmorphone    ipratropium-albuterol    Magnesium Standard Dose Replacement - Follow Nurse / BPA Driven Protocol    naloxone    nitroglycerin    ondansetron    oxyCODONE    Phosphorus Replacement - Follow Nurse / BPA Driven Protocol    Potassium Replacement - Follow Nurse / BPA Driven Protocol    [COMPLETED] Insert Peripheral IV **AND** sodium chloride    Physical Exam:  Physical Exam  Cardiovascular:      Heart sounds: Murmur heard.      No gallop.   Pulmonary:      Effort: No respiratory distress.      Breath sounds: No stridor. Rhonchi and rales present. No wheezing.   Chest:      Chest wall: No tenderness.         ROS  Review of Systems   Respiratory:  Positive for cough and shortness of breath. Negative for wheezing and stridor.    Cardiovascular:  Negative for chest pain, palpitations and leg swelling.             Total critical care time spent with patient greater than: 45 Minutes

## 2025-04-10 NOTE — PLAN OF CARE
Problem: Adult Inpatient Plan of Care  Goal: Plan of Care Review  Outcome: Progressing  Goal: Patient-Specific Goal (Individualized)  Outcome: Progressing  Goal: Absence of Hospital-Acquired Illness or Injury  Outcome: Progressing  Intervention: Identify and Manage Fall Risk  Recent Flowsheet Documentation  Taken 4/10/2025 1600 by Ana Rivera LPN  Safety Promotion/Fall Prevention:   safety round/check completed   assistive device/personal items within reach  Taken 4/10/2025 1400 by Ana Rivera LPN  Safety Promotion/Fall Prevention:   assistive device/personal items within reach   safety round/check completed   fall prevention program maintained  Taken 4/10/2025 1300 by Ana Rivera LPN  Safety Promotion/Fall Prevention:   toileting scheduled   safety round/check completed   room organization consistent   patient off unit   fall prevention program maintained   assistive device/personal items within reach  Taken 4/10/2025 1200 by Ana Rivera LPN  Safety Promotion/Fall Prevention:   toileting scheduled   safety round/check completed   assistive device/personal items within reach  Taken 4/10/2025 1100 by Ana Rivera LPN  Safety Promotion/Fall Prevention:   safety round/check completed   assistive device/personal items within reach  Intervention: Prevent Skin Injury  Recent Flowsheet Documentation  Taken 4/10/2025 1500 by Ana Rivera LPN  Body Position: position changed independently  Taken 4/10/2025 1400 by Ana Rivera LPN  Body Position: position changed independently  Taken 4/10/2025 1300 by Ana Rivera LPN  Body Position: position changed independently  Taken 4/10/2025 1200 by Ana Rivera LPN  Body Position: position changed independently  Taken 4/10/2025 1100 by Ana Rivera LPN  Body Position: position changed independently  Intervention: Prevent Infection  Recent Flowsheet Documentation  Taken 4/10/2025 1600 by Ana Rivera LPN  Infection Prevention: single patient room  provided  Taken 4/10/2025 1500 by Ana Rivera LPN  Infection Prevention:   rest/sleep promoted   hand hygiene promoted  Taken 4/10/2025 1400 by Ana Rivera LPN  Infection Prevention:   rest/sleep promoted   hand hygiene promoted  Taken 4/10/2025 1300 by Ana Rivera LPN  Infection Prevention: rest/sleep promoted  Goal: Optimal Comfort and Wellbeing  Outcome: Progressing  Goal: Readiness for Transition of Care  Outcome: Progressing     Problem: Comorbidity Management  Goal: Maintenance of COPD Symptom Control  Outcome: Progressing  Intervention: Maintain COPD (Chronic Obstructive Pulmonary Disease) Symptom Control  Recent Flowsheet Documentation  Taken 4/10/2025 1200 by Ana Rivera LPN  Breathing Techniques/Airway Clearance: airway clearance techniques utilized  Goal: Blood Glucose Level Within Target Range  Outcome: Progressing  Goal: Blood Pressure in Desired Range  Outcome: Progressing     Problem: Fall Injury Risk  Goal: Absence of Fall and Fall-Related Injury  Outcome: Progressing  Intervention: Promote Injury-Free Environment  Recent Flowsheet Documentation  Taken 4/10/2025 1600 by Ana Rivera LPN  Safety Promotion/Fall Prevention:   safety round/check completed   assistive device/personal items within reach  Taken 4/10/2025 1400 by Ana Rivera LPN  Safety Promotion/Fall Prevention:   assistive device/personal items within reach   safety round/check completed   fall prevention program maintained  Taken 4/10/2025 1300 by Ana Rivera LPN  Safety Promotion/Fall Prevention:   toileting scheduled   safety round/check completed   room organization consistent   patient off unit   fall prevention program maintained   assistive device/personal items within reach  Taken 4/10/2025 1200 by Ana Rivera LPN  Safety Promotion/Fall Prevention:   toileting scheduled   safety round/check completed   assistive device/personal items within reach  Taken 4/10/2025 1100 by Ana Rivera LPN  Safety  Promotion/Fall Prevention:   safety round/check completed   assistive device/personal items within reach   Goal Outcome Evaluation:     Patient had a bronc this AM without issues. Started on PO ABT. Cardiothoracic surgeon wants her to follow up within 2 weeks. Chest tube in place to right upper chest without issues. No complaints of shortness of air or cough noted this shift. No drainage noted this shift in chest tube. Vitals stable, afebrile, room air.

## 2025-04-11 ENCOUNTER — APPOINTMENT (OUTPATIENT)
Dept: GENERAL RADIOLOGY | Facility: HOSPITAL | Age: 70
DRG: 200 | End: 2025-04-11
Payer: MEDICARE

## 2025-04-11 LAB
GLUCOSE BLDC GLUCOMTR-MCNC: 118 MG/DL (ref 70–105)
GLUCOSE BLDC GLUCOMTR-MCNC: 221 MG/DL (ref 70–105)
GLUCOSE BLDC GLUCOMTR-MCNC: 307 MG/DL (ref 70–105)
GLUCOSE BLDC GLUCOMTR-MCNC: 368 MG/DL (ref 70–105)

## 2025-04-11 PROCEDURE — 71045 X-RAY EXAM CHEST 1 VIEW: CPT

## 2025-04-11 PROCEDURE — 63710000001 PREDNISONE PER 1 MG: Performed by: INTERNAL MEDICINE

## 2025-04-11 PROCEDURE — 82948 REAGENT STRIP/BLOOD GLUCOSE: CPT | Performed by: STUDENT IN AN ORGANIZED HEALTH CARE EDUCATION/TRAINING PROGRAM

## 2025-04-11 PROCEDURE — 94799 UNLISTED PULMONARY SVC/PX: CPT

## 2025-04-11 PROCEDURE — 94664 DEMO&/EVAL PT USE INHALER: CPT

## 2025-04-11 PROCEDURE — 63710000001 INSULIN LISPRO (HUMAN) PER 5 UNITS: Performed by: STUDENT IN AN ORGANIZED HEALTH CARE EDUCATION/TRAINING PROGRAM

## 2025-04-11 PROCEDURE — 82948 REAGENT STRIP/BLOOD GLUCOSE: CPT

## 2025-04-11 PROCEDURE — 94761 N-INVAS EAR/PLS OXIMETRY MLT: CPT

## 2025-04-11 RX ADMIN — INSULIN LISPRO 5 UNITS: 100 INJECTION, SOLUTION INTRAVENOUS; SUBCUTANEOUS at 21:31

## 2025-04-11 RX ADMIN — NYSTATIN 500000 UNITS: 100000 SUSPENSION ORAL at 18:38

## 2025-04-11 RX ADMIN — IPRATROPIUM BROMIDE AND ALBUTEROL SULFATE 3 ML: .5; 3 SOLUTION RESPIRATORY (INHALATION) at 19:05

## 2025-04-11 RX ADMIN — SULFAMETHOXAZOLE AND TRIMETHOPRIM 1 TABLET: 800; 160 TABLET ORAL at 09:03

## 2025-04-11 RX ADMIN — OXYCODONE 5 MG: 5 TABLET ORAL at 13:03

## 2025-04-11 RX ADMIN — GUAIFENESIN 1200 MG: 600 TABLET, EXTENDED RELEASE ORAL at 20:00

## 2025-04-11 RX ADMIN — FLUCONAZOLE 100 MG: 100 TABLET ORAL at 09:03

## 2025-04-11 RX ADMIN — BUDESONIDE 0.5 MG: 0.5 INHALANT RESPIRATORY (INHALATION) at 19:10

## 2025-04-11 RX ADMIN — OXYCODONE 5 MG: 5 TABLET ORAL at 19:50

## 2025-04-11 RX ADMIN — CALCIUM CARBONATE (ANTACID) CHEW TAB 500 MG 2 TABLET: 500 CHEW TAB at 10:43

## 2025-04-11 RX ADMIN — INSULIN LISPRO 3 UNITS: 100 INJECTION, SOLUTION INTRAVENOUS; SUBCUTANEOUS at 13:03

## 2025-04-11 RX ADMIN — EMPAGLIFLOZIN 25 MG: 25 TABLET, FILM COATED ORAL at 09:03

## 2025-04-11 RX ADMIN — PREDNISONE 20 MG: 20 TABLET ORAL at 09:03

## 2025-04-11 RX ADMIN — NYSTATIN 500000 UNITS: 100000 SUSPENSION ORAL at 13:03

## 2025-04-11 RX ADMIN — AMLODIPINE BESYLATE 5 MG: 5 TABLET ORAL at 09:03

## 2025-04-11 RX ADMIN — SULFAMETHOXAZOLE AND TRIMETHOPRIM 1 TABLET: 800; 160 TABLET ORAL at 20:00

## 2025-04-11 RX ADMIN — IPRATROPIUM BROMIDE AND ALBUTEROL SULFATE 3 ML: .5; 3 SOLUTION RESPIRATORY (INHALATION) at 08:05

## 2025-04-11 RX ADMIN — PANTOPRAZOLE SODIUM 40 MG: 40 TABLET, DELAYED RELEASE ORAL at 05:01

## 2025-04-11 RX ADMIN — IPRATROPIUM BROMIDE AND ALBUTEROL SULFATE 3 ML: .5; 3 SOLUTION RESPIRATORY (INHALATION) at 15:15

## 2025-04-11 RX ADMIN — GUAIFENESIN 1200 MG: 600 TABLET, EXTENDED RELEASE ORAL at 09:03

## 2025-04-11 RX ADMIN — INSULIN LISPRO 6 UNITS: 100 INJECTION, SOLUTION INTRAVENOUS; SUBCUTANEOUS at 18:38

## 2025-04-11 RX ADMIN — NYSTATIN 500000 UNITS: 100000 SUSPENSION ORAL at 09:03

## 2025-04-11 RX ADMIN — NYSTATIN 500000 UNITS: 100000 SUSPENSION ORAL at 20:00

## 2025-04-11 RX ADMIN — BUDESONIDE 0.5 MG: 0.5 INHALANT RESPIRATORY (INHALATION) at 08:10

## 2025-04-11 RX ADMIN — CITALOPRAM HYDROBROMIDE 20 MG: 20 TABLET ORAL at 09:03

## 2025-04-11 RX ADMIN — IPRATROPIUM BROMIDE AND ALBUTEROL SULFATE 3 ML: .5; 3 SOLUTION RESPIRATORY (INHALATION) at 11:26

## 2025-04-11 RX ADMIN — SENNOSIDES AND DOCUSATE SODIUM 2 TABLET: 50; 8.6 TABLET ORAL at 09:03

## 2025-04-11 RX ADMIN — ATORVASTATIN CALCIUM 80 MG: 40 TABLET, FILM COATED ORAL at 09:03

## 2025-04-11 RX ADMIN — NICOTINE 1 PATCH: 21 PATCH TRANSDERMAL at 09:04

## 2025-04-11 RX ADMIN — SENNOSIDES AND DOCUSATE SODIUM 2 TABLET: 50; 8.6 TABLET ORAL at 20:00

## 2025-04-11 RX ADMIN — ASPIRIN 81 MG: 81 TABLET, COATED ORAL at 09:03

## 2025-04-11 NOTE — PROGRESS NOTES
Daily Progress Note        Pneumothorax    Pneumothorax on right    Dyspnea         Assessment:    Recurrent pneumothorax right side,   status post chest tube placement 4/8/25 and  4/3/2025 and  4/5/2025    COPD but not in acute exacerbation  RSV infection positive on 4/3/2025     Right upper lobe bronchiectasis  Lung nodules on CT scan 2023 2.3 cm right upper lobe, 1.3 cm right upper lobe and 2.2 cm subcarinal lymph node  Hypoxia  HTN  HLD  DM  CAD status post CABG 2019  Essential tremors  Tobacco smoker: Quit 2023 but returned to smoking again     PFTs 2022: Severe obstructive pattern FEV1/FVC 50%, FVC 61%, FEV1 40% without significant improvement with bronchodilators        Recommendations:    Bronchoscopy completed 4/10/2025   Copious amount of green purulent secretions right lung      Oral and esophageal candidiasis      Cont empiric Bactrim and nystatin and flucanazole  Check culture results    Certain infections can increase the risk for spontaneous pneumothorax such as PCP and MAC    chest tube management, waterseal and repeat chest x-ray    Discussed with thoracic surgery given the active infection we will avoid any pleurodesis or surgical intervention and continue medical management for the chest catheter, after discharge patient is to follow-up with thoracic surgery for definite plan to prevent recurrent pneumothorax     Nebulized Pulmicort  Mucinex  Prednisone 20 mg qd    Oxygen supplement and titration to maintain saturation 90 to 95%, currently on 4 L per nasal cannula  Bronchodilators     Aspirin, atorvastatin, amlodipine, valsartan  Jardiance  Insulin sliding scale  Protonix     Smoking cessation counseling, nicotine patch     I personally reviewed the radiological studies        Chest x-ray 4/11/2025          LOS: 3 days     Subjective         Objective     Vital signs for last 24 hours:  Vitals:    04/11/25 0805 04/11/25 0809 04/11/25 0810 04/11/25 0812   BP:       BP Location:       Patient  Position:       Pulse: 91 89 90 87   Resp:  16  16   Temp:       TempSrc:       SpO2: 99% 99% 99% 96%   Weight:       Height:           Intake/Output last 3 shifts:  I/O last 3 completed shifts:  In: 1070 [P.O.:960; I.V.:110]  Out: 1850 [Urine:1850]  Intake/Output this shift:  I/O this shift:  In: 360 [P.O.:360]  Out: -       Radiology  Imaging Results (Last 24 Hours)       Procedure Component Value Units Date/Time    XR Chest 1 View [[id number]] Collected: 04/11/25 0700     Updated: 04/11/25 0706    Narrative:      XR CHEST 1 VW    Date of Exam: 4/11/2025 2:00 AM EDT    Indication: pneumothorax    Comparison: Chest x-ray 4/10/2025    Findings:  The heart is stable in size. Right pleural chest drain is noted unchanged. There is a small right apical pneumothorax measuring 2 mm minimally changed from prior study. No evidence for large pleural effusion. No focal consolidation. Median sternotomy   wires are noted.      Impression:      Impression:  Stable chest examination with stable appearance to 2 mm right apical pneumothorax.      Electronically Signed: Milena Trent MD    4/11/2025 7:04 AM EDT    Workstation ID: [id number]            Labs:  Results from last 7 days   Lab Units 04/10/25  0539   WBC 10*3/mm3 23.33*   HEMOGLOBIN g/dL 15.4   HEMATOCRIT % 48.5*   PLATELETS 10*3/mm3 326     Results from last 7 days   Lab Units 04/09/25  0037   SODIUM mmol/L 136   POTASSIUM mmol/L 4.3   CHLORIDE mmol/L 102   CO2 mmol/L 25.9   BUN mg/dL 28*   CREATININE mg/dL 1.02*   CALCIUM mg/dL 9.7   GLUCOSE mg/dL 87                           Results from last 7 days   Lab Units 04/08/25  0847   INR  1.05   APTT seconds 22.3*               Meds:   SCHEDULE  amLODIPine, 5 mg, Oral, Daily  aspirin, 81 mg, Oral, Daily  atorvastatin, 80 mg, Oral, Daily  budesonide, 0.5 mg, Nebulization, BID - RT  citalopram, 20 mg, Oral, Daily  empagliflozin, 25 mg, Oral, Daily  fluconazole, 100 mg, Oral, Q24H  guaiFENesin, 1,200 mg, Oral, Q12H  insulin  lispro, 2-7 Units, Subcutaneous, 4x Daily AC & at Bedtime  ipratropium-albuterol, 3 mL, Nebulization, 4x Daily - RT  nicotine, 1 patch, Transdermal, Q24H  nystatin, 5 mL, Swish & Swallow, 4x Daily  pantoprazole, 40 mg, Oral, Q AM  predniSONE, 20 mg, Oral, Daily With Breakfast  senna-docusate sodium, 2 tablet, Oral, BID  sulfamethoxazole-trimethoprim, 1 tablet, Oral, Q12H  [Held by provider] valsartan, 40 mg, Oral, Daily      Infusions     PRNs    acetaminophen **OR** acetaminophen **OR** acetaminophen    senna-docusate sodium **AND** polyethylene glycol **AND** bisacodyl **AND** bisacodyl    calcium carbonate    Calcium Replacement - Follow Nurse / BPA Driven Protocol    cyclobenzaprine    dextrose    dextrose    glucagon (human recombinant)    HYDROmorphone    ipratropium-albuterol    Magnesium Standard Dose Replacement - Follow Nurse / BPA Driven Protocol    naloxone    nitroglycerin    oxyCODONE    Phosphorus Replacement - Follow Nurse / BPA Driven Protocol    Potassium Replacement - Follow Nurse / BPA Driven Protocol    [COMPLETED] Insert Peripheral IV **AND** sodium chloride    Physical Exam:  Physical Exam  Cardiovascular:      Heart sounds: Murmur heard.      No gallop.   Pulmonary:      Effort: No respiratory distress.      Breath sounds: No stridor. Rhonchi and rales present. No wheezing.   Chest:      Chest wall: No tenderness.         ROS  Review of Systems   Respiratory:  Positive for cough and shortness of breath. Negative for wheezing and stridor.    Cardiovascular:  Negative for chest pain, palpitations and leg swelling.             Total critical care time spent with patient greater than: 45 Minutes

## 2025-04-11 NOTE — CASE MANAGEMENT/SOCIAL WORK
Discharge Planning Assessment  [facility]     Patient Name: Darleen Stallings  MRN: [id number]  Today's Date: 4/11/2025    Admit Date: 4/8/2025    Plan: Anticipate routine home alone. New OP PT referral [facility] (order faxed). Current home O2 through [facility].       Discharge Plan       Row Name 04/11/25 1317       Plan    Plan Anticipate routine home alone. New OP PT referral [facility] (order faxed). Current home O2 through [facility].    Patient/Family in Agreement with Plan yes    Plan Comments CM met with patient at bedside. [facility] letter reviewed with patient, verbal consent and copy left at bedside. Patient agreeable to [facility], order faxed, CM spoke to Harriet, clinic will call patient to schedule. Barrier to D/C: right chest tube, WBC monitoring (23.33), pulmonology following.                      Expected Discharge Date and Time       Expected Discharge Date Expected Discharge Time    Apr 12, 2025                Patient Forms       Row Name 04/11/25 1320       Patient Forms    Important Message from Medicare (IMM) Delivered  4/11/25    Delivered to Patient    Method of delivery In person                  Met with patient in room wearing PPE: mask.  Maintained distance greater than six feet and spent less than 15 minutes in the room.   JUDY PimentelN, RN, CCM    [facility]  [address] [place] [address]    Office: [phone]  Fax: [phone]

## 2025-04-11 NOTE — DISCHARGE PLACEMENT REQUEST
"Darleen Guzman (69 y.o. Female)       Date of Birth   1955    Social Security Number       Address   [address] [place] [address]    Home Phone   [phone]    MRN   [id number]       [place]   [employer]    Marital Status   Single                            Admission Date   4/8/2025    Admission Type   Emergency    Admitting Provider   Luis Carlos Hay MD    Attending Provider   Justyn Ramirez MD    Department, Room/Bed   [facility], 2123/1       Discharge Date       Discharge Disposition       Discharge Destination                                 Attending Provider: Justyn Ramirez MD    Allergies: Ace Inhibitors, Pregabalin, Aricept [Donepezil], Codeine, Paxil [Paroxetine]    Isolation: Contact Drop   Infection: RSV (04/03/25)   Code Status: CPR    Ht: 152.4 cm (60\")   Wt: 61.4 kg (135 lb 5.8 oz)    Admission Cmt: None   Principal Problem: Pneumothorax [J93.9]                   Active Insurance as of 4/8/2025       Primary Coverage       Payor Plan Insurance Group Employer/Plan Group    ANTHEM MEDICARE REPLACEMENT ANTHEM MEDICARE ADVANTAGE SNP [id number]       Payor Plan Address Payor Plan Phone Number Payor Plan Fax Number Effective Dates    [address] [phone]  1/1/2024 - None Entered    [facility] [address]         Subscriber Name Subscriber Birth Date Member ID       DARLEEN GUZMAN 1955 [id number]               Secondary Coverage       Payor Plan Insurance Group Employer/Plan Group    [id number]        Payor Plan Address Payor Plan Phone Number Payor Plan Fax Number Effective Dates    [address] [address]   11/1/2024 - None Entered    [place] [address]         Subscriber Name Subscriber Birth Date Member ID       DARLEEN GUZMAN 1955 [id number]                     Emergency Contacts        (Rel.) Home Phone Work Phone Mobile Phone    Cristin Guzman (Sister) -- -- [phone]    " Sergio Stallings (Son) [phone] -- [phone]    PAULA PAGAN (Daughter) [phone] -- [phone]          Ambulatory Referral to Physical Therapy for Evaluation & Treatment [REF87] (Order [id number])  Order  Date: 4/11/2025 Department: [facility] CARE Ordering/Authorizing: Justyn Ramirez MD     Order History  Outpatient  Date/Time Action Taken User Additional Information   04/11/25 1221 Sign Felipa Peacock Ordering Mode: Verbal with readback   04/11/25 1248 Verbal Cosign Justyn Ramirez MD      Order Details    Frequency Duration Priority Order Class   None None Routine Internal Referral     Start Date/Time    Start Date   04/11/25     Order Information    Order Date Service Start Date Start Time   04/11/25 Medicine 04/11/25      Reference Links    Associated Reports   View Encounter     Order Questions    Question Answer   Specialty needed: Evaluate and treat   Follow-up needed: Yes            Verbal / Cosign Order Info    Action Created on Order Mode Entered by Responsible Provider Signed by Signed on   Ordering 04/11/25 1221 Verbal with readback Felipa Peacock Federico N, MD Salcedo, Federico N, MD 04/11/25 1248     Source Order Set / Preference List    Preference List   [facility]B [facility] REFERRALS [[id number]]             Clinical Indications     ICD-10-CM ICD-9-CM   Pneumothorax on right  - Primary    J93.9 512.89                             Reprint Order Requisition    Ambulatory Referral to Physical Therapy for Evaluation & Treatment (Order #[id number]) on 4/11/25         Encounter    View Encounter                Order Provider Info        Phone Pager E-mail   Ordering User Felipa Peacock [phone] (Sign In Phone) -- --   Authorizing Provider  Justyn Ramirez MD  [phone] (Office Phone) -- --   Attending Provider  Justyn Ramirez MD  [phone] (Office Phone) -- --   Entered By Felipa Peacock [phone] (Sign In Phone) -- --   Ordering Provider   Justyn Ramirez MD  [phone] (Office Phone) -- --   Signed By (on 04/11/2025 1248)  Justyn Ramirez MD  [phone] (Office Phone) -- --     Tracking Reports    Cosign Tracking Order Transmittal Tracking     Authorized by:  Justyn Ramirez MD  (NPI: [id number])                Lab Component SmartPhrase Guide    Ambulatory Referral to Physical Therapy for Evaluation & Treatment (Order #[id number]) on 4/11/25          Physical Therapy Notes (last 24 hours)        Conchita Centeno, PT at 04/10/25 1524  Version 1 of 1         Subjective: Pt agreeable to therapeutic plan of care.    Objective:     Precautions - High fall risk; chest tube; contact and droplet    Bed mobility - Independent  Transfers - Supervision  Ambulation - 35 feet with SBA and line management with no LOB; Pt with increased HR to 102 and RR increased from 16-23    Therapeutic Exercise - 10 Reps   supported sitting / chair; worked on PLB; slowed breathing and increasing depth of breath    Vitals: WNL; satting at 90% on RA throughout session; replaced 2L per nasal cannula and pt returns to 92%    Pain: 0 VAS Location: n/a  Intervention for pain: N/A    Education: Provided education on the importance of mobility in the acute care setting and Transfer Training    Assessment: Darleen Stalilngs presents with functional mobility impairments which indicate the need for skilled intervention. Tolerating session today without incident. Will continue to follow and progress as tolerated. Pt with improved mobility.  Pt with improved max RR during session compared to evaluation.  She should be safe to return home at discharge.She may benefit from HH PT at discharge.     Plan/Recommendations:   If medically appropriate, Low Intensity Therapy recommended post-acute care - This is recommended as therapy feels this patient would require 2-3 visits per week. OP or HH would be the best option depending on patient's home bound status. Consider, if the  "patient has other  \"skilled\" needs such as wounds, IV antibiotics, etc. Combined with \"low intensity\" could also equate to a SNF. If patient is medically complex, consider LTAC. Pt requires no DME at discharge.     Pt desires Home with Home Health at discharge. Pt cooperative; agreeable to therapeutic recommendations and plan of care.         Basic Mobility 6-click:  Rollin = Total, A lot = 2, A little = 3; 4 = None  Supine>Sit:   1 = Total, A lot = 2, A little = 3; 4 = None   Sit>Stand with arms:  1 = Total, A lot = 2, A little = 3; 4 = None  Bed>Chair:   1 = Total, A lot = 2, A little = 3; 4 = None  Ambulate in room:  1 = Total, A lot = 2, A little = 3; 4 = None  3-5 Steps with railin = Total, A lot = 2, A little = 3; 4 = None  Score: 20    Modified Stefano: N/A = No pre-op stroke/TIA    Post-Tx Position: Up in Chair, Alarms activated, and Call light and personal items within reach  PPE: gloves, surgical mask, and gown    Therapy Charges for Today       Code Description Service Date Service Provider Modifiers Qty    39217115535 HC PT EVAL MOD COMPLEXITY 4 2025 Conchita Centeno, PT GP 1    [id number]  PT THERAPEUTIC ACT EA 15 MIN 4/10/2025 Conchita Centeno, PT GP 1    70110379091 HC GAIT TRAINING EA 15 MIN 4/10/2025 Conchita Centeno, PT GP 1           PT Charges       Row Name 04/10/25 1522             Time Calculation    Start Time 1433  -CL      Stop Time 1457  -CL      Time Calculation (min) 24 min  -CL      PT Received On 04/10/25  -CL      PT - Next Appointment 25  -CL      PT Goal Re-Cert Due Date 25  -CL         Time Calculation- PT    Total Timed Code Minutes- PT 24 minute(s)  -CL                User Key  (r) = Recorded By, (t) = Taken By, (c) = Cosigned By      Initials Name Provider Type    CL Conchita Centeno, PT Physical Therapist                      Electronically signed by Conchita Centeno PT at 04/10/25 1533       "

## 2025-04-11 NOTE — PLAN OF CARE
Goal Outcome Evaluation:           Progress: improving        Patient alert and able to make needs known. Patient continues with pain to her right chest where chest tube is in place relieved by prn pain medication. Patient on and off 02 @ 2 liters nasal cannula with no complaints of soa throughout the night. Patient's blood glucose greater than 300 last pm however got sliding scale insulin per md orders. Vitals remain within normal limits without incident. Call light in reach.

## 2025-04-11 NOTE — PROGRESS NOTES
[facility] MEDICINE SERVICE  DAILY PROGRESS NOTE    NAME: Darleen Stallings  : 1955  MRN: [id number]      LOS: 3 days     PROVIDER OF SERVICE: Justyn Ramirez MD    Chief Complaint: Pneumothorax    Subjective:     Interval History:  History taken from: patient     History of Present Illness: Darleen Stallings is a 69 y.o. female with a CMH of COPD with home O2 PRN, CAD s/p CABG, htn, hld, T2DM, essential tremor, tobacco use who presented to [facility] on 2025 with  SOA. Pt was recently admitted here - for right sided pneumothorax, was also positive for RSV at that time. Had chest tube placed and then removed prior to discharge.  She reports that when she got home she still had some continued shortness of air, worse with exertion, and her shortness of air progressed, prompting her to present to the ED again this morning. She otherwise denies any new symptoms, no fever/chills/sore throat/congestion/chest pain. Endorses mild dry cough and baseline wheezing.   In ED workup done notable for WBC 24.45 and chest xray showed moderate right pneumothorax. Pulmonology consulted in ED and chest tube placed. Hospitalist service consulted for admission.       seen in bed NAD, VSS MELLY RN, chest tube in place, no new complaints.  4/10 seen in bed NAD, underwent bronchoscopy showed Copious amount of green purulent secretions suctioned therapeutically from right lung  Right lung washing. Started on empiric Bactrim and nystatin and flucanazole  pending culture results   patient seen and examined in bed no acute distress, no complaints, chest tube in place, dyspnea on 2 L.  Tolerating antibiotics,    Review of Systems  Constitutional:  Negative for chills and fever.   HENT:  Negative for congestion, rhinorrhea and sore throat.    Eyes: Negative.    Respiratory:  Positive for shortness of breath.         Mild dry cough, baseline wheezing   Cardiovascular:  Negative for chest pain,  palpitations and leg swelling.   Gastrointestinal:  Negative for abdominal pain, constipation, diarrhea and nausea.   Genitourinary:  Negative for dysuria, frequency and urgency.   Musculoskeletal:  Negative for myalgias.   Neurological:  Positive for tremors. Negative for dizziness, syncope, weakness, light-headedness and headaches.      Objective:     Vital Signs  Temp:  [97.6 °F (36.4 °C)-98.1 °F (36.7 °C)] 97.6 °F (36.4 °C)  Heart Rate:  [] 87  Resp:  [15-28] 16  BP: (106-144)/(62-81) 108/64  Flow (L/min) (Oxygen Therapy):  [2] 2   Body mass index is 26.44 kg/m².    Physical Exam      General: She is not in acute distress.     Appearance: Normal appearance.   HENT:      Head: Normocephalic and atraumatic.      Mouth/Throat:      Mouth: Mucous membranes are moist.      Pharynx: Oropharynx is clear.   Eyes:      Extraocular Movements: Extraocular movements intact.      Conjunctiva/sclera: Conjunctivae normal.   Cardiovascular:      Rate and Rhythm: Normal rate and regular rhythm.      Heart sounds: Normal heart sounds.   Pulmonary:      Comments: No incr wob on RA  Breath sounds present in all lung fields     Abdominal:      General: Abdomen is flat. Bowel sounds are normal.      Palpations: Abdomen is soft.      Tenderness: There is no abdominal tenderness. There is no guarding or rebound.   Musculoskeletal:         General: No swelling or tenderness.      Cervical back: Normal range of motion and neck supple.      Right lower leg: No edema.      Left lower leg: No edema.   Skin:     General: Skin is warm and dry.      Comments: Chest tube with dressing in place right anterior chest   Neurological:      General: No focal deficit present.      Mental Status: She is alert and oriented to person, place, and time. Mental status is at baseline.      Cranial Nerves: No cranial nerve deficit.      Motor: No weakness.      Comments: + baseline upper extremity tremor        Diagnostic Data    Results from last 7  days   Lab Units 04/10/25  0539 04/09/25  0037   WBC 10*3/mm3 23.33* 20.98*   HEMOGLOBIN g/dL 15.4 16.2*   HEMATOCRIT % 48.5* 53.1*   PLATELETS 10*3/mm3 326 398   GLUCOSE mg/dL  --  87   CREATININE mg/dL  --  1.02*   BUN mg/dL  --  28*   SODIUM mmol/L  --  136   POTASSIUM mmol/L  --  4.3   ANION GAP mmol/L  --  8.1       XR Chest 1 View  Result Date: 4/11/2025  Impression: Stable chest examination with stable appearance to 2 mm right apical pneumothorax. Electronically Signed: Milena Trent MD  4/11/2025 7:04 AM EDT  Workstation ID: [id number]    XR Chest 1 View  Result Date: 4/10/2025  Impression: Suspected small right subpulmonic pneumothorax, probably stable to improved compared to yesterday's of the CT chest study. Subsegmental atelectasis in the right upper lobe and right lower lobe, unchanged. Electronically Signed: Jessi Enriquez MD  4/10/2025 8:20 AM EDT  Workstation ID: [id number]      Scheduled Meds:amLODIPine, 5 mg, Oral, Daily  aspirin, 81 mg, Oral, Daily  atorvastatin, 80 mg, Oral, Daily  budesonide, 0.5 mg, Nebulization, BID - RT  citalopram, 20 mg, Oral, Daily  empagliflozin, 25 mg, Oral, Daily  fluconazole, 100 mg, Oral, Q24H  guaiFENesin, 1,200 mg, Oral, Q12H  insulin lispro, 2-7 Units, Subcutaneous, 4x Daily AC & at Bedtime  ipratropium-albuterol, 3 mL, Nebulization, 4x Daily - RT  nicotine, 1 patch, Transdermal, Q24H  nystatin, 5 mL, Swish & Swallow, 4x Daily  pantoprazole, 40 mg, Oral, Q AM  predniSONE, 20 mg, Oral, Daily With Breakfast  senna-docusate sodium, 2 tablet, Oral, BID  sulfamethoxazole-trimethoprim, 1 tablet, Oral, Q12H  [Held by provider] valsartan, 40 mg, Oral, Daily      Continuous Infusions:   PRN Meds:.  acetaminophen **OR** acetaminophen **OR** acetaminophen    senna-docusate sodium **AND** polyethylene glycol **AND** bisacodyl **AND** bisacodyl    calcium carbonate    Calcium Replacement - Follow Nurse / BPA Driven Protocol    cyclobenzaprine    dextrose    dextrose    glucagon  (human recombinant)    HYDROmorphone    ipratropium-albuterol    Magnesium Standard Dose Replacement - Follow Nurse / BPA Driven Protocol    naloxone    nitroglycerin    oxyCODONE    Phosphorus Replacement - Follow Nurse / BPA Driven Protocol    Potassium Replacement - Follow Nurse / BPA Driven Protocol    [COMPLETED] Insert Peripheral IV **AND** sodium chloride   I reviewed the patient's new clinical results.    Assessment/Plan:     Active and Resolved Problems  Active Hospital Problems    Diagnosis  POA    **Pneumothorax [J93.9]  Yes    Pneumothorax on right [J93.9]  Unknown    Dyspnea [R06.00]  Unknown      Resolved Hospital Problems   No resolved problems to display.     Recurrent right sided pneumothorax  - spontaneous, no recent trauma  - s/p chest tube with interval improvement, currently to LWS.   - Pulmonology managing  Consult thoracic surgery   -s/p bronch to r/o infections such as MAC/PCP that can lead to pneumothorax  - CT Chest pending to ensure adequate lung expansion, anticipate chemical pleurodesis 4/9.  - started empiric Bactrim and nystatin and flucanazole  Check culture results  - PRN pain control     COPD not in acute exacerbation  RSV infection  Leukocytosis  Tobacco use  - WBC elevated at 24.45, was 18.6 at recent discharge. Pt has been on steroid therapy, also found to be RSV+ 4/3. Afebrile, without infectious Sx, imaging without opacity/consolidation appreciated. Low suspicion for bacterial infection at this time, will defer abx. Re-evaluate if pt status worsens.   - Pulm following as above  - Cont home medications, pulmicort, duonebs  - Cont steroid course, guaifenesin   - Cont pulse ox, supplemental O2 as needed  - Incentive spirometry  - Droplet precautions  - tobacco cessation counseled  - nicotine patch prn     Hypertension  - BP initially elevated in the setting of stress as above, improved  - cont home medications, hold ARB 4/9 am in case of possible general anesthesia, resume when  appropriate     T2DM  - cont jardiance, hold home metformin  - SSI  - CCD     CAD s/p CABG 2019  HLD  Depression  Anxiety  - cont home medications     VTE Prophylaxis:  Mechanical VTE prophylaxis orders are present.    Disposition Planning:   Barriers to Discharge:chest tube  Anticipated Date of Discharge: 4/13  [facility] of Discharge: [facility]  Time: 34 minutes     Code Status and Medical Interventions: CPR (Attempt to Resuscitate); Full Support   Ordered at: 04/08/25 1139     Code Status (Patient has no pulse and is not breathing):    CPR (Attempt to Resuscitate)     Medical Interventions (Patient has pulse or is breathing):    Full Support     Level Of Support Discussed With:    Patient       Signature: Electronically signed by Justyn Ramirez MD, 04/11/25, 09:45 EDT.  [facility] Hospitalist Team

## 2025-04-12 ENCOUNTER — APPOINTMENT (OUTPATIENT)
Dept: GENERAL RADIOLOGY | Facility: HOSPITAL | Age: 70
DRG: 200 | End: 2025-04-12
Payer: MEDICARE

## 2025-04-12 LAB
BACTERIA SPEC RESP CULT: NORMAL
GLUCOSE BLDC GLUCOMTR-MCNC: 124 MG/DL (ref 70–105)
GLUCOSE BLDC GLUCOMTR-MCNC: 177 MG/DL (ref 70–105)
GLUCOSE BLDC GLUCOMTR-MCNC: 380 MG/DL (ref 70–105)
GLUCOSE BLDC GLUCOMTR-MCNC: 381 MG/DL (ref 70–105)
GRAM STN SPEC: NORMAL

## 2025-04-12 PROCEDURE — 94664 DEMO&/EVAL PT USE INHALER: CPT

## 2025-04-12 PROCEDURE — 94799 UNLISTED PULMONARY SVC/PX: CPT

## 2025-04-12 PROCEDURE — 63710000001 PREDNISONE PER 1 MG: Performed by: INTERNAL MEDICINE

## 2025-04-12 PROCEDURE — 82948 REAGENT STRIP/BLOOD GLUCOSE: CPT | Performed by: STUDENT IN AN ORGANIZED HEALTH CARE EDUCATION/TRAINING PROGRAM

## 2025-04-12 PROCEDURE — 82948 REAGENT STRIP/BLOOD GLUCOSE: CPT

## 2025-04-12 PROCEDURE — 63710000001 INSULIN LISPRO (HUMAN) PER 5 UNITS: Performed by: STUDENT IN AN ORGANIZED HEALTH CARE EDUCATION/TRAINING PROGRAM

## 2025-04-12 PROCEDURE — 94761 N-INVAS EAR/PLS OXIMETRY MLT: CPT

## 2025-04-12 PROCEDURE — 71045 X-RAY EXAM CHEST 1 VIEW: CPT

## 2025-04-12 RX ADMIN — FLUCONAZOLE 100 MG: 100 TABLET ORAL at 09:55

## 2025-04-12 RX ADMIN — ASPIRIN 81 MG: 81 TABLET, COATED ORAL at 09:56

## 2025-04-12 RX ADMIN — NICOTINE 1 PATCH: 21 PATCH TRANSDERMAL at 10:03

## 2025-04-12 RX ADMIN — NYSTATIN 500000 UNITS: 100000 SUSPENSION ORAL at 17:36

## 2025-04-12 RX ADMIN — NYSTATIN 500000 UNITS: 100000 SUSPENSION ORAL at 09:55

## 2025-04-12 RX ADMIN — SENNOSIDES AND DOCUSATE SODIUM 2 TABLET: 50; 8.6 TABLET ORAL at 20:17

## 2025-04-12 RX ADMIN — Medication 10 ML: at 20:19

## 2025-04-12 RX ADMIN — SENNOSIDES AND DOCUSATE SODIUM 2 TABLET: 50; 8.6 TABLET ORAL at 09:56

## 2025-04-12 RX ADMIN — BUDESONIDE 0.5 MG: 0.5 INHALANT RESPIRATORY (INHALATION) at 07:22

## 2025-04-12 RX ADMIN — PANTOPRAZOLE SODIUM 40 MG: 40 TABLET, DELAYED RELEASE ORAL at 05:30

## 2025-04-12 RX ADMIN — INSULIN LISPRO 6 UNITS: 100 INJECTION, SOLUTION INTRAVENOUS; SUBCUTANEOUS at 17:35

## 2025-04-12 RX ADMIN — SULFAMETHOXAZOLE AND TRIMETHOPRIM 1 TABLET: 800; 160 TABLET ORAL at 20:17

## 2025-04-12 RX ADMIN — IPRATROPIUM BROMIDE AND ALBUTEROL SULFATE 3 ML: .5; 3 SOLUTION RESPIRATORY (INHALATION) at 14:27

## 2025-04-12 RX ADMIN — NYSTATIN 500000 UNITS: 100000 SUSPENSION ORAL at 20:18

## 2025-04-12 RX ADMIN — EMPAGLIFLOZIN 25 MG: 25 TABLET, FILM COATED ORAL at 09:55

## 2025-04-12 RX ADMIN — INSULIN LISPRO 2 UNITS: 100 INJECTION, SOLUTION INTRAVENOUS; SUBCUTANEOUS at 09:53

## 2025-04-12 RX ADMIN — IPRATROPIUM BROMIDE AND ALBUTEROL SULFATE 3 ML: .5; 3 SOLUTION RESPIRATORY (INHALATION) at 19:08

## 2025-04-12 RX ADMIN — NYSTATIN 500000 UNITS: 100000 SUSPENSION ORAL at 13:12

## 2025-04-12 RX ADMIN — PREDNISONE 20 MG: 20 TABLET ORAL at 09:55

## 2025-04-12 RX ADMIN — GUAIFENESIN 1200 MG: 600 TABLET, EXTENDED RELEASE ORAL at 09:55

## 2025-04-12 RX ADMIN — IPRATROPIUM BROMIDE AND ALBUTEROL SULFATE 3 ML: .5; 3 SOLUTION RESPIRATORY (INHALATION) at 10:55

## 2025-04-12 RX ADMIN — OXYCODONE 5 MG: 5 TABLET ORAL at 20:44

## 2025-04-12 RX ADMIN — GUAIFENESIN 1200 MG: 600 TABLET, EXTENDED RELEASE ORAL at 20:17

## 2025-04-12 RX ADMIN — SULFAMETHOXAZOLE AND TRIMETHOPRIM 1 TABLET: 800; 160 TABLET ORAL at 09:56

## 2025-04-12 RX ADMIN — CITALOPRAM HYDROBROMIDE 20 MG: 20 TABLET ORAL at 09:55

## 2025-04-12 RX ADMIN — BUDESONIDE 0.5 MG: 0.5 INHALANT RESPIRATORY (INHALATION) at 19:13

## 2025-04-12 RX ADMIN — ATORVASTATIN CALCIUM 80 MG: 40 TABLET, FILM COATED ORAL at 09:56

## 2025-04-12 RX ADMIN — AMLODIPINE BESYLATE 5 MG: 5 TABLET ORAL at 09:56

## 2025-04-12 RX ADMIN — INSULIN LISPRO 6 UNITS: 100 INJECTION, SOLUTION INTRAVENOUS; SUBCUTANEOUS at 20:43

## 2025-04-12 RX ADMIN — OXYCODONE 5 MG: 5 TABLET ORAL at 10:09

## 2025-04-12 RX ADMIN — IPRATROPIUM BROMIDE AND ALBUTEROL SULFATE 3 ML: .5; 3 SOLUTION RESPIRATORY (INHALATION) at 07:26

## 2025-04-12 NOTE — SIGNIFICANT NOTE
04/12/25 1703   OTHER   Discipline physical therapy assistant   Rehab Time/Intention   Session Not Performed patient/family declined treatment  (patient stated she was too full from eating and tired. Stated she thought she was going to dc home tomorrow if they dc'd CT)   Recommendation   PT - Next Appointment 04/14/25

## 2025-04-12 NOTE — PROGRESS NOTES
Daily Progress Note        Pneumothorax    Pneumothorax on right    Dyspnea         Assessment:    Recurrent pneumothorax right side,   status post chest tube placement 4/8/25 and  4/3/2025 and  4/5/2025    Bronchoscopy completed 4/10/2025   Copious amount of green purulent secretions right lung      Oral and esophageal candidiasis      COPD but not in acute exacerbation  RSV infection positive on 4/3/2025     Right upper lobe bronchiectasis  Lung nodules on CT scan 2023 2.3 cm right upper lobe, 1.3 cm right upper lobe and 2.2 cm subcarinal lymph node  Hypoxia  HTN  HLD  DM  CAD status post CABG 2019  Essential tremors  Tobacco smoker: Quit 2023 but returned to smoking again     PFTs 2022: Severe obstructive pattern FEV1/FVC 50%, FVC 61%, FEV1 40% without significant improvement with bronchodilators        Recommendations:    Chest catheter clamped repeat chest x-ray in the morning  Discussed with thoracic surgery given the active infection we will avoid any pleurodesis or surgical intervention and continue medical management for the chest catheter, after discharge patient is to follow-up with thoracic surgery for definite plan to prevent recurrent pneumothorax    Cont empiric Bactrim and nystatin and flucanazole  Check culture results    Certain infections can increase the risk for spontaneous pneumothorax such as PCP and MAC     Nebulized Pulmicort  Mucinex  Prednisone 20 mg qd    Oxygen supplement and titration to maintain saturation 90 to 95%, currently on 4 L per nasal cannula  Bronchodilators     Aspirin, atorvastatin, amlodipine, valsartan  Jardiance  Insulin sliding scale  Protonix     Smoking cessation counseling, nicotine patch     I personally reviewed the radiological studies        Chest x-ray 4/11/2025          LOS: 4 days     Subjective         Objective     Vital signs for last 24 hours:  Vitals:    04/12/25 0804 04/12/25 0956 04/12/25 1055 04/12/25 1100   BP: 129/65 113/66     BP Location: Right arm       Patient Position: Lying      Pulse: 93 78 84    Resp: 19 18 15   Temp: 98 °F (36.7 °C)      TempSrc: Oral      SpO2: (!) 88%  96%    Weight:       Height:           Intake/Output last 3 shifts:  I/O last 3 completed shifts:  In: 600 [P.O.:600]  Out: 1000 [Urine:1000]  Intake/Output this shift:  I/O this shift:  In: 360 [P.O.:360]  Out: -       Radiology  Imaging Results (Last 24 Hours)       Procedure Component Value Units Date/Time    XR Chest 1 View [[id number]] Collected: 04/12/25 0622     Updated: 04/12/25 0625    Narrative:      XR CHEST 1 VW    Date of Exam: 4/12/2025 5:50 AM EDT    Indication: pneumothorax    Comparison: 4/11/2025    Findings:  Cardiac and mediastinal contours are normal status post sternotomy. Right chest tube remains in place with a tiny residual right pneumothorax. There is some scarring or atelectasis in the right upper lobe. Lungs are otherwise clear. No pneumothorax.      Impression:      Tiny residual right pneumothorax with right chest tube in place.        Electronically Signed: Bonifacio Munoz MD    4/12/2025 6:23 AM EDT    Workstation ID: [id number]            Labs:  Results from last 7 days   Lab Units 04/10/25  0539   WBC 10*3/mm3 23.33*   HEMOGLOBIN g/dL 15.4   HEMATOCRIT % 48.5*   PLATELETS 10*3/mm3 326     Results from last 7 days   Lab Units 04/09/25  0037   SODIUM mmol/L 136   POTASSIUM mmol/L 4.3   CHLORIDE mmol/L 102   CO2 mmol/L 25.9   BUN mg/dL 28*   CREATININE mg/dL 1.02*   CALCIUM mg/dL 9.7   GLUCOSE mg/dL 87                           Results from last 7 days   Lab Units 04/08/25  0847   INR  1.05   APTT seconds 22.3*               Meds:   SCHEDULE  amLODIPine, 5 mg, Oral, Daily  aspirin, 81 mg, Oral, Daily  atorvastatin, 80 mg, Oral, Daily  budesonide, 0.5 mg, Nebulization, BID - RT  citalopram, 20 mg, Oral, Daily  empagliflozin, 25 mg, Oral, Daily  fluconazole, 100 mg, Oral, Q24H  guaiFENesin, 1,200 mg, Oral, Q12H  insulin lispro, 2-7 Units, Subcutaneous, 4x Daily  AC & at Bedtime  ipratropium-albuterol, 3 mL, Nebulization, 4x Daily - RT  nicotine, 1 patch, Transdermal, Q24H  nystatin, 5 mL, Swish & Swallow, 4x Daily  pantoprazole, 40 mg, Oral, Q AM  predniSONE, 20 mg, Oral, Daily With Breakfast  senna-docusate sodium, 2 tablet, Oral, BID  sulfamethoxazole-trimethoprim, 1 tablet, Oral, Q12H  [Held by provider] valsartan, 40 mg, Oral, Daily      Infusions     PRNs    acetaminophen **OR** acetaminophen **OR** acetaminophen    senna-docusate sodium **AND** polyethylene glycol **AND** bisacodyl **AND** bisacodyl    calcium carbonate    Calcium Replacement - Follow Nurse / BPA Driven Protocol    cyclobenzaprine    dextrose    dextrose    glucagon (human recombinant)    HYDROmorphone    ipratropium-albuterol    Magnesium Standard Dose Replacement - Follow Nurse / BPA Driven Protocol    naloxone    nitroglycerin    oxyCODONE    Phosphorus Replacement - Follow Nurse / BPA Driven Protocol    Potassium Replacement - Follow Nurse / BPA Driven Protocol    [COMPLETED] Insert Peripheral IV **AND** sodium chloride    Physical Exam:  Physical Exam  Cardiovascular:      Heart sounds: Murmur heard.      No gallop.   Pulmonary:      Effort: No respiratory distress.      Breath sounds: No stridor. Rhonchi and rales present. No wheezing.   Chest:      Chest wall: No tenderness.         ROS  Review of Systems   Respiratory:  Positive for cough and shortness of breath. Negative for wheezing and stridor.    Cardiovascular:  Negative for chest pain, palpitations and leg swelling.             Total critical care time spent with patient greater than: 45 Minutes

## 2025-04-12 NOTE — PLAN OF CARE
Goal Outcome Evaluation:      No new concerns at this time. Able to make needs known. Able to reposition self. Chest tube in place, no output during this shift. Call light in reach.

## 2025-04-12 NOTE — PLAN OF CARE
Goal Outcome Evaluation:  Plan of Care Reviewed With: patient        Progress: improving  Outcome Evaluation: Patient AOx 4, able to make needs known. CT at Right mid clavicular line clamped by Dr Soliz and will repeat CXR tomorrow. No SOA noted. Call lighr within reach

## 2025-04-13 ENCOUNTER — APPOINTMENT (OUTPATIENT)
Dept: GENERAL RADIOLOGY | Facility: HOSPITAL | Age: 70
DRG: 200 | End: 2025-04-13
Payer: MEDICARE

## 2025-04-13 ENCOUNTER — READMISSION MANAGEMENT (OUTPATIENT)
Dept: CALL CENTER | Facility: HOSPITAL | Age: 70
End: 2025-04-13
Payer: MEDICARE

## 2025-04-13 VITALS
HEART RATE: 87 BPM | DIASTOLIC BLOOD PRESSURE: 70 MMHG | RESPIRATION RATE: 20 BRPM | SYSTOLIC BLOOD PRESSURE: 123 MMHG | HEIGHT: 60 IN | WEIGHT: 144.62 LBS | BODY MASS INDEX: 28.39 KG/M2 | TEMPERATURE: 98.3 F | OXYGEN SATURATION: 97 %

## 2025-04-13 LAB
GLUCOSE BLDC GLUCOMTR-MCNC: 140 MG/DL (ref 70–105)
GLUCOSE BLDC GLUCOMTR-MCNC: 198 MG/DL (ref 70–105)

## 2025-04-13 PROCEDURE — 94664 DEMO&/EVAL PT USE INHALER: CPT

## 2025-04-13 PROCEDURE — 71045 X-RAY EXAM CHEST 1 VIEW: CPT

## 2025-04-13 PROCEDURE — 82948 REAGENT STRIP/BLOOD GLUCOSE: CPT | Performed by: STUDENT IN AN ORGANIZED HEALTH CARE EDUCATION/TRAINING PROGRAM

## 2025-04-13 PROCEDURE — 94799 UNLISTED PULMONARY SVC/PX: CPT

## 2025-04-13 PROCEDURE — 82948 REAGENT STRIP/BLOOD GLUCOSE: CPT

## 2025-04-13 PROCEDURE — 63710000001 INSULIN LISPRO (HUMAN) PER 5 UNITS: Performed by: STUDENT IN AN ORGANIZED HEALTH CARE EDUCATION/TRAINING PROGRAM

## 2025-04-13 PROCEDURE — 94761 N-INVAS EAR/PLS OXIMETRY MLT: CPT

## 2025-04-13 RX ORDER — SULFAMETHOXAZOLE AND TRIMETHOPRIM 800; 160 MG/1; MG/1
1 TABLET ORAL EVERY 12 HOURS SCHEDULED
Qty: 7 TABLET | Refills: 0 | Status: SHIPPED | OUTPATIENT
Start: 2025-04-13 | End: 2025-04-17

## 2025-04-13 RX ORDER — NYSTATIN 100000 [USP'U]/ML
5 SUSPENSION ORAL 4 TIMES DAILY
Qty: 473 ML | Refills: 0 | Status: SHIPPED | OUTPATIENT
Start: 2025-04-13 | End: 2025-04-15

## 2025-04-13 RX ORDER — FLUCONAZOLE 100 MG/1
100 TABLET ORAL EVERY 24 HOURS
Qty: 1 TABLET | Refills: 0 | Status: SHIPPED | OUTPATIENT
Start: 2025-04-14 | End: 2025-04-15

## 2025-04-13 RX ORDER — OXYCODONE HYDROCHLORIDE 5 MG/1
5 TABLET ORAL EVERY 6 HOURS PRN
Qty: 14 TABLET | Refills: 0 | Status: SHIPPED | OUTPATIENT
Start: 2025-04-13 | End: 2025-04-17

## 2025-04-13 RX ADMIN — GUAIFENESIN 1200 MG: 600 TABLET, EXTENDED RELEASE ORAL at 08:39

## 2025-04-13 RX ADMIN — ATORVASTATIN CALCIUM 80 MG: 40 TABLET, FILM COATED ORAL at 08:37

## 2025-04-13 RX ADMIN — PANTOPRAZOLE SODIUM 40 MG: 40 TABLET, DELAYED RELEASE ORAL at 06:08

## 2025-04-13 RX ADMIN — CITALOPRAM HYDROBROMIDE 20 MG: 20 TABLET ORAL at 08:37

## 2025-04-13 RX ADMIN — IPRATROPIUM BROMIDE AND ALBUTEROL SULFATE 3 ML: .5; 3 SOLUTION RESPIRATORY (INHALATION) at 11:06

## 2025-04-13 RX ADMIN — NICOTINE 1 PATCH: 21 PATCH TRANSDERMAL at 08:42

## 2025-04-13 RX ADMIN — INSULIN LISPRO 2 UNITS: 100 INJECTION, SOLUTION INTRAVENOUS; SUBCUTANEOUS at 12:26

## 2025-04-13 RX ADMIN — OXYCODONE 5 MG: 5 TABLET ORAL at 06:36

## 2025-04-13 RX ADMIN — EMPAGLIFLOZIN 25 MG: 25 TABLET, FILM COATED ORAL at 08:39

## 2025-04-13 RX ADMIN — SULFAMETHOXAZOLE AND TRIMETHOPRIM 1 TABLET: 800; 160 TABLET ORAL at 08:38

## 2025-04-13 RX ADMIN — AMLODIPINE BESYLATE 5 MG: 5 TABLET ORAL at 08:38

## 2025-04-13 RX ADMIN — NYSTATIN 500000 UNITS: 100000 SUSPENSION ORAL at 12:26

## 2025-04-13 RX ADMIN — ASPIRIN 81 MG: 81 TABLET, COATED ORAL at 08:39

## 2025-04-13 RX ADMIN — SENNOSIDES AND DOCUSATE SODIUM 2 TABLET: 50; 8.6 TABLET ORAL at 08:38

## 2025-04-13 RX ADMIN — NYSTATIN 500000 UNITS: 100000 SUSPENSION ORAL at 08:37

## 2025-04-13 RX ADMIN — FLUCONAZOLE 100 MG: 100 TABLET ORAL at 08:48

## 2025-04-13 RX ADMIN — IPRATROPIUM BROMIDE AND ALBUTEROL SULFATE 3 ML: .5; 3 SOLUTION RESPIRATORY (INHALATION) at 07:07

## 2025-04-13 RX ADMIN — BUDESONIDE 0.5 MG: 0.5 INHALANT RESPIRATORY (INHALATION) at 07:03

## 2025-04-13 NOTE — PROGRESS NOTES
Daily Progress Note        Pneumothorax    Pneumothorax on right    Dyspnea         Assessment:    Recurrent pneumothorax right side,   status post chest tube placement 4/8/25 and  4/3/2025 and  4/5/2025    Bronchoscopy completed 4/10/2025   Copious amount of green purulent secretions right lung      Oral and esophageal candidiasis      COPD but not in acute exacerbation  RSV infection positive on 4/3/2025     Right upper lobe bronchiectasis  Lung nodules on CT scan 2023 2.3 cm right upper lobe, 1.3 cm right upper lobe and 2.2 cm subcarinal lymph node  Hypoxia  HTN  HLD  DM  CAD status post CABG 2019  Essential tremors  Tobacco smoker: Quit 2023 but returned to smoking again     PFTs 2022: Severe obstructive pattern FEV1/FVC 50%, FVC 61%, FEV1 40% without significant improvement with bronchodilators        Recommendations:    Chest catheter removed today can be discharged in the afternoon    Discussed with thoracic surgery given the active infection we will avoid any pleurodesis or surgical intervention and continue medical management for the chest catheter, after discharge patient is to follow-up with thoracic surgery for definite plan to prevent recurrent pneumothorax    Cont empiric Bactrim and nystatin and flucanazole  Check culture results    Certain infections can increase the risk for spontaneous pneumothorax such as PCP and MAC     Nebulized Pulmicort  Mucinex  Prednisone 20 mg qd    Oxygen supplement and titration to maintain saturation 90 to 95%, currently on 4 L per nasal cannula  Bronchodilators     Aspirin, atorvastatin, amlodipine, valsartan  Jardiance  Insulin sliding scale  Protonix     Smoking cessation counseling, nicotine patch     I personally reviewed the radiological studies        Chest x-ray 4/11/2025          LOS: 5 days     Subjective         Objective     Vital signs for last 24 hours:  Vitals:    04/13/25 0707 04/13/25 0710 04/13/25 0750 04/13/25 0838   BP:   146/77 154/85   BP Location:    Right arm    Patient Position:   Lying    Pulse: 82 80 88 88   Resp: 15 16 24    Temp:   98.7 °F (37.1 °C)    TempSrc:   Oral    SpO2: 100% 99% 90%    Weight:       Height:           Intake/Output last 3 shifts:  I/O last 3 completed shifts:  In: 600 [P.O.:600]  Out: 0   Intake/Output this shift:  No intake/output data recorded.      Radiology  Imaging Results (Last 24 Hours)       Procedure Component Value Units Date/Time    XR Chest 1 View [[id number]] Collected: 04/13/25 0608     Updated: 04/13/25 0611    Narrative:      XR CHEST 1 VW    Date of Exam: 4/13/2025 4:45 AM EDT    Indication: Chest tube clamped    Comparison: 4/12/2025    Findings:  Cardiac and mediastinal contours remain normal status post CABG. Pulmonary vascularity is normal. Right perihilar opacity is unchanged. Lungs are otherwise clear. Right-sided chest tube is present. No pneumothorax is identified.      Impression:      Right-sided chest tube without pneumothorax.        Electronically Signed: Bonifacio Munoz MD    4/13/2025 6:09 AM EDT    Workstation ID: [id number]            Labs:  Results from last 7 days   Lab Units 04/10/25  0539   WBC 10*3/mm3 23.33*   HEMOGLOBIN g/dL 15.4   HEMATOCRIT % 48.5*   PLATELETS 10*3/mm3 326     Results from last 7 days   Lab Units 04/09/25  0037   SODIUM mmol/L 136   POTASSIUM mmol/L 4.3   CHLORIDE mmol/L 102   CO2 mmol/L 25.9   BUN mg/dL 28*   CREATININE mg/dL 1.02*   CALCIUM mg/dL 9.7   GLUCOSE mg/dL 87                           Results from last 7 days   Lab Units 04/08/25  0847   INR  1.05   APTT seconds 22.3*               Meds:   SCHEDULE  amLODIPine, 5 mg, Oral, Daily  aspirin, 81 mg, Oral, Daily  atorvastatin, 80 mg, Oral, Daily  budesonide, 0.5 mg, Nebulization, BID - RT  citalopram, 20 mg, Oral, Daily  empagliflozin, 25 mg, Oral, Daily  fluconazole, 100 mg, Oral, Q24H  guaiFENesin, 1,200 mg, Oral, Q12H  insulin lispro, 2-7 Units, Subcutaneous, 4x Daily AC & at Bedtime  ipratropium-albuterol, 3 mL,  Nebulization, 4x Daily - RT  nicotine, 1 patch, Transdermal, Q24H  nystatin, 5 mL, Swish & Swallow, 4x Daily  pantoprazole, 40 mg, Oral, Q AM  senna-docusate sodium, 2 tablet, Oral, BID  sulfamethoxazole-trimethoprim, 1 tablet, Oral, Q12H  [Held by provider] valsartan, 40 mg, Oral, Daily      Infusions     PRNs    acetaminophen **OR** acetaminophen **OR** acetaminophen    senna-docusate sodium **AND** polyethylene glycol **AND** bisacodyl **AND** bisacodyl    calcium carbonate    Calcium Replacement - Follow Nurse / BPA Driven Protocol    cyclobenzaprine    dextrose    dextrose    glucagon (human recombinant)    HYDROmorphone    ipratropium-albuterol    Magnesium Standard Dose Replacement - Follow Nurse / BPA Driven Protocol    naloxone    nitroglycerin    oxyCODONE    Phosphorus Replacement - Follow Nurse / BPA Driven Protocol    Potassium Replacement - Follow Nurse / BPA Driven Protocol    [COMPLETED] Insert Peripheral IV **AND** sodium chloride    Physical Exam:  Physical Exam  Cardiovascular:      Heart sounds: Murmur heard.      No gallop.   Pulmonary:      Effort: No respiratory distress.      Breath sounds: No stridor. Rhonchi and rales present. No wheezing.   Chest:      Chest wall: No tenderness.         ROS  Review of Systems   Respiratory:  Positive for cough and shortness of breath. Negative for wheezing and stridor.    Cardiovascular:  Negative for chest pain, palpitations and leg swelling.             Total critical care time spent with patient greater than: 45 Minutes

## 2025-04-13 NOTE — OUTREACH NOTE
Prep Survey      Flowsheet Row Responses   [facility] patient discharged from? [facility]   Is LACE score < 7 ? No   Eligibility [facility]   Date of Admission 04/08/25   Date of Discharge 04/13/25   Discharge Disposition Home or Self Care   Discharge diagnosis Pneumothorax on right   Does the patient have one of the following disease processes/diagnoses(primary or secondary)? Other   Does the patient have Home health ordered? No   Is there a DME ordered? No   Comments regarding appointments out pt PT   Prep survey completed? Yes            Kadie SON - Registered Nurse

## 2025-04-13 NOTE — PROGRESS NOTES
[facility] MEDICINE SERVICE  DAILY PROGRESS NOTE    NAME: Darleen Stallings  : 1955  MRN: [id number]      LOS: 5 days     PROVIDER OF SERVICE: Justyn Ramirez MD    Chief Complaint: Pneumothorax    Subjective:     Interval History:  History taken from: patient     History of Present Illness: Darleen Stallings is a 69 y.o. female with a CMH of COPD with home O2 PRN, CAD s/p CABG, htn, hld, T2DM, essential tremor, tobacco use who presented to [facility] on 2025 with  SOA. Pt was recently admitted here - for right sided pneumothorax, was also positive for RSV at that time. Had chest tube placed and then removed prior to discharge.  She reports that when she got home she still had some continued shortness of air, worse with exertion, and her shortness of air progressed, prompting her to present to the ED again this morning. She otherwise denies any new symptoms, no fever/chills/sore throat/congestion/chest pain. Endorses mild dry cough and baseline wheezing.   In ED workup done notable for WBC 24.45 and chest xray showed moderate right pneumothorax. Pulmonology consulted in ED and chest tube placed. Hospitalist service consulted for admission.       seen in bed [facility], VSS MELLY RN, chest tube in place, no new complaints.  4/10 seen in bed [facility], underwent bronchoscopy showed Copious amount of green purulent secretions suctioned therapeutically from right lung  Right lung washing. Started on empiric Bactrim and nystatin and flucanazole  pending culture results   patient seen and examined in bed no acute distress, no complaints, chest tube in place, dyspnea on 2 L.  Tolerating antibiotics,   seen in bed NAD, no new complaints, MELLY RN chest tube in place.   seen in bed NAD, no new complaints, chest tube in place, on room air, anxious to go home, cultures negative so far     Review of Systems  Constitutional:  Negative for chills and fever.   HENT:  Negative for congestion,  rhinorrhea and sore throat.    Eyes: Negative.    Respiratory:  Positive for shortness of breath.         Mild dry cough, baseline wheezing   Cardiovascular:  Negative for chest pain, palpitations and leg swelling.   Gastrointestinal:  Negative for abdominal pain, constipation, diarrhea and nausea.   Genitourinary:  Negative for dysuria, frequency and urgency.   Musculoskeletal:  Negative for myalgias.   Neurological:  Positive for tremors. Negative for dizziness, syncope, weakness, light-headedness and headaches.      Objective:     Vital Signs  Temp:  [97.5 °F (36.4 °C)-98.7 °F (37.1 °C)] 98.3 °F (36.8 °C)  Heart Rate:  [] 87  Resp:  [15-24] 20  BP: (107-154)/(59-85) 123/70   Body mass index is 28.24 kg/m².    Physical Exam      General: She is not in acute distress.     Appearance: Normal appearance.   HENT:      Head: Normocephalic and atraumatic.      Mouth/Throat:      Mouth: Mucous membranes are moist.      Pharynx: Oropharynx is clear.   Eyes:      Extraocular Movements: Extraocular movements intact.      Conjunctiva/sclera: Conjunctivae normal.   Cardiovascular:      Rate and Rhythm: Normal rate and regular rhythm.      Heart sounds: Normal heart sounds.   Pulmonary:      Comments: No incr wob on RA  Breath sounds present in all lung fields     Abdominal:      General: Abdomen is flat. Bowel sounds are normal.      Palpations: Abdomen is soft.      Tenderness: There is no abdominal tenderness. There is no guarding or rebound.   Musculoskeletal:         General: No swelling or tenderness.      Cervical back: Normal range of motion and neck supple.      Right lower leg: No edema.      Left lower leg: No edema.   Skin:     General: Skin is warm and dry.      Comments: Chest tube with dressing in place right anterior chest   Neurological:      General: No focal deficit present.      Mental Status: She is alert and oriented to person, place, and time. Mental status is at baseline.      Cranial Nerves: No  cranial nerve deficit.      Motor: No weakness.      Comments: + baseline upper extremity tremor        Diagnostic Data    Results from last 7 days   Lab Units 04/10/25  0539 04/09/25  0037   WBC 10*3/mm3 23.33* 20.98*   HEMOGLOBIN g/dL 15.4 16.2*   HEMATOCRIT % 48.5* 53.1*   PLATELETS 10*3/mm3 326 398   GLUCOSE mg/dL  --  87   CREATININE mg/dL  --  1.02*   BUN mg/dL  --  28*   SODIUM mmol/L  --  136   POTASSIUM mmol/L  --  4.3   ANION GAP mmol/L  --  8.1       XR Chest 1 View  Result Date: 4/13/2025  Right-sided chest tube without pneumothorax. Electronically Signed: Bonifacio Munoz MD  4/13/2025 6:09 AM EDT  Workstation ID: [id number]    XR Chest 1 View  Result Date: 4/12/2025  Tiny residual right pneumothorax with right chest tube in place. Electronically Signed: Bonifacio Munoz MD  4/12/2025 6:23 AM EDT  Workstation ID: [id number]      Scheduled Meds:amLODIPine, 5 mg, Oral, Daily  aspirin, 81 mg, Oral, Daily  atorvastatin, 80 mg, Oral, Daily  budesonide, 0.5 mg, Nebulization, BID - RT  citalopram, 20 mg, Oral, Daily  empagliflozin, 25 mg, Oral, Daily  fluconazole, 100 mg, Oral, Q24H  guaiFENesin, 1,200 mg, Oral, Q12H  insulin lispro, 2-7 Units, Subcutaneous, 4x Daily AC & at Bedtime  ipratropium-albuterol, 3 mL, Nebulization, 4x Daily - RT  nicotine, 1 patch, Transdermal, Q24H  nystatin, 5 mL, Swish & Swallow, 4x Daily  pantoprazole, 40 mg, Oral, Q AM  senna-docusate sodium, 2 tablet, Oral, BID  sulfamethoxazole-trimethoprim, 1 tablet, Oral, Q12H  [Held by provider] valsartan, 40 mg, Oral, Daily      Continuous Infusions:   PRN Meds:.  acetaminophen **OR** acetaminophen **OR** acetaminophen    senna-docusate sodium **AND** polyethylene glycol **AND** bisacodyl **AND** bisacodyl    calcium carbonate    Calcium Replacement - Follow Nurse / BPA Driven Protocol    cyclobenzaprine    dextrose    dextrose    glucagon (human recombinant)    HYDROmorphone    ipratropium-albuterol    Magnesium Standard Dose Replacement -  Follow Nurse / BPA Driven Protocol    naloxone    nitroglycerin    oxyCODONE    Phosphorus Replacement - Follow Nurse / BPA Driven Protocol    Potassium Replacement - Follow Nurse / BPA Driven Protocol    [COMPLETED] Insert Peripheral IV **AND** sodium chloride   I reviewed the patient's new clinical results.    Assessment/Plan:     Active and Resolved Problems  Active Hospital Problems    Diagnosis  POA    **Pneumothorax [J93.9]  Yes    Pneumothorax on right [J93.9]  Unknown    Dyspnea [R06.00]  Unknown      Resolved Hospital Problems   No resolved problems to display.     Recurrent right sided pneumothorax  - spontaneous, no recent trauma  - s/p chest tube with interval improvement, currently to LWS.   - Pulmonology managing  Consulted thoracic surgery no surgery at this time  -s/p bronch to r/o infections such as MAC/PCP that can lead to pneumothorax  - CT Chest pending to ensure adequate lung expansion, anticipate chemical pleurodesis 4/9.  - started empiric Bactrim and nystatin and flucanazole  -follow culture results  - PRN pain control     COPD not in acute exacerbation  RSV infection  Leukocytosis  Tobacco use  - WBC elevated at 24.45, was 18.6 at recent discharge. Pt has been on steroid therapy, also found to be RSV+ 4/3. Afebrile, without infectious Sx, imaging without opacity/consolidation appreciated. Low suspicion for bacterial infection at this time, will defer abx. Re-evaluate if pt status worsens.   - Pulm following as above  - Cont home medications, pulmicort, duonebs  - Cont steroid course, guaifenesin   - Cont pulse ox, supplemental O2 as needed  - Incentive spirometry  - Droplet precautions  - tobacco cessation counseled  - nicotine patch prn     Hypertension  - BP initially elevated in the setting of stress as above, improved  - cont home medications, hold ARB 4/9 am in case of possible general anesthesia, resume when appropriate     T2DM  - cont jardiance, hold home metformin  - SSI  - CCD      CAD s/p CABG 2019  HLD  Depression  Anxiety  - cont home medications     VTE Prophylaxis:  Mechanical VTE prophylaxis orders are present.    Disposition Planning:   Barriers to Discharge:chest tube  Anticipated Date of Discharge: 4/13  Place of Discharge: [facility]  Time: 34 minutes     Code Status and Medical Interventions: CPR (Attempt to Resuscitate); Full Support   Ordered at: 04/08/25 1139     Code Status (Patient has no pulse and is not breathing):    CPR (Attempt to Resuscitate)     Medical Interventions (Patient has pulse or is breathing):    Full Support     Level Of Support Discussed With:    Patient       Signature: Electronically signed by Justyn Ramirez MD, 04/13/25, 11:55 EDT.  [facility] Hospitalist Team

## 2025-04-13 NOTE — PLAN OF CARE
Problem: Adult Inpatient Plan of Care  Goal: Plan of Care Review  Outcome: Progressing  Goal: Patient-Specific Goal (Individualized)  Outcome: Progressing  Goal: Absence of Hospital-Acquired Illness or Injury  Outcome: Progressing  Intervention: Identify and Manage Fall Risk  Intervention: Prevent Skin Injury  Intervention: Prevent and Manage VTE (Venous Thromboembolism) Risk  Intervention: Prevent Infection  Goal: Optimal Comfort and Wellbeing  Outcome: Progressing  Intervention: Provide Person-Centered Care  Goal: Readiness for Transition of Care  Outcome: Progressing   Goal Outcome Evaluation:    Patient's chest tube has been clamped by MD. Chest xray was done earlier.     Plan of care is ongoing.

## 2025-04-13 NOTE — PROGRESS NOTES
[facility] MEDICINE SERVICE  DAILY PROGRESS NOTE    NAME: Darleen Stallings  : 1955  MRN: [id number]      LOS: 4 days     PROVIDER OF SERVICE: Justyn Ramirez MD    Chief Complaint: Pneumothorax    Subjective:     Interval History:  History taken from: patient     History of Present Illness: Darleen Stallings is a 69 y.o. female with a CMH of COPD with home O2 PRN, CAD s/p CABG, htn, hld, T2DM, essential tremor, tobacco use who [facility]  [facility]. Pt was recently admitted here - for right sided pneumothorax, was also positive for RSV at that time. Had chest tube placed and then removed prior to discharge.  She reports that when she got home she still had some continued shortness of air, worse with exertion, and her shortness of air progressed, prompting her to present to the ED again this morning. She otherwise denies any new symptoms, no fever/chills/sore throat/congestion/chest pain. Endorses mild dry cough and baseline wheezing.   In ED workup done notable for WBC 24.45 and chest xray showed moderate right pneumothorax. Pulmonology consulted in ED and chest tube placed. Hospitalist service consulted for admission.       seen in bed [facility], VSS MELLY RN, chest tube in place, no new complaints.  4/10 seen in bed [facility], underwent bronchoscopy showed Copious amount of green purulent secretions suctioned therapeutically from right lung  Right lung washing. Started on empiric Bactrim and nystatin and flucanazole  pending culture results   patient seen and examined in bed no acute distress, no complaints, chest tube in place, dyspnea on 2 L.  Tolerating antibiotics,   seen in bed NAD, no new complaints, MELLY RN chest tube in place.    Review of Systems  Constitutional:  Negative for chills and fever.   HENT:  Negative for congestion, rhinorrhea and sore throat.    Eyes: Negative.    Respiratory:  Positive for shortness of breath.         Mild dry cough,  baseline wheezing   Cardiovascular:  Negative for chest pain, palpitations and leg swelling.   Gastrointestinal:  Negative for abdominal pain, constipation, diarrhea and nausea.   Genitourinary:  Negative for dysuria, frequency and urgency.   Musculoskeletal:  Negative for myalgias.   Neurological:  Positive for tremors. Negative for dizziness, syncope, weakness, light-headedness and headaches.      Objective:     Vital Signs  Temp:  [97.5 °F (36.4 °C)-98.3 °F (36.8 °C)] 98.2 °F (36.8 °C)  Heart Rate:  [] 94  Resp:  [15-24] 17  BP: (113-135)/(51-67) 135/67   Body mass index is 27.73 kg/m².    Physical Exam      General: She is not in acute distress.     Appearance: Normal appearance.   HENT:      Head: Normocephalic and atraumatic.      Mouth/Throat:      Mouth: Mucous membranes are moist.      Pharynx: Oropharynx is clear.   Eyes:      Extraocular Movements: Extraocular movements intact.      Conjunctiva/sclera: Conjunctivae normal.   Cardiovascular:      Rate and Rhythm: Normal rate and regular rhythm.      Heart sounds: Normal heart sounds.   Pulmonary:      Comments: No incr wob on RA  Breath sounds present in all lung fields     Abdominal:      General: Abdomen is flat. Bowel sounds are normal.      Palpations: Abdomen is soft.      Tenderness: There is no abdominal tenderness. There is no guarding or rebound.   Musculoskeletal:         General: No swelling or tenderness.      Cervical back: Normal range of motion and neck supple.      Right lower leg: No edema.      Left lower leg: No edema.   Skin:     General: Skin is warm and dry.      Comments: Chest tube with dressing in place right anterior chest   Neurological:      General: No focal deficit present.      Mental Status: She is alert and oriented to person, place, and time. Mental status is at baseline.      Cranial Nerves: No cranial nerve deficit.      Motor: No weakness.      Comments: + baseline upper extremity tremor        Diagnostic Data     Results from last 7 days   Lab Units 04/10/25  0539 04/09/25  0037   WBC 10*3/mm3 23.33* 20.98*   HEMOGLOBIN g/dL 15.4 16.2*   HEMATOCRIT % 48.5* 53.1*   PLATELETS 10*3/mm3 326 398   GLUCOSE mg/dL  --  87   CREATININE mg/dL  --  1.02*   BUN mg/dL  --  28*   SODIUM mmol/L  --  136   POTASSIUM mmol/L  --  4.3   ANION GAP mmol/L  --  8.1       XR Chest 1 View  Result Date: 4/12/2025  Tiny residual right pneumothorax with right chest tube in place. Electronically Signed: Bonifacio Munoz MD  4/12/2025 6:23 AM EDT  Workstation ID: [id number]    XR Chest 1 View  Result Date: 4/11/2025  Impression: Stable chest examination with stable appearance to 2 mm right apical pneumothorax. Electronically Signed: Milena Trent MD  4/11/2025 7:04 AM EDT  Workstation ID: [id number]      Scheduled Meds:amLODIPine, 5 mg, Oral, Daily  aspirin, 81 mg, Oral, Daily  atorvastatin, 80 mg, Oral, Daily  budesonide, 0.5 mg, Nebulization, BID - RT  citalopram, 20 mg, Oral, Daily  empagliflozin, 25 mg, Oral, Daily  fluconazole, 100 mg, Oral, Q24H  guaiFENesin, 1,200 mg, Oral, Q12H  insulin lispro, 2-7 Units, Subcutaneous, 4x Daily AC & at Bedtime  ipratropium-albuterol, 3 mL, Nebulization, 4x Daily - RT  nicotine, 1 patch, Transdermal, Q24H  nystatin, 5 mL, Swish & Swallow, 4x Daily  pantoprazole, 40 mg, Oral, Q AM  predniSONE, 20 mg, Oral, Daily With Breakfast  senna-docusate sodium, 2 tablet, Oral, BID  sulfamethoxazole-trimethoprim, 1 tablet, Oral, Q12H  [Held by provider] valsartan, 40 mg, Oral, Daily      Continuous Infusions:   PRN Meds:.  acetaminophen **OR** acetaminophen **OR** acetaminophen    senna-docusate sodium **AND** polyethylene glycol **AND** bisacodyl **AND** bisacodyl    calcium carbonate    Calcium Replacement - Follow Nurse / BPA Driven Protocol    cyclobenzaprine    dextrose    dextrose    glucagon (human recombinant)    HYDROmorphone    ipratropium-albuterol    Magnesium Standard Dose Replacement - Follow Nurse / BPA Driven  Protocol    naloxone    nitroglycerin    oxyCODONE    Phosphorus Replacement - Follow Nurse / BPA Driven Protocol    Potassium Replacement - Follow Nurse / BPA Driven Protocol    [COMPLETED] Insert Peripheral IV **AND** sodium chloride   I reviewed the patient's new clinical results.    Assessment/Plan:     Active and Resolved Problems  Active Hospital Problems    Diagnosis  POA    **Pneumothorax [J93.9]  Yes    Pneumothorax on right [J93.9]  Unknown    Dyspnea [R06.00]  Unknown      Resolved Hospital Problems   No resolved problems to display.     Recurrent right sided pneumothorax  - spontaneous, no recent trauma  - s/p chest tube with interval improvement, currently to LWS.   - Pulmonology managing  Consult thoracic surgery   -s/p bronch to r/o infections such as MAC/PCP that can lead to pneumothorax  - CT Chest pending to ensure adequate lung expansion, anticipate chemical pleurodesis 4/9.  - started empiric Bactrim and nystatin and flucanazole  Check culture results  - PRN pain control     COPD not in acute exacerbation  RSV infection  Leukocytosis  Tobacco use  - WBC elevated at 24.45, was 18.6 at recent discharge. Pt has been on steroid therapy, also found to be RSV+ 4/3. Afebrile, without infectious Sx, imaging without opacity/consolidation appreciated. Low suspicion for bacterial infection at this time, will defer abx. Re-evaluate if pt status worsens.   - Pulm following as above  - Cont home medications, pulmicort, duonebs  - Cont steroid course, guaifenesin   - Cont pulse ox, supplemental O2 as needed  - Incentive spirometry  - Droplet precautions  - tobacco cessation counseled  - nicotine patch prn     Hypertension  - BP initially elevated in the setting of stress as above, improved  - cont home medications, hold ARB 4/9 am in case of possible general anesthesia, resume when appropriate     T2DM  - cont jardiance, hold home metformin  - SSI  - CCD     CAD s/p CABG 2019  HLD  Depression  Anxiety  - cont  home medications     VTE Prophylaxis:  Mechanical VTE prophylaxis orders are present.    Disposition Planning:   Barriers to Discharge:chest tube  Anticipated Date of Discharge: 4/13  Place of Discharge: [place]  Time: 34 minutes     Code Status and Medical Interventions: CPR (Attempt to Resuscitate); Full Support   Ordered at: 04/08/25 1139     Code Status (Patient has no pulse and is not breathing):    CPR (Attempt to Resuscitate)     Medical Interventions (Patient has pulse or is breathing):    Full Support     Level Of Support Discussed With:    Patient       Signature: Electronically signed by Justyn Ramirez MD, 04/12/25, 22:25 EDT.  [facility] Hospitalist Team

## 2025-04-14 ENCOUNTER — TRANSITIONAL CARE MANAGEMENT TELEPHONE ENCOUNTER (OUTPATIENT)
Dept: CALL CENTER | Facility: HOSPITAL | Age: 70
End: 2025-04-14
Payer: MEDICARE

## 2025-04-14 DIAGNOSIS — M54.50 ACUTE BILATERAL LOW BACK PAIN WITHOUT SCIATICA: ICD-10-CM

## 2025-04-14 DIAGNOSIS — F41.1 GENERALIZED ANXIETY DISORDER: ICD-10-CM

## 2025-04-14 LAB
LAB AP CASE REPORT: NORMAL
P JIROVECII DNA L RESP QL NAA+NON-PROBE: NEGATIVE
PATH REPORT.FINAL DX SPEC: NORMAL
PATH REPORT.GROSS SPEC: NORMAL
REF LAB TEST METHOD: NORMAL

## 2025-04-14 NOTE — OUTREACH NOTE
Call Center TCM Note      Flowsheet Row Responses   [facility] patient discharged from? [facility]   Does the patient have one of the following disease processes/diagnoses(primary or secondary)? Other   TCM attempt successful? Yes  [VR- jasper]   Call start time 1020   Call end time 1022   Discharge diagnosis Pneumothorax on right   Person spoke with today (if not patient) and relationship jasper   Meds reviewed with patient/caregiver? Yes   Is the patient having any side effects they believe may be caused by any medication additions or changes? No   Does the patient have all medications ordered at discharge? Yes   Is the patient taking all medications as directed (includes completed medication regime)? Yes   Comments Pt will call office for HOSP DC FU appt. TCM call complete.   Does the patient have an appointment with their PCP within 7-14 days of discharge? No   Nursing Interventions Routed TCM call to PCP office   Has home health visited the patient within 72 hours of discharge? N/A   Psychosocial issues? No   Did the patient receive a copy of their discharge instructions? Yes   Nursing interventions Reviewed instructions with patient   What is the patient's perception of their health status since discharge? Improving   Is the patient/caregiver able to teach back signs and symptoms related to disease process for when to call PCP? Yes   Is the patient/caregiver able to teach back signs and symptoms related to disease process for when to call 911? Yes   Is the patient/caregiver able to teach back the hierarchy of who to call/visit for symptoms/problems? PCP, Specialist, Home health nurse, Urgent Care, ED, 911 Yes   TCM call completed? Yes   Wrap up additional comments Daughter reports Pt is improving. Daughter will have Pt call for HOSP DC FU appt with PCP   Call end time 1022            JOSH LUONG - Registered Nurse    4/14/2025, 10:39 EDT

## 2025-04-14 NOTE — CASE MANAGEMENT/SOCIAL WORK
Case Management Discharge Note                Selected Continued Care - Discharged on 4/13/2025 Admission date: 4/8/2025 - Discharge disposition: Home or Self Care        Therapy Coordination complete.      Service Provider Services Address Phone Fax Patient Preferred    [facility] PHYSICAL THERAPY [facility] [address] [place] [address] [place] [address] [phone] --                    Transportation Services  Private: Car    Final Discharge Disposition Code: 01 - home or self-care

## 2025-04-14 NOTE — DISCHARGE SUMMARY
[facility] Medicine Services  Discharge Summary    Date of Service: 2025  Patient Name: Darleen Stallings  : 1955  MRN: [id number]    Date of Admission: 2025  Discharge Diagnosis: Pneumothorax  Date of Discharge: 2025  Primary Care Physician: Elen Jameson DO    Presenting Problem:   Pneumothorax on right [J93.9]  Pneumothorax [J93.9]  Dyspnea, unspecified type [R06.00]    Active and Resolved Hospital Problems:  Active Hospital Problems    Diagnosis POA    **Pneumothorax [J93.9] Yes    Pneumothorax on right [J93.9] Unknown    Dyspnea [R06.00] Unknown      Resolved Hospital Problems   No resolved problems to display.     Recurrent right sided pneumothorax  - spontaneous, no recent trauma  - s/p chest tube with interval improvement, currently to LWS.   - Pulmonology managing  Consulted thoracic surgery no surgery at this time  -s/p bronch to r/o infections such as MAC/PCP that can lead to pneumothorax  - CT Chest pending to ensure adequate lung expansion, anticipate chemical pleurodesis .  - started empiric Bactrim and nystatin and flucanazole  -follow culture results  - PRN pain control     COPD not in acute exacerbation  RSV infection  Leukocytosis  Tobacco use  - WBC elevated at 24.45, was 18.6 at recent discharge. Pt has been on steroid therapy, also found to be RSV+ 4/3. Afebrile, without infectious Sx, imaging without opacity/consolidation appreciated. Low suspicion for bacterial infection at this time, will defer abx. Re-evaluate if pt status worsens.   - Pulm following as above  - Cont home medications, pulmicort, duonebs  - Cont steroid course, guaifenesin   - Cont pulse ox, supplemental O2 as needed  - Incentive spirometry  - Droplet precautions  - tobacco cessation counseled  - nicotine patch prn     Hypertension  - BP initially elevated in the setting of stress as above, improved  - cont home medications, hold ARB 4/9 am in case of possible general anesthesia,  "resume when appropriate     T2DM  - cont jardiance, hold home metformin  - SSI  - CCD     CAD s/p CABG 2019  HLD  Depression  Anxiety  - cont home medications       Hospital Course     History of Present Illness: Darleen Stallings is a 69 y.o. female with a CMH of COPD with home O2 PRN, CAD s/p CABG, htn, hld, T2DM, essential tremor, tobacco use who presented to [facility] on 4/8/2025 with  SOA. Pt was recently admitted here 4/4-4/6 for right sided pneumothorax, was also positive for RSV at that time. Had chest tube placed and then removed prior to discharge.  She reports that when she got home she still had some continued shortness of air, worse with exertion, and her shortness of air progressed, prompting her to present to the ED again this morning. She otherwise denies any new symptoms, no fever/chills/sore throat/congestion/chest pain. Endorses mild dry cough and baseline wheezing.   In ED workup done notable for WBC 24.45 and chest xray showed moderate right pneumothorax. Pulmonology consulted in ED and chest tube placed. Hospitalist service consulted for admission.      4/9 seen in bed NAD, VSS MELLY RN, chest tube in place, no new complaints.  4/10 seen in bed NAD, underwent bronchoscopy showed Copious amount of green purulent secretions suctioned therapeutically from right lung  Right lung washing. Started on empiric Bactrim and nystatin and flucanazole  pending culture results  4/11 patient seen and examined in bed no acute distress, no complaints, chest tube in place, dyspnea on 2 L.  Tolerating antibiotics,  4/12 seen in bed NAD, no new complaints, MELLY RN chest tube in place.  4/13 seen in bed NAD, no new complaints, chest tube in place, on room air, anxious to go home, cultures negative so far \"  Will dc home, condition on dc stable.    DISCHARGE Follow Up Recommendations for labs and diagnostics: follow with pcp in one week  Day of Discharge     Vital Signs:  Physical Exam          General: She is " not in acute distress.     Appearance: Normal appearance.   HENT:      Head: Normocephalic and atraumatic.      Mouth/Throat:      Mouth: Mucous membranes are moist.      Pharynx: Oropharynx is clear.   Eyes:      Extraocular Movements: Extraocular movements intact.      Conjunctiva/sclera: Conjunctivae normal.   Cardiovascular:      Rate and Rhythm: Normal rate and regular rhythm.      Heart sounds: Normal heart sounds.   Pulmonary:      Comments: No incr wob on RA  Breath sounds present in all lung fields     Abdominal:      General: Abdomen is flat. Bowel sounds are normal.      Palpations: Abdomen is soft.      Tenderness: There is no abdominal tenderness. There is no guarding or rebound.   Musculoskeletal:         General: No swelling or tenderness.      Cervical back: Normal range of motion and neck supple.      Right lower leg: No edema.      Left lower leg: No edema.   Skin:     General: Skin is warm and dry.      Comments: Chest tube with dressing in place right anterior chest   Neurological:      General: No focal deficit present.      Mental Status: She is alert and oriented to person, place, and time. Mental status is at baseline.      Cranial Nerves: No cranial nerve deficit.      Motor: No weakness.      Comments: + baseline upper extremity tremor          Pertinent  and/or Most Recent Results     LAB RESULTS:      Lab 04/10/25  0539 04/09/25  0037 04/08/25  0847   WBC 23.33* 20.98* 24.45*   HEMOGLOBIN 15.4 16.2* 17.3*   HEMATOCRIT 48.5* 53.1* 54.9*   PLATELETS 326 398 477*   NEUTROS ABS  --  14.06* 14.91*   EOS ABS  --  1.05* 0.98*   MCV 87.4 89.7 89.1   PROTIME  --   --  13.6   APTT  --   --  22.3*         Lab 04/09/25  0037 04/08/25  0847   SODIUM 136 142   POTASSIUM 4.3 4.4   CHLORIDE 102 104   CO2 25.9 28.0   ANION GAP 8.1 10.0   BUN 28* 28*   CREATININE 1.02* 0.93   EGFR 60.4 67.5   GLUCOSE 87 171*   CALCIUM 9.7 9.7             Lab 04/08/25  0847   PROBNP 690.0   PROTIME 13.6   INR 1.05                  Brief Urine Lab Results  (Last result in the past 365 days)        Color   Clarity   Blood   Leuk Est   Nitrite   Protein   CREAT   Urine HCG        01/17/25 1401 Yellow   Clear   Negative   Negative   Negative   Negative                 Microbiology Results (last 10 days)       Procedure Component Value - Date/Time    Respiratory Culture - Wash, Lung, R [[id number]] Collected: 04/10/25 0757    Lab Status: Final result Specimen: Wash from Lung, R Updated: 04/12/25 1206     Respiratory Culture Heavy growth (4+) Normal respiratory mojgan. No S. aureus or Pseudomonas aeruginosa detected. Final report.     Gram Stain Many (4+) WBCs per low power field      Rare (1+) Epithelial cells per low power field      Rare (1+) Bronchial epithelial cells      Moderate (3+) Mixed bacterial morphotypes seen on Gram Stain      Budding yeast with pseudohyphae    Pneumocystis PCR - Wash, Lung, R [[id number]] Collected: 04/10/25 0757    Lab Status: Final result Specimen: Wash from Lung, R Updated: 04/14/25 0935     Reference Lab Report See Attached Report     Pneumocystis Negative    Respiratory Panel PCR w/COVID-19(SARS-CoV-2) [facility] In-House, NP Swab in UTM/VTM, 2 HR TAT - Wash, Lung, R [[id number]]  (Normal) Collected: 04/10/25 0757    Lab Status: Final result Specimen: Wash from Lung, R Updated: 04/10/25 1044     ADENOVIRUS, PCR Not Detected     Coronavirus 229E Not Detected     Coronavirus HKU1 Not Detected     Coronavirus NL63 Not Detected     Coronavirus OC43 Not Detected     COVID19 Not Detected     Human Metapneumovirus Not Detected     Human Rhinovirus/Enterovirus Not Detected     Influenza A PCR Not Detected     Influenza B PCR Not Detected     Parainfluenza Virus 1 Not Detected     Parainfluenza Virus 2 Not Detected     Parainfluenza Virus 3 Not Detected     Parainfluenza Virus 4 Not Detected     RSV, PCR Not Detected     Bordetella pertussis pcr Not Detected     Bordetella parapertussis PCR Not Detected      Chlamydophila pneumoniae PCR Not Detected     Mycoplasma pneumo by PCR Not Detected    Narrative:      In the setting of a positive respiratory panel with a viral infection PLUS a negative procalcitonin without other underlying concern for bacterial infection, consider observing off antibiotics or discontinuation of antibiotics and continue supportive care. If the respiratory panel is positive for atypical bacterial infection (Bordetella pertussis, Chlamydophila pneumoniae, or Mycoplasma pneumoniae), consider antibiotic de-escalation to target atypical bacterial infection.            XR Chest 1 View  Result Date: 4/13/2025  Impression: Right-sided chest tube without pneumothorax. Electronically Signed: Bonifacio Munoz MD  4/13/2025 6:09 AM EDT  Workstation ID: [id number]    XR Chest 1 View  Result Date: 4/12/2025  Impression: Tiny residual right pneumothorax with right chest tube in place. Electronically Signed: Bonifacio Munoz MD  4/12/2025 6:23 AM EDT  Workstation ID: [id number]    XR Chest 1 View  Result Date: 4/11/2025  Impression: Impression: Stable chest examination with stable appearance to 2 mm right apical pneumothorax. Electronically Signed: Milena Trent MD  4/11/2025 7:04 AM EDT  Workstation ID: [id number]    XR Chest 1 View  Result Date: 4/10/2025  Impression: Impression: Suspected small right subpulmonic pneumothorax, probably stable to improved compared to yesterday's of the CT chest study. Subsegmental atelectasis in the right upper lobe and right lower lobe, unchanged. Electronically Signed: Jessi Enriquez MD  4/10/2025 8:20 AM EDT  Workstation ID: [id number]    CT Chest Without Contrast Diagnostic  Result Date: 4/9/2025  Impression: Impression: 1.Small-moderate right-sided pneumothorax with a from basilar component. Right apical pleural drain catheter is in place. 2.Mild areas of atelectasis in the right lung. There are a few small foci of airspace disease in the right lung that could be infectious  or inflammatory. 3.Patulous esophagus with a fluid column extending to the thoracic inlet. 4.Evidence of prior median sternotomy and CABG. Electronically Signed: Brian Miranda MD  4/9/2025 6:04 AM EDT  Workstation ID: [id number]    XR Chest 1 View  Result Date: 4/8/2025  Impression: Impression: Interval placement of a right apical chest tube with evacuation of an apical pneumothorax. Electronically Signed: Tristan Trent MD  4/8/2025 10:22 AM EDT  Workstation ID: [id number]    XR Chest 1 View  Result Date: 4/8/2025  Impression: Impression: Moderate right pneumothorax. I spoke with the ER physician, who is already aware of this finding. Electronically Signed: Jessi Enriquez MD  4/8/2025 9:05 AM EDT  Workstation ID: [id number]    XR Chest 1 View  Result Date: 4/6/2025  Impression: Impression: 1. Right pleural chest tube without demonstrable right pneumothorax. 2. Mild right chest wall subcutaneous emphysema. Electronically Signed: Milena Trent MD  4/6/2025 11:49 AM EDT  Workstation ID: [id number]    XR Chest 1 View  Result Date: 4/6/2025  Impression: Impression: No evidence of pneumothorax Electronically Signed: Tan Mar  4/6/2025 7:55 AM EDT  Workstation ID: [id number]    XR Chest 1 View  Result Date: 4/5/2025  Impression: Impression: Near complete resolution of pneumothorax status post pleural catheter placement Electronically Signed: Tan Mar  4/5/2025 3:35 PM EDT  Workstation ID: [id number]    XR Chest 1 View  Result Date: 4/5/2025  Impression: Impression: Small to moderate right pneumothorax and right chest wall subcutaneous emphysema, enlarged since the prior chest x-ray. The above findings were discussed with Gary Hoang via telephone on 4/5/2025 10:52 AM EDT. Electronically Signed: Christopher Ferreira MD  4/5/2025 10:53 AM EDT  Workstation ID: [id number]    CT Chest Without Contrast Diagnostic  Result Date: 4/5/2025  Impression: 1.Small to moderate right pneumothorax appears enlarged since the 4/4/2025 chest  x-ray. 2.Scattered atelectasis within the right lung. The above findings were discussed with Gary Hoang via telephone on 4/5/2025 10:52 AM EDT. Electronically Signed: Christopher Ferreira MD  4/5/2025 10:53 AM EDT  Workstation ID: [id number]    XR Chest 1 View  Result Date: 4/4/2025  Impression: Impression: Interval removal of a right small bore thoracostomy tube with a small pneumothorax noted, as above. Irregular density in the mid right lung is chronic, probably representing scarring and/or atelectasis. Small right basilar density could represent pneumonia or atelectasis, similar to the most recent exam. Electronically Signed: Jonah Loredo MD  4/4/2025 12:46 PM EDT  Workstation ID: [id number]    XR Chest 1 View  Result Date: 4/4/2025  Impression: Impression: Increasing small right pneumothorax. Thoracotomy tube appears slightly retracted with small portion remaining in the pleural space. Electronically Signed: Omar Hi MD  4/4/2025 11:48 AM EDT  Workstation ID: [id number]    XR Chest 1 View  Result Date: 4/4/2025  Impression: Near complete resolution of right-sided pneumothorax following placement of small bore chest tube. Tiny apical pneumothorax persists. Electronically Signed: Bonifacio Munoz MD  4/4/2025 12:21 AM EDT  Workstation ID: [id number]    XR Chest 1 View  Result Date: 4/3/2025  Impression: Impression: Moderate size right pneumothorax. Electronically Signed: Braxton Mac MD  4/3/2025 11:27 PM EDT  Workstation ID: [id number]      Results for orders placed during the hospital encounter of 01/21/25    Duplex Carotid Ultrasound CAR    Interpretation Summary    Right internal carotid artery demonstrates a less than 50% stenosis.    Antegrade right vertebral flow.    Left internal carotid artery demonstrates a less than 50% stenosis.    Antegrade left vertebral flow.      Results for orders placed during the hospital encounter of 01/21/25    Duplex Carotid Ultrasound CAR    Interpretation Summary    Right  internal carotid artery demonstrates a less than 50% stenosis.    Antegrade right vertebral flow.    Left internal carotid artery demonstrates a less than 50% stenosis.    Antegrade left vertebral flow.      Results for orders placed during the hospital encounter of 06/20/22    Adult Transthoracic Echo Complete W/ Cont if Necessary Per Protocol    Interpretation Summary  · Left ventricular wall thickness is consistent with mild to moderate concentric hypertrophy.  · Estimated left ventricular EF = 55% Estimated left ventricular EF was in agreement with the calculated left ventricular EF. Left ventricular systolic function is normal.  · There is moderate calcification of the aortic valve mainly affecting the left coronary and right coronary cusp(s).  · Left ventricular diastolic function is consistent with (grade I) impaired relaxation.  · Estimated right ventricular systolic pressure from tricuspid regurgitation is normal (<35 mmHg).      Labs Pending at Discharge:  Pending Results       Procedure [Order ID] Specimen - Date/Time    Fungus Culture - Wash, Lung, R [[id number]] Collected: 04/10/25 0757    Specimen: Wash from Lung, R Updated: 04/10/25 0938            Procedures Performed  Procedure(s):  BRONCHOSCOPY with right lung washing         Consults:   Consults       Date and Time Order Name Status Description    4/9/2025  8:58 AM Inpatient Thoracic Surgery Consult Completed     4/4/2025 12:47 AM Pulmonology (on-call MD unless specified) Completed               Discharge Details        Discharge Medications        New Medications        Instructions Start Date   fluconazole 100 MG tablet  Commonly known as: DIFLUCAN   100 mg, Oral, Every 24 Hours      nystatin 100,000 unit/mL suspension  Commonly known as: MYCOSTATIN   500,000 Units, Swish & Swallow, 4 Times Daily      oxyCODONE 5 MG immediate release tablet  Commonly known as: ROXICODONE   5 mg, Oral, Every 6 Hours PRN      sulfamethoxazole-trimethoprim 800-160  MG per tablet  Commonly known as: BACTRIM DS,SEPTRA DS   1 tablet, Oral, Every 12 Hours Scheduled             Changes to Medications        Instructions Start Date   Combivent Respimat  MCG/ACT inhaler  Generic drug: ipratropium-albuterol  What changed: Another medication with the same name was removed. Continue taking this medication, and follow the directions you see here.   INHALE 1 PUFF BY MOUTH 4 TIMES A DAY AS NEEDED FOR WHEEZING      vitamin D 1.25 MG (42728 UT) capsule capsule  Commonly known as: ERGOCALCIFEROL  What changed: additional instructions   50,000 Units, Oral, Weekly             Continue These Medications        Instructions Start Date   ALPRAZolam 0.25 MG tablet  Commonly known as: XANAX   TAKE 1 TABLET BY MOUTH EVERY DAY AS NEEDED FOR SLEEP FOR ANXIETY      amLODIPine 5 MG tablet  Commonly known as: NORVASC   5 mg, Oral, Daily      aspirin 81 MG tablet   81 mg, Daily      atorvastatin 80 MG tablet  Commonly known as: LIPITOR   80 mg, Oral, Daily      citalopram 20 MG tablet  Commonly known as: CeleXA   20 mg, Oral, Daily      CRANBERRY PO   1 tablet, Daily PRN      cyclobenzaprine 10 MG tablet  Commonly known as: FLEXERIL   10 mg, Oral, Nightly PRN      empagliflozin 25 MG tablet tablet  Commonly known as: Jardiance   25 mg, Oral, Daily      fenofibrate 160 MG tablet   160 mg, Oral, Daily      guaiFENesin 600 MG 12 hr tablet  Commonly known as: MUCINEX   1,200 mg, Oral, 2 Times Daily      ibandronate 150 MG tablet  Commonly known as: Boniva   150 mg, Oral, Every 30 Days      metFORMIN  MG 24 hr tablet  Commonly known as: GLUCOPHAGE-XR   1 po bid w/ meals      nitroglycerin 0.4 MG SL tablet  Commonly known as: NITROSTAT   0.4 mg, Sublingual, Every 5 Minutes PRN, Take no more than 3 doses in 15 minutes.      omeprazole 40 MG capsule  Commonly known as: priLOSEC   TAKE 1 CAPSULE BY MOUTH EVERY DAY      traMADol 50 MG tablet  Commonly known as: ULTRAM   50 mg, Oral, Every 8 Hours PRN       Ashernery Ellipta 200-62.5-25 MCG/ACT inhaler  Generic drug: Fluticasone-Umeclidin-Vilant   1 puff, Inhalation, Daily      valsartan 40 MG tablet  Commonly known as: Diovan   40 mg, Oral, Daily             Stop These Medications      predniSONE 20 MG tablet  Commonly known as: DELTASONE              Allergies   Allergen Reactions    Ace Inhibitors Cough    Pregabalin Confusion    Aricept [Donepezil] Mental Status Change    Codeine Nausea And Vomiting    Paxil [Paroxetine] Other (See Comments)     TREMOR WORSENING         Discharge Disposition:   Home or Self Care    Diet:  Hospital:No active diet order        Discharge Activity:   Activity Instructions       Gradually Increase Activity Until at Pre-Hospitalization Level                CODE STATUS:  Code Status and Medical Interventions: CPR (Attempt to Resuscitate); Full Support   Ordered at: 04/08/25 1139     Code Status (Patient has no pulse and is not breathing):    CPR (Attempt to Resuscitate)     Medical Interventions (Patient has pulse or is breathing):    Full Support     Level Of Support Discussed With:    Patient         Future Appointments   Date Time Provider Department Center   4/22/2025 10:30 AM Douglas Mcginnis MD [facility]   4/28/2025  1:40 PM Elisa Soto MD [facility]   5/1/2025  1:30 PM Elen Jameson DO [facility]   7/17/2025  3:00 PM Elen Jameson DO [facility]       Additional Instructions for the Follow-ups that You Need to Schedule       Ambulatory Referral to Physical Therapy for Evaluation & Treatment   As directed      Specialty needed: Evaluate and treat   Follow-up needed: Yes        Discharge Follow-up with PCP   As directed       Currently Documented PCP:    Elen Jameson DO    PCP Phone Number:    [phone]     Follow Up Details: 1 week        Discharge Follow-up with Specified Provider: pulmonary; 2 Weeks   As directed      To: pulmonary   Follow Up: 2 Weeks                Time spent on Discharge  including face to face service:  34 minutes    Signature: Electronically signed by Justyn Ramirez MD, 04/14/25, 11:11 EDT.  [facility] Hospitalist Team

## 2025-04-15 NOTE — TELEPHONE ENCOUNTER
[facility]    Rx Refill Note  Requested Prescriptions     Pending Prescriptions Disp Refills    traMADol (ULTRAM) 50 MG tablet 60 tablet 0     Sig: Take 1 tablet by mouth Every 8 (Eight) Hours As Needed for Moderate Pain.    ALPRAZolam (XANAX) 0.25 MG tablet 25 tablet 0      Last office visit with prescribing clinician: 4/1/2025   Last telemedicine visit with prescribing clinician: Visit date not found   Next office visit with prescribing clinician: 5/1/2025        Lipid Panel (02/22/2024 11:18)                  Would you like a call back once the refill request has been completed: [] Yes [] No    If the office needs to give you a call back, can they leave a voicemail: [] Yes [] No    Radha Henry, RT  04/15/25, 09:19 EDT

## 2025-04-17 LAB — FUNGUS WND CULT: ABNORMAL

## 2025-04-17 RX ORDER — ALPRAZOLAM 0.25 MG
0.25 TABLET ORAL NIGHTLY PRN
Qty: 25 TABLET | Refills: 0 | Status: SHIPPED | OUTPATIENT
Start: 2025-04-17

## 2025-04-17 RX ORDER — TRAMADOL HYDROCHLORIDE 50 MG/1
50 TABLET ORAL EVERY 8 HOURS PRN
Qty: 60 TABLET | Refills: 0 | Status: SHIPPED | OUTPATIENT
Start: 2025-04-17

## 2025-04-18 DIAGNOSIS — J93.83 OTHER PNEUMOTHORAX: Primary | ICD-10-CM

## 2025-04-22 ENCOUNTER — TELEPHONE (OUTPATIENT)
Dept: FAMILY MEDICINE CLINIC | Facility: CLINIC | Age: 70
End: 2025-04-22

## 2025-04-22 NOTE — TELEPHONE ENCOUNTER
Caller: Darleen Stallings    Relationship: Self    Best call back number: [phone]     What medication are you requesting:  CANNULA FOR OXYGENT       If a prescription is needed, what is your preferred pharmacy and phone number:       [id number] - [place], [place] - [address] - [phone]  - [phone] FX

## 2025-04-23 ENCOUNTER — READMISSION MANAGEMENT (OUTPATIENT)
Dept: CALL CENTER | Facility: HOSPITAL | Age: 70
End: 2025-04-23
Payer: MEDICARE

## 2025-04-23 NOTE — OUTREACH NOTE
Medical Week 2 Survey      Flowsheet Row Responses   [facility] facility patient discharged from? [facility]   Does the patient have one of the following disease processes/diagnoses(primary or secondary)? Other   Week 2 attempt successful? No   Unsuccessful attempts Attempt 1            Harriet LAIRD - Licensed Nurse

## 2025-04-24 ENCOUNTER — OFFICE VISIT (OUTPATIENT)
Dept: FAMILY MEDICINE CLINIC | Facility: CLINIC | Age: 70
End: 2025-04-24
Payer: MEDICARE

## 2025-04-24 VITALS
WEIGHT: 136 LBS | HEIGHT: 60 IN | TEMPERATURE: 98.2 F | RESPIRATION RATE: 20 BRPM | BODY MASS INDEX: 26.7 KG/M2 | OXYGEN SATURATION: 95 % | HEART RATE: 71 BPM | SYSTOLIC BLOOD PRESSURE: 144 MMHG | DIASTOLIC BLOOD PRESSURE: 73 MMHG

## 2025-04-24 DIAGNOSIS — R07.89 ATYPICAL CHEST PAIN: Primary | ICD-10-CM

## 2025-04-24 DIAGNOSIS — D72.829 LEUKOCYTOSIS, UNSPECIFIED TYPE: ICD-10-CM

## 2025-04-24 DIAGNOSIS — J95.811: ICD-10-CM

## 2025-04-24 DIAGNOSIS — J43.1 PANLOBULAR EMPHYSEMA: ICD-10-CM

## 2025-04-24 PROCEDURE — 85025 COMPLETE CBC W/AUTO DIFF WBC: CPT | Performed by: FAMILY MEDICINE

## 2025-04-24 PROCEDURE — 80053 COMPREHEN METABOLIC PANEL: CPT | Performed by: FAMILY MEDICINE

## 2025-04-24 NOTE — PROGRESS NOTES
Venipuncture performed in L ARM by RT Cynthia  with good hemostasis. Patient tolerated well. 04/24/25 Radha Henry, RT

## 2025-04-24 NOTE — PROGRESS NOTES
Subjective   Darleen Stallings is a 69 y.o. female.     Chief Complaint   Patient presents with    Breathing Problem     Breathing discomfort when taking a deep breath          Current Outpatient Medications:     ALPRAZolam (XANAX) 0.25 MG tablet, Take 1 tablet by mouth At Night As Needed for Anxiety or Sleep., Disp: 25 tablet, Rfl: 0    amLODIPine (NORVASC) 5 MG tablet, TAKE 1 TABLET BY MOUTH EVERY DAY, Disp: 90 tablet, Rfl: 1    aspirin 81 MG tablet, Take 1 tablet by mouth Daily., Disp: , Rfl:     atorvastatin (LIPITOR) 80 MG tablet, Take 1 tablet by mouth Daily., Disp: 90 tablet, Rfl: 0    CRANBERRY PO, Take 1 tablet by mouth Daily As Needed. 1 po qd, Disp: , Rfl:     cyclobenzaprine (FLEXERIL) 10 MG tablet, TAKE 1 TABLET BY MOUTH AT NIGHT AS NEEDED FOR MUSCLE SPASM, Disp: 30 tablet, Rfl: 0    empagliflozin (Jardiance) 25 MG tablet tablet, Take 1 tablet by mouth Daily., Disp: 90 tablet, Rfl: 1    fenofibrate 160 MG tablet, Take 1 tablet by mouth Daily., Disp: 90 tablet, Rfl: 0    Fluticasone-Umeclidin-Vilant (Trelegy Ellipta) 200-62.5-25 MCG/ACT inhaler, Inhale 1 puff Daily., Disp: 60 each, Rfl: 3    guaiFENesin (MUCINEX) 600 MG 12 hr tablet, Take 2 tablets by mouth 2 (Two) Times a Day., Disp: , Rfl:     ibandronate (Boniva) 150 MG tablet, Take 1 tablet by mouth Every 30 (Thirty) Days., Disp: 3 tablet, Rfl: 3    metFORMIN ER (GLUCOPHAGE-XR) 500 MG 24 hr tablet, 1 po bid w/ meals, Disp: 120 tablet, Rfl: 2    nitroglycerin (NITROSTAT) 0.4 MG SL tablet, Place 1 tablet under the tongue Every 5 (Five) Minutes As Needed for Chest Pain. Take no more than 3 doses in 15 minutes., Disp: 25 tablet, Rfl: 2    omeprazole (priLOSEC) 40 MG capsule, TAKE 1 CAPSULE BY MOUTH EVERY DAY, Disp: 90 capsule, Rfl: 3    traMADol (ULTRAM) 50 MG tablet, Take 1 tablet by mouth Every 8 (Eight) Hours As Needed for Moderate Pain., Disp: 60 tablet, Rfl: 0    valsartan (Diovan) 40 MG tablet, Take 1 tablet by mouth Daily., Disp: 90 tablet, Rfl:  0    vitamin D (ERGOCALCIFEROL) 1.25 MG (19747 UT) capsule capsule, TAKE 1 CAPSULE BY MOUTH 1 TIME A WEEK, Disp: 12 capsule, Rfl: 0    citalopram (CeleXA) 20 MG tablet, TAKE 1 TABLET BY MOUTH DAILY, Disp: 90 tablet, Rfl: 0    Combivent Respimat  MCG/ACT inhaler, INHALE 1 PUFF BY MOUTH 4 TIMES A DAY AS NEEDED FOR WHEEZING, Disp: 4 g, Rfl: 0    Past Medical History:   Diagnosis Date    CAD     Cervical disc disorder 2019    After fall    CHOL     Chronic pain disorder 2020    After fall down stairs    COPD     Dementia     Depression     DEXA     2023= (1.1/ -2.3)    DMII     Extremity pain 2019    Maybe longer    Fractures 2022    GERD     Hypertension     Joint pain 2020    Low back pain 2019    After fall    MAMMO     NEG = 2018/ 2023/ 2024    Myocardial infarction     Neck pain 2015    Maybe longer    Neuropathy in diabetes 2018    Osteoarthritis 2023    Diagnosed    PAP     NEG =2021    Spinal stenosis 2019    Tremor        Past Surgical History:   Procedure Laterality Date    BRONCHOSCOPY N/A 4/10/2025    Procedure: BRONCHOSCOPY with right lung washing;  Surgeon: Marissa Soliz MD;  Location: [facility] ENDOSCOPY;  Service: Pulmonary;  Laterality: N/A;    CATARACT EXTRACTION W/ INTRAOCULAR LENS  IMPLANT, BILATERAL      COLONOSCOPY      2023= NEG, rech 2026   GSI    COMPRESSION HIP SCREW Right 03/25/2022    Procedure: HIP COMPRESSION SCREW, right [place] hip screw from [place];  Surgeon: Loco Cortes MD;  Location: [facility] MAIN OR;  Service: Orthopedics;  Laterality: Right;    CORONARY ARTERY BYPASS GRAFT      x 3    FRACTURE SURGERY      TONSILLECTOMY      TUBAL ABDOMINAL LIGATION         Family History   Problem Relation Age of Onset    Stroke Mother     COPD Mother     Hypertension Mother     Tremor Father     Heart disease Father         CHF    COPD Father     Depression Father     Tremor Sister     Fibromyalgia Sister     Hypertension Sister     Post-traumatic stress disorder Sister     Heart disease Sister      Hypertension Sister     Depression Sister     Tremor Brother     COPD Brother     Depression Brother     Hypertension Brother     Cancer Brother         Undiagnosed lung cancer    Tremor Paternal Grandmother     Depression Sister     Hypertension Brother        Social History     Socioeconomic History    Marital status: Single   Tobacco Use    Smoking status: Every Day     Current packs/day: 0.00     Average packs/day: 0.5 packs/day for 48.0 years (24.0 ttl pk-yrs)     Types: Cigarettes     Start date: 1975     Last attempt to quit:      Years since quittin.3     Passive exposure: Past    Smokeless tobacco: Never    Tobacco comments:     Started when i was 18 quit a few times. Used vape but i think it gave me pneumonia   Vaping Use    Vaping status: Every Day    Substances: Nicotine   Substance and Sexual Activity    Alcohol use: No    Drug use: No    Sexual activity: Not Currently     Partners: Male     Birth control/protection: None     Comment: [place] to get pregnant..hahaa!       History of Present Illness  The patient presents for f/u from the Hosp for chest pain, spontaneous pneumothorax, and thrush from 4/3/25-25 and again 25-25.    Chest pain is reported, described as feeling like an infection, with associated swelling and tightness, particularly upon waking in the morning. Ibuprofen has been used for pain management despite advisement against it. Tramadol was prescribed for pain relief, but discomfort persists. Previous medications included oxycodone and Dilaudid in the hosp, which caused nausea. DuoNeb is used for nebulizer treatments, with a refill available, and no additional medication refills are required at this time.    A chest tube was inserted in the ER the first time and later removed. After returning home, a recurrence of pneumothorax necessitated another procedure. Dissatisfaction is expressed regarding the lack of pain management during these procedures. RSV was  diagnosed during the hospital stay. Inhalers and mouth rinses are used as part of the treatment regimen. Nebulizer treatments are performed as needed, including 3 to 4 times yesterday due to outdoor activities, and a rescue inhaler is always carried. A course of Bactrim and fluconazole was completed from the hosp. Steroid use was discontinued upon returning home, after having taken them for 2 days.    Thrush developed during the hospital stay, and a swish and swallow medication was prescribed, which is believed to have resolved the condition.        The following portions of the patient's history were reviewed and updated as appropriate: allergies, current medications, past family history, past medical history, past social history, past surgical history and problem list.    Review of Systems   Constitutional:  Positive for activity change and fatigue. Negative for appetite change, unexpected weight gain and unexpected weight loss.   HENT:  Negative for mouth sores.    Respiratory:  Positive for cough, chest tightness, shortness of breath and wheezing.    Cardiovascular:  Positive for chest pain. Negative for palpitations.   Gastrointestinal:  Negative for nausea and vomiting.   Psychiatric/Behavioral:  Positive for stress.        Vitals:    04/24/25 1109   BP: 144/73   Pulse: 71   Resp: 20   Temp: 98.2 °F (36.8 °C)   SpO2: 95%       Objective   Physical Exam  Vitals and nursing note reviewed.   Constitutional:       General: She is not in acute distress.     Appearance: She is not ill-appearing or toxic-appearing.   HENT:      Head: Normocephalic and atraumatic.   Cardiovascular:      Rate and Rhythm: Normal rate and regular rhythm.   Pulmonary:      Effort: Pulmonary effort is normal. No respiratory distress.      Breath sounds: Decreased breath sounds, wheezing and rhonchi present. No rales.   Musculoskeletal:      Right lower leg: No edema.      Left lower leg: No edema.   Neurological:      Mental Status: She is  alert and oriented to person, place, and time. Mental status is at baseline.   Psychiatric:         Attention and Perception: Attention and perception normal.         Mood and Affect: Mood and affect normal.         Speech: Speech normal.         Behavior: Behavior normal. Behavior is cooperative.         Thought Content: Thought content normal.         Cognition and Memory: Cognition and memory normal.         Judgment: Judgment normal.       Physical Exam    Assessment & Plan   Diagnoses and all orders for this visit:    1. Atypical chest pain (Primary)  -     CBC & Differential  -     Comprehensive Metabolic Panel    2. Panlobular emphysema  -     XR Chest PA & Lateral (In Office)  -     CBC & Differential  -     Comprehensive Metabolic Panel    3. Recurrent pneumothorax after chest tube removed  -     XR Chest PA & Lateral (In Office)    4. Leukocytosis, unspecified type  -     CBC & Differential  -     Comprehensive Metabolic Panel      Assessment & Plan  1. Chest pain.  - Reports chest pain and discomfort, describing it as feeling swollen and tight.  - A chest x-ray will be conducted today to further investigate the cause of her symptoms.  - Blood work will also be ordered due to a previously elevated white blood cell count.  - Advised to continue current breathing treatments. If results indicate an infection, an antibiotic will be prescribed.    2. Pneumothorax.  - Experienced a recurrent pneumothorax on the right side, requiring multiple chest tubes and a bronchoscopy.  - Bronchoscopy revealed acute inflammation and yeast, but no cancer. Fungal culture confirmed the presence of yeast. Basic bacterial culture showed normal respiratory mojgan with no Pseudomonas or Staph aureus.  - Advised to reschedule missed appointment with the thoracic surgeon to discuss long-term management options, including the possibility of pleurodesis or a VATS procedure.  - Discussed the potential need for pleurodesis to prevent  future pneumothorax episodes.    3. Thrush.  - Developed thrush during hospital stay and was treated with a swish and swallow medication.  - Advised to use the swish and swallow medication for 2 more days if needed and then discontinue it.  - Confirmed that she still has a large bottle of the medication and has been using it as directed.     Results  Reviewed hosp stays w/ pt   Labs   - Culture: Yeast found   - Lab Report: No cancer detected. Acute inflammation and yeast found   - Fungal Culture: Yeast found   - Basic Bacterial Culture: Normal respiratory mojgan. No Pseudomonas or Staph aureus detected          Patient or patient representative verbalized consent for the use of Ambient Listening during the visit with  Elen Jameson DO for chart documentation. 5/3/2025  17:27 EDT

## 2025-04-25 ENCOUNTER — TELEPHONE (OUTPATIENT)
Dept: FAMILY MEDICINE CLINIC | Facility: CLINIC | Age: 70
End: 2025-04-25

## 2025-04-25 LAB
ALBUMIN SERPL-MCNC: 4 G/DL (ref 3.5–5.2)
ALBUMIN/GLOB SERPL: 1.4 G/DL
ALP SERPL-CCNC: 81 U/L (ref 39–117)
ALT SERPL W P-5'-P-CCNC: 26 U/L (ref 1–33)
ANION GAP SERPL CALCULATED.3IONS-SCNC: 12 MMOL/L (ref 5–15)
AST SERPL-CCNC: 24 U/L (ref 1–32)
BASOPHILS # BLD AUTO: 0.08 10*3/MM3 (ref 0–0.2)
BASOPHILS NFR BLD AUTO: 0.8 % (ref 0–1.5)
BILIRUB SERPL-MCNC: 0.3 MG/DL (ref 0–1.2)
BUN SERPL-MCNC: 13 MG/DL (ref 8–23)
BUN/CREAT SERPL: 15.5 (ref 7–25)
CALCIUM SPEC-SCNC: 9.4 MG/DL (ref 8.6–10.5)
CHLORIDE SERPL-SCNC: 104 MMOL/L (ref 98–107)
CO2 SERPL-SCNC: 25 MMOL/L (ref 22–29)
CREAT SERPL-MCNC: 0.84 MG/DL (ref 0.57–1)
DEPRECATED RDW RBC AUTO: 43.4 FL (ref 37–54)
EGFRCR SERPLBLD CKD-EPI 2021: 76.3 ML/MIN/1.73
EOSINOPHIL # BLD AUTO: 1.03 10*3/MM3 (ref 0–0.4)
EOSINOPHIL NFR BLD AUTO: 10.1 % (ref 0.3–6.2)
ERYTHROCYTE [DISTWIDTH] IN BLOOD BY AUTOMATED COUNT: 13.6 % (ref 12.3–15.4)
GLOBULIN UR ELPH-MCNC: 2.8 GM/DL
GLUCOSE SERPL-MCNC: 148 MG/DL (ref 65–99)
HCT VFR BLD AUTO: 48.5 % (ref 34–46.6)
HGB BLD-MCNC: 15.5 G/DL (ref 12–15.9)
IMM GRANULOCYTES # BLD AUTO: 0.03 10*3/MM3 (ref 0–0.05)
IMM GRANULOCYTES NFR BLD AUTO: 0.3 % (ref 0–0.5)
LYMPHOCYTES # BLD AUTO: 2.36 10*3/MM3 (ref 0.7–3.1)
LYMPHOCYTES NFR BLD AUTO: 23.1 % (ref 19.6–45.3)
MCH RBC QN AUTO: 28.5 PG (ref 26.6–33)
MCHC RBC AUTO-ENTMCNC: 32 G/DL (ref 31.5–35.7)
MCV RBC AUTO: 89.2 FL (ref 79–97)
MONOCYTES # BLD AUTO: 0.6 10*3/MM3 (ref 0.1–0.9)
MONOCYTES NFR BLD AUTO: 5.9 % (ref 5–12)
NEUTROPHILS NFR BLD AUTO: 59.8 % (ref 42.7–76)
NEUTROPHILS NFR BLD AUTO: 6.12 10*3/MM3 (ref 1.7–7)
NRBC BLD AUTO-RTO: 0 /100 WBC (ref 0–0.2)
PLATELET # BLD AUTO: 356 10*3/MM3 (ref 140–450)
PMV BLD AUTO: 10.1 FL (ref 6–12)
POTASSIUM SERPL-SCNC: 4.8 MMOL/L (ref 3.5–5.2)
PROT SERPL-MCNC: 6.8 G/DL (ref 6–8.5)
RBC # BLD AUTO: 5.44 10*6/MM3 (ref 3.77–5.28)
SODIUM SERPL-SCNC: 141 MMOL/L (ref 136–145)
WBC NRBC COR # BLD AUTO: 10.22 10*3/MM3 (ref 3.4–10.8)

## 2025-04-25 RX ORDER — IPRATROPIUM BROMIDE AND ALBUTEROL 20; 100 UG/1; UG/1
SPRAY, METERED RESPIRATORY (INHALATION)
Qty: 4 G | Refills: 0 | Status: SHIPPED | OUTPATIENT
Start: 2025-04-25

## 2025-04-25 NOTE — TELEPHONE ENCOUNTER
Rx Refill Note  Requested Prescriptions     Pending Prescriptions Disp Refills    Combivent Respimat  MCG/ACT inhaler [Pharmacy Med Name: Combivent Respimat Inhalation Aerosol Solution  MCG/ACT] 4 g 0     Sig: INHALE 1 PUFF BY MOUTH 4 TIMES A DAY AS NEEDED FOR WHEEZING      Last office visit with prescribing clinician: 4/24/2025   Last telemedicine visit with prescribing clinician: Visit date not found   Next office visit with prescribing clinician: 5/1/2025        Lipid Panel (02/22/2024 11:18)                  Would you like a call back once the refill request has been completed: [] Yes [] No    If the office needs to give you a call back, can they leave a voicemail: [] Yes [] No    Radha Henry, RT  04/25/25, 09:41 EDT

## 2025-04-25 NOTE — TELEPHONE ENCOUNTER
Caller: Darleen Stallings    Relationship: Self    Best call back number: [phone]     Caller requesting test results: DARLEEN    What test was performed: LABS AND X-RAY    When was the test performed: 4/24/25    Where was the test performed: IN OFFICE

## 2025-04-28 ENCOUNTER — READMISSION MANAGEMENT (OUTPATIENT)
Dept: CALL CENTER | Facility: HOSPITAL | Age: 70
End: 2025-04-28
Payer: MEDICARE

## 2025-04-28 NOTE — TELEPHONE ENCOUNTER
Rx Refill Note  Requested Prescriptions     Pending Prescriptions Disp Refills    citalopram (CeleXA) 20 MG tablet [Pharmacy Med Name: Citalopram Hydrobromide Oral Tablet 20 MG] 90 tablet 0     Sig: TAKE 1 TABLET BY MOUTH DAILY      Last office visit with prescribing clinician: 4/24/2025   Last telemedicine visit with prescribing clinician: Visit date not found   Next office visit with prescribing clinician: 5/1/2025                         Would you like a call back once the refill request has been completed: [] Yes [] No    If the office needs to give you a call back, can they leave a voicemail: [] Yes [] No    Radha Henry, RT  04/28/25, 12:04 EDT

## 2025-04-28 NOTE — OUTREACH NOTE
Medical Week 2 Survey      Flowsheet Row Responses   [facility] patient discharged from? [facility]   Does the patient have one of the following disease processes/diagnoses(primary or secondary)? Other   Week 2 attempt successful? No   Unsuccessful attempts Attempt 2   Revoke Saurabh Lopez Registered Nurse

## 2025-04-29 RX ORDER — CITALOPRAM HYDROBROMIDE 20 MG/1
20 TABLET ORAL DAILY
Qty: 90 TABLET | Refills: 0 | Status: SHIPPED | OUTPATIENT
Start: 2025-04-29

## 2025-05-06 ENCOUNTER — HOSPITAL ENCOUNTER (OUTPATIENT)
Dept: GENERAL RADIOLOGY | Facility: HOSPITAL | Age: 70
Discharge: HOME | End: 2025-05-06
Payer: MEDICARE

## 2025-05-06 ENCOUNTER — OFFICE VISIT (OUTPATIENT)
Dept: SURGERY | Facility: CLINIC | Age: 70
End: 2025-05-06
Payer: MEDICARE

## 2025-05-06 VITALS
DIASTOLIC BLOOD PRESSURE: 60 MMHG | HEIGHT: 60 IN | WEIGHT: 136.7 LBS | OXYGEN SATURATION: 93 % | SYSTOLIC BLOOD PRESSURE: 120 MMHG | BODY MASS INDEX: 26.84 KG/M2

## 2025-05-06 DIAGNOSIS — J93.83 OTHER PNEUMOTHORAX: Primary | ICD-10-CM

## 2025-05-06 DIAGNOSIS — J93.83 OTHER PNEUMOTHORAX: ICD-10-CM

## 2025-05-06 PROCEDURE — 71046 X-RAY EXAM CHEST 2 VIEWS: CPT

## 2025-05-07 RX ORDER — CYCLOBENZAPRINE HCL 10 MG
10 TABLET ORAL NIGHTLY PRN
Qty: 30 TABLET | Refills: 0 | Status: SHIPPED | OUTPATIENT
Start: 2025-05-07

## 2025-05-08 LAB — FUNGUS WND CULT: ABNORMAL

## 2025-05-09 ENCOUNTER — TELEPHONE (OUTPATIENT)
Dept: SURGERY | Facility: CLINIC | Age: 70
End: 2025-05-09
Payer: MEDICARE

## 2025-05-09 NOTE — TELEPHONE ENCOUNTER
PATIENT CALLED COMPLAINING OF SHORTNESS OF BREATH. PATIENT STATES SHE HAS BEEN EXTREMELY FATIGUED. SHE WENT TO THE GROCERY YESTERDAY AND WAS WORRIED SHE WOULD MAKE IT HOME. PATIENT STATED SHE WAS GOING TO GO AHEAD AND GO TO THE ER TO BE CONSULTED THERE BY OUR TEAM. I ADVISED THE PATIENT TO REACH OUT TO OFFICE IF SHE NEEDS ANYTHING ELSE. PATIENT VOICED AGREEMENT AND UNDERSTANDING.

## 2025-05-12 ENCOUNTER — HOSPITAL ENCOUNTER (OUTPATIENT)
Facility: HOSPITAL | Age: 70
Setting detail: OBSERVATION
Discharge: HOME | End: 2025-05-14
Attending: EMERGENCY MEDICINE | Admitting: EMERGENCY MEDICINE
Payer: MEDICARE

## 2025-05-12 ENCOUNTER — APPOINTMENT (OUTPATIENT)
Dept: CT IMAGING | Facility: HOSPITAL | Age: 70
End: 2025-05-12
Payer: MEDICARE

## 2025-05-12 DIAGNOSIS — J44.1 ACUTE EXACERBATION OF CHRONIC OBSTRUCTIVE PULMONARY DISEASE (COPD): Primary | ICD-10-CM

## 2025-05-12 DIAGNOSIS — Z87.09 HISTORY OF PNEUMOTHORAX: ICD-10-CM

## 2025-05-12 DIAGNOSIS — J20.9 ACUTE BRONCHITIS, UNSPECIFIED ORGANISM: ICD-10-CM

## 2025-05-12 LAB
ALBUMIN SERPL-MCNC: 4 G/DL (ref 3.5–5.2)
ALBUMIN/GLOB SERPL: 1.4 G/DL
ALP SERPL-CCNC: 128 U/L (ref 39–117)
ALT SERPL W P-5'-P-CCNC: 33 U/L (ref 1–33)
ANION GAP SERPL CALCULATED.3IONS-SCNC: 11.1 MMOL/L (ref 5–15)
AST SERPL-CCNC: 19 U/L (ref 1–32)
BILIRUB SERPL-MCNC: 0.2 MG/DL (ref 0–1.2)
BUN SERPL-MCNC: 20 MG/DL (ref 8–23)
BUN/CREAT SERPL: 22 (ref 7–25)
CALCIUM SPEC-SCNC: 10.1 MG/DL (ref 8.6–10.5)
CHLORIDE SERPL-SCNC: 101 MMOL/L (ref 98–107)
CO2 SERPL-SCNC: 25.9 MMOL/L (ref 22–29)
CREAT SERPL-MCNC: 0.91 MG/DL (ref 0.57–1)
DEPRECATED RDW RBC AUTO: 45.3 FL (ref 37–54)
EGFRCR SERPLBLD CKD-EPI 2021: 69.3 ML/MIN/1.73
EOSINOPHIL # BLD MANUAL: 0.15 10*3/MM3 (ref 0–0.4)
EOSINOPHIL NFR BLD MANUAL: 1 % (ref 0.3–6.2)
ERYTHROCYTE [DISTWIDTH] IN BLOOD BY AUTOMATED COUNT: 13.5 % (ref 12.3–15.4)
GLOBULIN UR ELPH-MCNC: 2.8 GM/DL
GLUCOSE SERPL-MCNC: 229 MG/DL (ref 65–99)
HCT VFR BLD AUTO: 49.5 % (ref 34–46.6)
HGB BLD-MCNC: 15.4 G/DL (ref 12–15.9)
HOLD SPECIMEN: NORMAL
HOLD SPECIMEN: NORMAL
LARGE PLATELETS: ABNORMAL
LYMPHOCYTES # BLD MANUAL: 3.46 10*3/MM3 (ref 0.7–3.1)
LYMPHOCYTES NFR BLD MANUAL: 3 % (ref 5–12)
MCH RBC QN AUTO: 28.1 PG (ref 26.6–33)
MCHC RBC AUTO-ENTMCNC: 31.1 G/DL (ref 31.5–35.7)
MCV RBC AUTO: 90.2 FL (ref 79–97)
MONOCYTES # BLD: 0.45 10*3/MM3 (ref 0.1–0.9)
NEUTROPHILS # BLD AUTO: 10.99 10*3/MM3 (ref 1.7–7)
NEUTROPHILS NFR BLD MANUAL: 73 % (ref 42.7–76)
NT-PROBNP SERPL-MCNC: 754 PG/ML (ref 0–900)
PLATELET # BLD AUTO: 356 10*3/MM3 (ref 140–450)
PMV BLD AUTO: 9.5 FL (ref 6–12)
POTASSIUM SERPL-SCNC: 4 MMOL/L (ref 3.5–5.2)
PROT SERPL-MCNC: 6.8 G/DL (ref 6–8.5)
RBC # BLD AUTO: 5.49 10*6/MM3 (ref 3.77–5.28)
RBC MORPH BLD: NORMAL
SCAN SLIDE: NORMAL
SODIUM SERPL-SCNC: 138 MMOL/L (ref 136–145)
TROPONIN T SERPL HS-MCNC: 35 NG/L
VARIANT LYMPHS NFR BLD MANUAL: 19 % (ref 19.6–45.3)
VARIANT LYMPHS NFR BLD MANUAL: 4 % (ref 0–5)
WBC MORPH BLD: NORMAL
WBC NRBC COR # BLD AUTO: 15.06 10*3/MM3 (ref 3.4–10.8)
WHOLE BLOOD HOLD COAG: NORMAL
WHOLE BLOOD HOLD SPECIMEN: NORMAL

## 2025-05-12 PROCEDURE — 0202U NFCT DS 22 TRGT SARS-COV-2: CPT | Performed by: EMERGENCY MEDICINE

## 2025-05-12 PROCEDURE — 83036 HEMOGLOBIN GLYCOSYLATED A1C: CPT | Performed by: NURSE PRACTITIONER

## 2025-05-12 PROCEDURE — 80053 COMPREHEN METABOLIC PANEL: CPT | Performed by: EMERGENCY MEDICINE

## 2025-05-12 PROCEDURE — 93005 ELECTROCARDIOGRAM TRACING: CPT

## 2025-05-12 PROCEDURE — 94799 UNLISTED PULMONARY SVC/PX: CPT

## 2025-05-12 PROCEDURE — 83605 ASSAY OF LACTIC ACID: CPT

## 2025-05-12 PROCEDURE — 84484 ASSAY OF TROPONIN QUANT: CPT | Performed by: EMERGENCY MEDICINE

## 2025-05-12 PROCEDURE — 87040 BLOOD CULTURE FOR BACTERIA: CPT | Performed by: EMERGENCY MEDICINE

## 2025-05-12 PROCEDURE — 93005 ELECTROCARDIOGRAM TRACING: CPT | Performed by: EMERGENCY MEDICINE

## 2025-05-12 PROCEDURE — 85025 COMPLETE CBC W/AUTO DIFF WBC: CPT | Performed by: EMERGENCY MEDICINE

## 2025-05-12 PROCEDURE — 71250 CT THORAX DX C-: CPT

## 2025-05-12 PROCEDURE — 99285 EMERGENCY DEPT VISIT HI MDM: CPT

## 2025-05-12 PROCEDURE — 84145 PROCALCITONIN (PCT): CPT | Performed by: NURSE PRACTITIONER

## 2025-05-12 PROCEDURE — 85007 BL SMEAR W/DIFF WBC COUNT: CPT | Performed by: EMERGENCY MEDICINE

## 2025-05-12 PROCEDURE — 36415 COLL VENOUS BLD VENIPUNCTURE: CPT

## 2025-05-12 PROCEDURE — 83880 ASSAY OF NATRIURETIC PEPTIDE: CPT | Performed by: EMERGENCY MEDICINE

## 2025-05-12 RX ORDER — IPRATROPIUM BROMIDE AND ALBUTEROL SULFATE 2.5; .5 MG/3ML; MG/3ML
3 SOLUTION RESPIRATORY (INHALATION) ONCE
Status: COMPLETED | OUTPATIENT
Start: 2025-05-12 | End: 2025-05-12

## 2025-05-12 RX ORDER — SODIUM CHLORIDE 0.9 % (FLUSH) 0.9 %
10 SYRINGE (ML) INJECTION AS NEEDED
Status: DISCONTINUED | OUTPATIENT
Start: 2025-05-12 | End: 2025-05-14 | Stop reason: HOSPADM

## 2025-05-12 RX ADMIN — IPRATROPIUM BROMIDE AND ALBUTEROL SULFATE 3 ML: .5; 3 SOLUTION RESPIRATORY (INHALATION) at 22:57

## 2025-05-13 PROBLEM — J44.1 ACUTE EXACERBATION OF CHRONIC OBSTRUCTIVE PULMONARY DISEASE (COPD): Status: ACTIVE | Noted: 2025-05-13

## 2025-05-13 LAB
ANION GAP SERPL CALCULATED.3IONS-SCNC: 12.8 MMOL/L (ref 5–15)
ANISOCYTOSIS BLD QL: ABNORMAL
B PARAPERT DNA SPEC QL NAA+PROBE: NOT DETECTED
B PERT DNA SPEC QL NAA+PROBE: NOT DETECTED
BACTERIA UR QL AUTO: ABNORMAL /HPF
BILIRUB UR QL STRIP: NEGATIVE
BUN SERPL-MCNC: 19 MG/DL (ref 8–23)
BUN/CREAT SERPL: 26 (ref 7–25)
C PNEUM DNA NPH QL NAA+NON-PROBE: NOT DETECTED
CALCIUM SPEC-SCNC: 9.3 MG/DL (ref 8.6–10.5)
CHLORIDE SERPL-SCNC: 104 MMOL/L (ref 98–107)
CLARITY UR: CLEAR
CO2 SERPL-SCNC: 24.2 MMOL/L (ref 22–29)
COLOR UR: YELLOW
CREAT SERPL-MCNC: 0.73 MG/DL (ref 0.57–1)
D-LACTATE SERPL-SCNC: 1.6 MMOL/L (ref 0.5–2)
D-LACTATE SERPL-SCNC: <0.3 MMOL/L (ref 0.3–2)
DEPRECATED RDW RBC AUTO: 43.3 FL (ref 37–54)
EGFRCR SERPLBLD CKD-EPI 2021: 90.3 ML/MIN/1.73
ERYTHROCYTE [DISTWIDTH] IN BLOOD BY AUTOMATED COUNT: 13.2 % (ref 12.3–15.4)
FLUAV SUBTYP SPEC NAA+PROBE: NOT DETECTED
FLUBV RNA ISLT QL NAA+PROBE: NOT DETECTED
GEN 5 1HR TROPONIN T REFLEX: 35 NG/L
GLUCOSE BLDC GLUCOMTR-MCNC: 217 MG/DL (ref 70–105)
GLUCOSE BLDC GLUCOMTR-MCNC: 281 MG/DL (ref 70–105)
GLUCOSE BLDC GLUCOMTR-MCNC: 330 MG/DL (ref 70–105)
GLUCOSE BLDC GLUCOMTR-MCNC: 335 MG/DL (ref 70–105)
GLUCOSE SERPL-MCNC: 347 MG/DL (ref 65–99)
GLUCOSE UR STRIP-MCNC: ABNORMAL MG/DL
HADV DNA SPEC NAA+PROBE: NOT DETECTED
HBA1C MFR BLD: 8.32 % (ref 4.8–5.6)
HCOV 229E RNA SPEC QL NAA+PROBE: NOT DETECTED
HCOV HKU1 RNA SPEC QL NAA+PROBE: NOT DETECTED
HCOV NL63 RNA SPEC QL NAA+PROBE: NOT DETECTED
HCOV OC43 RNA SPEC QL NAA+PROBE: NOT DETECTED
HCT VFR BLD AUTO: 47.5 % (ref 34–46.6)
HGB BLD-MCNC: 15.2 G/DL (ref 12–15.9)
HGB UR QL STRIP.AUTO: NEGATIVE
HMPV RNA NPH QL NAA+NON-PROBE: NOT DETECTED
HOLD SPECIMEN: NORMAL
HPIV1 RNA ISLT QL NAA+PROBE: NOT DETECTED
HPIV2 RNA SPEC QL NAA+PROBE: NOT DETECTED
HPIV3 RNA NPH QL NAA+PROBE: NOT DETECTED
HPIV4 P GENE NPH QL NAA+PROBE: NOT DETECTED
HYALINE CASTS UR QL AUTO: ABNORMAL /LPF
KETONES UR QL STRIP: ABNORMAL
LEUKOCYTE ESTERASE UR QL STRIP.AUTO: ABNORMAL
LYMPHOCYTES # BLD MANUAL: 1.75 10*3/MM3 (ref 0.7–3.1)
M PNEUMO IGG SER IA-ACNC: NOT DETECTED
MCH RBC QN AUTO: 28.5 PG (ref 26.6–33)
MCHC RBC AUTO-ENTMCNC: 32 G/DL (ref 31.5–35.7)
MCV RBC AUTO: 89.1 FL (ref 79–97)
NEUTROPHILS # BLD AUTO: 11.74 10*3/MM3 (ref 1.7–7)
NEUTROPHILS NFR BLD MANUAL: 87 % (ref 42.7–76)
NITRITE UR QL STRIP: NEGATIVE
PH UR STRIP.AUTO: 6.5 [PH] (ref 5–8)
PLAT MORPH BLD: NORMAL
PLATELET # BLD AUTO: 359 10*3/MM3 (ref 140–450)
PMV BLD AUTO: 9.8 FL (ref 6–12)
POTASSIUM SERPL-SCNC: 4.2 MMOL/L (ref 3.5–5.2)
PROCALCITONIN SERPL-MCNC: 0.04 NG/ML (ref 0–0.25)
PROT UR QL STRIP: NEGATIVE
QT INTERVAL: 390 MS
QTC INTERVAL: 435 MS
RBC # BLD AUTO: 5.33 10*6/MM3 (ref 3.77–5.28)
RBC # UR STRIP: ABNORMAL /HPF
REF LAB TEST METHOD: ABNORMAL
RHINOVIRUS RNA SPEC NAA+PROBE: NOT DETECTED
RSV RNA NPH QL NAA+NON-PROBE: NOT DETECTED
SARS-COV-2 RNA RESP QL NAA+PROBE: NOT DETECTED
SCAN SLIDE: NORMAL
SODIUM SERPL-SCNC: 141 MMOL/L (ref 136–145)
SP GR UR STRIP: 1.03 (ref 1–1.03)
SQUAMOUS #/AREA URNS HPF: ABNORMAL /HPF
TROPONIN T % DELTA: 0
TROPONIN T NUMERIC DELTA: 0 NG/L
UROBILINOGEN UR QL STRIP: ABNORMAL
VARIANT LYMPHS NFR BLD MANUAL: 13 % (ref 19.6–45.3)
WBC # UR STRIP: ABNORMAL /HPF
WBC MORPH BLD: NORMAL
WBC NRBC COR # BLD AUTO: 13.49 10*3/MM3 (ref 3.4–10.8)

## 2025-05-13 PROCEDURE — 25010000002 CEFTRIAXONE PER 250 MG: Performed by: EMERGENCY MEDICINE

## 2025-05-13 PROCEDURE — 94799 UNLISTED PULMONARY SVC/PX: CPT

## 2025-05-13 PROCEDURE — 25010000002 METHYLPREDNISOLONE PER 40 MG: Performed by: EMERGENCY MEDICINE

## 2025-05-13 PROCEDURE — 96376 TX/PRO/DX INJ SAME DRUG ADON: CPT

## 2025-05-13 PROCEDURE — 96374 THER/PROPH/DIAG INJ IV PUSH: CPT

## 2025-05-13 PROCEDURE — 87086 URINE CULTURE/COLONY COUNT: CPT | Performed by: NURSE PRACTITIONER

## 2025-05-13 PROCEDURE — 83605 ASSAY OF LACTIC ACID: CPT | Performed by: NURSE PRACTITIONER

## 2025-05-13 PROCEDURE — 85025 COMPLETE CBC W/AUTO DIFF WBC: CPT | Performed by: NURSE PRACTITIONER

## 2025-05-13 PROCEDURE — 25010000002 KETOROLAC TROMETHAMINE PER 15 MG: Performed by: NURSE PRACTITIONER

## 2025-05-13 PROCEDURE — 82948 REAGENT STRIP/BLOOD GLUCOSE: CPT

## 2025-05-13 PROCEDURE — 25010000002 ONDANSETRON PER 1 MG: Performed by: EMERGENCY MEDICINE

## 2025-05-13 PROCEDURE — 96375 TX/PRO/DX INJ NEW DRUG ADDON: CPT

## 2025-05-13 PROCEDURE — 63710000001 INSULIN REGULAR HUMAN PER 5 UNITS: Performed by: EMERGENCY MEDICINE

## 2025-05-13 PROCEDURE — 85007 BL SMEAR W/DIFF WBC COUNT: CPT | Performed by: NURSE PRACTITIONER

## 2025-05-13 PROCEDURE — 99212 OFFICE O/P EST SF 10 MIN: CPT | Performed by: NURSE PRACTITIONER

## 2025-05-13 PROCEDURE — 82948 REAGENT STRIP/BLOOD GLUCOSE: CPT | Performed by: NURSE PRACTITIONER

## 2025-05-13 PROCEDURE — 81001 URINALYSIS AUTO W/SCOPE: CPT | Performed by: NURSE PRACTITIONER

## 2025-05-13 PROCEDURE — G0378 HOSPITAL OBSERVATION PER HR: HCPCS

## 2025-05-13 PROCEDURE — 80048 BASIC METABOLIC PNL TOTAL CA: CPT | Performed by: NURSE PRACTITIONER

## 2025-05-13 PROCEDURE — 94640 AIRWAY INHALATION TREATMENT: CPT

## 2025-05-13 PROCEDURE — 63710000001 INSULIN LISPRO (HUMAN) PER 5 UNITS: Performed by: NURSE PRACTITIONER

## 2025-05-13 RX ORDER — SODIUM CHLORIDE 9 MG/ML
40 INJECTION, SOLUTION INTRAVENOUS AS NEEDED
Status: DISCONTINUED | OUTPATIENT
Start: 2025-05-13 | End: 2025-05-14 | Stop reason: HOSPADM

## 2025-05-13 RX ORDER — METFORMIN HYDROCHLORIDE 500 MG/1
500 TABLET, EXTENDED RELEASE ORAL 2 TIMES DAILY
COMMUNITY
End: 2025-05-19

## 2025-05-13 RX ORDER — CYCLOBENZAPRINE HCL 10 MG
10 TABLET ORAL NIGHTLY PRN
Status: DISCONTINUED | OUTPATIENT
Start: 2025-05-13 | End: 2025-05-14 | Stop reason: HOSPADM

## 2025-05-13 RX ORDER — DEXTROSE MONOHYDRATE 25 G/50ML
25 INJECTION, SOLUTION INTRAVENOUS
Status: DISCONTINUED | OUTPATIENT
Start: 2025-05-13 | End: 2025-05-14 | Stop reason: HOSPADM

## 2025-05-13 RX ORDER — ONDANSETRON 2 MG/ML
4 INJECTION INTRAMUSCULAR; INTRAVENOUS EVERY 6 HOURS PRN
Status: DISCONTINUED | OUTPATIENT
Start: 2025-05-13 | End: 2025-05-14 | Stop reason: HOSPADM

## 2025-05-13 RX ORDER — IBUPROFEN 600 MG/1
1 TABLET ORAL
Status: DISCONTINUED | OUTPATIENT
Start: 2025-05-13 | End: 2025-05-14 | Stop reason: HOSPADM

## 2025-05-13 RX ORDER — ATORVASTATIN CALCIUM 40 MG/1
80 TABLET, FILM COATED ORAL DAILY
Status: DISCONTINUED | OUTPATIENT
Start: 2025-05-13 | End: 2025-05-14 | Stop reason: HOSPADM

## 2025-05-13 RX ORDER — AMLODIPINE BESYLATE 5 MG/1
5 TABLET ORAL DAILY
Status: DISCONTINUED | OUTPATIENT
Start: 2025-05-13 | End: 2025-05-14 | Stop reason: HOSPADM

## 2025-05-13 RX ORDER — GUAIFENESIN 600 MG/1
1200 TABLET, EXTENDED RELEASE ORAL DAILY
Status: DISCONTINUED | OUTPATIENT
Start: 2025-05-13 | End: 2025-05-14 | Stop reason: HOSPADM

## 2025-05-13 RX ORDER — ASPIRIN 81 MG/1
81 TABLET ORAL DAILY
Status: DISCONTINUED | OUTPATIENT
Start: 2025-05-13 | End: 2025-05-14 | Stop reason: HOSPADM

## 2025-05-13 RX ORDER — IPRATROPIUM BROMIDE AND ALBUTEROL SULFATE 2.5; .5 MG/3ML; MG/3ML
3 SOLUTION RESPIRATORY (INHALATION) EVERY 4 HOURS PRN
Status: DISCONTINUED | OUTPATIENT
Start: 2025-05-13 | End: 2025-05-14 | Stop reason: HOSPADM

## 2025-05-13 RX ORDER — ACETAMINOPHEN 325 MG/1
650 TABLET ORAL EVERY 6 HOURS PRN
Status: DISCONTINUED | OUTPATIENT
Start: 2025-05-13 | End: 2025-05-14 | Stop reason: HOSPADM

## 2025-05-13 RX ORDER — TRAMADOL HYDROCHLORIDE 50 MG/1
50 TABLET ORAL EVERY 8 HOURS PRN
Status: DISCONTINUED | OUTPATIENT
Start: 2025-05-13 | End: 2025-05-14 | Stop reason: HOSPADM

## 2025-05-13 RX ORDER — IPRATROPIUM BROMIDE AND ALBUTEROL SULFATE 2.5; .5 MG/3ML; MG/3ML
3 SOLUTION RESPIRATORY (INHALATION)
Status: DISCONTINUED | OUTPATIENT
Start: 2025-05-13 | End: 2025-05-13

## 2025-05-13 RX ORDER — FAMOTIDINE 20 MG/1
40 TABLET, FILM COATED ORAL NIGHTLY
Status: DISCONTINUED | OUTPATIENT
Start: 2025-05-13 | End: 2025-05-14 | Stop reason: HOSPADM

## 2025-05-13 RX ORDER — CITALOPRAM HYDROBROMIDE 20 MG/1
20 TABLET ORAL DAILY
Status: DISCONTINUED | OUTPATIENT
Start: 2025-05-13 | End: 2025-05-14 | Stop reason: HOSPADM

## 2025-05-13 RX ORDER — NICOTINE POLACRILEX 4 MG
15 LOZENGE BUCCAL
Status: DISCONTINUED | OUTPATIENT
Start: 2025-05-13 | End: 2025-05-14 | Stop reason: HOSPADM

## 2025-05-13 RX ORDER — INSULIN LISPRO 100 [IU]/ML
2-9 INJECTION, SOLUTION INTRAVENOUS; SUBCUTANEOUS
Status: DISCONTINUED | OUTPATIENT
Start: 2025-05-13 | End: 2025-05-14 | Stop reason: HOSPADM

## 2025-05-13 RX ORDER — KETOROLAC TROMETHAMINE 30 MG/ML
15 INJECTION, SOLUTION INTRAMUSCULAR; INTRAVENOUS 2 TIMES DAILY
Status: DISCONTINUED | OUTPATIENT
Start: 2025-05-13 | End: 2025-05-14 | Stop reason: HOSPADM

## 2025-05-13 RX ORDER — ALPRAZOLAM 0.25 MG
0.25 TABLET ORAL NIGHTLY PRN
Status: DISCONTINUED | OUTPATIENT
Start: 2025-05-13 | End: 2025-05-14 | Stop reason: HOSPADM

## 2025-05-13 RX ORDER — SODIUM CHLORIDE 0.9 % (FLUSH) 0.9 %
10 SYRINGE (ML) INJECTION EVERY 12 HOURS SCHEDULED
Status: DISCONTINUED | OUTPATIENT
Start: 2025-05-13 | End: 2025-05-14 | Stop reason: HOSPADM

## 2025-05-13 RX ORDER — GUAIFENESIN 600 MG/1
1200 TABLET, EXTENDED RELEASE ORAL DAILY
COMMUNITY

## 2025-05-13 RX ORDER — VALSARTAN 80 MG/1
40 TABLET ORAL DAILY
Status: DISCONTINUED | OUTPATIENT
Start: 2025-05-13 | End: 2025-05-14 | Stop reason: HOSPADM

## 2025-05-13 RX ORDER — NYSTATIN 100000 [USP'U]/ML
5 SUSPENSION ORAL 2 TIMES DAILY PRN
COMMUNITY

## 2025-05-13 RX ORDER — PANTOPRAZOLE SODIUM 40 MG/1
40 TABLET, DELAYED RELEASE ORAL
Status: DISCONTINUED | OUTPATIENT
Start: 2025-05-14 | End: 2025-05-14 | Stop reason: HOSPADM

## 2025-05-13 RX ORDER — SODIUM CHLORIDE 0.9 % (FLUSH) 0.9 %
10 SYRINGE (ML) INJECTION AS NEEDED
Status: DISCONTINUED | OUTPATIENT
Start: 2025-05-13 | End: 2025-05-14 | Stop reason: HOSPADM

## 2025-05-13 RX ORDER — METHYLPREDNISOLONE SODIUM SUCCINATE 40 MG/ML
40 INJECTION, POWDER, LYOPHILIZED, FOR SOLUTION INTRAMUSCULAR; INTRAVENOUS EVERY 8 HOURS
Status: DISCONTINUED | OUTPATIENT
Start: 2025-05-13 | End: 2025-05-14

## 2025-05-13 RX ORDER — BUDESONIDE AND FORMOTEROL FUMARATE DIHYDRATE 160; 4.5 UG/1; UG/1
2 AEROSOL RESPIRATORY (INHALATION)
Status: DISCONTINUED | OUTPATIENT
Start: 2025-05-13 | End: 2025-05-14 | Stop reason: HOSPADM

## 2025-05-13 RX ORDER — FAMOTIDINE 40 MG/5ML
40 POWDER, FOR SUSPENSION ORAL NIGHTLY
Status: DISCONTINUED | OUTPATIENT
Start: 2025-05-13 | End: 2025-05-13 | Stop reason: CLARIF

## 2025-05-13 RX ADMIN — KETOROLAC TROMETHAMINE 15 MG: 30 INJECTION INTRAMUSCULAR; INTRAVENOUS at 12:41

## 2025-05-13 RX ADMIN — BUDESONIDE AND FORMOTEROL FUMARATE DIHYDRATE 2 PUFF: 160; 4.5 AEROSOL RESPIRATORY (INHALATION) at 20:38

## 2025-05-13 RX ADMIN — INSULIN LISPRO 7 UNITS: 100 INJECTION, SOLUTION INTRAVENOUS; SUBCUTANEOUS at 12:45

## 2025-05-13 RX ADMIN — Medication 10 ML: at 00:37

## 2025-05-13 RX ADMIN — INSULIN HUMAN 4 UNITS: 100 INJECTION, SOLUTION PARENTERAL at 17:15

## 2025-05-13 RX ADMIN — TRAMADOL HYDROCHLORIDE 50 MG: 50 TABLET, COATED ORAL at 21:20

## 2025-05-13 RX ADMIN — Medication 10 ML: at 21:21

## 2025-05-13 RX ADMIN — AMLODIPINE BESYLATE 5 MG: 5 TABLET ORAL at 09:02

## 2025-05-13 RX ADMIN — ATORVASTATIN CALCIUM 80 MG: 40 TABLET, FILM COATED ORAL at 16:06

## 2025-05-13 RX ADMIN — CITALOPRAM 20 MG: 20 TABLET, FILM COATED ORAL at 16:06

## 2025-05-13 RX ADMIN — INSULIN LISPRO 4 UNITS: 100 INJECTION, SOLUTION INTRAVENOUS; SUBCUTANEOUS at 09:06

## 2025-05-13 RX ADMIN — Medication 10 ML: at 09:04

## 2025-05-13 RX ADMIN — FAMOTIDINE 40 MG: 20 TABLET, FILM COATED ORAL at 21:20

## 2025-05-13 RX ADMIN — ONDANSETRON 4 MG: 2 INJECTION, SOLUTION INTRAMUSCULAR; INTRAVENOUS at 01:46

## 2025-05-13 RX ADMIN — METHYLPREDNISOLONE SODIUM SUCCINATE 40 MG: 40 INJECTION, POWDER, FOR SOLUTION INTRAMUSCULAR; INTRAVENOUS at 00:37

## 2025-05-13 RX ADMIN — IPRATROPIUM BROMIDE AND ALBUTEROL SULFATE 3 ML: .5; 3 SOLUTION RESPIRATORY (INHALATION) at 03:04

## 2025-05-13 RX ADMIN — METHYLPREDNISOLONE SODIUM SUCCINATE 40 MG: 40 INJECTION, POWDER, FOR SOLUTION INTRAMUSCULAR; INTRAVENOUS at 09:03

## 2025-05-13 RX ADMIN — INSULIN LISPRO 7 UNITS: 100 INJECTION, SOLUTION INTRAVENOUS; SUBCUTANEOUS at 17:15

## 2025-05-13 RX ADMIN — KETOROLAC TROMETHAMINE 15 MG: 30 INJECTION INTRAMUSCULAR; INTRAVENOUS at 21:20

## 2025-05-13 RX ADMIN — ASPIRIN 81 MG: 81 TABLET, COATED ORAL at 16:06

## 2025-05-13 RX ADMIN — INSULIN LISPRO 6 UNITS: 100 INJECTION, SOLUTION INTRAVENOUS; SUBCUTANEOUS at 21:26

## 2025-05-13 RX ADMIN — GUAIFENESIN 1200 MG: 600 TABLET, MULTILAYER, EXTENDED RELEASE ORAL at 16:06

## 2025-05-13 RX ADMIN — VALSARTAN 40 MG: 80 TABLET, FILM COATED ORAL at 16:06

## 2025-05-13 RX ADMIN — METHYLPREDNISOLONE SODIUM SUCCINATE 40 MG: 40 INJECTION, POWDER, FOR SOLUTION INTRAMUSCULAR; INTRAVENOUS at 17:15

## 2025-05-13 RX ADMIN — CEFTRIAXONE SODIUM 2000 MG: 2 INJECTION, POWDER, FOR SOLUTION INTRAMUSCULAR; INTRAVENOUS at 00:37

## 2025-05-13 RX ADMIN — Medication 5 MG: at 21:20

## 2025-05-13 RX ADMIN — ALPRAZOLAM 0.25 MG: 0.25 TABLET ORAL at 21:20

## 2025-05-13 NOTE — PLAN OF CARE
Problem: Adult Inpatient Plan of Care  Goal: Plan of Care Review  Outcome: Progressing  Flowsheets (Taken 5/13/2025 3069)  Progress: improving  Goal: Patient-Specific Goal (Individualized)  Outcome: Progressing  Goal: Absence of Hospital-Acquired Illness or Injury  Outcome: Progressing  Goal: Optimal Comfort and Wellbeing  Outcome: Progressing  Goal: Readiness for Transition of Care  Outcome: Progressing     Problem: Comorbidity Management  Goal: Maintenance of COPD Symptom Control  Outcome: Progressing  Goal: Blood Glucose Level Within Target Range  Outcome: Progressing  Goal: Blood Pressure in Desired Range  Outcome: Progressing     Problem: Sepsis/Septic Shock  Goal: Optimal Coping  Outcome: Progressing  Goal: Absence of Bleeding  Outcome: Progressing  Goal: Blood Glucose Level Within Target Range  Outcome: Progressing  Goal: Absence of Infection Signs and Symptoms  Outcome: Progressing  Goal: Optimal Nutrition Delivery  Outcome: Progressing   Goal Outcome Evaluation:           Progress: improving

## 2025-05-13 NOTE — CONSULTS
Consults    Patient Care Team:  Elen Jameson DO as PCP - General (Family Medicine)  Adryan Kumar MD as Consulting Physician (Cardiology)  Michael Hickman MD as Consulting Physician (Neurology)  Sergio Mcdonald MD as Consulting Physician (Gastroenterology)  Douglas Mcginnis MD as Surgeon (Thoracic Surgery)    Chief Complaint   Patient presents with    Shortness of Breath       Subjective     Shortness of Breath  Associated symptoms: no chest pain        Ms. Ridley is a pleasant 69-year-old lady who is known to our service for a right-sided pneumothorax.  She was seen in our office last week and seemed to be doing well.  She came back to the hospital with increased shortness of breath.  CT chest performed upon admission did not have any acute intrathoracic findings, no pneumothorax.  There appears to be an error and Dr. Mcginnis was consulted but the order was placed for pulmonary.  I do however think pulmonary should be involved in her care as she is not established with a pulmonologist and her emphysema is primarily managed by her PCP.  She also inquires today regarding a GI referral due to increased GERD symptoms.  A an outpatient referral was previously placed by her PCP but it appears her symptoms have increased.      Review of Systems   Constitutional:  Negative for unexpected weight change.   HENT:  Positive for trouble swallowing.    Respiratory:  Positive for cough and shortness of breath.    Cardiovascular:  Negative for chest pain.   Endocrine: Negative.    Genitourinary: Negative.    Musculoskeletal: Negative.    Allergic/Immunologic: Negative.    Neurological: Negative.  Negative for weakness.   Hematological: Negative.    Psychiatric/Behavioral: Negative.          Past Medical History:   Diagnosis Date    CAD     Cervical disc disorder 2019    After fall    CHOL     Chronic pain disorder 2020    After fall down stairs    COPD     Dementia     Depression     DEXA      2023= (1.1/ -2.3)    DMII     Extremity pain 2019    Maybe longer    Fractures 2022    GERD     Hypertension     Joint pain 2020    Low back pain 2019    After fall    MAMMO     NEG = 2018/ 2023/ 2024    Myocardial infarction     Neck pain 2015    Maybe longer    Neuropathy in diabetes 2018    Osteoarthritis 2023    Diagnosed    PAP     NEG =2021    Spinal stenosis 2019    Tremor      Past Surgical History:   Procedure Laterality Date    BRONCHOSCOPY N/A 4/10/2025    Procedure: BRONCHOSCOPY with right lung washing;  Surgeon: Marissa Soliz MD;  Location: [facility] ENDOSCOPY;  Service: Pulmonary;  Laterality: N/A;    CATARACT EXTRACTION W/ INTRAOCULAR LENS  IMPLANT, BILATERAL      COLONOSCOPY      2023= NEG, rech 2026   GSI    COMPRESSION HIP SCREW Right 03/25/2022    Procedure: HIP COMPRESSION SCREW, right [place] hip screw from [facility];  Surgeon: Loco Cortes MD;  Location: [facility] MAIN OR;  Service: Orthopedics;  Laterality: Right;    CORONARY ARTERY BYPASS GRAFT      x 3    FRACTURE SURGERY      TONSILLECTOMY      TUBAL ABDOMINAL LIGATION       Family History   Problem Relation Age of Onset    Stroke Mother     COPD Mother     Hypertension Mother     Tremor Father     Heart disease Father         CHF    COPD Father     Depression Father     Tremor Sister     Fibromyalgia Sister     Hypertension Sister     Post-traumatic stress disorder Sister     Heart disease Sister     Hypertension Sister     Depression Sister     Tremor Brother     COPD Brother     Depression Brother     Hypertension Brother     Cancer Brother         Undiagnosed lung cancer    Tremor Paternal Grandmother     Depression Sister     Hypertension Brother      Social History     Socioeconomic History    Marital status: Single   Tobacco Use    Smoking status: Every Day     Current packs/day: 0.00     Average packs/day: 0.5 packs/day for 48.0 years (24.0 ttl pk-yrs)     Types: Cigarettes     Start date: 1/9/1975     Last attempt to quit: 2023     Years  since quittin.3     Passive exposure: Past    Smokeless tobacco: Never    Tobacco comments:     Started when i was 18 quit a few times. Used vape but i think it gave me pneumonia   Vaping Use    Vaping status: Every Day    Substances: Nicotine   Substance and Sexual Activity    Alcohol use: No    Drug use: No    Sexual activity: Not Currently     Partners: Male     Birth control/protection: None     Comment: [place] to get pregnant..hahaa!     Medications Prior to Admission   Medication Sig Dispense Refill Last Dose/Taking    ALPRAZolam (XANAX) 0.25 MG tablet Take 1 tablet by mouth At Night As Needed for Anxiety or Sleep. 25 tablet 0 Past Month Bedtime    amLODIPine (NORVASC) 5 MG tablet TAKE 1 TABLET BY MOUTH EVERY DAY 90 tablet 1 2025 Morning    aspirin 81 MG tablet Take 1 tablet by mouth Daily.   2025 Morning    atorvastatin (LIPITOR) 80 MG tablet Take 1 tablet by mouth Daily. 90 tablet 0 2025 Morning    citalopram (CeleXA) 20 MG tablet TAKE 1 TABLET BY MOUTH DAILY 90 tablet 0 2025 Morning    Combivent Respimat  MCG/ACT inhaler INHALE 1 PUFF BY MOUTH 4 TIMES A DAY AS NEEDED FOR WHEEZING 4 g 0 2025 at  7:00 PM    CRANBERRY PO Take 4 tablets by mouth Daily.   2025 Morning    cyclobenzaprine (FLEXERIL) 10 MG tablet TAKE 1 TABLET BY MOUTH AT NIGHT AS NEEDED FOR MUSCLE SPASM 30 tablet 0 Past Month Bedtime    empagliflozin (Jardiance) 25 MG tablet tablet Take 1 tablet by mouth Daily. 90 tablet 1 2025 Morning    fenofibrate 160 MG tablet Take 1 tablet by mouth Daily. 90 tablet 0 2025 Morning    Fluticasone-Umeclidin-Vilant (Trelegy Ellipta) 200-62.5-25 MCG/ACT inhaler Inhale 1 puff Daily. 60 each 3 2025 Morning    guaiFENesin (MUCINEX) 600 MG 12 hr tablet Take 2 tablets by mouth Daily.   2025 Morning    omeprazole (priLOSEC) 40 MG capsule TAKE 1 CAPSULE BY MOUTH EVERY DAY 90 capsule 3 2025 Morning    traMADol (ULTRAM) 50 MG tablet Take 1 tablet by mouth  Every 8 (Eight) Hours As Needed for Moderate Pain. 60 tablet 0 5/12/2025 Evening    valsartan (Diovan) 40 MG tablet Take 1 tablet by mouth Daily. 90 tablet 0 5/12/2025 Morning    ibandronate (Boniva) 150 MG tablet Take 1 tablet by mouth Every 30 (Thirty) Days. 3 tablet 3 5/1/2025 Morning    metFORMIN ER (GLUCOPHAGE-XR) 500 MG 24 hr tablet Take 1 tablet by mouth 2 (Two) Times a Day. With meals       nitroglycerin (NITROSTAT) 0.4 MG SL tablet Place 1 tablet under the tongue Every 5 (Five) Minutes As Needed for Chest Pain. Take no more than 3 doses in 15 minutes. 25 tablet 2 More than a month    nystatin (MYCOSTATIN) 100,000 unit/mL suspension Swish and swallow 5 mL 2 (Two) Times a Day As Needed.       vitamin D (ERGOCALCIFEROL) 1.25 MG (95791 UT) capsule capsule TAKE 1 CAPSULE BY MOUTH 1 TIME A WEEK 12 capsule 0 5/10/2025     Allergies   Allergen Reactions    Ace Inhibitors Cough    Codeine Nausea And Vomiting    Pregabalin Confusion    Aricept [Donepezil] Mental Status Change    Paxil [Paroxetine] Other (See Comments)     TREMOR WORSENING    Lisinopril Other (See Comments)     gout       Objective      Vital Signs  Temp:  [97.5 °F (36.4 °C)-98.3 °F (36.8 °C)] 97.8 °F (36.6 °C)  Heart Rate:  [71-88] 88  Resp:  [14-20] 16  BP: (135-172)/(65-83) 137/76    Intake & Output (last day)         05/12 0701  05/13 0700 05/13 0701  05/14 0700    P.O.  356    Total Intake(mL/kg)  356 (5.8)    Net  +356                  Physical Exam  Constitutional:       General: She is not in acute distress.     Appearance: Normal appearance. She is not ill-appearing.   HENT:      Head: Normocephalic and atraumatic.      Nose: Nose normal.   Cardiovascular:      Rate and Rhythm: Normal rate.   Pulmonary:      Effort: Pulmonary effort is normal. No respiratory distress.   Musculoskeletal:         General: Normal range of motion.      Cervical back: Normal range of motion and neck supple.   Skin:     General: Skin is warm and dry.    Neurological:      General: No focal deficit present.      Mental Status: She is alert. Mental status is at baseline.      Motor: No weakness.   Psychiatric:         Mood and Affect: Mood normal.         Thought Content: Thought content normal.         Results Review:    I reviewed the patient's new clinical results.  I reviewed the patient's new imaging results and agree with the interpretation.  Discussed with patient, nursing, surgeon    Imaging Results (Last 24 Hours)       Procedure Component Value Units Date/Time    CT Chest Without Contrast Diagnostic [[id number]] Collected: 05/12/25 2251     Updated: 05/12/25 2258    Narrative:      CT CHEST WO CONTRAST DIAGNOSTIC    Date of Exam: 5/12/2025 10:38 PM EDT    Indication: History of recurrent pneumothorax now with increasing dyspnea.    Comparison: Chest radiograph 5/6/2025. Chest CT 4/9/2025.    Technique: Axial CT images were obtained of the chest without contrast administration.  Sagittal and coronal reconstructions were performed.  Automated exposure control and iterative reconstruction methods were used.      Findings:    Thyroid and thoracic inlet: No significant abnormality.    Lymph nodes: No pathologic appearing lymph nodes by imaging criteria. Calcified mediastinal and hilar nodes, likely sequelae of prior granulomatous disease.    Cardiovascular: Normal appearing heart size. No pericardial effusion. Aorta and main pulmonary artery diameters are within normal range.. There are coronary artery calcifications. Status post CABG. Aortic atherosclerosis.    Esophagus: No significant abnormality.    Lung parenchyma: Mild biapical pleural-parenchymal scarring. Mild emphysema. Unchanged focal bandlike opacity in the right upper lobe and right middle lobe, likely focus of scarring. Similar mild areas of bronchiectasis in the right upper lobe, right   middle lobe, right lower lobe, and left upper lobe. Scattered atelectasis. No suspicious appearing opacities.  Calcified granulomas.    Airways: Patent trachea and mainstem bronchi.    Pleura: No pleural effusion or pneumothorax.    Chest wall and osseous structures: Degenerative changes of the imaged spine. No acute osseous abnormality. Median sternotomy.    Included abdomen: No significant abnormality.      Impression:      Impression:  No acute intrathoracic finding.    Mild emphysema. Emphysema on CT is an independent risk factor for lung cancer. Low dose lung cancer screening should be considered if patient qualifies based on smoking history or if not already enrolled in a screening program.        Electronically Signed: Ashkan Meyer MD    5/12/2025 10:56 PM EDT    Workstation ID: [id number]            Lab Results:  Lab Results (last 24 hours)       Procedure Component Value Units Date/Time    POC Glucose 4x Daily Before Meals & at Bedtime [[id number]]  (Abnormal) Collected: 05/13/25 1118    Specimen: Blood Updated: 05/13/25 1120     Glucose 330 mg/dL      Comment: Serial Number: [id number]:  [id number]       Lactic Acid, Plasma [[id number]]  (Normal) Collected: 05/13/25 0935    Specimen: Blood from Arm, Left Updated: 05/13/25 1012     Lactate 1.6 mmol/L     Urinalysis With Culture If Indicated - Urine, Clean Catch [[id number]]  (Abnormal) Collected: 05/13/25 0908    Specimen: Urine, Clean Catch Updated: 05/13/25 0936     Color, UA Yellow     Appearance, UA Clear     pH, UA 6.5     Specific Gravity, UA 1.031     Glucose, UA >=1000 mg/dL (3+)     Ketones, UA 15 mg/dL (1+)     Bilirubin, UA Negative     Blood, UA Negative     Protein, UA Negative     Leuk Esterase, UA Small (1+)     Nitrite, UA Negative     Urobilinogen, UA 0.2 E.U./dL    Narrative:      In absence of clinical symptoms, the presence of pyuria, bacteria, and/or nitrites on the urinalysis result does not correlate with infection.    Urinalysis, Microscopic Only - Urine, Clean Catch [[id number]]  (Abnormal) Collected: 05/13/25 0908    Specimen: Urine,  Clean Catch Updated: 05/13/25 0935     RBC, UA 3-5 /HPF      WBC, UA Too Numerous to Count /HPF      Bacteria, UA None Seen /HPF      Squamous Epithelial Cells, UA 0-2 /HPF      Hyaline Casts, UA None Seen /LPF      Methodology Automated Microscopy    Urine Culture - Urine, Urine, Clean Catch [[id number]] Collected: 05/13/25 0908    Specimen: Urine, Clean Catch Updated: 05/13/25 0935    Basic Metabolic Panel [[id number]]  (Abnormal) Collected: 05/13/25 0752    Specimen: Blood from Arm, Left Updated: 05/13/25 0846     Glucose 347 mg/dL      BUN 19 mg/dL      Creatinine 0.73 mg/dL      Sodium 141 mmol/L      Potassium 4.2 mmol/L      Chloride 104 mmol/L      CO2 24.2 mmol/L      Calcium 9.3 mg/dL      BUN/Creatinine Ratio 26.0     Anion Gap 12.8 mmol/L      eGFR 90.3 mL/min/1.73     Narrative:      GFR Categories in Chronic Kidney Disease (CKD)              GFR Category          GFR (mL/min/1.73)    Interpretation  G1                    90 or greater        Normal or high (1)  G2                    60-89                Mild decrease (1)  G3a                   45-59                Mild to moderate decrease  G3b                   30-44                Moderate to severe decrease  G4                    15-29                Severe decrease  G5                    14 or less           Kidney failure    (1)In the absence of evidence of kidney disease, neither GFR category G1 or G2 fulfill the criteria for CKD.    eGFR calculation 2021 CKD-EPI creatinine equation, which does not include race as a factor    CBC & Differential [[id number]]  (Abnormal) Collected: 05/13/25 0752    Specimen: Blood from Arm, Left Updated: 05/13/25 0846    Narrative:      The following orders were created for panel order CBC & Differential.  Procedure                               Abnormality         Status                     ---------                               -----------         ------                     CBC Auto Differential[[id number]]         Abnormal            Final result               Scan Slide[[id number]]                                       Final result                 Please view results for these tests on the individual orders.    Manual Differential [[id number]]  (Abnormal) Collected: 05/13/25 0752    Specimen: Blood from Arm, Left Updated: 05/13/25 0846     Neutrophil % 87.0 %      Lymphocyte % 13.0 %      Neutrophils Absolute 11.74 10*3/mm3      Lymphocytes Absolute 1.75 10*3/mm3      Anisocytosis Slight/1+     WBC Morphology Normal     Platelet Morphology Normal    CBC Auto Differential [[id number]]  (Abnormal) Collected: 05/13/25 0752    Specimen: Blood from Arm, Left Updated: 05/13/25 0846     WBC 13.49 10*3/mm3      RBC 5.33 10*6/mm3      Hemoglobin 15.2 g/dL      Hematocrit 47.5 %      MCV 89.1 fL      MCH 28.5 pg      MCHC 32.0 g/dL      RDW 13.2 %      RDW-SD 43.3 fl      MPV 9.8 fL      Platelets 359 10*3/mm3     Narrative:      The previously reported component NRBC is no longer being reported. Previous result was 0.0 /100 WBC (Reference Range: 0.0-0.2 /100 WBC) on 5/13/2025 at 0826 EDT.    Scan Slide [[id number]] Collected: 05/13/25 0752    Specimen: Blood from Arm, Left Updated: 05/13/25 0846     Scan Slide --     Comment: See Manual Differential Results       Hemoglobin A1c [[id number]]  (Abnormal) Collected: 05/12/25 2226    Specimen: Blood Updated: 05/13/25 0843     Hemoglobin A1C 8.32 %     Narrative:      Hemoglobin A1C Ranges:    Increased Risk for Diabetes  5.7% to 6.4%  Diabetes                     >= 6.5%  Diabetic Goal                < 7.0%    POC Glucose Once [[id number]]  (Abnormal) Collected: 05/13/25 0754    Specimen: Blood Updated: 05/13/25 0839     Glucose 217 mg/dL      Comment: Serial Number: [id number]:  [id number]       Extra Tubes [[id number]] Collected: 05/13/25 0752    Specimen: Blood from Arm, Left Updated: 05/13/25 0830    Narrative:      The following orders were created for panel order Extra  "Tubes.  Procedure                               Abnormality         Status                     ---------                               -----------         ------                     Green Top (Gel)[[id number]]                                  Final result                 Please view results for these tests on the individual orders.    Green Top (Gel) [[id number]] Collected: 05/13/25 0752    Specimen: Blood from Arm, Left Updated: 05/13/25 0830     Extra Tube Hold for add-ons.     Comment: Auto resulted.       Procalcitonin [[id number]]  (Normal) Collected: 05/12/25 2344    Specimen: Blood from Arm, Right Updated: 05/13/25 0755     Procalcitonin 0.04 ng/mL     Narrative:      As a Marker for Sepsis (Non-Neonates):    1. <0.5 ng/mL represents a low risk of severe sepsis and/or septic shock.  2. >2 ng/mL represents a high risk of severe sepsis and/or septic shock.    As a Marker for Lower Respiratory Tract Infections that require antibiotic therapy:    PCT on Admission    Antibiotic Therapy       6-12 Hrs later    >0.5                Strongly Recommended  >0.25 - <0.5        Recommended   0.1 - 0.25          Discouraged              Remeasure/reassess PCT  <0.1                Strongly Discouraged     Remeasure/reassess PCT    As 28 day mortality risk marker: \"Change in Procalcitonin Result\" (>80% or <=80%) if Day 0 (or Day 1) and Day 4 values are available. Refer to http://www.[employer]-pct-calculator.com    Change in PCT <=80%  A decrease of PCT levels below or equal to 80% defines a positive change in PCT test result representing a higher risk for 28-day all-cause mortality of patients diagnosed with severe sepsis for septic shock.    Change in PCT >80%  A decrease of PCT levels of more than 80% defines a negative change in PCT result representing a lower risk for 28-day all-cause mortality of patients diagnosed with severe sepsis or septic shock.       POC Lactate [[id number]]  (Abnormal) Collected: 05/12/25 4712    " Specimen: Blood Updated: 05/13/25 0738     Lactate <0.3 mmol/L      Comment: Serial Number: [id number]:  [id number]       High Sensitivity Troponin T 1Hr [[id number]]  (Abnormal) Collected: 05/12/25 2344    Specimen: Blood from Arm, Right Updated: 05/13/25 0029     HS Troponin T 35 ng/L      Troponin T Numeric Delta 0 ng/L      Troponin T % Delta 0    Narrative:      High Sensitive Troponin T Reference Range:  <14.0 ng/L- Negative Female for AMI  <22.0 ng/L- Negative Male for AMI  >=14 - Abnormal Female indicating possible myocardial injury.  >=22 - Abnormal Male indicating possible myocardial injury.   Clinicians would have to utilize clinical acumen, EKG, Troponin, and serial changes to determine if it is an Acute Myocardial Infarction or myocardial injury due to an underlying chronic condition.         Respiratory Panel PCR w/COVID-19(SARS-CoV-2) [facility]O/A[facility]A/[facility] In-House, NP Swab in UTM/VTM, 2 HR TAT - Swab, Nasopharynx [[id number]]  (Normal) Collected: 05/12/25 2307    Specimen: Swab from Nasopharynx Updated: 05/13/25 0011     ADENOVIRUS, PCR Not Detected     Coronavirus 229E Not Detected     Coronavirus HKU1 Not Detected     Coronavirus NL63 Not Detected     Coronavirus OC43 Not Detected     COVID19 Not Detected     Human Metapneumovirus Not Detected     Human Rhinovirus/Enterovirus Not Detected     Influenza A PCR Not Detected     Influenza B PCR Not Detected     Parainfluenza Virus 1 Not Detected     Parainfluenza Virus 2 Not Detected     Parainfluenza Virus 3 Not Detected     Parainfluenza Virus 4 Not Detected     RSV, PCR Not Detected     Bordetella pertussis pcr Not Detected     Bordetella parapertussis PCR Not Detected     Chlamydophila pneumoniae PCR Not Detected     Mycoplasma pneumo by PCR Not Detected    Narrative:      In the setting of a positive respiratory panel with a viral infection PLUS a negative procalcitonin without other underlying concern for bacterial infection, consider  observing off antibiotics or discontinuation of antibiotics and continue supportive care. If the respiratory panel is positive for atypical bacterial infection (Bordetella pertussis, Chlamydophila pneumoniae, or Mycoplasma pneumoniae), consider antibiotic de-escalation to target atypical bacterial infection.    Blood Culture - Blood, Arm, Right [[id number]] Collected: 05/12/25 2344    Specimen: Blood from Arm, Right Updated: 05/13/25 0001    Blood Culture - Blood, Arm, Left [[id number]] Collected: 05/12/25 2301    Specimen: Blood from Arm, Left Updated: 05/12/25 2319    CBC & Differential [[id number]]  (Abnormal) Collected: 05/12/25 2226    Specimen: Blood Updated: 05/12/25 2318    Narrative:      The following orders were created for panel order CBC & Differential.  Procedure                               Abnormality         Status                     ---------                               -----------         ------                     CBC Auto Differential[[id number]]        Abnormal            Final result               Scan Slide[[id number]]                                       Final result                 Please view results for these tests on the individual orders.    CBC Auto Differential [[id number]]  (Abnormal) Collected: 05/12/25 2226    Specimen: Blood Updated: 05/12/25 2318     WBC 15.06 10*3/mm3      RBC 5.49 10*6/mm3      Hemoglobin 15.4 g/dL      Hematocrit 49.5 %      MCV 90.2 fL      MCH 28.1 pg      MCHC 31.1 g/dL      RDW 13.5 %      RDW-SD 45.3 fl      MPV 9.5 fL      Platelets 356 10*3/mm3     Narrative:      The previously reported component NRBC is no longer being reported. Previous result was 0.0 /100 WBC (Reference Range: 0.0-0.2 /100 WBC) on 5/12/2025 at 2248 EDT.    Scan Slide [[id number]] Collected: 05/12/25 2226    Specimen: Blood Updated: 05/12/25 2318     Scan Slide --     Comment: See Manual Differential Results       Manual Differential [[id number]]  (Abnormal) Collected: 05/12/25 2226     Specimen: Blood Updated: 05/12/25 2318     Neutrophil % 73.0 %      Lymphocyte % 19.0 %      Monocyte % 3.0 %      Eosinophil % 1.0 %      Atypical Lymphocyte % 4.0 %      Neutrophils Absolute 10.99 10*3/mm3      Lymphocytes Absolute 3.46 10*3/mm3      Monocytes Absolute 0.45 10*3/mm3      Eosinophils Absolute 0.15 10*3/mm3      RBC Morphology Normal     WBC Morphology Normal     Large Platelets Slight/1+    Comprehensive Metabolic Panel [[id number]]  (Abnormal) Collected: 05/12/25 2226    Specimen: Blood Updated: 05/12/25 2308     Glucose 229 mg/dL      BUN 20 mg/dL      Creatinine 0.91 mg/dL      Sodium 138 mmol/L      Potassium 4.0 mmol/L      Chloride 101 mmol/L      CO2 25.9 mmol/L      Calcium 10.1 mg/dL      Total Protein 6.8 g/dL      Albumin 4.0 g/dL      ALT (SGPT) 33 U/L      AST (SGOT) 19 U/L      Alkaline Phosphatase 128 U/L      Total Bilirubin 0.2 mg/dL      Globulin 2.8 gm/dL      A/G Ratio 1.4 g/dL      BUN/Creatinine Ratio 22.0     Anion Gap 11.1 mmol/L      eGFR 69.3 mL/min/1.73     Narrative:      GFR Categories in Chronic Kidney Disease (CKD)              GFR Category          GFR (mL/min/1.73)    Interpretation  G1                    90 or greater        Normal or high (1)  G2                    60-89                Mild decrease (1)  G3a                   45-59                Mild to moderate decrease  G3b                   30-44                Moderate to severe decrease  G4                    15-29                Severe decrease  G5                    14 or less           Kidney failure    (1)In the absence of evidence of kidney disease, neither GFR category G1 or G2 fulfill the criteria for CKD.    eGFR calculation 2021 CKD-EPI creatinine equation, which does not include race as a factor    High Sensitivity Troponin T [[id number]]  (Abnormal) Collected: 05/12/25 2226    Specimen: Blood Updated: 05/12/25 2308     HS Troponin T 35 ng/L     Narrative:      High Sensitive Troponin T Reference  Range:  <14.0 ng/L- Negative Female for AMI  <22.0 ng/L- Negative Male for AMI  >=14 - Abnormal Female indicating possible myocardial injury.  >=22 - Abnormal Male indicating possible myocardial injury.   Clinicians would have to utilize clinical acumen, EKG, Troponin, and serial changes to determine if it is an Acute Myocardial Infarction or myocardial injury due to an underlying chronic condition.         BNP [[id number]]  (Normal) Collected: 05/12/25 2226    Specimen: Blood Updated: 05/12/25 2308     proBNP 754.0 pg/mL     Narrative:      This assay is used as an aid in the diagnosis of individuals suspected of having heart failure. It can be used as an aid in the diagnosis of acute decompensated heart failure (ADHF) in patients presenting with signs and symptoms of ADHF to the emergency department (ED). In addition, NT-proBNP of <300 pg/mL indicates ADHF is not likely.    Age Range Result Interpretation  NT-proBNP Concentration (pg/mL:      <50             Positive            >450                   Gray                 300-450                    Negative             <300    50-75           Positive            >900                  Gray                300-900                  Negative            <300      >75             Positive            >1800                  Gray                300-1800                  Negative            <300    [facility] [[id number]] Collected: 05/12/25 2226    Specimen: Blood Updated: 05/12/25 2246    Narrative:      The following orders were created for panel order [facility].  Procedure                               Abnormality         Status                     ---------                               -----------         ------                     Green Top (Gel)[[id number]]                                  Final result               Lavender Top[[id number]]                                     Final result               Gold Top - SST[[id number]]                                   Final  result               Light Blue Top[[id number]]                                   Final result                 Please view results for these tests on the individual orders.    Green Top (Gel) [[id number]] Collected: 05/12/25 2226    Specimen: Blood Updated: 05/12/25 2246     Extra Tube Hold for add-ons.     Comment: Auto resulted.       Lavender Top [[id number]] Collected: 05/12/25 2226    Specimen: Blood Updated: 05/12/25 2246     Extra Tube hold for add-on     Comment: Auto resulted       Gold Top - SST [[id number]] Collected: 05/12/25 2226    Specimen: Blood Updated: 05/12/25 2246     Extra Tube Hold for add-ons.     Comment: Auto resulted.       Light Blue Top [[id number]] Collected: 05/12/25 2226    Specimen: Blood Updated: 05/12/25 2246     Extra Tube Hold for add-ons.     Comment: Auto resulted                   Assessment & Plan       Acute exacerbation of chronic obstructive pulmonary disease (COPD)      Assessment & Plan    Ms. Dos Santos is a pleasant 69-year-old lady who was found to have a right-sided pneumothorax on previous hospital admission treated with chest tube.  Pneumothorax eventually resolved and chest tube was removed.  No bullous disease on CT chest.  She does however have history of emphysema that has been managed by her primary care.  She came into the ER with increased shortness of breath, no pneumothorax on CT chest.  No indication for thoracic intervention.  Will defer management of her emphysema to pulmonary medicine and hopefully she can be established as an outpatient.  In regards to her increased reflux/GERD symptoms, will ask GI to see her to establish care.      Nothing further to add from a thoracic standpoint.  Follow-up with us in July 2025 as scheduled with CT chest.  Discharge per primary service.    I discussed the patient's findings and our recommendations with patient, nursing staff, and Dr. Rahel Bennett, DNP, APRN  [facility]  05/13/25  14:56  EDT    I spent 65 minutes reviewing the patient's chart including medical history, notes, radiographic imaging, labs and performing an assessment and development of a plan and discussion with the patient/family at bedside.

## 2025-05-13 NOTE — H&P
[facility] H&P    Patient Name: Darleen Stallings  : 1955  MRN: [id number]  Primary Care Physician: Elen Jameson DO  Date of admission: 2025     Patient Care Team:  Elen Jameson DO as PCP - General (Family Medicine)  Adryan Kumar MD as Consulting Physician (Cardiology)  Michael Hickman MD as Consulting Physician (Neurology)  Sergio Mcdonald MD as Consulting Physician (Gastroenterology)  Douglas Mcginnis MD as Surgeon (Thoracic Surgery)          Subjective   History Present Illness     Chief Complaint:   Chief Complaint   Patient presents with    Shortness of Breath     Shortness of breath     Shortness of Breath    ED 2025  69-year-old female states that she has had a 1 week history of feeling short of breath. She states is worse generally during the day and sometimes gets better at night. She states she has had no definite fever chills but felt hot earlier today. She states she has had a cough and some wheezing with the cough is been nonproductive she states that she has recently had recurrent pneumothorax and was seen by Dr. Post recently had a chest x-ray that was negative on the sixth of this month the patient states she is to get a follow-up CT scan but has not got the scan yet. She reports no leg pain or swelling she denies diaphoresis or palpitate     Observation 2025  Patient agrees with HPI noted above including shortness of breath for the past few weeks and worse last week.  She reports that she is on O2 at night but only uses as needed.  She does not have a portable tank at home but often feels short of breath with exertion.  She feels better today after receiving Solu-Medrol.  Cardiothoracic surgeon recommending pulmonologist consult.  Hopefully DC in a.m.    Review of Systems   Respiratory:  Positive for shortness of breath.    Genitourinary:  Positive for dysuria, frequency and urgency.     Review of Systems   Constitutional:   Positive for fatigue. Negative for chills, diaphoresis and fever.   Respiratory:  Positive for cough, chest tightness, shortness of breath and wheezing.    Hematological:  Does not bruise/bleed easily.   All other systems reviewed and are negative.      Personal History     Past Medical History:   Past Medical History:   Diagnosis Date    CAD     Cervical disc disorder 2019    After fall    CHOL     Chronic pain disorder 2020    After fall down stairs    COPD     Dementia     Depression     DEXA     2023= (1.1/ -2.3)    DMII     Extremity pain 2019    Maybe longer    Fractures 2022    GERD     Hypertension     Joint pain 2020    Low back pain 2019    After fall    MAMMO     NEG = 2018/ 2023/ 2024    Myocardial infarction     Neck pain 2015    Maybe longer    Neuropathy in diabetes 2018    Osteoarthritis 2023    Diagnosed    PAP     NEG =2021    Spinal stenosis 2019    Tremor        Surgical History:      Past Surgical History:   Procedure Laterality Date    BRONCHOSCOPY N/A 4/10/2025    Procedure: BRONCHOSCOPY with right lung washing;  Surgeon: Marissa Soliz MD;  Location: [facility] ENDOSCOPY;  Service: Pulmonary;  Laterality: N/A;    CATARACT EXTRACTION W/ INTRAOCULAR LENS  IMPLANT, BILATERAL      COLONOSCOPY      2023= NEG, rech 2026   GSI    COMPRESSION HIP SCREW Right 03/25/2022    Procedure: HIP COMPRESSION SCREW, right [place] hip screw from [employer];  Surgeon: Loco Cortes MD;  Location: [facility] MAIN OR;  Service: Orthopedics;  Laterality: Right;    CORONARY ARTERY BYPASS GRAFT      x 3    FRACTURE SURGERY      TONSILLECTOMY      TUBAL ABDOMINAL LIGATION             Family History: family history includes COPD in her brother, father, and mother; Cancer in her brother; Depression in her brother, father, sister, and sister; Fibromyalgia in her sister; Heart disease in her father and sister; Hypertension in her brother, brother, mother, sister, and sister; Post-traumatic stress disorder in her sister; Stroke in her  mother; Tremor in her brother, father, paternal grandmother, and sister. Otherwise pertinent FHx was reviewed and unremarkable.     Social History:  reports that she has been smoking cigarettes. She started smoking about 50 years ago. She has a 24 pack-year smoking history. She has been exposed to tobacco smoke. She has never used smokeless tobacco. She reports that she does not drink alcohol and does not use drugs.      Medications:  Prior to Admission medications    Medication Sig Start Date End Date Taking? Authorizing Provider   ALPRAZolam (XANAX) 0.25 MG tablet Take 1 tablet by mouth At Night As Needed for Anxiety or Sleep. 4/17/25  Yes Elen Jameson DO   amLODIPine (NORVASC) 5 MG tablet TAKE 1 TABLET BY MOUTH EVERY DAY 3/10/25  Yes Elen Jameson DO   aspirin 81 MG tablet Take 1 tablet by mouth Daily.   Yes ProviderKaye MD   atorvastatin (LIPITOR) 80 MG tablet Take 1 tablet by mouth Daily. 4/1/25  Yes Elen Jameson DO   citalopram (CeleXA) 20 MG tablet TAKE 1 TABLET BY MOUTH DAILY 4/29/25  Yes Elen Jameson DO   Combivent Respimat  MCG/ACT inhaler INHALE 1 PUFF BY MOUTH 4 TIMES A DAY AS NEEDED FOR WHEEZING 4/25/25  Yes Elen Jameson DO   CRANBERRY PO Take 1 tablet by mouth Daily. 1 po qd   Yes ProviderKaye MD   cyclobenzaprine (FLEXERIL) 10 MG tablet TAKE 1 TABLET BY MOUTH AT NIGHT AS NEEDED FOR MUSCLE SPASM 5/7/25  Yes Elen Jameson DO   empagliflozin (Jardiance) 25 MG tablet tablet Take 1 tablet by mouth Daily. 1/20/25  Yes Elen Jameson DO   fenofibrate 160 MG tablet Take 1 tablet by mouth Daily. 4/1/25  Yes Elen Jameson, DO   Fluticasone-Umeclidin-Vilant (Trelegy Ellipta) 200-62.5-25 MCG/ACT inhaler Inhale 1 puff Daily. 4/1/25  Yes Elen Jameson DO   guaiFENesin (MUCINEX) 600 MG 12 hr tablet Take 2 tablets by mouth 2 (Two) Times a Day.  Patient taking differently: Take 2 tablets by mouth Daily. 2/21/25  Yes Elen Jameson DO   metFORMIN ER (GLUCOPHAGE-XR)  500 MG 24 hr tablet 1 po bid w/ meals 4/1/25  Yes Elen Jameson DO   omeprazole (priLOSEC) 40 MG capsule TAKE 1 CAPSULE BY MOUTH EVERY DAY 6/9/23  Yes Elen Jameson DO   traMADol (ULTRAM) 50 MG tablet Take 1 tablet by mouth Every 8 (Eight) Hours As Needed for Moderate Pain. 4/17/25  Yes Elen Jameson, DO   valsartan (Diovan) 40 MG tablet Take 1 tablet by mouth Daily. 2/17/25  Yes Elen Jameson DO   ibandronate (Boniva) 150 MG tablet Take 1 tablet by mouth Every 30 (Thirty) Days. 4/1/25   Elen Jameson DO   nitroglycerin (NITROSTAT) 0.4 MG SL tablet Place 1 tablet under the tongue Every 5 (Five) Minutes As Needed for Chest Pain. Take no more than 3 doses in 15 minutes. 9/10/24   Elen Jameson DO   vitamin D (ERGOCALCIFEROL) 1.25 MG (21397 UT) capsule capsule TAKE 1 CAPSULE BY MOUTH 1 TIME A WEEK 3/20/25   Elen Jameson DO       Allergies:    Allergies   Allergen Reactions    Ace Inhibitors Cough    Codeine Nausea And Vomiting    Pregabalin Confusion    Aricept [Donepezil] Mental Status Change    Paxil [Paroxetine] Other (See Comments)     TREMOR WORSENING    Lisinopril Other (See Comments)     gout       Objective   Objective     Vital Signs  Temp:  [97.5 °F (36.4 °C)-98.3 °F (36.8 °C)] 97.8 °F (36.6 °C)  Heart Rate:  [71-88] 88  Resp:  [14-20] 16  BP: (135-172)/(65-83) 137/76  SpO2:  [92 %-99 %] 96 %  on  Flow (L/min) (Oxygen Therapy):  [2] 2;   Device (Oxygen Therapy): room air  Body mass index is 26.57 kg/m².    Physical Exam  Vitals and nursing note reviewed.   Constitutional:       Appearance: Normal appearance.   HENT:      Head: Normocephalic and atraumatic.      Right Ear: External ear normal.      Left Ear: External ear normal.      Nose: Nose normal.      Mouth/Throat:      Mouth: Mucous membranes are moist.      Pharynx: Oropharynx is clear.   Eyes:      Extraocular Movements: Extraocular movements intact.   Cardiovascular:      Rate and Rhythm: Normal rate and regular rhythm.       Pulses: Normal pulses.      Heart sounds: Normal heart sounds.   Pulmonary:      Effort: Pulmonary effort is normal.      Breath sounds: Normal breath sounds.   Abdominal:      General: Abdomen is flat. Bowel sounds are normal.      Palpations: Abdomen is soft.   Musculoskeletal:         General: Normal range of motion.      Cervical back: Normal range of motion.   Skin:     General: Skin is warm.   Neurological:      General: No focal deficit present.      Mental Status: She is alert and oriented to person, place, and time.   Psychiatric:         Mood and Affect: Mood normal.         Behavior: Behavior normal.           Results Review:  I have personally reviewed most recent cardiac tracings, lab results, and radiology images and interpretations and agree with findings, most notably: Troponin, CMP, CBC, lactate, procalcitonin, UA, chest x-ray and EKG.    Results from last 7 days   Lab Units 05/13/25  0752   WBC 10*3/mm3 13.49*   HEMOGLOBIN g/dL 15.2   HEMATOCRIT % 47.5*   PLATELETS 10*3/mm3 359     Results from last 7 days   Lab Units 05/13/25  0935 05/13/25  0752 05/12/25  2344 05/12/25  2257 05/12/25  2226   SODIUM mmol/L  --  141  --   --  138   POTASSIUM mmol/L  --  4.2  --   --  4.0   CHLORIDE mmol/L  --  104  --   --  101   CO2 mmol/L  --  24.2  --   --  25.9   BUN mg/dL  --  19  --   --  20   CREATININE mg/dL  --  0.73  --   --  0.91   GLUCOSE mg/dL  --  347*  --   --  229*   CALCIUM mg/dL  --  9.3  --   --  10.1   ALK PHOS U/L  --   --   --   --  128*   ALT (SGPT) U/L  --   --   --   --  33   AST (SGOT) U/L  --   --   --   --  19   HSTROP T ng/L  --   --  35*  --  35*   PROBNP pg/mL  --   --   --   --  754.0   LACTATE mmol/L 1.6  --   --    < >  --    PROCALCITONIN ng/mL  --   --  0.04  --   --     < > = values in this interval not displayed.     Estimated Creatinine Clearance: 61.4 mL/min (by C-G formula based on SCr of 0.73 mg/dL).  Brief Urine Lab Results  (Last result in the past 365 days)        Color    Clarity   Blood   Leuk Est   Nitrite   Protein   CREAT   Urine HCG        05/13/25 0908 Yellow   Clear   Negative   Small (1+)   Negative   Negative                   Microbiology Results (last 10 days)       Procedure Component Value - Date/Time    Respiratory Panel PCR w/COVID-19(SARS-CoV-2) MANJIT/THEO/AUREA/[facility]IL[facility] In-House, NP Swab in UTM/VTM, 2 HR TAT - Swab, Nasopharynx [[id number]]  (Normal) Collected: 05/12/25 2307    Lab Status: Final result Specimen: Swab from Nasopharynx Updated: 05/13/25 0011     ADENOVIRUS, PCR Not Detected     Coronavirus 229E Not Detected     Coronavirus HKU1 Not Detected     Coronavirus NL63 Not Detected     Coronavirus OC43 Not Detected     COVID19 Not Detected     Human Metapneumovirus Not Detected     Human Rhinovirus/Enterovirus Not Detected     Influenza A PCR Not Detected     Influenza B PCR Not Detected     Parainfluenza Virus 1 Not Detected     Parainfluenza Virus 2 Not Detected     Parainfluenza Virus 3 Not Detected     Parainfluenza Virus 4 Not Detected     RSV, PCR Not Detected     Bordetella pertussis pcr Not Detected     Bordetella parapertussis PCR Not Detected     Chlamydophila pneumoniae PCR Not Detected     Mycoplasma pneumo by PCR Not Detected    Narrative:      In the setting of a positive respiratory panel with a viral infection PLUS a negative procalcitonin without other underlying concern for bacterial infection, consider observing off antibiotics or discontinuation of antibiotics and continue supportive care. If the respiratory panel is positive for atypical bacterial infection (Bordetella pertussis, Chlamydophila pneumoniae, or Mycoplasma pneumoniae), consider antibiotic de-escalation to target atypical bacterial infection.            ECG/EMG Results (most recent)       Procedure Component Value Units Date/Time    Telemetry Scan [[id number]] Resulted: 05/12/25     Updated: 05/12/25 2359    ECG 12 Lead Dyspnea [[id number]] Collected: 05/12/25 2138     Updated:  05/13/25 0652     QT Interval 390 ms      QTC Interval 435 ms     Narrative:      HEART RATE=75  bpm  RR Lhtdhbom=342  ms  AR Vedhpzod=564  ms  P Horizontal Axis=-7  deg  P Front Axis=79  deg  QRSD Interval=97  ms  QT Vyxcries=622  ms  PQqD=774  ms  QRS Axis=70  deg  T Wave Axis=63  deg  - ABNORMAL ECG -  Sinus rhythm  Probable  left atrial enlargement  Anterior  infarct, old  When compared with ECG of 08-Apr-2025 08:39:48,  Significant axis, voltage or hypertrophy change  Electronically Signed By: Natanael Armenta (Sundeep) 2025-05-13 06:52:05  Date and Time of Study:2025-05-12 21:38:20            Results for orders placed during the hospital encounter of 01/21/25    Duplex Carotid Ultrasound CAR    Interpretation Summary    Right internal carotid artery demonstrates a less than 50% stenosis.    Antegrade right vertebral flow.    Left internal carotid artery demonstrates a less than 50% stenosis.    Antegrade left vertebral flow.      Results for orders placed during the hospital encounter of 06/20/22    Adult Transthoracic Echo Complete W/ Cont if Necessary Per Protocol    Interpretation Summary  · Left ventricular wall thickness is consistent with mild to moderate concentric hypertrophy.  · Estimated left ventricular EF = 55% Estimated left ventricular EF was in agreement with the calculated left ventricular EF. Left ventricular systolic function is normal.  · There is moderate calcification of the aortic valve mainly affecting the left coronary and right coronary cusp(s).  · Left ventricular diastolic function is consistent with (grade I) impaired relaxation.  · Estimated right ventricular systolic pressure from tricuspid regurgitation is normal (<35 mmHg).      CT Chest Without Contrast Diagnostic  Result Date: 5/12/2025  Impression: No acute intrathoracic finding. Mild emphysema. Emphysema on CT is an independent risk factor for lung cancer. Low dose lung cancer screening should be considered if patient qualifies  based on smoking history or if not already enrolled in a screening program. Electronically Signed: Ashkan Meyer MD  5/12/2025 10:56 PM EDT  Workstation ID: [id number]    XR Chest 2 View  Result Date: 5/7/2025  Impression: No acute process. Electronically Signed: Jeffrey Lo MD  5/7/2025 4:21 PM EDT  Workstation ID: [id number]        Estimated Creatinine Clearance: 61.4 mL/min (by C-G formula based on SCr of 0.73 mg/dL).    Assessment & Plan   Assessment/Plan       Active Hospital Problems    Diagnosis  POA    **Acute exacerbation of chronic obstructive pulmonary disease (COPD) [J44.1]  Yes      Resolved Hospital Problems   No resolved problems to display.       COPD exacerbation  - WBC trended down from 15.06-13.49  - Lactate and procalcitonin within normal range  - Troponin flat and proBNP unremarkable  - CMP unremarkable except hyperglycemia in the setting of diabetes and steroids  - Blood cultures pending results  - Chest x-ray showed no acute process  - CT chest showed no acute finding and mild emphysema  - EKG showed sinus rhythm with heart rate 75  - In the ED patient given DuoNeb and started on IV ceftriaxone and IV Solu-Medrol  -Continue iv steroid for now  - Cardiothoracic surgeon consulted by ED provider due to history of recent pneumothorax  - Pulmonologist consulted by cardiothoracic surgeon  -Will need walk oximetry at dc   - On Combivent and trilogy at home    UTI  -C/o dysuria, frequency and urgency  -UA appears to be negative for UTI but it was collected after received dose of iv ceftriaxone  -Will keep iv ceftriaxone for now pending urine cx.    Diabetes mellitus  - Poorly controlled   Lab Results   Component Value Date    GLUCOSE 347 (H) 05/13/2025    GLUCOSE 229 (H) 05/12/2025    GLUCOSE 148 (H) 04/24/2025    GLUCOSE 87 04/09/2025   -A1c 8.32  - SSI  - Pharmacy consulted to dose steroid protocol  -Hold metformin  - Continue Jardiance  -Diabetic diet  -Monitor before meals and at bedtime      Hypertension  - Well controlled   BP Readings from Last 1 Encounters:   05/13/25 137/76   - Continue amlodipine and valsartan  - Monitor while admitted     Hyperlipidemia  - Continue statin    Anxiety  - Continue Xanax and Celexa    GERD  - Continue PPI  -GI was consulted by cardiothoracic surgeon per patient's request    VTE Prophylaxis - Documented VTE Prophylaxis Not Indicated  Reason:        Start        05/13/25 0034  VTE Prophylaxis Not Indicated: Low Risk; Acute Infection / Rheumatologic Disorder (1)  Once                              CODE STATUS:    Code Status and Medical Interventions: CPR (Attempt to Resuscitate); Full Support   Ordered at: 05/13/25 0719     Code Status (Patient has no pulse and is not breathing):    CPR (Attempt to Resuscitate)     Medical Interventions (Patient has pulse or is breathing):    Full Support     Level Of Support Discussed With:    Patient       This patient has been examined wearing personal protective equipment.     I discussed the patient's findings and my recommendations with patient and nursing staff.      Signature:Electronically signed by DESEAN Lozoya, 05/13/25, 12:02 PM EDT.

## 2025-05-13 NOTE — PLAN OF CARE
Problem: Adult Inpatient Plan of Care  Goal: Plan of Care Review  5/13/2025 1733 by Chelle Quintanilla RN  Outcome: Progressing  Flowsheets (Taken 5/13/2025 1733)  Progress: improving  Plan of Care Reviewed With: patient  5/13/2025 1338 by Chelle Quintanilla RN  Outcome: Progressing  Flowsheets (Taken 5/13/2025 1338)  Progress: improving  Goal: Patient-Specific Goal (Individualized)  5/13/2025 1733 by Chelle Quintanilla RN  Outcome: Progressing  5/13/2025 1338 by Chelle Quintanilla RN  Outcome: Progressing  Goal: Absence of Hospital-Acquired Illness or Injury  5/13/2025 1733 by Chelle Quintanilla RN  Outcome: Progressing  5/13/2025 1338 by Chelle Quintanilla RN  Outcome: Progressing  Goal: Optimal Comfort and Wellbeing  5/13/2025 1733 by Chelle Quintanilla RN  Outcome: Progressing  5/13/2025 1338 by Chelle Quintanilla RN  Outcome: Progressing  Goal: Readiness for Transition of Care  5/13/2025 1733 by Chelle Quintanilla RN  Outcome: Progressing  5/13/2025 1338 by Chelle Quintanilla RN  Outcome: Progressing     Problem: Comorbidity Management  Goal: Maintenance of COPD Symptom Control  5/13/2025 1733 by Chelle Quintanilla RN  Outcome: Progressing  5/13/2025 1338 by Chelle Quintanilla RN  Outcome: Progressing  Goal: Blood Glucose Level Within Target Range  5/13/2025 1733 by Chelle Quintanilla RN  Outcome: Progressing  5/13/2025 1338 by Chelle Quintanilla RN  Outcome: Progressing  Goal: Blood Pressure in Desired Range  5/13/2025 1733 by Chelle Quintanilla RN  Outcome: Progressing  5/13/2025 1338 by Chelle Quintanilla RN  Outcome: Progressing     Problem: Sepsis/Septic Shock  Goal: Optimal Coping  5/13/2025 1733 by Chelle Quintanilla RN  Outcome: Progressing  5/13/2025 1338 by Chelle Quintanilla RN  Outcome: Progressing  Goal: Absence of Bleeding  5/13/2025 1733 by Chelle Quintanilla, RN  Outcome: Progressing  5/13/2025 1338 by Chelle Quintanilla, RN  Outcome: Progressing  Goal: Blood Glucose Level Within Target Range  5/13/2025 1733 by  Chelle Quintanilla RN  Outcome: Progressing  5/13/2025 1338 by Chelle Quintanilla RN  Outcome: Progressing  Goal: Absence of Infection Signs and Symptoms  5/13/2025 1733 by Chelle Quintanilla RN  Outcome: Progressing  5/13/2025 1338 by Chelle Quintanilla RN  Outcome: Progressing  Goal: Optimal Nutrition Delivery  5/13/2025 1733 by Chelle Quintanilla RN  Outcome: Progressing  5/13/2025 1338 by Chelle Quintanilla RN  Outcome: Progressing   Goal Outcome Evaluation:  Plan of Care Reviewed With: patient        Progress: improving

## 2025-05-13 NOTE — CASE MANAGEMENT/SOCIAL WORK
Discharge Planning Assessment   [place]     Patient Name: Darleen Stallings  MRN: [id number]  Today's Date: 5/13/2025    Admit Date: 5/12/2025  Per [facility] they need a new qualifying 6 MWT and order for home oxygen  Plan: Return home with son Sergio staying with her. Has nighttime oxygen with [facility] at 2 liters   Discharge Needs Assessment       Row Name 05/13/25 1545       Living Environment    People in Home child(jabier), adult    Name(s) of People in Home sonSergio staying  with her temporarily    Current Living Arrangements home    Potentially Unsafe Housing Conditions none    In the past 12 months has the electric, gas, oil, or water company threatened to shut off services in your home? No    Primary Care Provided by self    Provides Primary Care For no one, unable/limited ability to care for self    Family Caregiver if Needed child(jabier), adult;sibling(s)    Family Caregiver Names Sister, Cristin or son, Sergio    Quality of Family Relationships involved;helpful    Able to Return to Prior Arrangements yes       Resource/Environmental Concerns    Resource/Environmental Concerns none    Transportation Concerns none       Transportation Needs    In the past 12 months, has lack of transportation kept you from medical appointments or from getting medications? no    In the past 12 months, has lack of transportation kept you from meetings, work, or from getting things needed for daily living? No       Food Insecurity    Within the past 12 months, you worried that your food would run out before you got the money to buy more. Never true    Within the past 12 months, the food you bought just didn't last and you didn't have money to get more. Never true       Transition Planning    Patient/Family Anticipates Transition to home with family    Patient/Family Anticipated Services at Transition durable medical equipment    Transportation Anticipated car, drives self;family or friend will provide       Discharge Needs Assessment     Equipment Currently Used at Home oxygen;glucometer;nebulizer    Concerns to be Addressed discharge planning    Do you want help finding or keeping work or a job? I do not need or want help    Do you want help with school or training? For example, starting or completing job training or getting a high school diploma, GED or equivalent No    Anticipated Changes Related to Illness none    Equipment Needed After Discharge none                   Discharge Plan       Row Name 05/13/25 1546       Plan    Plan Return home with son , Sergio staying with her. Has nighttime oxygen with [facility] at 2 liters    Patient/Family in Agreement with Plan yes    Plan Comments Barriers: CardioThoracic Surgery Consult. LAYA met with Ms. Stallings who is a/o and said she lives alone but her son, Sergio is staying with her temporarily. She drives and is independent. She uses a nebulizer, glucometer and oxygen at 2 liters mostly at night through [facility]. She reported she does not have any portable oxygen tanks at home but feels like she needs it. CM sent message to Juve with [facility] and added to Epic and asked him to verify her home oxygen orders.She denied financial concerns and said her son or sister will pick her up at discharge                  Continued Care and Services - Admitted Since 5/12/2025       Durable Medical Equipment       Service Provider Request Status Services Address Phone Fax Patient Preferred    [facility] -- [address] [address] [phone] --    [facility] - [facility] [place] [address] [phone] --                           Demographic Summary       Row Name 05/13/25 1549       General Information    Admission Type observation    Arrived From emergency department    Required Notices Provided Observation Status Notice    Referral Source admission list    Reason for Consult  discharge planning    Preferred Language English       Contact Information    Permission Granted to Share Info With                    Functional Status       Row Name 05/13/25 1544       Functional Status    Usual Activity Tolerance good    Current Activity Tolerance moderate       Functional Status, IADL    Medications independent    Meal Preparation independent    Housekeeping independent    Laundry independent    Shopping independent       Mental Status    General Appearance WDL WDL       Mental Status Summary    Recent Changes in Mental Status/Cognitive Functioning no changes       Employment/    Employment Status retired                      Belkis SIFUENTES,RN Case Manager  [facility]  Phone: desk- [phone] cell- [phone]

## 2025-05-13 NOTE — CONSULTS
GI CONSULT  NOTE:    Referring Provider:  Dr. Armenta    Chief complaint: Acute exacerbation of chronic COPD, GERD    Subjective .     History of present illness: Patient is a 69-year-old female admitted to the hospital with shortness of breath and acute exacerbation of COPD.  She also makes mention of worsening GERD symptoms and GI has been consulted to establish care.  She was previously scheduled for outpatient evaluation but missed this appointment due to being admitted for other respiratory disease.  Patient states that she thinks she has had worsening GERD for approximately 6 to 8 months.  GERD can sometimes be associated with sore throat, chest/epigastric pain.  She does take NSAIDs approximately 3 times daily for chronic inflammation/pain.  She does take omeprazole daily feels that she can still have nocturnal reflux.  She states she has never had an EGD in the past.  Reports colonoscopy approximately 1 year ago.  No bright red blood per rectum or melanotic stools recently.  She can have some a.m. nausea without vomiting.  She can also have occasional dysphagia towards solids but ate solid food today and tolerated it well.      Endo History:  9/1/2023 colonoscopy with Dr. Carlson-polyps in the mid sigmoid colon, stage II internal hemorrhoids.  Pathology shows hyperplastic polyps    Past Medical History:  Past Medical History:   Diagnosis Date    CAD     Cervical disc disorder 2019    After fall    CHOL     Chronic pain disorder 2020    After fall down stairs    COPD     Dementia     Depression     DEXA     2023= (1.1/ -2.3)    DMII     Extremity pain 2019    Maybe longer    Fractures 2022    GERD     Hypertension     Joint pain 2020    Low back pain 2019    After fall    MAMMO     NEG = 2018/ 2023/ 2024    Myocardial infarction     Neck pain 2015    Maybe longer    Neuropathy in diabetes 2018    Osteoarthritis 2023    Diagnosed    PAP     NEG =2021    Spinal stenosis 2019    Tremor        Past Surgical  History:  Past Surgical History:   Procedure Laterality Date    BRONCHOSCOPY N/A 4/10/2025    Procedure: BRONCHOSCOPY with right lung washing;  Surgeon: Marissa Soliz MD;  Location: [facility] ENDOSCOPY;  Service: Pulmonary;  Laterality: N/A;    CATARACT EXTRACTION W/ INTRAOCULAR LENS  IMPLANT, BILATERAL      COLONOSCOPY      = NEG, rech    GSI    COMPRESSION HIP SCREW Right 2022    Procedure: HIP COMPRESSION SCREW, right [place] hip screw from [place];  Surgeon: Loco Cortes MD;  Location: [facility] MAIN OR;  Service: Orthopedics;  Laterality: Right;    CORONARY ARTERY BYPASS GRAFT      x 3    FRACTURE SURGERY      TONSILLECTOMY      TUBAL ABDOMINAL LIGATION         Social History:  Social History     Tobacco Use    Smoking status: Every Day     Current packs/day: 0.00     Average packs/day: 0.5 packs/day for 48.0 years (24.0 ttl pk-yrs)     Types: Cigarettes     Start date: 1975     Last attempt to quit:      Years since quittin.3     Passive exposure: Past    Smokeless tobacco: Never    Tobacco comments:     Started when i was 18 quit a few times. Used vape but i think it gave me pneumonia   Vaping Use    Vaping status: Every Day    Substances: Nicotine   Substance Use Topics    Alcohol use: No    Drug use: No       Family History:  Family History   Problem Relation Age of Onset    Stroke Mother     COPD Mother     Hypertension Mother     Tremor Father     Heart disease Father         CHF    COPD Father     Depression Father     Tremor Sister     Fibromyalgia Sister     Hypertension Sister     Post-traumatic stress disorder Sister     Heart disease Sister     Hypertension Sister     Depression Sister     Tremor Brother     COPD Brother     Depression Brother     Hypertension Brother     Cancer Brother         Undiagnosed lung cancer    Tremor Paternal Grandmother     Depression Sister     Hypertension Brother        Medications:  Medications Prior to Admission   Medication Sig Dispense Refill  Last Dose/Taking    ALPRAZolam (XANAX) 0.25 MG tablet Take 1 tablet by mouth At Night As Needed for Anxiety or Sleep. 25 tablet 0 Past Month Bedtime    amLODIPine (NORVASC) 5 MG tablet TAKE 1 TABLET BY MOUTH EVERY DAY 90 tablet 1 5/12/2025 Morning    aspirin 81 MG tablet Take 1 tablet by mouth Daily.   5/12/2025 Morning    atorvastatin (LIPITOR) 80 MG tablet Take 1 tablet by mouth Daily. 90 tablet 0 5/12/2025 Morning    citalopram (CeleXA) 20 MG tablet TAKE 1 TABLET BY MOUTH DAILY 90 tablet 0 5/12/2025 Morning    Combivent Respimat  MCG/ACT inhaler INHALE 1 PUFF BY MOUTH 4 TIMES A DAY AS NEEDED FOR WHEEZING 4 g 0 5/12/2025 at  7:00 PM    CRANBERRY PO Take 4 tablets by mouth Daily.   5/12/2025 Morning    cyclobenzaprine (FLEXERIL) 10 MG tablet TAKE 1 TABLET BY MOUTH AT NIGHT AS NEEDED FOR MUSCLE SPASM 30 tablet 0 Past Month Bedtime    empagliflozin (Jardiance) 25 MG tablet tablet Take 1 tablet by mouth Daily. 90 tablet 1 5/12/2025 Morning    fenofibrate 160 MG tablet Take 1 tablet by mouth Daily. 90 tablet 0 5/12/2025 Morning    Fluticasone-Umeclidin-Vilant (Trelegy Ellipta) 200-62.5-25 MCG/ACT inhaler Inhale 1 puff Daily. 60 each 3 5/12/2025 Morning    guaiFENesin (MUCINEX) 600 MG 12 hr tablet Take 2 tablets by mouth Daily.   5/12/2025 Morning    omeprazole (priLOSEC) 40 MG capsule TAKE 1 CAPSULE BY MOUTH EVERY DAY 90 capsule 3 5/12/2025 Morning    traMADol (ULTRAM) 50 MG tablet Take 1 tablet by mouth Every 8 (Eight) Hours As Needed for Moderate Pain. 60 tablet 0 5/12/2025 Evening    valsartan (Diovan) 40 MG tablet Take 1 tablet by mouth Daily. 90 tablet 0 5/12/2025 Morning    ibandronate (Boniva) 150 MG tablet Take 1 tablet by mouth Every 30 (Thirty) Days. 3 tablet 3 5/1/2025 Morning    metFORMIN ER (GLUCOPHAGE-XR) 500 MG 24 hr tablet Take 1 tablet by mouth 2 (Two) Times a Day. With meals       nitroglycerin (NITROSTAT) 0.4 MG SL tablet Place 1 tablet under the tongue Every 5 (Five) Minutes As Needed for  Chest Pain. Take no more than 3 doses in 15 minutes. 25 tablet 2 More than a month    nystatin (MYCOSTATIN) 100,000 unit/mL suspension Swish and swallow 5 mL 2 (Two) Times a Day As Needed.       vitamin D (ERGOCALCIFEROL) 1.25 MG (20057 UT) capsule capsule TAKE 1 CAPSULE BY MOUTH 1 TIME A WEEK 12 capsule 0 5/10/2025       Scheduled Meds:amLODIPine, 5 mg, Oral, Daily  aspirin, 81 mg, Oral, Daily  atorvastatin, 80 mg, Oral, Daily  budesonide-formoterol, 2 puff, Inhalation, BID - RT   And  revefenacin, 175 mcg, Nebulization, Daily - RT  cefTRIAXone, 2,000 mg, Intravenous, Q24H  citalopram, 20 mg, Oral, Daily  empagliflozin, 25 mg, Oral, Daily  guaiFENesin, 1,200 mg, Oral, Daily  insulin lispro, 2-9 Units, Subcutaneous, 4x Daily AC & at Bedtime  insulin regular, 4 Units, Subcutaneous, Q8H  ketorolac, 15 mg, Intravenous, BID  methylPREDNISolone sodium succinate, 40 mg, Intravenous, Q8H  pantoprazole, 40 mg, Oral, Q AM  sodium chloride, 10 mL, Intravenous, Q12H  valsartan, 40 mg, Oral, Daily      Continuous Infusions:Pharmacy Consult - Steroid Insulin Protocol,       PRN Meds:.  acetaminophen    ALPRAZolam    cyclobenzaprine    dextrose    dextrose    glucagon (human recombinant)    ipratropium-albuterol    melatonin    ondansetron    Pharmacy Consult - Steroid Insulin Protocol    sodium chloride    sodium chloride    sodium chloride    traMADol    ALLERGIES:  Ace inhibitors, Codeine, Pregabalin, Aricept [donepezil], Paxil [paroxetine], and Lisinopril    ROS:  Review of Systems   Constitutional:  Negative for fever and unexpected weight change.   HENT:  Positive for trouble swallowing.    Respiratory:  Negative for shortness of breath.    Cardiovascular:  Negative for chest pain.   Gastrointestinal:  Positive for abdominal pain and nausea. Negative for blood in stool, constipation and vomiting.   Genitourinary:  Negative for flank pain.   Skin:  Negative for color change.   Neurological:  Negative for weakness.  "  Psychiatric/Behavioral:  Negative for confusion.        Objective     Vital Signs:   Visit Vitals  /76 (BP Location: Left arm, Patient Position: Lying)   Pulse 88   Temp 97.8 °F (36.6 °C) (Oral)   Resp 16   Ht 152.4 cm (60\")   Wt 61.7 kg (136 lb 0.4 oz)   SpO2 96%   BMI 26.57 kg/m²       Physical Exam:      General Appearance:    Awake and alert, in no acute distress   Head:    Normocephalic, without obvious abnormality, atraumatic   Eyes:            Conjunctivae normal, anicteric sclerae   Ears:    Ears appear intact with no abnormalities noted   Throat:   No oral lesions, no thrush, oral mucosa moist       Lungs:     respirations regular, even and unlabored       Chest Wall:    No abnormalities observed   Abdomen:     soft, nontender, no rebound or guarding, non distended   Rectal:     Deferred   Extremities:   Moves all extremities well   Pulses:   Pulses palpable and equal bilaterally   Skin:   no jaundice   Lymph nodes:   No palpable adenopathy           Results Review:   I reviewed the patient's labs and imaging.  CBC  Results from last 7 days   Lab Units 05/13/25  0752 05/12/25  2226   RBC 10*6/mm3 5.33* 5.49*   WBC 10*3/mm3 13.49* 15.06*   HEMOGLOBIN g/dL 15.2 15.4   PLATELETS 10*3/mm3 359 356       CMP  Results from last 7 days   Lab Units 05/13/25  0752 05/12/25  2226   SODIUM mmol/L 141 138   POTASSIUM mmol/L 4.2 4.0   CHLORIDE mmol/L 104 101   CO2 mmol/L 24.2 25.9   BUN mg/dL 19 20   CREATININE mg/dL 0.73 0.91   GLUCOSE mg/dL 347* 229*   ALBUMIN g/dL  --  4.0   BILIRUBIN mg/dL  --  0.2   ALK PHOS U/L  --  128*   AST (SGOT) U/L  --  19   ALT (SGPT) U/L  --  33       Amylase and Lipase        CRP         Imaging Results (Last 24 Hours)       Procedure Component Value Units Date/Time    CT Chest Without Contrast Diagnostic [[id number]] Collected: 05/12/25 2251     Updated: 05/12/25 2258    Narrative:      CT CHEST WO CONTRAST DIAGNOSTIC    Date of Exam: 5/12/2025 10:38 PM EDT    Indication: History " of recurrent pneumothorax now with increasing dyspnea.    Comparison: Chest radiograph 5/6/2025. Chest CT 4/9/2025.    Technique: Axial CT images were obtained of the chest without contrast administration.  Sagittal and coronal reconstructions were performed.  Automated exposure control and iterative reconstruction methods were used.      Findings:    Thyroid and thoracic inlet: No significant abnormality.    Lymph nodes: No pathologic appearing lymph nodes by imaging criteria. Calcified mediastinal and hilar nodes, likely sequelae of prior granulomatous disease.    Cardiovascular: Normal appearing heart size. No pericardial effusion. Aorta and main pulmonary artery diameters are within normal range.. There are coronary artery calcifications. Status post CABG. Aortic atherosclerosis.    Esophagus: No significant abnormality.    Lung parenchyma: Mild biapical pleural-parenchymal scarring. Mild emphysema. Unchanged focal bandlike opacity in the right upper lobe and right middle lobe, likely focus of scarring. Similar mild areas of bronchiectasis in the right upper lobe, right   middle lobe, right lower lobe, and left upper lobe. Scattered atelectasis. No suspicious appearing opacities. Calcified granulomas.    Airways: Patent trachea and mainstem bronchi.    Pleura: No pleural effusion or pneumothorax.    Chest wall and osseous structures: Degenerative changes of the imaged spine. No acute osseous abnormality. Median sternotomy.    Included abdomen: No significant abnormality.      Impression:      Impression:  No acute intrathoracic finding.    Mild emphysema. Emphysema on CT is an independent risk factor for lung cancer. Low dose lung cancer screening should be considered if patient qualifies based on smoking history or if not already enrolled in a screening program.        Electronically Signed: Ashkan Meyer MD    5/12/2025 10:56 PM EDT    Workstation ID: [id number]              ASSESSMENT:      Principal  Problem:    Acute exacerbation of chronic obstructive pulmonary disease (COPD)     -GERD  - Epigastric abdominal pain  - Atypical chest pain  - Sore throat  - Dysphagia  -Reported abnormal swallow study  - Long-term NSAID usage    Patient is a 69-year-old female admitted to the hospital for COPD exacerbation.  She has been consulted to establish care for worsening symptoms of GERD which include occasional epigastric pain, throat soreness, feelings of reflux into the esophagus and mouth.  Symptoms of GERD have been worsening for the past 6 to 8 months.  She currently takes omeprazole daily.  She has never had an EGD in the past.  No red flag symptoms to warrant endoscopy as an inpatient at this time.  Patient also reports recent swallow study which I am unable to view the report for on this admission    Hemoglobin 5.2, MCV 89.1, white blood cell count 13.49, creatinine 0.73    Plan:  -Will send task to office to arrange for outpatient follow-up of GERD and consideration for EGD  - Continue omeprazole daily and add famotidine nightly  - Minimize NSAID usage  - Discussed GERD lifestyle modifications with the patient  - Diet, exercise, weight loss were discussed  - Patient okay to be discharged from GI standpoint with outpatient follow-up    I discussed the patient's findings and my recommendations with the patient.  Will discuss case with Dr. Puckett.  Addendum to follow  Loco Magallon PA-C  05/13/25  13:55 EDT

## 2025-05-13 NOTE — CONSULTS
"Diabetes Education  Assessment/Teaching    Patient Name:  Darleen Stallings  YOB: 1955  MRN: [id number]  Admit Date:  5/12/2025      Assessment Date:  5/13/2025  Flowsheet Row Most Recent Value   General Information     Referral From: MD order  [Consult received due to A1c >7%. A1c this adm 8.32%. Adm bs 229.]   Height 152.4 cm (60\")   Height Method Stated   Weight 61.7 kg (136 lb 0.4 oz)   Weight Method Standing scale   Pregnancy Assessment    Diabetes History    What type of diabetes do you have? Type 2   Length of Diabetes Diagnosis --  [Pt unsure when first dx. States she has had diabetes for some time.]   Have you had diabetes education/teaching in the past? yes   When and where was your diabetes education? Last seen by inpatient diabetes educator at [facility] on 4/9/2025.   Do you test your blood sugar at home? yes   Frequency of checks wears continuous glucose sensor   Meter type Dexcom G7, has bs meter for backup   Who performs the test? self   Have you had low blood sugar? (<70mg/dl) no   Education Preferences    Nutrition Information    Assessment Topics    DM Goals    Healthy Eating - Goal Today            Flowsheet Row Most Recent Value   DM Education Needs    Meter Has own   Frequency of Testing --  [Discussed importance of sharing readings with PCP if sugars running outside healthy range.]   Blood Glucose Target Range Discussed A1c result of 8.32%. Discussed healthy bs range and healthy A1c target. Discussed importance of bs control.   Medication Oral  [Pt taking Jardiance 25 mg daily and Metformin  mg bid. Pt states she started Jardiance about 2 months ago.]   Reducing Risks A1C testing  [Gave A1c info sheet.]   Healthy Eating --  [Pt usually eating 2 meals plus snack. Pt states she needs to follow meal plan better. Pt feels she has been eating more CHO than she should be.]   Motivation Engaged   Teaching Method Explanation, Discussion, Handouts   Patient Response Verbalized " understanding              Other Comments:  Met with pt at bedside. Pt sees PCP routinely. Reviewed with pt previous A1c result from 1/17/2025 of 7.0%. Asked pt if she knows what would have caused A1c to increase since 1/2025. Pt states not sure but she has been getting higher readers on her sensor for past several months. Pt asking educator to help her with downloading Dexcom G7 marcus to her phone. Educator attempted to download marcus. Educator unsuccessful in finding marcus. Discussed with pt that her phone may not be compatible with the marcus. Educator downloaded marcus to educator phone and showed pt what the marcus looks like. Discussed she should contact Dexcom customer service for assistance. Pt states she does have reader for the sensors but was hoping to get it on her phone. Pt states will contact customer service. Pt with no additional questions at this time.       Electronically signed by:  Frances Brunson RN  05/13/25 14:11 EDT

## 2025-05-13 NOTE — ED PROVIDER NOTES
Subjective   History of Present Illness  69-year-old female states that she has had a 1 week history of feeling short of breath.  She states is worse generally during the day and sometimes gets better at night.  She states she has had no definite fever chills but felt hot earlier today.  She states she has had a cough and some wheezing with the cough is been nonproductive she states that she has recently had recurrent pneumothorax and was seen by Dr. Post recently had a chest x-ray that was negative on the sixth of this month the patient states she is to get a follow-up CT scan but has not got the scan yet.  She reports no leg pain or swelling she denies diaphoresis or palpitate      Review of Systems   Constitutional:  Positive for fatigue. Negative for chills, diaphoresis and fever.   Respiratory:  Positive for cough, chest tightness, shortness of breath and wheezing.    Hematological:  Does not bruise/bleed easily.   All other systems reviewed and are negative.      Past Medical History:   Diagnosis Date    CAD     Cervical disc disorder 2019    After fall    CHOL     Chronic pain disorder 2020    After fall down stairs    COPD     Dementia     Depression     DEXA     2023= (1.1/ -2.3)    DMII     Extremity pain 2019    Maybe longer    Fractures 2022    GERD     Hypertension     Joint pain 2020    Low back pain 2019    After fall    MAMMO     NEG = 2018/ 2023/ 2024    Myocardial infarction     Neck pain 2015    Maybe longer    Neuropathy in diabetes 2018    Osteoarthritis 2023    Diagnosed    PAP     NEG =2021    Spinal stenosis 2019    Tremor        Allergies   Allergen Reactions    Ace Inhibitors Cough    Codeine Nausea And Vomiting    Pregabalin Confusion    Aricept [Donepezil] Mental Status Change    Paxil [Paroxetine] Other (See Comments)     TREMOR WORSENING    Lisinopril Other (See Comments)     gout       Past Surgical History:   Procedure Laterality Date    BRONCHOSCOPY N/A 4/10/2025    Procedure:  BRONCHOSCOPY with right lung washing;  Surgeon: Marissa Soliz MD;  Location: [facility] ENDOSCOPY;  Service: Pulmonary;  Laterality: N/A;    CATARACT EXTRACTION W/ INTRAOCULAR LENS  IMPLANT, BILATERAL      COLONOSCOPY      = NEG, rech    GSI    COMPRESSION HIP SCREW Right 2022    Procedure: HIP COMPRESSION SCREW, right Golden hip screw from Hien;  Surgeon: Loco Cortes MD;  Location: [facility] MAIN OR;  Service: Orthopedics;  Laterality: Right;    CORONARY ARTERY BYPASS GRAFT      x 3    FRACTURE SURGERY      TONSILLECTOMY      TUBAL ABDOMINAL LIGATION         Family History   Problem Relation Age of Onset    Stroke Mother     COPD Mother     Hypertension Mother     Tremor Father     Heart disease Father         CHF    COPD Father     Depression Father     Tremor Sister     Fibromyalgia Sister     Hypertension Sister     Post-traumatic stress disorder Sister     Heart disease Sister     Hypertension Sister     Depression Sister     Tremor Brother     COPD Brother     Depression Brother     Hypertension Brother     Cancer Brother         Undiagnosed lung cancer    Tremor Paternal Grandmother     Depression Sister     Hypertension Brother        Social History     Socioeconomic History    Marital status: Single   Tobacco Use    Smoking status: Every Day     Current packs/day: 0.00     Average packs/day: 0.5 packs/day for 48.0 years (24.0 ttl pk-yrs)     Types: Cigarettes     Start date: 1975     Last attempt to quit:      Years since quittin.3     Passive exposure: Past    Smokeless tobacco: Never    Tobacco comments:     Started when i was 18 quit a few times. Used vape but i think it gave me pneumonia   Vaping Use    Vaping status: Every Day    Substances: Nicotine   Substance and Sexual Activity    Alcohol use: No    Drug use: No    Sexual activity: Not Currently     Partners: Male     Birth control/protection: None     Comment: [place] to get pregnant..hahaa!     Reported safety issue does  have access to home oxygen but states her tubing is too short to allow her to get around      Objective   Physical Exam  Alert Leonid Coma Scale 15   HEENT: Pupils equal and reactive to light. Conjunctivae are not injected. Normal tympanic membranes. Oropharynx and nares are normal.   Neck: Supple. Midline trachea. No JVD. No goiter.   Chest: Rhonchi and expiratory wheezes bilaterally no subcutaneous air or crepitance and equal breath sounds bilaterally, regular rate and rhythm without murmur or rub.   Abdomen: Positive bowel sounds, nontender, nondistended. No rebound or peritoneal signs. No CVA tenderness.   Extremities no clubbing. cyanosis or edema. Motor sensory exam is normal. The full range of motion is intact   Skin: Warm and dry, no rashes or petechia.   Lymphatic: No regional lymphadenopathy. No calf pain, swelling or Homans sign    Procedures           ED Course      Labs Reviewed   COMPREHENSIVE METABOLIC PANEL - Abnormal; Notable for the following components:       Result Value    Glucose 229 (*)     Alkaline Phosphatase 128 (*)     All other components within normal limits    Narrative:     GFR Categories in Chronic Kidney Disease (CKD)              GFR Category          GFR (mL/min/1.73)    Interpretation  G1                    90 or greater        Normal or high (1)  G2                    60-89                Mild decrease (1)  G3a                   45-59                Mild to moderate decrease  G3b                   30-44                Moderate to severe decrease  G4                    15-29                Severe decrease  G5                    14 or less           Kidney failure    (1)In the absence of evidence of kidney disease, neither GFR category G1 or G2 fulfill the criteria for CKD.    eGFR calculation 2021 CKD-EPI creatinine equation, which does not include race as a factor   TROPONIN - Abnormal; Notable for the following components:    HS Troponin T 35 (*)     All other components within  normal limits    Narrative:     High Sensitive Troponin T Reference Range:  <14.0 ng/L- Negative Female for AMI  <22.0 ng/L- Negative Male for AMI  >=14 - Abnormal Female indicating possible myocardial injury.  >=22 - Abnormal Male indicating possible myocardial injury.   Clinicians would have to utilize clinical acumen, EKG, Troponin, and serial changes to determine if it is an Acute Myocardial Infarction or myocardial injury due to an underlying chronic condition.        CBC WITH AUTO DIFFERENTIAL - Abnormal; Notable for the following components:    WBC 15.06 (*)     RBC 5.49 (*)     Hematocrit 49.5 (*)     MCHC 31.1 (*)     All other components within normal limits    Narrative:     The previously reported component NRBC is no longer being reported. Previous result was 0.0 /100 WBC (Reference Range: 0.0-0.2 /100 WBC) on 5/12/2025 at 2248 EDT.   HIGH SENSITIVITIY TROPONIN T 1HR - Abnormal; Notable for the following components:    HS Troponin T 35 (*)     All other components within normal limits    Narrative:     High Sensitive Troponin T Reference Range:  <14.0 ng/L- Negative Female for AMI  <22.0 ng/L- Negative Male for AMI  >=14 - Abnormal Female indicating possible myocardial injury.  >=22 - Abnormal Male indicating possible myocardial injury.   Clinicians would have to utilize clinical acumen, EKG, Troponin, and serial changes to determine if it is an Acute Myocardial Infarction or myocardial injury due to an underlying chronic condition.        MANUAL DIFFERENTIAL - Abnormal; Notable for the following components:    Lymphocyte % 19.0 (*)     Monocyte % 3.0 (*)     Neutrophils Absolute 10.99 (*)     Lymphocytes Absolute 3.46 (*)     All other components within normal limits   RESPIRATORY PANEL PCR W/ COVID-19 (SARS-COV-2), NP SWAB IN UTM/VTP, 2 HR TAT - Normal    Narrative:     In the setting of a positive respiratory panel with a viral infection PLUS a negative procalcitonin without other underlying concern  for bacterial infection, consider observing off antibiotics or discontinuation of antibiotics and continue supportive care. If the respiratory panel is positive for atypical bacterial infection (Bordetella pertussis, Chlamydophila pneumoniae, or Mycoplasma pneumoniae), consider antibiotic de-escalation to target atypical bacterial infection.   BNP (IN-HOUSE) - Normal    Narrative:     This assay is used as an aid in the diagnosis of individuals suspected of having heart failure. It can be used as an aid in the diagnosis of acute decompensated heart failure (ADHF) in patients presenting with signs and symptoms of ADHF to the emergency department (ED). In addition, NT-proBNP of <300 pg/mL indicates ADHF is not likely.    Age Range Result Interpretation  NT-proBNP Concentration (pg/mL:      <50             Positive            >450                   Gray                 300-450                    Negative             <300    50-75           Positive            >900                  Gray                300-900                  Negative            <300      >75             Positive            >1800                  Gray                300-1800                  Negative            <300   BLOOD CULTURE   BLOOD CULTURE   RAINBOW DRAW    Narrative:     The following orders were created for panel order [facility] Draw.  Procedure                               Abnormality         Status                     ---------                               -----------         ------                     Green Top (Gel)[[id number]]                                  Final result               Lavender Top[[id number]]                                     Final result               Gold Top - SST[[id number]]                                   Final result               Light Blue Top[[id number]]                                   Final result                 Please view results for these tests on the individual orders.   SCAN SLIDE   POC LACTATE   GREEN  TOP   LAVENDER TOP   GOLD TOP - SST   LIGHT BLUE TOP   CBC AND DIFFERENTIAL    Narrative:     The following orders were created for panel order CBC & Differential.  Procedure                               Abnormality         Status                     ---------                               -----------         ------                     CBC Auto Differential[[id number]]        Abnormal            Final result               Scan Slide[[id number]]                                       Final result                 Please view results for these tests on the individual orders.     Medications   sodium chloride 0.9 % flush 10 mL (has no administration in time range)   methylPREDNISolone sodium succinate (SOLU-Medrol) injection 40 mg (has no administration in time range)   cefTRIAXone (ROCEPHIN) 2,000 mg in sodium chloride 0.9 % 100 mL MBP (has no administration in time range)   ipratropium-albuterol (DUO-NEB) nebulizer solution 3 mL (3 mL Nebulization Not Given 5/13/25 0027)   ipratropium-albuterol (DUO-NEB) nebulizer solution 3 mL (3 mL Nebulization Given 5/12/25 2257)     CT Chest Without Contrast Diagnostic  Result Date: 5/12/2025  Impression: No acute intrathoracic finding. Mild emphysema. Emphysema on CT is an independent risk factor for lung cancer. Low dose lung cancer screening should be considered if patient qualifies based on smoking history or if not already enrolled in a screening program. Electronically Signed: Ashkan Meyer MD  5/12/2025 10:56 PM EDT  Workstation ID: [id number]                                                     Medical Decision Making  Patient was started on Solu-Medrol and ceftriaxone pending culture results.  The patient be continued on DuoNeb.  The patient will be placed in the observational unit [facility] and we will obtain consultation.  The patient was agreeable to this plan to treat    Problems Addressed:  Acute bronchitis, unspecified organism: complicated acute illness or  injury  Acute exacerbation of chronic obstructive pulmonary disease (COPD): complicated acute illness or injury  History of pneumothorax: complicated acute illness or injury    Amount and/or Complexity of Data Reviewed  Labs: ordered. Decision-making details documented in ED Course.  Radiology: ordered and independent interpretation performed.  ECG/medicine tests: ordered and independent interpretation performed.     Details: Sinus rhythm left atrial enlargement    Risk  OTC drugs.  Prescription drug management.  Decision regarding hospitalization.        Final diagnoses:   Acute exacerbation of chronic obstructive pulmonary disease (COPD)   Acute bronchitis, unspecified organism   History of pneumothorax       ED Disposition  ED Disposition       ED Disposition   Decision to Admit    Condition   --    Comment   --               No follow-up provider specified.       Medication List      No changes were made to your prescriptions during this visit.            Natanael Armenta MD  05/13/25 0030

## 2025-05-13 NOTE — DISCHARGE PLACEMENT REQUEST
"Darleen Stallings (69 y.o. Female)       Date of Birth   1955    Social Security Number       Address   [address] [place] [address]    Home Phone   [phone]    MRN   [id number]       Nondenominational   Nondenominational    Marital Status   Single                            Admission Date   5/12/2025    Admission Type   Emergency    Admitting Provider   Natanael Armenta MD    Attending Provider   Natanael Armenta MD    Department, Room/Bed   [facility] OBSERVATION, 225/1       Discharge Date       Discharge Disposition       Discharge Destination                                 Attending Provider: Natanael Armenta MD    Allergies: Ace Inhibitors, Codeine, Pregabalin, Aricept [Donepezil], Paxil [Paroxetine], Lisinopril    Isolation: None   Infection: None   Code Status: CPR    Ht: 152.4 cm (60\")   Wt: 61.7 kg (136 lb 0.4 oz)    Admission Cmt: None   Principal Problem: Acute exacerbation of chronic obstructive pulmonary disease (COPD) [J44.1]                   Active Insurance as of 5/12/2025       Primary Coverage       Payor Plan Insurance Group Employer/Plan Group    ANTHEM MEDICARE REPLACEMENT ANTHEM MEDICARE ADVANTAGE [facility] [id number]       Payor Plan Address Payor Plan Phone Number Payor Plan Fax Number Effective Dates    [address] [phone]  1/1/2024 - None Entered    [facility] [id number]         Subscriber Name Subscriber Birth Date Member ID       DARLEEN STALLINGS 1955 [id number]                     Emergency Contacts        (Rel.) Home Phone Work Phone Mobile Phone    Caroline Stallingsnda (Sister) -- -- [phone]    SusieSergio cano (Son) [phone] -- [phone]    PAGANPAULA RICHMOND (Daughter) [phone] -- [phone]              Insurance Information                  ANTHEM MEDICARE REPLACEMENT/ANTHEM MEDICARE ADVANTAGE [employer] Phone: [phone]    Subscriber: Darleen Stallings Subscriber#: [id number]    Group#: [id number] Precert#: --    Authorization#: -- " Effective Date: --

## 2025-05-13 NOTE — PLAN OF CARE
Goal Outcome Evaluation:      Admitted patient to current room. Left consult a message on answering service.    No major complaints or issues with pt.

## 2025-05-13 NOTE — CONSULTS
Group: [facility]         CONSULT NOTE    Patient Identification:  Darleen Stallings  69 y.o.  female  1955  [id number]            Requesting physician: Attending physician    Reason for Consultation: COPD, recurrent pneumothorax      History of Present Illness:  69-year-old female with history of COPD, CAD, HLD and previous pneumothorax in April 2025 and was discharged 4/13/2025 who presented 5/12/2025 with complaint of increased shortness of breath and cough.    CT Chest Without Contrast Diagnostic  Result Date: 5/12/2025  Impression: No acute intrathoracic finding. Mild emphysema. Emphysema on CT is an independent risk factor for lung cancer. Low dose lung cancer screening should be considered if patient qualifies based on smoking history or if not already enrolled in a screening program. Electronically Signed: Ashkan Meyer MD  5/12/2025 10:56 PM EDT  Workstation ID: [id number]    XR Chest 2 View  Result Date: 5/7/2025  Impression: No acute process. Electronically Signed: Jeffrey Lo MD  5/7/2025 4:21 PM EDT  Workstation ID: [id number]        Assessment:  Acute exacerbation of chronic obstructive pulmonary disease (COPD)  Mild emphysema  Acute bronchitis due to unspecified pathogen: Respiratory panel is negative    History of recurrent right-sided pneumothorax but no signs of recurrent pneumothorax in this admission  Status post chest tube placement 4/8/25 and  4/3/2025 and  4/5/2025: Pleurodesis was avoided at that time due to the lung infection     Bronchoscopy completed 4/10/2025   Copious amount of green purulent secretions right lung: Positive only for clinically insignificant yeast    Right upper lobe bronchiectasis  Lung nodules on CT scan 2023 2.3 cm right upper lobe, 1.3 cm right upper lobe and 2.2 cm subcarinal lymph node  Hypoxia  HTN  HLD  DM  CAD status post CABG 2019  Essential tremors  Tobacco smoker: Quit 2023 but returned to smoking again     PFTs 2022: Severe obstructive  pattern FEV1/FVC 50%, FVC 61%, FEV1 40% without significant improvement with bronchodilators      Recommendations:    Respiratory status is gradually improving, she still having some wheezing but there is no evidence of recurrent pneumothorax     antibiotics: Rocephin for 2 days followed by oral antibiotic for 3 days     Symbicort  Yupelri  Mucinex  IV Solu-Medrol 40 mg every 8 hours     Oxygen supplement and titration to maintain saturation 90 to 95%, currently on room air alternating with 2 L per nasal cannula  Bronchodilators     Aspirin, atorvastatin, amlodipine, valsartan  Jardiance  Insulin sliding scale  Protonix     Smoking cessation counseling, I offered the patient nicotine patch but she is trying to quit cold turkey    I personally reviewed the radiological studies      Review of Sytems:  Constitutional: Negative for chills, and fever and positive for malaise/fatigue.   HENT: Negative.    Eyes: Negative.    Cardiovascular: Negative.    Respiratory: Positive for cough and shortness of breath.    Skin: Negative.    Musculoskeletal: Negative.    Gastrointestinal: Negative.    Genitourinary: Negative.    Neurological: Negative.    Psychiatric/Behavioral: Negative.    Past Medical History:  Past Medical History:   Diagnosis Date    CAD     Cervical disc disorder 2019    After fall    CHOL     Chronic pain disorder 2020    After fall down stairs    COPD     Dementia     Depression     DEXA     2023= (1.1/ -2.3)    DMII     Extremity pain 2019    Maybe longer    Fractures 2022    GERD     Hypertension     Joint pain 2020    Low back pain 2019    After fall    MAMMO     NEG = 2018/ 2023/ 2024    Myocardial infarction     Neck pain 2015    Maybe longer    Neuropathy in diabetes 2018    Osteoarthritis 2023    Diagnosed    PAP     NEG =2021    Spinal stenosis 2019    Tremor        Past Surgical History:  Past Surgical History:   Procedure Laterality Date    BRONCHOSCOPY N/A 4/10/2025    Procedure: BRONCHOSCOPY with  right lung washing;  Surgeon: Marissa Soliz MD;  Location: [facility] ENDOSCOPY;  Service: Pulmonary;  Laterality: N/A;    CATARACT EXTRACTION W/ INTRAOCULAR LENS  IMPLANT, BILATERAL      COLONOSCOPY      2023= NEG, rech 2026   GSI    COMPRESSION HIP SCREW Right 03/25/2022    Procedure: HIP COMPRESSION SCREW, right [place] hip screw from [place];  Surgeon: Loco Cortes MD;  Location: [facility] MAIN OR;  Service: Orthopedics;  Laterality: Right;    CORONARY ARTERY BYPASS GRAFT      x 3    FRACTURE SURGERY      TONSILLECTOMY      TUBAL ABDOMINAL LIGATION          Home Meds:  Medications Prior to Admission   Medication Sig Dispense Refill Last Dose/Taking    ALPRAZolam (XANAX) 0.25 MG tablet Take 1 tablet by mouth At Night As Needed for Anxiety or Sleep. 25 tablet 0 Past Month Bedtime    amLODIPine (NORVASC) 5 MG tablet TAKE 1 TABLET BY MOUTH EVERY DAY 90 tablet 1 5/12/2025 Morning    aspirin 81 MG tablet Take 1 tablet by mouth Daily.   5/12/2025 Morning    atorvastatin (LIPITOR) 80 MG tablet Take 1 tablet by mouth Daily. 90 tablet 0 5/12/2025 Morning    citalopram (CeleXA) 20 MG tablet TAKE 1 TABLET BY MOUTH DAILY 90 tablet 0 5/12/2025 Morning    Combivent Respimat  MCG/ACT inhaler INHALE 1 PUFF BY MOUTH 4 TIMES A DAY AS NEEDED FOR WHEEZING 4 g 0 5/12/2025 at  7:00 PM    CRANBERRY PO Take 4 tablets by mouth Daily.   5/12/2025 Morning    cyclobenzaprine (FLEXERIL) 10 MG tablet TAKE 1 TABLET BY MOUTH AT NIGHT AS NEEDED FOR MUSCLE SPASM 30 tablet 0 Past Month Bedtime    empagliflozin (Jardiance) 25 MG tablet tablet Take 1 tablet by mouth Daily. 90 tablet 1 5/12/2025 Morning    fenofibrate 160 MG tablet Take 1 tablet by mouth Daily. 90 tablet 0 5/12/2025 Morning    Fluticasone-Umeclidin-Vilant (Trelegy Ellipta) 200-62.5-25 MCG/ACT inhaler Inhale 1 puff Daily. 60 each 3 5/12/2025 Morning    guaiFENesin (MUCINEX) 600 MG 12 hr tablet Take 2 tablets by mouth Daily.   5/12/2025 Morning    omeprazole (priLOSEC) 40 MG capsule TAKE  1 CAPSULE BY MOUTH EVERY DAY 90 capsule 3 2025 Morning    traMADol (ULTRAM) 50 MG tablet Take 1 tablet by mouth Every 8 (Eight) Hours As Needed for Moderate Pain. 60 tablet 0 2025 Evening    valsartan (Diovan) 40 MG tablet Take 1 tablet by mouth Daily. 90 tablet 0 2025 Morning    ibandronate (Boniva) 150 MG tablet Take 1 tablet by mouth Every 30 (Thirty) Days. 3 tablet 3 2025 Morning    metFORMIN ER (GLUCOPHAGE-XR) 500 MG 24 hr tablet Take 1 tablet by mouth 2 (Two) Times a Day. With meals       nitroglycerin (NITROSTAT) 0.4 MG SL tablet Place 1 tablet under the tongue Every 5 (Five) Minutes As Needed for Chest Pain. Take no more than 3 doses in 15 minutes. 25 tablet 2 More than a month    nystatin (MYCOSTATIN) 100,000 unit/mL suspension Swish and swallow 5 mL 2 (Two) Times a Day As Needed.       vitamin D (ERGOCALCIFEROL) 1.25 MG (54878 UT) capsule capsule TAKE 1 CAPSULE BY MOUTH 1 TIME A WEEK 12 capsule 0 5/10/2025       Allergies:  Allergies   Allergen Reactions    Ace Inhibitors Cough    Codeine Nausea And Vomiting    Pregabalin Confusion    Aricept [Donepezil] Mental Status Change    Paxil [Paroxetine] Other (See Comments)     TREMOR WORSENING    Lisinopril Other (See Comments)     gout       Social History:   Social History     Socioeconomic History    Marital status: Single   Tobacco Use    Smoking status: Every Day     Current packs/day: 0.00     Average packs/day: 0.5 packs/day for 48.0 years (24.0 ttl pk-yrs)     Types: Cigarettes     Start date: 1975     Last attempt to quit:      Years since quittin.3     Passive exposure: Past    Smokeless tobacco: Never    Tobacco comments:     Started when i was 18 quit a few times. Used vape but i think it gave me pneumonia   Vaping Use    Vaping status: Every Day    Substances: Nicotine   Substance and Sexual Activity    Alcohol use: No    Drug use: No    Sexual activity: Not Currently     Partners: Male     Birth control/protection:  "None     Comment: [place] to get pregnant..hahaa!       Family History:  Family History   Problem Relation Age of Onset    Stroke Mother     COPD Mother     Hypertension Mother     Tremor Father     Heart disease Father         CHF    COPD Father     Depression Father     Tremor Sister     Fibromyalgia Sister     Hypertension Sister     Post-traumatic stress disorder Sister     Heart disease Sister     Hypertension Sister     Depression Sister     Tremor Brother     COPD Brother     Depression Brother     Hypertension Brother     Cancer Brother         Undiagnosed lung cancer    Tremor Paternal Grandmother     Depression Sister     Hypertension Brother        Physical Exam:  /76 (BP Location: Left arm, Patient Position: Lying)   Pulse 88   Temp 97.8 °F (36.6 °C) (Oral)   Resp 16   Ht 152.4 cm (60\")   Wt 61.7 kg (136 lb 0.4 oz)   SpO2 96%   BMI 26.57 kg/m²  Body mass index is 26.57 kg/m². 96% 61.7 kg (136 lb 0.4 oz)  General Appearance:  Alert   HEENT:  Normocephalic, without obvious abnormality, Conjunctiva/corneas clear,.   Nares normal, no drainage     Neck:  Supple, symmetrical, trachea midline. No JVD.  Lungs /Chest wall: Wheezing and mild bilateral basal rhonchi, respirations unlabored, symmetrical wall movement.     Heart:  Regular rate and rhythm, S1 S2 normal  Abdomen: Soft, non-tender, no masses, no organomegaly.    Extremities: No edema, no clubbing or cyanosis    LABS:  Lab Results   Component Value Date    CALCIUM 9.3 05/13/2025    PHOS 2.8 02/17/2023     Results from last 7 days   Lab Units 05/13/25  0752 05/12/25  2344 05/12/25  2226   SODIUM mmol/L 141  --  138   POTASSIUM mmol/L 4.2  --  4.0   CHLORIDE mmol/L 104  --  101   CO2 mmol/L 24.2  --  25.9   BUN mg/dL 19  --  20   CREATININE mg/dL 0.73  --  0.91   GLUCOSE mg/dL 347*  --  229*   CALCIUM mg/dL 9.3  --  10.1   WBC 10*3/mm3 13.49*  --  15.06*   HEMOGLOBIN g/dL 15.2  --  15.4   PLATELETS 10*3/mm3 359  --  356   ALT (SGPT) U/L  --   " --  33   AST (SGOT) U/L  --   --  19   PROBNP pg/mL  --   --  754.0   PROCALCITONIN ng/mL  --  0.04  --      Lab Results   Component Value Date    CKTOTAL 44 06/21/2022    TROPONINT 35 (H) 05/12/2025     Results from last 7 days   Lab Units 05/12/25  2344 05/12/25  2226   HSTROP T ng/L 35* 35*         Results from last 7 days   Lab Units 05/13/25  0935 05/12/25  2344 05/12/25  2257   PROCALCITONIN ng/mL  --  0.04  --    LACTATE mmol/L 1.6  --  <0.3*         Results from last 7 days   Lab Units 05/12/25  2307   ADENOVIRUS DETECTION BY PCR  Not Detected   CORONAVIRUS 229E  Not Detected   CORONAVIRUS HKU1  Not Detected   CORONAVIRUS NL63  Not Detected   CORONAVIRUS OC43  Not Detected   HUMAN METAPNEUMOVIRUS  Not Detected   HUMAN RHINOVIRUS/ENTEROVIRUS  Not Detected   INFLUENZA B PCR  Not Detected   PARAINFLUENZA 1  Not Detected   PARAINFLUENZA VIRUS 2  Not Detected   PARAINFLUENZA VIRUS 3  Not Detected   PARAINFLUENZA VIRUS 4  Not Detected   BORDETELLA PERTUSSIS PCR  Not Detected   CHLAMYDOPHILA PNEUMONIAE PCR  Not Detected   MYCOPLAMA PNEUMO PCR  Not Detected   INFLUENZA A PCR  Not Detected   RSV, PCR  Not Detected             Lab Results   Component Value Date    TSH 0.79 08/27/2018     Estimated Creatinine Clearance: 61.4 mL/min (by C-G formula based on SCr of 0.73 mg/dL).  Results from last 7 days   Lab Units 05/13/25  0908   NITRITE UA  Negative   WBC UA /HPF Too Numerous to Count*   BACTERIA UA /HPF None Seen   SQUAM EPITHEL UA /HPF 0-2        Imaging:  Imaging Results (Last 24 Hours)       Procedure Component Value Units Date/Time    CT Chest Without Contrast Diagnostic [[id number]] Collected: 05/12/25 2251     Updated: 05/12/25 2258    Narrative:      CT CHEST WO CONTRAST DIAGNOSTIC    Date of Exam: 5/12/2025 10:38 PM EDT    Indication: History of recurrent pneumothorax now with increasing dyspnea.    Comparison: Chest radiograph 5/6/2025. Chest CT 4/9/2025.    Technique: Axial CT images were obtained of the  chest without contrast administration.  Sagittal and coronal reconstructions were performed.  Automated exposure control and iterative reconstruction methods were used.      Findings:    Thyroid and thoracic inlet: No significant abnormality.    Lymph nodes: No pathologic appearing lymph nodes by imaging criteria. Calcified mediastinal and hilar nodes, likely sequelae of prior granulomatous disease.    Cardiovascular: Normal appearing heart size. No pericardial effusion. Aorta and main pulmonary artery diameters are within normal range.. There are coronary artery calcifications. Status post CABG. Aortic atherosclerosis.    Esophagus: No significant abnormality.    Lung parenchyma: Mild biapical pleural-parenchymal scarring. Mild emphysema. Unchanged focal bandlike opacity in the right upper lobe and right middle lobe, likely focus of scarring. Similar mild areas of bronchiectasis in the right upper lobe, right   middle lobe, right lower lobe, and left upper lobe. Scattered atelectasis. No suspicious appearing opacities. Calcified granulomas.    Airways: Patent trachea and mainstem bronchi.    Pleura: No pleural effusion or pneumothorax.    Chest wall and osseous structures: Degenerative changes of the imaged spine. No acute osseous abnormality. Median sternotomy.    Included abdomen: No significant abnormality.      Impression:      Impression:  No acute intrathoracic finding.    Mild emphysema. Emphysema on CT is an independent risk factor for lung cancer. Low dose lung cancer screening should be considered if patient qualifies based on smoking history or if not already enrolled in a screening program.        Electronically Signed: Ashkan Meyer MD    5/12/2025 10:56 PM EDT    Workstation ID: [id number]              Current Meds:   SCHEDULE  amLODIPine, 5 mg, Oral, Daily  aspirin, 81 mg, Oral, Daily  atorvastatin, 80 mg, Oral, Daily  budesonide-formoterol, 2 puff, Inhalation, BID - RT   And  revefenacin, 175 mcg,  Nebulization, Daily - RT  [START ON 5/14/2025] cefTRIAXone, 2,000 mg, Intravenous, Q24H  citalopram, 20 mg, Oral, Daily  [START ON 5/14/2025] empagliflozin, 25 mg, Oral, Daily  famotidine, 40 mg, Oral, Nightly  guaiFENesin, 1,200 mg, Oral, Daily  insulin lispro, 2-9 Units, Subcutaneous, 4x Daily AC & at Bedtime  insulin regular, 4 Units, Subcutaneous, Q8H  ketorolac, 15 mg, Intravenous, BID  methylPREDNISolone sodium succinate, 40 mg, Intravenous, Q8H  [START ON 5/14/2025] pantoprazole, 40 mg, Oral, Q AM  sodium chloride, 10 mL, Intravenous, Q12H  valsartan, 40 mg, Oral, Daily      Infusions  Pharmacy Consult - Steroid Insulin Protocol,       PRNs    acetaminophen    ALPRAZolam    cyclobenzaprine    dextrose    dextrose    glucagon (human recombinant)    ipratropium-albuterol    melatonin    ondansetron    Pharmacy Consult - Steroid Insulin Protocol    sodium chloride    sodium chloride    sodium chloride    traMADol        Bryon Gerber MD  5/13/2025  15:53 EDT      Much of this encounter note is an electronic transcription/translation of spoken language to printed text using Dragon Software.

## 2025-05-14 ENCOUNTER — READMISSION MANAGEMENT (OUTPATIENT)
Dept: CALL CENTER | Facility: HOSPITAL | Age: 70
End: 2025-05-14
Payer: MEDICARE

## 2025-05-14 VITALS
BODY MASS INDEX: 26.71 KG/M2 | HEIGHT: 60 IN | WEIGHT: 136.02 LBS | TEMPERATURE: 98 F | OXYGEN SATURATION: 90 % | SYSTOLIC BLOOD PRESSURE: 132 MMHG | DIASTOLIC BLOOD PRESSURE: 78 MMHG | RESPIRATION RATE: 15 BRPM | HEART RATE: 81 BPM

## 2025-05-14 LAB
ANION GAP SERPL CALCULATED.3IONS-SCNC: 10.4 MMOL/L (ref 5–15)
BACTERIA SPEC AEROBE CULT: NORMAL
BASOPHILS # BLD AUTO: 0.04 10*3/MM3 (ref 0–0.2)
BASOPHILS NFR BLD AUTO: 0.2 % (ref 0–1.5)
BUN SERPL-MCNC: 32 MG/DL (ref 8–23)
BUN/CREAT SERPL: 33.3 (ref 7–25)
CALCIUM SPEC-SCNC: 9.7 MG/DL (ref 8.6–10.5)
CHLORIDE SERPL-SCNC: 105 MMOL/L (ref 98–107)
CO2 SERPL-SCNC: 25.6 MMOL/L (ref 22–29)
CREAT SERPL-MCNC: 0.96 MG/DL (ref 0.57–1)
DEPRECATED RDW RBC AUTO: 43.7 FL (ref 37–54)
EGFRCR SERPLBLD CKD-EPI 2021: 65 ML/MIN/1.73
EOSINOPHIL # BLD AUTO: 0 10*3/MM3 (ref 0–0.4)
EOSINOPHIL NFR BLD AUTO: 0 % (ref 0.3–6.2)
ERYTHROCYTE [DISTWIDTH] IN BLOOD BY AUTOMATED COUNT: 13.3 % (ref 12.3–15.4)
GLUCOSE BLDC GLUCOMTR-MCNC: 200 MG/DL (ref 70–105)
GLUCOSE BLDC GLUCOMTR-MCNC: 246 MG/DL (ref 70–105)
GLUCOSE SERPL-MCNC: 200 MG/DL (ref 65–99)
HCT VFR BLD AUTO: 45.8 % (ref 34–46.6)
HGB BLD-MCNC: 14.4 G/DL (ref 12–15.9)
IMM GRANULOCYTES # BLD AUTO: 0.41 10*3/MM3 (ref 0–0.05)
IMM GRANULOCYTES NFR BLD AUTO: 1.6 % (ref 0–0.5)
LYMPHOCYTES # BLD AUTO: 2.6 10*3/MM3 (ref 0.7–3.1)
LYMPHOCYTES NFR BLD AUTO: 9.9 % (ref 19.6–45.3)
MCH RBC QN AUTO: 28.3 PG (ref 26.6–33)
MCHC RBC AUTO-ENTMCNC: 31.4 G/DL (ref 31.5–35.7)
MCV RBC AUTO: 90 FL (ref 79–97)
MONOCYTES # BLD AUTO: 0.68 10*3/MM3 (ref 0.1–0.9)
MONOCYTES NFR BLD AUTO: 2.6 % (ref 5–12)
NEUTROPHILS NFR BLD AUTO: 22.61 10*3/MM3 (ref 1.7–7)
NEUTROPHILS NFR BLD AUTO: 85.7 % (ref 42.7–76)
NRBC BLD AUTO-RTO: 0 /100 WBC (ref 0–0.2)
PLATELET # BLD AUTO: 351 10*3/MM3 (ref 140–450)
PMV BLD AUTO: 9.9 FL (ref 6–12)
POTASSIUM SERPL-SCNC: 4.9 MMOL/L (ref 3.5–5.2)
RBC # BLD AUTO: 5.09 10*6/MM3 (ref 3.77–5.28)
SODIUM SERPL-SCNC: 141 MMOL/L (ref 136–145)
WBC NRBC COR # BLD AUTO: 26.34 10*3/MM3 (ref 3.4–10.8)

## 2025-05-14 PROCEDURE — 25010000002 CEFTRIAXONE PER 250 MG: Performed by: NURSE PRACTITIONER

## 2025-05-14 PROCEDURE — 94799 UNLISTED PULMONARY SVC/PX: CPT

## 2025-05-14 PROCEDURE — 63710000001 REVEFENACIN 175 MCG/3ML SOLUTION: Performed by: NURSE PRACTITIONER

## 2025-05-14 PROCEDURE — 94618 PULMONARY STRESS TESTING: CPT

## 2025-05-14 PROCEDURE — 94761 N-INVAS EAR/PLS OXIMETRY MLT: CPT

## 2025-05-14 PROCEDURE — 63710000001 INSULIN REGULAR HUMAN PER 5 UNITS: Performed by: EMERGENCY MEDICINE

## 2025-05-14 PROCEDURE — 63710000001 INSULIN LISPRO (HUMAN) PER 5 UNITS: Performed by: NURSE PRACTITIONER

## 2025-05-14 PROCEDURE — G0378 HOSPITAL OBSERVATION PER HR: HCPCS

## 2025-05-14 PROCEDURE — 96376 TX/PRO/DX INJ SAME DRUG ADON: CPT

## 2025-05-14 PROCEDURE — 94664 DEMO&/EVAL PT USE INHALER: CPT

## 2025-05-14 PROCEDURE — 80048 BASIC METABOLIC PNL TOTAL CA: CPT | Performed by: NURSE PRACTITIONER

## 2025-05-14 PROCEDURE — 82948 REAGENT STRIP/BLOOD GLUCOSE: CPT | Performed by: NURSE PRACTITIONER

## 2025-05-14 PROCEDURE — 85025 COMPLETE CBC W/AUTO DIFF WBC: CPT | Performed by: NURSE PRACTITIONER

## 2025-05-14 PROCEDURE — 25010000002 METHYLPREDNISOLONE PER 40 MG: Performed by: EMERGENCY MEDICINE

## 2025-05-14 PROCEDURE — 25010000002 KETOROLAC TROMETHAMINE PER 15 MG: Performed by: NURSE PRACTITIONER

## 2025-05-14 RX ORDER — PREDNISONE 10 MG/1
TABLET ORAL
Qty: 30 TABLET | Refills: 0 | Status: SHIPPED | OUTPATIENT
Start: 2025-05-14 | End: 2025-05-26

## 2025-05-14 RX ORDER — FAMOTIDINE 20 MG/1
20 TABLET, FILM COATED ORAL NIGHTLY PRN
Qty: 30 TABLET | Refills: 0 | Status: SHIPPED | OUTPATIENT
Start: 2025-05-14 | End: 2025-05-19

## 2025-05-14 RX ORDER — DOXYCYCLINE 100 MG/1
100 CAPSULE ORAL 2 TIMES DAILY
Qty: 10 CAPSULE | Refills: 0 | Status: SHIPPED | OUTPATIENT
Start: 2025-05-14 | End: 2025-05-19

## 2025-05-14 RX ORDER — PREDNISONE 20 MG/1
40 TABLET ORAL
Status: DISCONTINUED | OUTPATIENT
Start: 2025-05-15 | End: 2025-05-14 | Stop reason: HOSPADM

## 2025-05-14 RX ADMIN — INSULIN LISPRO 4 UNITS: 100 INJECTION, SOLUTION INTRAVENOUS; SUBCUTANEOUS at 08:16

## 2025-05-14 RX ADMIN — GUAIFENESIN 1200 MG: 600 TABLET, MULTILAYER, EXTENDED RELEASE ORAL at 08:17

## 2025-05-14 RX ADMIN — VALSARTAN 40 MG: 80 TABLET, FILM COATED ORAL at 08:17

## 2025-05-14 RX ADMIN — INSULIN HUMAN 4 UNITS: 100 INJECTION, SOLUTION PARENTERAL at 01:07

## 2025-05-14 RX ADMIN — METHYLPREDNISOLONE SODIUM SUCCINATE 40 MG: 40 INJECTION, POWDER, FOR SOLUTION INTRAMUSCULAR; INTRAVENOUS at 01:05

## 2025-05-14 RX ADMIN — ATORVASTATIN CALCIUM 80 MG: 40 TABLET, FILM COATED ORAL at 08:17

## 2025-05-14 RX ADMIN — KETOROLAC TROMETHAMINE 15 MG: 30 INJECTION INTRAMUSCULAR; INTRAVENOUS at 08:17

## 2025-05-14 RX ADMIN — ASPIRIN 81 MG: 81 TABLET, COATED ORAL at 08:17

## 2025-05-14 RX ADMIN — AMLODIPINE BESYLATE 5 MG: 5 TABLET ORAL at 08:17

## 2025-05-14 RX ADMIN — INSULIN HUMAN 4 UNITS: 100 INJECTION, SOLUTION PARENTERAL at 08:17

## 2025-05-14 RX ADMIN — CITALOPRAM 20 MG: 20 TABLET, FILM COATED ORAL at 08:17

## 2025-05-14 RX ADMIN — BUDESONIDE AND FORMOTEROL FUMARATE DIHYDRATE 2 PUFF: 160; 4.5 AEROSOL RESPIRATORY (INHALATION) at 07:05

## 2025-05-14 RX ADMIN — REVEFENACIN 175 MCG: 175 SOLUTION RESPIRATORY (INHALATION) at 07:00

## 2025-05-14 RX ADMIN — INSULIN LISPRO 4 UNITS: 100 INJECTION, SOLUTION INTRAVENOUS; SUBCUTANEOUS at 12:33

## 2025-05-14 RX ADMIN — CEFTRIAXONE 2000 MG: 2 INJECTION, POWDER, FOR SOLUTION INTRAMUSCULAR; INTRAVENOUS at 01:06

## 2025-05-14 RX ADMIN — PANTOPRAZOLE SODIUM 40 MG: 40 TABLET, DELAYED RELEASE ORAL at 06:10

## 2025-05-14 RX ADMIN — EMPAGLIFLOZIN 25 MG: 25 TABLET, FILM COATED ORAL at 08:17

## 2025-05-14 RX ADMIN — Medication 10 ML: at 08:17

## 2025-05-14 NOTE — PLAN OF CARE
Problem: Adult Inpatient Plan of Care  Goal: Plan of Care Review  Outcome: Progressing  Flowsheets (Taken 5/14/2025 1031)  Progress: improving  Plan of Care Reviewed With: patient  Goal: Patient-Specific Goal (Individualized)  Outcome: Progressing  Goal: Absence of Hospital-Acquired Illness or Injury  Outcome: Progressing  Goal: Optimal Comfort and Wellbeing  Outcome: Progressing  Goal: Readiness for Transition of Care  Outcome: Progressing     Problem: Comorbidity Management  Goal: Maintenance of COPD Symptom Control  Outcome: Progressing  Goal: Blood Glucose Level Within Target Range  Outcome: Progressing  Goal: Blood Pressure in Desired Range  Outcome: Progressing     Problem: Sepsis/Septic Shock  Goal: Optimal Coping  Outcome: Progressing  Goal: Absence of Bleeding  Outcome: Progressing  Goal: Blood Glucose Level Within Target Range  Outcome: Progressing  Goal: Absence of Infection Signs and Symptoms  Outcome: Progressing  Goal: Optimal Nutrition Delivery  Outcome: Progressing   Goal Outcome Evaluation:  Plan of Care Reviewed With: patient        Progress: improving

## 2025-05-14 NOTE — PLAN OF CARE
PT talked with pt about PT needs. Pt is up ad mikki, does not use AD and was able to complete 6MWT without difficulty. Pt appears to be at baseline. PT will complete orders. Thank you for this referral.

## 2025-05-14 NOTE — DISCHARGE SUMMARY
[facility]    Elen Jameson DO    CHIEF COMPLAINT:     Shortness of breath     HISTORY OF PRESENT ILLNESS:    Shortness of Breath      ED 05/13/2025  69-year-old female states that she has had a 1 week history of feeling short of breath. She states is worse generally during the day and sometimes gets better at night. She states she has had no definite fever chills but felt hot earlier today. She states she has had a cough and some wheezing with the cough is been nonproductive she states that she has recently had recurrent pneumothorax and was seen by Dr. Post recently had a chest x-ray that was negative on the sixth of this month the patient states she is to get a follow-up CT scan but has not got the scan yet. She reports no leg pain or swelling she denies diaphoresis or palpitate      Observation 05/13/2025  Patient agrees with HPI noted above including shortness of breath for the past few weeks and worse last week.  She reports that she is on O2 at night but only uses as needed.  She does not have a portable tank at home but often feels short of breath with exertion.  She feels better today after receiving Solu-Medrol.  Cardiothoracic surgeon recommending pulmonologist consult.  Hopefully DC in a.m.    Observation 05/14/2025  Patient feeling much better today and would like to go home. Has had 2 doses of iv ceftriaxone and as switched from iv steroid to po steroid per pulmonologist. Walk oximetry completed and did not require O2      Past Medical History:   Diagnosis Date    CAD     Cervical disc disorder 2019    After fall    CHOL     Chronic pain disorder 2020    After fall down stairs    COPD     Dementia     Depression     DEXA     2023= (1.1/ -2.3)    DMII     Extremity pain 2019    Maybe longer    Fractures 2022    GERD     Hypertension     Joint pain 2020    Low back pain 2019    After fall    MAMMO     NEG = 2018/ 2023/ 2024    Myocardial infarction     Neck pain 2015    Maybe  longer    Neuropathy in diabetes 2018    Osteoarthritis     Diagnosed    PAP     NEG =    Spinal stenosis 2019    Tremor      Past Surgical History:   Procedure Laterality Date    BRONCHOSCOPY N/A 4/10/2025    Procedure: BRONCHOSCOPY with right lung washing;  Surgeon: Marissa Soliz MD;  Location: [facility] ENDOSCOPY;  Service: Pulmonary;  Laterality: N/A;    CATARACT EXTRACTION W/ INTRAOCULAR LENS  IMPLANT, BILATERAL      COLONOSCOPY      = NEG, rech    GSI    COMPRESSION HIP SCREW Right 2022    Procedure: HIP COMPRESSION SCREW, right [place] hip screw from [place];  Surgeon: Loco Cortes MD;  Location: [facility] MAIN OR;  Service: Orthopedics;  Laterality: Right;    CORONARY ARTERY BYPASS GRAFT      x 3    FRACTURE SURGERY      TONSILLECTOMY      TUBAL ABDOMINAL LIGATION       Family History   Problem Relation Age of Onset    Stroke Mother     COPD Mother     Hypertension Mother     Tremor Father     Heart disease Father         CHF    COPD Father     Depression Father     Tremor Sister     Fibromyalgia Sister     Hypertension Sister     Post-traumatic stress disorder Sister     Heart disease Sister     Hypertension Sister     Depression Sister     Tremor Brother     COPD Brother     Depression Brother     Hypertension Brother     Cancer Brother         Undiagnosed lung cancer    Tremor Paternal Grandmother     Depression Sister     Hypertension Brother      Social History     Tobacco Use    Smoking status: Every Day     Current packs/day: 0.00     Average packs/day: 0.5 packs/day for 48.0 years (24.0 ttl pk-yrs)     Types: Cigarettes     Start date: 1975     Last attempt to quit:      Years since quittin.3     Passive exposure: Past    Smokeless tobacco: Never    Tobacco comments:     Started when i was 18 quit a few times. Used vape but i think it gave me pneumonia   Vaping Use    Vaping status: Every Day    Substances: Nicotine   Substance Use Topics    Alcohol use: No    Drug use: No      Medications Prior to Admission   Medication Sig Dispense Refill Last Dose/Taking    ALPRAZolam (XANAX) 0.25 MG tablet Take 1 tablet by mouth At Night As Needed for Anxiety or Sleep. 25 tablet 0 Past Month Bedtime    amLODIPine (NORVASC) 5 MG tablet TAKE 1 TABLET BY MOUTH EVERY DAY 90 tablet 1 5/12/2025 Morning    aspirin 81 MG tablet Take 1 tablet by mouth Daily.   5/12/2025 Morning    atorvastatin (LIPITOR) 80 MG tablet Take 1 tablet by mouth Daily. 90 tablet 0 5/12/2025 Morning    citalopram (CeleXA) 20 MG tablet TAKE 1 TABLET BY MOUTH DAILY 90 tablet 0 5/12/2025 Morning    Combivent Respimat  MCG/ACT inhaler INHALE 1 PUFF BY MOUTH 4 TIMES A DAY AS NEEDED FOR WHEEZING 4 g 0 5/12/2025 at  7:00 PM    CRANBERRY PO Take 4 tablets by mouth Daily.   5/12/2025 Morning    cyclobenzaprine (FLEXERIL) 10 MG tablet TAKE 1 TABLET BY MOUTH AT NIGHT AS NEEDED FOR MUSCLE SPASM 30 tablet 0 Past Month Bedtime    empagliflozin (Jardiance) 25 MG tablet tablet Take 1 tablet by mouth Daily. 90 tablet 1 5/12/2025 Morning    fenofibrate 160 MG tablet Take 1 tablet by mouth Daily. 90 tablet 0 5/12/2025 Morning    Fluticasone-Umeclidin-Vilant (Trelegy Ellipta) 200-62.5-25 MCG/ACT inhaler Inhale 1 puff Daily. 60 each 3 5/12/2025 Morning    guaiFENesin (MUCINEX) 600 MG 12 hr tablet Take 2 tablets by mouth Daily.   5/12/2025 Morning    omeprazole (priLOSEC) 40 MG capsule TAKE 1 CAPSULE BY MOUTH EVERY DAY 90 capsule 3 5/12/2025 Morning    traMADol (ULTRAM) 50 MG tablet Take 1 tablet by mouth Every 8 (Eight) Hours As Needed for Moderate Pain. 60 tablet 0 5/12/2025 Evening    valsartan (Diovan) 40 MG tablet Take 1 tablet by mouth Daily. 90 tablet 0 5/12/2025 Morning    ibandronate (Boniva) 150 MG tablet Take 1 tablet by mouth Every 30 (Thirty) Days. 3 tablet 3 5/1/2025 Morning    metFORMIN ER (GLUCOPHAGE-XR) 500 MG 24 hr tablet Take 1 tablet by mouth 2 (Two) Times a Day. With meals       nitroglycerin (NITROSTAT) 0.4 MG SL tablet Place  1 tablet under the tongue Every 5 (Five) Minutes As Needed for Chest Pain. Take no more than 3 doses in 15 minutes. 25 tablet 2 More than a month    nystatin (MYCOSTATIN) 100,000 unit/mL suspension Swish and swallow 5 mL 2 (Two) Times a Day As Needed.       vitamin D (ERGOCALCIFEROL) 1.25 MG (51987 UT) capsule capsule TAKE 1 CAPSULE BY MOUTH 1 TIME A WEEK 12 capsule 0 5/10/2025     Allergies:  Ace inhibitors, Codeine, Pregabalin, Aricept [donepezil], Paxil [paroxetine], and Lisinopril    Immunization History   Administered Date(s) Administered    COVID-19 ([employer]) 03/19/2021    Flu Vaccine Intradermal Quad 18-64YR 10/02/2018    Fluzone (or Fluarix & Flulaval for VFC) >6mos 09/11/2020, 11/16/2021, 10/11/2022    Fluzone High-Dose 65+YRS 10/18/2024    Fluzone High-Dose 65+yrs 10/30/2023    Influenza, Unspecified 12/10/2014, 11/02/2015, 10/17/2016    Pneumococcal Conjugate 13-Valent (PCV13) 10/02/2018    Pneumococcal Polysaccharide (PPSV23) 09/11/2020    Tdap 08/09/2019           REVIEW OF SYSTEMS:    Review of Systems   Respiratory:  Positive for shortness of breath.        Respiratory:  Positive for shortness of breath.    Genitourinary:  Positive for dysuria, frequency and urgency.     Review of Systems   Constitutional:  Positive for fatigue. Negative for chills, diaphoresis and fever.   Respiratory:  Positive for cough, chest tightness, shortness of breath and wheezing.    Hematological:  Does not bruise/bleed easily.   All other systems reviewed and are negative.    Vital Signs  Temp:  [97.8 °F (36.6 °C)-98.2 °F (36.8 °C)] 97.9 °F (36.6 °C)  Heart Rate:  [78-95] 80  Resp:  [14-20] 18  BP: (101-137)/(53-86) 135/86          Physical Exam:  Physical Exam  Vitals and nursing note reviewed.   Constitutional:       Appearance: Normal appearance.   HENT:      Head: Normocephalic and atraumatic.      Right Ear: External ear normal.      Left Ear: External ear normal.      Nose: Nose normal.      Mouth/Throat:      Mouth:  Mucous membranes are moist.      Pharynx: Oropharynx is clear.   Eyes:      Extraocular Movements: Extraocular movements intact.   Cardiovascular:      Rate and Rhythm: Normal rate and regular rhythm.      Pulses: Normal pulses.      Heart sounds: Normal heart sounds.   Pulmonary:      Effort: Pulmonary effort is normal.      Breath sounds: Normal breath sounds.   Abdominal:      General: Abdomen is flat. Bowel sounds are normal.      Palpations: Abdomen is soft.   Musculoskeletal:         General: Normal range of motion.      Cervical back: Normal range of motion.   Skin:     General: Skin is warm.   Neurological:      General: No focal deficit present.      Mental Status: She is alert and oriented to person, place, and time.   Psychiatric:         Mood and Affect: Mood normal.         Behavior: Behavior normal.           Emotional Behavior:    Normal    Debilities:   None  Results Review:    I reviewed the patient's new clinical results.  Lab Results (most recent)       Procedure Component Value Units Date/Time    POC Glucose 4x Daily Before Meals & at Bedtime [[id number]]  (Abnormal) Collected: 05/14/25 0731    Specimen: Blood Updated: 05/14/25 0732     Glucose 200 mg/dL      Comment: Serial Number: [id number]:  [id number]       Basic Metabolic Panel [[id number]]  (Abnormal) Collected: 05/14/25 0557    Specimen: Blood Updated: 05/14/25 0653     Glucose 200 mg/dL      BUN 32 mg/dL      Creatinine 0.96 mg/dL      Sodium 141 mmol/L      Potassium 4.9 mmol/L      Chloride 105 mmol/L      CO2 25.6 mmol/L      Calcium 9.7 mg/dL      BUN/Creatinine Ratio 33.3     Anion Gap 10.4 mmol/L      eGFR 65.0 mL/min/1.73     Narrative:      GFR Categories in Chronic Kidney Disease (CKD)              GFR Category          GFR (mL/min/1.73)    Interpretation  G1                    90 or greater        Normal or high (1)  G2                    60-89                Mild decrease (1)  G3a                   45-59                 Mild to moderate decrease  G3b                   30-44                Moderate to severe decrease  G4                    15-29                Severe decrease  G5                    14 or less           Kidney failure    (1)In the absence of evidence of kidney disease, neither GFR category G1 or G2 fulfill the criteria for CKD.    eGFR calculation 2021 CKD-EPI creatinine equation, which does not include race as a factor    CBC & Differential [[id number]]  (Abnormal) Collected: 05/14/25 0557    Specimen: Blood Updated: 05/14/25 0625    Narrative:      The following orders were created for panel order CBC & Differential.  Procedure                               Abnormality         Status                     ---------                               -----------         ------                     CBC Auto Differential[[id number]]        Abnormal            Final result                 Please view results for these tests on the individual orders.    CBC Auto Differential [[id number]]  (Abnormal) Collected: 05/14/25 0557    Specimen: Blood Updated: 05/14/25 0625     WBC 26.34 10*3/mm3      RBC 5.09 10*6/mm3      Hemoglobin 14.4 g/dL      Hematocrit 45.8 %      MCV 90.0 fL      MCH 28.3 pg      MCHC 31.4 g/dL      RDW 13.3 %      RDW-SD 43.7 fl      MPV 9.9 fL      Platelets 351 10*3/mm3      Neutrophil % 85.7 %      Lymphocyte % 9.9 %      Monocyte % 2.6 %      Eosinophil % 0.0 %      Basophil % 0.2 %      Immature Grans % 1.6 %      Neutrophils, Absolute 22.61 10*3/mm3      Lymphocytes, Absolute 2.60 10*3/mm3      Monocytes, Absolute 0.68 10*3/mm3      Eosinophils, Absolute 0.00 10*3/mm3      Basophils, Absolute 0.04 10*3/mm3      Immature Grans, Absolute 0.41 10*3/mm3      nRBC 0.0 /100 WBC     Blood Culture - Blood, Arm, Right [[id number]]  (Normal) Collected: 05/12/25 2344    Specimen: Blood from Arm, Right Updated: 05/14/25 0015     Blood Culture No growth at 24 hours    Blood Culture - Blood, Arm, Left [[id number]]   (Normal) Collected: 05/12/25 2301    Specimen: Blood from Arm, Left Updated: 05/13/25 2330     Blood Culture No growth at 24 hours    POC Glucose Once [[id number]]  (Abnormal) Collected: 05/13/25 2051    Specimen: Blood Updated: 05/13/25 2052     Glucose 281 mg/dL      Comment: Serial Number: [id number]:  [id number]       Lactic Acid, Plasma [[id number]]  (Normal) Collected: 05/13/25 0935    Specimen: Blood from Arm, Left Updated: 05/13/25 1012     Lactate 1.6 mmol/L     Urinalysis With Culture If Indicated - Urine, Clean Catch [[id number]]  (Abnormal) Collected: 05/13/25 0908    Specimen: Urine, Clean Catch Updated: 05/13/25 0936     Color, UA Yellow     Appearance, UA Clear     pH, UA 6.5     Specific Gravity, UA 1.031     Glucose, UA >=1000 mg/dL (3+)     Ketones, UA 15 mg/dL (1+)     Bilirubin, UA Negative     Blood, UA Negative     Protein, UA Negative     Leuk Esterase, UA Small (1+)     Nitrite, UA Negative     Urobilinogen, UA 0.2 E.U./dL    Narrative:      In absence of clinical symptoms, the presence of pyuria, bacteria, and/or nitrites on the urinalysis result does not correlate with infection.    Urinalysis, Microscopic Only - Urine, Clean Catch [[id number]]  (Abnormal) Collected: 05/13/25 0908    Specimen: Urine, Clean Catch Updated: 05/13/25 0935     RBC, UA 3-5 /HPF      WBC, UA Too Numerous to Count /HPF      Bacteria, UA None Seen /HPF      Squamous Epithelial Cells, UA 0-2 /HPF      Hyaline Casts, UA None Seen /LPF      Methodology Automated Microscopy    Urine Culture - Urine, Urine, Clean Catch [[id number]] Collected: 05/13/25 0908    Specimen: Urine, Clean Catch Updated: 05/13/25 0935    Basic Metabolic Panel [[id number]]  (Abnormal) Collected: 05/13/25 0752    Specimen: Blood from Arm, Left Updated: 05/13/25 0846     Glucose 347 mg/dL      BUN 19 mg/dL      Creatinine 0.73 mg/dL      Sodium 141 mmol/L      Potassium 4.2 mmol/L      Chloride 104 mmol/L      CO2 24.2 mmol/L      Calcium 9.3 mg/dL       BUN/Creatinine Ratio 26.0     Anion Gap 12.8 mmol/L      eGFR 90.3 mL/min/1.73     Narrative:      GFR Categories in Chronic Kidney Disease (CKD)              GFR Category          GFR (mL/min/1.73)    Interpretation  G1                    90 or greater        Normal or high (1)  G2                    60-89                Mild decrease (1)  G3a                   45-59                Mild to moderate decrease  G3b                   30-44                Moderate to severe decrease  G4                    15-29                Severe decrease  G5                    14 or less           Kidney failure    (1)In the absence of evidence of kidney disease, neither GFR category G1 or G2 fulfill the criteria for CKD.    eGFR calculation 2021 CKD-EPI creatinine equation, which does not include race as a factor    CBC & Differential [[id number]]  (Abnormal) Collected: 05/13/25 0752    Specimen: Blood from Arm, Left Updated: 05/13/25 0846    Narrative:      The following orders were created for panel order CBC & Differential.  Procedure                               Abnormality         Status                     ---------                               -----------         ------                     CBC Auto Differential[[id number]]        Abnormal            Final result               Scan Slide[[id number]]                                       Final result                 Please view results for these tests on the individual orders.    Manual Differential [[id number]]  (Abnormal) Collected: 05/13/25 0752    Specimen: Blood from Arm, Left Updated: 05/13/25 0846     Neutrophil % 87.0 %      Lymphocyte % 13.0 %      Neutrophils Absolute 11.74 10*3/mm3      Lymphocytes Absolute 1.75 10*3/mm3      Anisocytosis Slight/1+     WBC Morphology Normal     Platelet Morphology Normal    CBC Auto Differential [[id number]]  (Abnormal) Collected: 05/13/25 0752    Specimen: Blood from Arm, Left Updated: 05/13/25 0846     WBC 13.49 10*3/mm3       RBC 5.33 10*6/mm3      Hemoglobin 15.2 g/dL      Hematocrit 47.5 %      MCV 89.1 fL      MCH 28.5 pg      MCHC 32.0 g/dL      RDW 13.2 %      RDW-SD 43.3 fl      MPV 9.8 fL      Platelets 359 10*3/mm3     Narrative:      The previously reported component NRBC is no longer being reported. Previous result was 0.0 /100 WBC (Reference Range: 0.0-0.2 /100 WBC) on 5/13/2025 at 0826 EDT.    Scan Slide [[id number]] Collected: 05/13/25 0752    Specimen: Blood from Arm, Left Updated: 05/13/25 0846     Scan Slide --     Comment: See Manual Differential Results       Hemoglobin A1c [[id number]]  (Abnormal) Collected: 05/12/25 2226    Specimen: Blood Updated: 05/13/25 0843     Hemoglobin A1C 8.32 %     Narrative:      Hemoglobin A1C Ranges:    Increased Risk for Diabetes  5.7% to 6.4%  Diabetes                     >= 6.5%  Diabetic Goal                < 7.0%    Extra Tubes [[id number]] Collected: 05/13/25 0752    Specimen: Blood from Arm, Left Updated: 05/13/25 0830    Narrative:      The following orders were created for panel order Extra Tubes.  Procedure                               Abnormality         Status                     ---------                               -----------         ------                     Green Top (Gel)[[id number]]                                  Final result                 Please view results for these tests on the individual orders.    Green Top (Gel) [[id number]] Collected: 05/13/25 0752    Specimen: Blood from Arm, Left Updated: 05/13/25 0830     Extra Tube Hold for add-ons.     Comment: Auto resulted.       Procalcitonin [[id number]]  (Normal) Collected: 05/12/25 2344    Specimen: Blood from Arm, Right Updated: 05/13/25 0755     Procalcitonin 0.04 ng/mL     Narrative:      As a Marker for Sepsis (Non-Neonates):    1. <0.5 ng/mL represents a low risk of severe sepsis and/or septic shock.  2. >2 ng/mL represents a high risk of severe sepsis and/or septic shock.    As a Marker for Lower Respiratory  "Tract Infections that require antibiotic therapy:    PCT on Admission    Antibiotic Therapy       6-12 Hrs later    >0.5                Strongly Recommended  >0.25 - <0.5        Recommended   0.1 - 0.25          Discouraged              Remeasure/reassess PCT  <0.1                Strongly Discouraged     Remeasure/reassess PCT    As 28 day mortality risk marker: \"Change in Procalcitonin Result\" (>80% or <=80%) if Day 0 (or Day 1) and Day 4 values are available. Refer to [email or web address]://[email or web address]    Change in PCT <=80%  A decrease of PCT levels below or equal to 80% defines a positive change in PCT test result representing a higher risk for 28-day all-cause mortality of patients diagnosed with severe sepsis for septic shock.    Change in PCT >80%  A decrease of PCT levels of more than 80% defines a negative change in PCT result representing a lower risk for 28-day all-cause mortality of patients diagnosed with severe sepsis or septic shock.       POC Lactate [[id number]]  (Abnormal) Collected: 05/12/25 2257    Specimen: Blood Updated: 05/13/25 0738     Lactate <0.3 mmol/L      Comment: Serial Number: [id number]:  [id number]       High Sensitivity Troponin T 1Hr [[id number]]  (Abnormal) Collected: 05/12/25 2344    Specimen: Blood from Arm, Right Updated: 05/13/25 0029     HS Troponin T 35 ng/L      Troponin T Numeric Delta 0 ng/L      Troponin T % Delta 0    Narrative:      High Sensitive Troponin T Reference Range:  <14.0 ng/L- Negative Female for AMI  <22.0 ng/L- Negative Male for AMI  >=14 - Abnormal Female indicating possible myocardial injury.  >=22 - Abnormal Male indicating possible myocardial injury.   Clinicians would have to utilize clinical acumen, EKG, Troponin, and serial changes to determine if it is an Acute Myocardial Infarction or myocardial injury due to an underlying chronic condition.         Respiratory Panel PCR w/COVID-19(SARS-CoV-2) MANJIT/THEO/AUREA/PAD/COR/PHILIP In-House, NP Swab in " UTM/VTM, 2 HR TAT - Swab, Nasopharynx [[id number]]  (Normal) Collected: 05/12/25 2307    Specimen: Swab from Nasopharynx Updated: 05/13/25 0011     ADENOVIRUS, PCR Not Detected     Coronavirus 229E Not Detected     Coronavirus HKU1 Not Detected     Coronavirus NL63 Not Detected     Coronavirus OC43 Not Detected     COVID19 Not Detected     Human Metapneumovirus Not Detected     Human Rhinovirus/Enterovirus Not Detected     Influenza A PCR Not Detected     Influenza B PCR Not Detected     Parainfluenza Virus 1 Not Detected     Parainfluenza Virus 2 Not Detected     Parainfluenza Virus 3 Not Detected     Parainfluenza Virus 4 Not Detected     RSV, PCR Not Detected     Bordetella pertussis pcr Not Detected     Bordetella parapertussis PCR Not Detected     Chlamydophila pneumoniae PCR Not Detected     Mycoplasma pneumo by PCR Not Detected    Narrative:      In the setting of a positive respiratory panel with a viral infection PLUS a negative procalcitonin without other underlying concern for bacterial infection, consider observing off antibiotics or discontinuation of antibiotics and continue supportive care. If the respiratory panel is positive for atypical bacterial infection (Bordetella pertussis, Chlamydophila pneumoniae, or Mycoplasma pneumoniae), consider antibiotic de-escalation to target atypical bacterial infection.    Scan Slide [[id number]] Collected: 05/12/25 2226    Specimen: Blood Updated: 05/12/25 2318     Scan Slide --     Comment: See Manual Differential Results       Manual Differential [[id number]]  (Abnormal) Collected: 05/12/25 2226    Specimen: Blood Updated: 05/12/25 2318     Neutrophil % 73.0 %      Lymphocyte % 19.0 %      Monocyte % 3.0 %      Eosinophil % 1.0 %      Atypical Lymphocyte % 4.0 %      Neutrophils Absolute 10.99 10*3/mm3      Lymphocytes Absolute 3.46 10*3/mm3      Monocytes Absolute 0.45 10*3/mm3      Eosinophils Absolute 0.15 10*3/mm3      RBC Morphology Normal     WBC Morphology  Normal     Large Platelets Slight/1+    Comprehensive Metabolic Panel [[id number]]  (Abnormal) Collected: 05/12/25 2226    Specimen: Blood Updated: 05/12/25 2308     Glucose 229 mg/dL      BUN 20 mg/dL      Creatinine 0.91 mg/dL      Sodium 138 mmol/L      Potassium 4.0 mmol/L      Chloride 101 mmol/L      CO2 25.9 mmol/L      Calcium 10.1 mg/dL      Total Protein 6.8 g/dL      Albumin 4.0 g/dL      ALT (SGPT) 33 U/L      AST (SGOT) 19 U/L      Alkaline Phosphatase 128 U/L      Total Bilirubin 0.2 mg/dL      Globulin 2.8 gm/dL      A/G Ratio 1.4 g/dL      BUN/Creatinine Ratio 22.0     Anion Gap 11.1 mmol/L      eGFR 69.3 mL/min/1.73     Narrative:      GFR Categories in Chronic Kidney Disease (CKD)              GFR Category          GFR (mL/min/1.73)    Interpretation  G1                    90 or greater        Normal or high (1)  G2                    60-89                Mild decrease (1)  G3a                   45-59                Mild to moderate decrease  G3b                   30-44                Moderate to severe decrease  G4                    15-29                Severe decrease  G5                    14 or less           Kidney failure    (1)In the absence of evidence of kidney disease, neither GFR category G1 or G2 fulfill the criteria for CKD.    eGFR calculation 2021 CKD-EPI creatinine equation, which does not include race as a factor    High Sensitivity Troponin T [[id number]]  (Abnormal) Collected: 05/12/25 2226    Specimen: Blood Updated: 05/12/25 2308     HS Troponin T 35 ng/L     Narrative:      High Sensitive Troponin T Reference Range:  <14.0 ng/L- Negative Female for AMI  <22.0 ng/L- Negative Male for AMI  >=14 - Abnormal Female indicating possible myocardial injury.  >=22 - Abnormal Male indicating possible myocardial injury.   Clinicians would have to utilize clinical acumen, EKG, Troponin, and serial changes to determine if it is an Acute Myocardial Infarction or myocardial injury due to an  underlying chronic condition.         BNP [[id number]]  (Normal) Collected: 05/12/25 2226    Specimen: Blood Updated: 05/12/25 2308     proBNP 754.0 pg/mL     Narrative:      This assay is used as an aid in the diagnosis of individuals suspected of having heart failure. It can be used as an aid in the diagnosis of acute decompensated heart failure (ADHF) in patients presenting with signs and symptoms of ADHF to the emergency department (ED). In addition, NT-proBNP of <300 pg/mL indicates ADHF is not likely.    Age Range Result Interpretation  NT-proBNP Concentration (pg/mL:      <50             Positive            >450                   Gray                 300-450                    Negative             <300    50-75           Positive            >900                  Gray                300-900                  Negative            <300      >75             Positive            >1800                  Gray                300-1800                  Negative            <300    [place] [facility] [[id number]] Collected: 05/12/25 2226    Specimen: Blood Updated: 05/12/25 2246    Narrative:      The following orders were created for panel order [place] [facility].  Procedure                               Abnormality         Status                     ---------                               -----------         ------                     Green Top (Gel)[[id number]]                                  Final result               Lavender Top[[id number]]                                     Final result               Gold Top - SST[[id number]]                                   Final result               Light Blue Top[[id number]]                                   Final result                 Please view results for these tests on the individual orders.    Green Top (Gel) [[id number]] Collected: 05/12/25 2226    Specimen: Blood Updated: 05/12/25 2246     Extra Tube Hold for add-ons.     Comment: Auto resulted.       Lavender Top [[id number]] Collected:  05/12/25 2226    Specimen: Blood Updated: 05/12/25 2246     Extra Tube hold for add-on     Comment: Auto resulted       Gold Top - SST [[id number]] Collected: 05/12/25 2226    Specimen: Blood Updated: 05/12/25 2246     Extra Tube Hold for add-ons.     Comment: Auto resulted.       Light Blue Top [[id number]] Collected: 05/12/25 2226    Specimen: Blood Updated: 05/12/25 2246     Extra Tube Hold for add-ons.     Comment: Auto resulted               Imaging Results (Most Recent)       Procedure Component Value Units Date/Time    CT Chest Without Contrast Diagnostic [[id number]] Collected: 05/12/25 2251     Updated: 05/12/25 2258    Narrative:      CT CHEST WO CONTRAST DIAGNOSTIC    Date of Exam: 5/12/2025 10:38 PM EDT    Indication: History of recurrent pneumothorax now with increasing dyspnea.    Comparison: Chest radiograph 5/6/2025. Chest CT 4/9/2025.    Technique: Axial CT images were obtained of the chest without contrast administration.  Sagittal and coronal reconstructions were performed.  Automated exposure control and iterative reconstruction methods were used.      Findings:    Thyroid and thoracic inlet: No significant abnormality.    Lymph nodes: No pathologic appearing lymph nodes by imaging criteria. Calcified mediastinal and hilar nodes, likely sequelae of prior granulomatous disease.    Cardiovascular: Normal appearing heart size. No pericardial effusion. Aorta and main pulmonary artery diameters are within normal range.. There are coronary artery calcifications. Status post CABG. Aortic atherosclerosis.    Esophagus: No significant abnormality.    Lung parenchyma: Mild biapical pleural-parenchymal scarring. Mild emphysema. Unchanged focal bandlike opacity in the right upper lobe and right middle lobe, likely focus of scarring. Similar mild areas of bronchiectasis in the right upper lobe, right   middle lobe, right lower lobe, and left upper lobe. Scattered atelectasis. No suspicious appearing opacities.  Calcified granulomas.    Airways: Patent trachea and mainstem bronchi.    Pleura: No pleural effusion or pneumothorax.    Chest wall and osseous structures: Degenerative changes of the imaged spine. No acute osseous abnormality. Median sternotomy.    Included abdomen: No significant abnormality.      Impression:      Impression:  No acute intrathoracic finding.    Mild emphysema. Emphysema on CT is an independent risk factor for lung cancer. Low dose lung cancer screening should be considered if patient qualifies based on smoking history or if not already enrolled in a screening program.        Electronically Signed: Ashkan Meyer MD    5/12/2025 10:56 PM EDT    Workstation ID: [id number]          reviewed    ECG/EMG Results (most recent)       Procedure Component Value Units Date/Time    Telemetry Scan [[id number]] Resulted: 05/12/25     Updated: 05/12/25 2359    ECG 12 Lead Dyspnea [[id number]] Collected: 05/12/25 2138     Updated: 05/13/25 0652     QT Interval 390 ms      QTC Interval 435 ms     Narrative:      HEART RATE=75  bpm  RR Mmmfkrdm=256  ms  ND Jgpvotns=629  ms  P Horizontal Axis=-7  deg  P Front Axis=79  deg  QRSD Interval=97  ms  QT Nioeqvrz=750  ms  YOxS=067  ms  QRS Axis=70  deg  T Wave Axis=63  deg  - ABNORMAL ECG -  Sinus rhythm  Probable  left atrial enlargement  Anterior  infarct, old  When compared with ECG of 08-Apr-2025 08:39:48,  Significant axis, voltage or hypertrophy change  Electronically Signed By: Natanael Armenta ([facility]) 2025-05-13 06:52:05  Date and Time of Study:2025-05-12 21:38:20    Telemetry Scan [[id number]] Resulted: 05/12/25     Updated: 05/13/25 2315    Telemetry Scan [[id number]] Resulted: 05/12/25     Updated: 05/13/25 2326    Telemetry Scan [[id number]] Resulted: 05/12/25     Updated: 05/14/25 0348          reviewed    Results for orders placed during the hospital encounter of 01/21/25    Duplex Carotid Ultrasound CAR    Interpretation Summary    Right internal carotid artery  demonstrates a less than 50% stenosis.    Antegrade right vertebral flow.    Left internal carotid artery demonstrates a less than 50% stenosis.    Antegrade left vertebral flow.      Results for orders placed during the hospital encounter of 06/20/22    Adult Transthoracic Echo Complete W/ Cont if Necessary Per Protocol    Interpretation Summary  · Left ventricular wall thickness is consistent with mild to moderate concentric hypertrophy.  · Estimated left ventricular EF = 55% Estimated left ventricular EF was in agreement with the calculated left ventricular EF. Left ventricular systolic function is normal.  · There is moderate calcification of the aortic valve mainly affecting the left coronary and right coronary cusp(s).  · Left ventricular diastolic function is consistent with (grade I) impaired relaxation.  · Estimated right ventricular systolic pressure from tricuspid regurgitation is normal (<35 mmHg).      Microbiology Results (last 10 days)       Procedure Component Value - Date/Time    Blood Culture - Blood, Arm, Right [[id number]]  (Normal) Collected: 05/12/25 2344    Lab Status: Preliminary result Specimen: Blood from Arm, Right Updated: 05/14/25 0015     Blood Culture No growth at 24 hours    Respiratory Panel PCR w/COVID-19(SARS-CoV-2) [facility] In-House, NP Swab in UTM/VTM, 2 HR TAT - Swab, Nasopharynx [[id number]]  (Normal) Collected: 05/12/25 2307    Lab Status: Final result Specimen: Swab from Nasopharynx Updated: 05/13/25 0011     ADENOVIRUS, PCR Not Detected     Coronavirus 229E Not Detected     Coronavirus HKU1 Not Detected     Coronavirus NL63 Not Detected     Coronavirus OC43 Not Detected     COVID19 Not Detected     Human Metapneumovirus Not Detected     Human Rhinovirus/Enterovirus Not Detected     Influenza A PCR Not Detected     Influenza B PCR Not Detected     Parainfluenza Virus 1 Not Detected     Parainfluenza Virus 2 Not Detected     Parainfluenza Virus 3 Not Detected      Parainfluenza Virus 4 Not Detected     RSV, PCR Not Detected     Bordetella pertussis pcr Not Detected     Bordetella parapertussis PCR Not Detected     Chlamydophila pneumoniae PCR Not Detected     Mycoplasma pneumo by PCR Not Detected    Narrative:      In the setting of a positive respiratory panel with a viral infection PLUS a negative procalcitonin without other underlying concern for bacterial infection, consider observing off antibiotics or discontinuation of antibiotics and continue supportive care. If the respiratory panel is positive for atypical bacterial infection (Bordetella pertussis, Chlamydophila pneumoniae, or Mycoplasma pneumoniae), consider antibiotic de-escalation to target atypical bacterial infection.    Blood Culture - Blood, Arm, Left [[id number]]  (Normal) Collected: 05/12/25 2301    Lab Status: Preliminary result Specimen: Blood from Arm, Left Updated: 05/13/25 2330     Blood Culture No growth at 24 hours            Assessment & Plan     Acute exacerbation of chronic obstructive pulmonary disease (COPD)             COPD exacerbation  - WBC trended down from 15.06,13.49, 26.34 in the setting of steroid administration   - Lactate and procalcitonin within normal range  - Troponin flat and proBNP unremarkable  - CMP unremarkable except hyperglycemia in the setting of diabetes and steroids  - Blood cultures negative. Afebrile  - Chest x-ray showed no acute process  - CT chest showed no acute finding and mild emphysema  - EKG showed sinus rhythm with heart rate 75  - In the ED patient given DuoNeb and started on IV ceftriaxone and IV Solu-Medrol  -Continue iv steroid for now  - Cardiothoracic surgeon consulted by ED provider due to history of recent pneumothorax  - Pulmonologist consulted by cardiothoracic surgeon and switched from Iv to po steroid  -Walk oximetry completed and did not require O2  - On Combivent and trilogy at home  -Dc on po prednisone taper for 12 days and doxycycline for 5 days  per Dr Gerber's recommendations      UTI  -C/o dysuria, frequency and urgency  -UA appears to be negative for UTI but it was collected after received dose of iv ceftriaxone  -Will keep iv ceftriaxone for now pending urine cx.  -Urine cx negative       Diabetes mellitus  - Poorly controlled   Lab Results   Component Value Date    GLUCOSE 200 (H) 05/14/2025    GLUCOSE 347 (H) 05/13/2025    GLUCOSE 229 (H) 05/12/2025    GLUCOSE 148 (H) 04/24/2025   -A1c 8.32  - SSI  - Pharmacy consulted to dose steroid protocol  -On metformin and jardiance   -Diabetic diet  -Monitor before meals and at bedtime      Hypertension  - Well controlled   BP Readings from Last 1 Encounters:   05/14/25 135/86   - Continue amlodipine and valsartan  - Monitor while admitted      Hyperlipidemia  - Continue statin     Anxiety  - Continue Xanax and Celexa     GERD  - Continue PPI  -GI was consulted by cardiothoracic surgeon per patient's request    I discussed the patients findings and my recommendations with patient and nursing staff.     Discharge Diagnosis:      Acute exacerbation of chronic obstructive pulmonary disease (COPD)      Hospital Course  Patient is a 69 y.o. female presented with dyspnea as noted in HPI above.  CMP was at her baseline and CBC showed WBC 15.06 on admission.  Her lactate, procalcitonin, troponin, proBNP, blood cultures were unremarkable.  Chest x-ray was negative and CT chest showed mild emphysema.  She was given DuoNebs and IV ceftriaxone as well as IV Solu-Medrol in the ED and admitted to the observation unit.  Chart review showed recent admission with pneumothorax so cardiothoracic surgeon was consulted by ED provider and recommended pulmonologist and GI's consults.  She was evaluated by GI for symptoms of GERD per her request and was discharged on PPI daily and Pepcid as needed at night.  She will be having EGD outpatient.  She was also evaluated by pulmonologist who recommended discharge today on p.o. prednisone  taper for 12 days and doxycycline for 5 days.  Walk oximetry was completed and she did not require O2 at home.  Patient also reported dysuria but urine culture was negative.  Noted that her WBC trended up at discharge in the setting of IV steroid administration. At this time, patient felt to be in good condition for discharge with close follow up with PCP. Instructed to take all medications as prescribed and to return to ED if any concerning signs/symptoms. All test/lab results were discussed with patient. All questions were answered and patient verbalizes understanding.       Past Medical History:     Past Medical History:   Diagnosis Date    CAD     Cervical disc disorder 2019    After fall    CHOL     Chronic pain disorder 2020    After fall down stairs    COPD     Dementia     Depression     DEXA     2023= (1.1/ -2.3)    DMII     Extremity pain 2019    Maybe longer    Fractures 2022    GERD     Hypertension     Joint pain 2020    Low back pain 2019    After fall    MAMMO     NEG = 2018/ 2023/ 2024    Myocardial infarction     Neck pain 2015    Maybe longer    Neuropathy in diabetes 2018    Osteoarthritis 2023    Diagnosed    PAP     NEG =2021    Spinal stenosis 2019    Tremor        Past Surgical History:     Past Surgical History:   Procedure Laterality Date    BRONCHOSCOPY N/A 4/10/2025    Procedure: BRONCHOSCOPY with right lung washing;  Surgeon: Marissa Soliz MD;  Location: [facility] ENDOSCOPY;  Service: Pulmonary;  Laterality: N/A;    CATARACT EXTRACTION W/ INTRAOCULAR LENS  IMPLANT, BILATERAL      COLONOSCOPY      2023= NEG, rech 2026   GSI    COMPRESSION HIP SCREW Right 03/25/2022    Procedure: HIP COMPRESSION SCREW, right [place] hip screw from [place];  Surgeon: Loco Cortes MD;  Location: [facility] MAIN OR;  Service: Orthopedics;  Laterality: Right;    CORONARY ARTERY BYPASS GRAFT      x 3    FRACTURE SURGERY      TONSILLECTOMY      TUBAL ABDOMINAL LIGATION         Social History:   Social History      Socioeconomic History    Marital status: Single   Tobacco Use    Smoking status: Every Day     Current packs/day: 0.00     Average packs/day: 0.5 packs/day for 48.0 years (24.0 ttl pk-yrs)     Types: Cigarettes     Start date: 1975     Last attempt to quit:      Years since quittin.3     Passive exposure: Past    Smokeless tobacco: Never    Tobacco comments:     Started when i was 18 quit a few times. Used vape but i think it gave me pneumonia   Vaping Use    Vaping status: Every Day    Substances: Nicotine   Substance and Sexual Activity    Alcohol use: No    Drug use: No    Sexual activity: Not Currently     Partners: Male     Birth control/protection: None     Comment: [place] to get pregnant..hahaa!       Procedures Performed         Consults:   Consults       Date and Time Order Name Status Description    2025 11:39 AM Inpatient Gastroenterology Consult Completed     2025 11:31 AM Inpatient Pulmonology Consult      2025  8:58 AM Inpatient Thoracic Surgery Consult Completed             Condition on Discharge:     Stable    Discharge Disposition      Discharge Medications     Discharge Medications        ASK your doctor about these medications        Instructions Start Date   ALPRAZolam 0.25 MG tablet  Commonly known as: XANAX   0.25 mg, Oral, Nightly PRN      amLODIPine 5 MG tablet  Commonly known as: NORVASC   5 mg, Oral, Daily      aspirin 81 MG tablet   81 mg, Daily      atorvastatin 80 MG tablet  Commonly known as: LIPITOR   80 mg, Oral, Daily      citalopram 20 MG tablet  Commonly known as: CeleXA   20 mg, Oral, Daily      Combivent Respimat  MCG/ACT inhaler  Generic drug: ipratropium-albuterol   INHALE 1 PUFF BY MOUTH 4 TIMES A DAY AS NEEDED FOR WHEEZING      CRANBERRY PO   4 tablets, Oral, Daily      cyclobenzaprine 10 MG tablet  Commonly known as: FLEXERIL   10 mg, Oral, Nightly PRN      empagliflozin 25 MG tablet tablet  Commonly known as: Jardiance   25 mg, Oral,  Daily      fenofibrate 160 MG tablet   160 mg, Oral, Daily      guaiFENesin 600 MG 12 hr tablet  Commonly known as: MUCINEX  Ask about: Which instructions should I use?   1,200 mg, Daily      ibandronate 150 MG tablet  Commonly known as: Boniva   150 mg, Oral, Every 30 Days      metFORMIN  MG 24 hr tablet  Commonly known as: GLUCOPHAGE-XR  Ask about: Which instructions should I use?   500 mg, Oral, 2 Times Daily, With meals      nitroglycerin 0.4 MG SL tablet  Commonly known as: NITROSTAT   0.4 mg, Sublingual, Every 5 Minutes PRN, Take no more than 3 doses in 15 minutes.      nystatin 100,000 unit/mL suspension  Commonly known as: MYCOSTATIN   5 mL, Swish & Swallow, 2 Times Daily PRN      omeprazole 40 MG capsule  Commonly known as: priLOSEC   TAKE 1 CAPSULE BY MOUTH EVERY DAY      traMADol 50 MG tablet  Commonly known as: ULTRAM   50 mg, Oral, Every 8 Hours PRN      Trelegy Ellipta 200-62.5-25 MCG/ACT inhaler  Generic drug: Fluticasone-Umeclidin-Vilant   1 puff, Inhalation, Daily      valsartan 40 MG tablet  Commonly known as: Diovan   40 mg, Oral, Daily      vitamin D 1.25 MG (70530 UT) capsule capsule  Commonly known as: ERGOCALCIFEROL   50,000 Units, Oral, Weekly               Discharge Diet:     Activity at Discharge:     Follow-up Appointments  Future Appointments   Date Time Provider Department Center   6/30/2025  2:00 PM Elisa Soto MD [facility]   7/17/2025  3:00 PM Elen Jameson DO [facility]   7/22/2025 10:30 AM Douglas Mcginnis MD [facility]         Test Results Pending at Discharge  Pending Results       Procedure [Order ID] Specimen - Date/Time    Urine Culture - Urine, Urine, Clean Catch [[id number]] Collected: 05/13/25 0908    Specimen: Urine, Clean Catch Updated: 05/13/25 0935             Risk for Readmission (LACE) Score: 7 (5/14/2025  6:00 AM)      Greater than 30 minutes spent in discharge activities for this patient    Signature:Electronically signed by Inocencia SON  DESEAN Harman, 05/14/25, 10:24 AM EDT.

## 2025-05-14 NOTE — CASE MANAGEMENT/SOCIAL WORK
Continued Stay Note  [facility]     Patient Name: Darleen Stallings  MRN: [id number]  Today's Date: 5/14/2025    Admit Date: 5/12/2025    Plan: Return home with son , Sergio staying with her. Has nighttime oxygen with [facility] at 2 liters   Discharge Plan       Row Name 05/14/25 1141       Plan    Plan Comments CM informed [facility] with [facility] that Ms. Stallings had 6 MWT and did not qualify for oxygen                         Belkis SIFUENTES,RN Case Manager  [facility]  Phone: Desk- [phone] cell- [phone]

## 2025-05-14 NOTE — CASE MANAGEMENT/SOCIAL WORK
Case Management Discharge Note      Final Note: Routine home              Durable Medical Equipment Coordination complete.      Service Provider Services Address Phone Fax Patient Preferred    [employer] [facility] [employer] [address], [place] [address] [phone] --                        Transportation Services  Private: Car    Final Discharge Disposition Code: 01 - home or self-care

## 2025-05-14 NOTE — CASE MANAGEMENT/SOCIAL WORK
Continued Stay Note   [facility]     Patient Name: Darleen Stallings  MRN: [id number]  Today's Date: 5/14/2025    Admit Date: 5/12/2025    Plan: Return home with son , Sergio staying with her. Has nighttime oxygen with [facility] at 2 liters   Discharge Plan       Row Name 05/14/25 1103       Plan    Plan Comments Barriers: Pending 6 MWT                          Belkis SIFUENTES,RN Case Manager  [facility]  Phone: Desk- [phone] cell- [phone]

## 2025-05-14 NOTE — PLAN OF CARE
Problem: Adult Inpatient Plan of Care  Goal: Plan of Care Review  Outcome: Progressing  Goal: Patient-Specific Goal (Individualized)  Outcome: Progressing  Goal: Absence of Hospital-Acquired Illness or Injury  Outcome: Progressing  Intervention: Identify and Manage Fall Risk  Recent Flowsheet Documentation  Taken 5/14/2025 0000 by Batsheva Montero RN  Safety Promotion/Fall Prevention: safety round/check completed  Taken 5/13/2025 2000 by Batsheva Montero RN  Safety Promotion/Fall Prevention: safety round/check completed  Intervention: Prevent Infection  Recent Flowsheet Documentation  Taken 5/13/2025 2000 by Batsheva Montero RN  Infection Prevention: single patient room provided  Goal: Optimal Comfort and Wellbeing  Outcome: Progressing  Intervention: Provide Person-Centered Care  Recent Flowsheet Documentation  Taken 5/13/2025 2000 by Batsheva Montero RN  Trust Relationship/Rapport:   care explained   questions answered   questions encouraged  Goal: Readiness for Transition of Care  Outcome: Progressing     Problem: Comorbidity Management  Goal: Maintenance of COPD Symptom Control  Outcome: Progressing  Goal: Blood Glucose Level Within Target Range  Outcome: Progressing  Goal: Blood Pressure in Desired Range  Outcome: Progressing     Problem: Sepsis/Septic Shock  Goal: Optimal Coping  Outcome: Progressing  Goal: Absence of Bleeding  Outcome: Progressing  Goal: Blood Glucose Level Within Target Range  Outcome: Progressing  Goal: Absence of Infection Signs and Symptoms  Outcome: Progressing  Intervention: Initiate Sepsis Management  Recent Flowsheet Documentation  Taken 5/13/2025 2000 by Batsheva Montero RN  Infection Prevention: single patient room provided  Intervention: Promote Recovery  Recent Flowsheet Documentation  Taken 5/13/2025 2000 by Batsheva Montero RN  Activity Management: ambulated to bathroom  Goal: Optimal Nutrition Delivery  Outcome: Progressing   Goal Outcome Evaluation:

## 2025-05-14 NOTE — PROCEDURES
Exercise Oximetry    Patient Name:Darleen Stallings   MRN: [id number]   Date: 05/14/25             ROOM AIR BASELINE   SpO2% 92   Heart Rate 78   Blood Pressure      EXERCISE ON ROOM AIR SpO2% EXERCISE ON O2 @  LPM SpO2%   1 MINUTE 92 1 MINUTE    2 MINUTES 94 2 MINUTES    3 MINUTES 95 3 MINUTES    4 MINUTES 93 4 MINUTES    5 MINUTES 94 5 MINUTES    6 MINUTES 96 6 MINUTES               Distance Walked   Distance Walked   Dyspnea (Rodo Scale)   Dyspnea (Rodo Scale)   Fatigue (Rodo Scale)   Fatigue (Rodo Scale)   SpO2% Post Exercise   SpO2% Post Exercise   HR Post Exercise   HR Post Exercise   Time to Recovery   Time to Recovery     Comments: pt doesn't need oxygen during ambulation.  Harvinder Cortez, Respiratory Tech Student

## 2025-05-14 NOTE — OUTREACH NOTE
Prep Survey      Flowsheet Row Responses   [facility] facility patient discharged from? [facility]   Is LACE score < 7 ? No   Eligibility [facility]   Date of Admission 05/12/25   Date of Discharge 05/14/25   Discharge Disposition Home or Self Care   Discharge diagnosis COPD   Does the patient have one of the following disease processes/diagnoses(primary or secondary)? COPD   Does the patient have Home health ordered? No   Is there a DME ordered? Yes   What DME was ordered? [facility] for oxygen needs   Prep survey completed? Yes            ELENO ARITA - Registered Nurse

## 2025-05-15 ENCOUNTER — TRANSITIONAL CARE MANAGEMENT TELEPHONE ENCOUNTER (OUTPATIENT)
Dept: CALL CENTER | Facility: HOSPITAL | Age: 70
End: 2025-05-15
Payer: MEDICARE

## 2025-05-15 DIAGNOSIS — F41.1 GENERALIZED ANXIETY DISORDER: ICD-10-CM

## 2025-05-15 RX ORDER — ALPRAZOLAM 0.25 MG
0.25 TABLET ORAL NIGHTLY PRN
Qty: 25 TABLET | Refills: 0 | Status: SHIPPED | OUTPATIENT
Start: 2025-05-15

## 2025-05-15 NOTE — TELEPHONE ENCOUNTER
[facility]    Rx Refill Note  Requested Prescriptions     Pending Prescriptions Disp Refills    ALPRAZolam (XANAX) 0.25 MG tablet [Pharmacy Med Name: ALPRAZolam Oral Tablet 0.25 MG] 25 tablet 0     Sig: TAKE 1 TABLET BY MOUTH AT NIGHT AS NEEDED FOR ANXIETY OR SLEEP      Last office visit with prescribing clinician: 4/24/2025   Last telemedicine visit with prescribing clinician: Visit date not found   Next office visit with prescribing clinician: 7/17/2025                         Would you like a call back once the refill request has been completed: [] Yes [] No    If the office needs to give you a call back, can they leave a voicemail: [] Yes [] No    Radha Henry, RT  05/15/25, 14:38 EDT

## 2025-05-15 NOTE — OUTREACH NOTE
Call Center TCM Note      Flowsheet Row Responses   [facility] facility patient discharged from? [facility]   Does the patient have one of the following disease processes/diagnoses(primary or secondary)? COPD   TCM attempt successful? No   Unsuccessful attempts Attempt 2            Pavithra PARADA - Registered Nurse    5/15/2025, 14:10 EDT

## 2025-05-15 NOTE — OUTREACH NOTE
Call Center TCM Note      Flowsheet Row Responses   [facility] facility patient discharged from? [facility]   Does the patient have one of the following disease processes/diagnoses(primary or secondary)? COPD   TCM attempt successful? No   Unsuccessful attempts Attempt 1            Pavithra PARADA - Registered Nurse    5/15/2025, 12:50 EDT

## 2025-05-16 ENCOUNTER — TRANSITIONAL CARE MANAGEMENT TELEPHONE ENCOUNTER (OUTPATIENT)
Dept: CALL CENTER | Facility: HOSPITAL | Age: 70
End: 2025-05-16
Payer: MEDICARE

## 2025-05-16 NOTE — PROGRESS NOTES
THORACIC SURGERY CLINIC CONSULT NOTE    REASON FOR CONSULT: Recurrent right-sided pneumothorax.    Subjective   HISTORY OF PRESENTING ILLNESS:   Darleen Stallings is a 69 y.o. female who has significant medical problems as mentioned in the medical chart.     History of Present Illness  She was admitted to [facility] on 4/8/2025 for recurrent pneumothorax.  She has 40-pack-year history of smoking.  She smoked at the time of hospital admission.  The pneumothorax was managed with chest tube.  CT scan of the chest demonstrated large basilar pneumothorax competent and airspace disease in the lungs.  She had another episode of pneumothorax earlier in April 2025.  This was manages well with chest tube placement.  While she was in the hospital thoracic surgery was consulted.  She was discharged home after the chest tube was removed.    She came to clinic for follow-up visit.  She reports persistent leg pain and swelling since her discharge. Her respiratory function has not improved, and she experiences dyspnea and chest pain during outdoor activities such as yard work. She has been managing her symptoms with ibuprofen, which provides minimal relief. She has a history of smoking almost a pack a day but quit a month ago. This is her first experience with a left-sided pneumothorax. She was previously on Bactrim for 3 to 4 days, which did not alleviate her symptoms. She reports no cough but felt unwell this morning, although she does not own a thermometer. She was diagnosed with RSV during her hospital stay. She lives independently but currently has her son staying with her. She reports no unintentional weight loss but has a persistent increased appetite. She has familial tremors, which are currently exacerbated due to her weakness. She has a history of 2 minor strokes, which she does not recall. She is not under the care of a pulmonologist. She has no history of lung surgery. She is currently using Trelegy inhaler  and Combivent Respimat, prescribed by her family doctor, Dr. Krishna. She experiences pain upon deep inspiration and is unable to walk her dog. She reports feeling better today compared to yesterday. She reports no known allergies.    Past Medical History:   Diagnosis Date    CAD     Cervical disc disorder 2019    After fall    CHOL     Chronic pain disorder 2020    After fall down stairs    COPD     Dementia     Depression     DEXA     2023= (1.1/ -2.3)    DMII     Extremity pain 2019    Maybe longer    Fractures 2022    GERD     Hypertension     Joint pain 2020    Low back pain 2019    After fall    MAMMO     NEG = 2018/ 2023/ 2024    Myocardial infarction     Neck pain 2015    Maybe longer    Neuropathy in diabetes 2018    Osteoarthritis 2023    Diagnosed    PAP     NEG =2021    Spinal stenosis 2019    Tremor        Past Surgical History:   Procedure Laterality Date    BRONCHOSCOPY N/A 4/10/2025    Procedure: BRONCHOSCOPY with right lung washing;  Surgeon: Marissa Soliz MD;  Location: [facility] ENDOSCOPY;  Service: Pulmonary;  Laterality: N/A;    CATARACT EXTRACTION W/ INTRAOCULAR LENS  IMPLANT, BILATERAL      COLONOSCOPY      2023= NEG, rech 2026   GSI    COMPRESSION HIP SCREW Right 03/25/2022    Procedure: HIP COMPRESSION SCREW, right [place] hip screw from [place];  Surgeon: Loco Cortes MD;  Location: [facility] MAIN OR;  Service: Orthopedics;  Laterality: Right;    CORONARY ARTERY BYPASS GRAFT      x 3    FRACTURE SURGERY      TONSILLECTOMY      TUBAL ABDOMINAL LIGATION         Family History   Problem Relation Age of Onset    Stroke Mother     COPD Mother     Hypertension Mother     Tremor Father     Heart disease Father         CHF    COPD Father     Depression Father     Tremor Sister     Fibromyalgia Sister     Hypertension Sister     Post-traumatic stress disorder Sister     Heart disease Sister     Hypertension Sister     Depression Sister     Tremor Brother     COPD Brother     Depression Brother      Hypertension Brother     Cancer Brother         Undiagnosed lung cancer    Tremor Paternal Grandmother     Depression Sister     Hypertension Brother        Social History     Socioeconomic History    Marital status: Single   Tobacco Use    Smoking status: Every Day     Current packs/day: 0.00     Average packs/day: 0.5 packs/day for 48.0 years (24.0 ttl pk-yrs)     Types: Cigarettes     Start date: 1975     Last attempt to quit:      Years since quittin.3     Passive exposure: Past    Smokeless tobacco: Never    Tobacco comments:     Started when i was 18 quit a few times. Used vape but i think it gave me pneumonia   Vaping Use    Vaping status: Every Day    Substances: Nicotine   Substance and Sexual Activity    Alcohol use: No    Drug use: No    Sexual activity: Not Currently     Partners: Male     Birth control/protection: None     Comment: [place] to get pregnant..hahaa!         Current Outpatient Medications:     amLODIPine (NORVASC) 5 MG tablet, TAKE 1 TABLET BY MOUTH EVERY DAY, Disp: 90 tablet, Rfl: 1    aspirin 81 MG tablet, Take 1 tablet by mouth Daily., Disp: , Rfl:     atorvastatin (LIPITOR) 80 MG tablet, Take 1 tablet by mouth Daily., Disp: 90 tablet, Rfl: 0    citalopram (CeleXA) 20 MG tablet, TAKE 1 TABLET BY MOUTH DAILY, Disp: 90 tablet, Rfl: 0    Combivent Respimat  MCG/ACT inhaler, INHALE 1 PUFF BY MOUTH 4 TIMES A DAY AS NEEDED FOR WHEEZING, Disp: 4 g, Rfl: 0    CRANBERRY PO, Take 4 tablets by mouth Daily., Disp: , Rfl:     empagliflozin (Jardiance) 25 MG tablet tablet, Take 1 tablet by mouth Daily., Disp: 90 tablet, Rfl: 1    fenofibrate 160 MG tablet, Take 1 tablet by mouth Daily., Disp: 90 tablet, Rfl: 0    Fluticasone-Umeclidin-Vilant (Trelegy Ellipta) 200-62.5-25 MCG/ACT inhaler, Inhale 1 puff Daily., Disp: 60 each, Rfl: 3    ibandronate (Boniva) 150 MG tablet, Take 1 tablet by mouth Every 30 (Thirty) Days., Disp: 3 tablet, Rfl: 3    nitroglycerin (NITROSTAT) 0.4 MG SL  tablet, Place 1 tablet under the tongue Every 5 (Five) Minutes As Needed for Chest Pain. Take no more than 3 doses in 15 minutes., Disp: 25 tablet, Rfl: 2    omeprazole (priLOSEC) 40 MG capsule, TAKE 1 CAPSULE BY MOUTH EVERY DAY, Disp: 90 capsule, Rfl: 3    traMADol (ULTRAM) 50 MG tablet, Take 1 tablet by mouth Every 8 (Eight) Hours As Needed for Moderate Pain., Disp: 60 tablet, Rfl: 0    valsartan (Diovan) 40 MG tablet, Take 1 tablet by mouth Daily., Disp: 90 tablet, Rfl: 0    vitamin D (ERGOCALCIFEROL) 1.25 MG (85204 UT) capsule capsule, TAKE 1 CAPSULE BY MOUTH 1 TIME A WEEK, Disp: 12 capsule, Rfl: 0    ALPRAZolam (XANAX) 0.25 MG tablet, TAKE 1 TABLET BY MOUTH AT NIGHT AS NEEDED FOR ANXIETY OR SLEEP, Disp: 25 tablet, Rfl: 0    cyclobenzaprine (FLEXERIL) 10 MG tablet, TAKE 1 TABLET BY MOUTH AT NIGHT AS NEEDED FOR MUSCLE SPASM, Disp: 30 tablet, Rfl: 0    doxycycline (VIBRAMYCIN) 100 MG capsule, Take 1 capsule by mouth 2 (Two) Times a Day for 5 days., Disp: 10 capsule, Rfl: 0    famotidine (Pepcid) 20 MG tablet, Take 1 tablet by mouth At Night As Needed for Heartburn (sore throat)., Disp: 30 tablet, Rfl: 0    guaiFENesin (MUCINEX) 600 MG 12 hr tablet, Take 2 tablets by mouth Daily., Disp: , Rfl:     metFORMIN ER (GLUCOPHAGE-XR) 500 MG 24 hr tablet, Take 1 tablet by mouth 2 (Two) Times a Day. With meals, Disp: , Rfl:     nystatin (MYCOSTATIN) 100,000 unit/mL suspension, Swish and swallow 5 mL 2 (Two) Times a Day As Needed., Disp: , Rfl:     predniSONE (DELTASONE) 10 MG tablet, Take 4 tablets by mouth Daily for 3 days, THEN 3 tablets Daily for 3 days, THEN 2 tablets Daily for 3 days, THEN 1 tablet Daily for 3 days., Disp: 30 tablet, Rfl: 0     Allergies   Allergen Reactions    Ace Inhibitors Cough    Codeine Nausea And Vomiting    Pregabalin Confusion    Aricept [Donepezil] Mental Status Change    Paxil [Paroxetine] Other (See Comments)     TREMOR WORSENING    Lisinopril Other (See Comments)     gout          "    Objective    OBJECTIVE:     VITAL SIGNS:  /60   Ht 152.4 cm (60\")   Wt 62 kg (136 lb 11.2 oz)   SpO2 93%   BMI 26.70 kg/m²     PHYSICAL EXAM:  Normal appearance.   Head is normocephalic.   Nose appears normal.   No obvious deformity of the mouth and throat.  Conjunctivae normal.   Heart rate and rhythm is normal.  Pulmonary effort is normal.   Moving all 4 extremities.  Extremities warm.  No focal deficit present.   Alert and oriented to person, place, and time.     RESULTS REVIEW:  I have reviewed the patient's all relevant laboratory and imaging findings.     Assessment & Plan    ASSESSMENT & PLAN:  Darleen Stallings is a 69 y.o. female with significant medical conditions as mentioned above presented to my clinic.    Assessment & Plan  1.  Recurrent right-sided pneumothorax  Her symptoms may be attributed to deconditioning or recovery from RSV pneumonia, rather than a significant lung collapse, as evidenced by her x-ray results. The possibility of pleuritic chest pain during deep inspiration was also discussed. A CT scan of the chest will be scheduled in 2 months to provide a more comprehensive view of her lungs. A referral to pulmonology clinic will be made for further evaluation and potential prescription of inhalers to improve her respiratory function. She is advised to gradually increase her physical activity, avoid crowded places, and maintain distance from individuals who are ill.     I discussed the patients findings and my recommendations with the patient/family/caregiver. The patient/family/caregiver was given adequate time to ask questions and all questions were answered to patient satisfaction. Thank you for this consult and allowing us to participate in the care of your patient.      Douglas Mcginnis MD  Thoracic Surgeon  [facility]        Dictated utilizing Dragon dictation    I spent 40 minutes caring for Darleen on this date of service. This time includes time spent " by me in the following activities:preparing for the visit, reviewing tests, obtaining and/or reviewing a separately obtained history, performing a medically appropriate examination and/or evaluation, counseling and educating the patient/family/caregiver, ordering medications, tests, or procedures, referring and communicating with other health care professionals , documenting information in the medical record, independently interpreting results and communicating that information with the patient/family/caregiver, and care coordination and more than half the time was spent in direct face to face evaluation and decision making.     Patient or patient representative verbalized consent for the use of Ambient Listening during the visit with  Douglas Mcginnis MD for chart documentation. 5/16/2025  08:59 EDT

## 2025-05-16 NOTE — OUTREACH NOTE
Call Center TCM Note      Flowsheet Row Responses   [facility], [place], operated by [facility] patient discharged from? [facility]   Does the patient have one of the following disease processes/diagnoses(primary or secondary)? COPD   TCM attempt successful? Yes  [verbal release for Itzel Ashford]   Call start time 1059   Unsuccessful attempts Attempt 3   Call end time 1114   Discharge diagnosis COPD   Meds reviewed with patient/caregiver? Yes   Is the patient having any side effects they believe may be caused by any medication additions or changes? No   Does the patient have all medications ordered at discharge? Yes   Is the patient taking all medications as directed (includes completed medication regime)? Yes   Comments TCM FOLLOW UP APPOINTMENT IS 5/19/25@400pm (appt in place at time of call)   Does the patient have an appointment with their PCP within 7-14 days of discharge? Yes   Nursing Interventions Confirmed date/time of appointment   Has home health visited the patient within 72 hours of discharge? N/A   Psychosocial issues? No   Did the patient receive a copy of their discharge instructions? No  [pt unable to locate]   Nursing interventions Educated on MyChart   What is the patient's perception of their health status since discharge? Same  [reports still dealing with pleuritic type pain,  taking meds as ordered.  Advised to f/u with MD to discuss ongoing concerns]   Nursing Interventions Nurse provided patient education   TCM call completed? Yes   Call end time 1114            ERVIN BISHOP - Registered Nurse    5/16/2025, 11:24 EDT

## 2025-05-17 LAB — BACTERIA SPEC AEROBE CULT: NORMAL

## 2025-05-18 LAB — BACTERIA SPEC AEROBE CULT: NORMAL

## 2025-05-19 ENCOUNTER — OFFICE VISIT (OUTPATIENT)
Dept: FAMILY MEDICINE CLINIC | Facility: CLINIC | Age: 70
End: 2025-05-19
Payer: MEDICARE

## 2025-05-19 VITALS
DIASTOLIC BLOOD PRESSURE: 64 MMHG | OXYGEN SATURATION: 95 % | TEMPERATURE: 97.7 F | WEIGHT: 136 LBS | HEART RATE: 69 BPM | RESPIRATION RATE: 14 BRPM | HEIGHT: 60 IN | SYSTOLIC BLOOD PRESSURE: 138 MMHG | BODY MASS INDEX: 26.7 KG/M2

## 2025-05-19 DIAGNOSIS — K21.9 GASTROESOPHAGEAL REFLUX DISEASE WITHOUT ESOPHAGITIS: ICD-10-CM

## 2025-05-19 DIAGNOSIS — D72.829 LEUKOCYTOSIS, UNSPECIFIED TYPE: ICD-10-CM

## 2025-05-19 DIAGNOSIS — T17.308D CHOKING, SUBSEQUENT ENCOUNTER: ICD-10-CM

## 2025-05-19 DIAGNOSIS — E11.65 TYPE 2 DIABETES MELLITUS WITH HYPERGLYCEMIA, WITHOUT LONG-TERM CURRENT USE OF INSULIN: ICD-10-CM

## 2025-05-19 DIAGNOSIS — R13.14 PHARYNGOESOPHAGEAL DYSPHAGIA: ICD-10-CM

## 2025-05-19 DIAGNOSIS — J44.1 ACUTE EXACERBATION OF CHRONIC OBSTRUCTIVE PULMONARY DISEASE (COPD): Primary | ICD-10-CM

## 2025-05-19 DIAGNOSIS — R13.10 ABNORMAL SWALLOWING: ICD-10-CM

## 2025-05-19 RX ORDER — ORAL SEMAGLUTIDE 3 MG/1
1 TABLET ORAL
Qty: 30 TABLET | Refills: 0 | Status: SHIPPED | OUTPATIENT
Start: 2025-05-19

## 2025-05-19 RX ORDER — FAMOTIDINE 20 MG/1
40 TABLET, FILM COATED ORAL NIGHTLY
Status: SHIPPED
Start: 2025-05-19

## 2025-05-19 RX ORDER — METFORMIN HYDROCHLORIDE 500 MG/1
1000 TABLET, EXTENDED RELEASE ORAL 2 TIMES DAILY WITH MEALS
Qty: 360 TABLET | Refills: 0 | Status: SHIPPED | OUTPATIENT
Start: 2025-05-19

## 2025-05-19 NOTE — PROGRESS NOTES
Subjective   Darleen Stallings is a 69 y.o. female.     Chief Complaint   Patient presents with    Transitional Care Management     Hospital follow up on SOA          Current Outpatient Medications:     ALPRAZolam (XANAX) 0.25 MG tablet, TAKE 1 TABLET BY MOUTH AT NIGHT AS NEEDED FOR ANXIETY OR SLEEP, Disp: 25 tablet, Rfl: 0    amLODIPine (NORVASC) 5 MG tablet, TAKE 1 TABLET BY MOUTH EVERY DAY, Disp: 90 tablet, Rfl: 1    aspirin 81 MG tablet, Take 1 tablet by mouth Daily., Disp: , Rfl:     atorvastatin (LIPITOR) 80 MG tablet, Take 1 tablet by mouth Daily., Disp: 90 tablet, Rfl: 0    citalopram (CeleXA) 20 MG tablet, TAKE 1 TABLET BY MOUTH DAILY, Disp: 90 tablet, Rfl: 0    Combivent Respimat  MCG/ACT inhaler, INHALE 1 PUFF BY MOUTH 4 TIMES A DAY AS NEEDED FOR WHEEZING, Disp: 4 g, Rfl: 0    CRANBERRY PO, Take 4 tablets by mouth Daily., Disp: , Rfl:     cyclobenzaprine (FLEXERIL) 10 MG tablet, TAKE 1 TABLET BY MOUTH AT NIGHT AS NEEDED FOR MUSCLE SPASM, Disp: 30 tablet, Rfl: 0    empagliflozin (Jardiance) 25 MG tablet tablet, Take 1 tablet by mouth Daily., Disp: 90 tablet, Rfl: 1    famotidine (Pepcid) 20 MG tablet, Take 2 tablets by mouth Every Night., Disp: , Rfl:     fenofibrate 160 MG tablet, Take 1 tablet by mouth Daily., Disp: 90 tablet, Rfl: 0    Fluticasone-Umeclidin-Vilant (Trelegy Ellipta) 200-62.5-25 MCG/ACT inhaler, Inhale 1 puff Daily., Disp: 60 each, Rfl: 3    guaiFENesin (MUCINEX) 600 MG 12 hr tablet, Take 2 tablets by mouth Daily., Disp: , Rfl:     ibandronate (Boniva) 150 MG tablet, Take 1 tablet by mouth Every 30 (Thirty) Days., Disp: 3 tablet, Rfl: 3    metFORMIN ER (GLUCOPHAGE-XR) 500 MG 24 hr tablet, Take 2 tablets by mouth 2 (Two) Times a Day With Meals., Disp: 360 tablet, Rfl: 0    nitroglycerin (NITROSTAT) 0.4 MG SL tablet, Place 1 tablet under the tongue Every 5 (Five) Minutes As Needed for Chest Pain. Take no more than 3 doses in 15 minutes., Disp: 25 tablet, Rfl: 2    nystatin (MYCOSTATIN)  100,000 unit/mL suspension, Swish and swallow 5 mL 2 (Two) Times a Day As Needed., Disp: , Rfl:     omeprazole (priLOSEC) 40 MG capsule, TAKE 1 CAPSULE BY MOUTH EVERY DAY, Disp: 90 capsule, Rfl: 3    predniSONE (DELTASONE) 10 MG tablet, Take 4 tablets by mouth Daily for 3 days, THEN 3 tablets Daily for 3 days, THEN 2 tablets Daily for 3 days, THEN 1 tablet Daily for 3 days., Disp: 30 tablet, Rfl: 0    traMADol (ULTRAM) 50 MG tablet, Take 1 tablet by mouth Every 8 (Eight) Hours As Needed for Moderate Pain., Disp: 60 tablet, Rfl: 0    valsartan (Diovan) 40 MG tablet, Take 1 tablet by mouth Daily., Disp: 90 tablet, Rfl: 0    vitamin D (ERGOCALCIFEROL) 1.25 MG (43284 UT) capsule capsule, TAKE 1 CAPSULE BY MOUTH 1 TIME A WEEK, Disp: 12 capsule, Rfl: 0    Semaglutide (Rybelsus) 3 MG tablet, Take 1 tablet by mouth Every Morning Before Breakfast., Disp: 30 tablet, Rfl: 0    Past Medical History:   Diagnosis Date    CAD     Cervical disc disorder 2019    After fall    CHOL     Chronic pain disorder 2020    After fall down stairs    COPD     Dementia     Depression     DEXA     2023= (1.1/ -2.3)    DMII     Extremity pain 2019    Maybe longer    Fractures 2022    GERD     Hypertension     Joint pain 2020    Low back pain 2019    After fall    MAMMO     NEG = 2018/ 2023/ 2024    Myocardial infarction     Neck pain 2015    Maybe longer    Neuropathy in diabetes 2018    Osteoarthritis 2023    Diagnosed    PAP     NEG =2021    Spinal stenosis 2019    Tremor        Past Surgical History:   Procedure Laterality Date    BRONCHOSCOPY N/A 4/10/2025    Procedure: BRONCHOSCOPY with right lung washing;  Surgeon: Marissa Soliz MD;  Location: [facility] ENDOSCOPY;  Service: Pulmonary;  Laterality: N/A;    CATARACT EXTRACTION W/ INTRAOCULAR LENS  IMPLANT, BILATERAL      COLONOSCOPY      2023= NEG, rech 2026   GSI    COMPRESSION HIP SCREW Right 03/25/2022    Procedure: HIP COMPRESSION SCREW, right [place] hip screw from [place];  Surgeon: Sebastian  Loco BISHOP MD;  Location: [facility] MAIN OR;  Service: Orthopedics;  Laterality: Right;    CORONARY ARTERY BYPASS GRAFT      x 3    FRACTURE SURGERY      TONSILLECTOMY      TUBAL ABDOMINAL LIGATION         Family History   Problem Relation Age of Onset    Stroke Mother     COPD Mother     Hypertension Mother     Tremor Father     Heart disease Father         CHF    COPD Father     Depression Father     Tremor Sister     Fibromyalgia Sister     Hypertension Sister     Post-traumatic stress disorder Sister     Heart disease Sister     Hypertension Sister     Depression Sister     Tremor Brother     COPD Brother     Depression Brother     Hypertension Brother     Cancer Brother         Undiagnosed lung cancer    Tremor Paternal Grandmother     Depression Sister     Hypertension Brother        Social History     Socioeconomic History    Marital status: Single   Tobacco Use    Smoking status: Every Day     Current packs/day: 0.00     Average packs/day: 0.5 packs/day for 48.0 years (24.0 ttl pk-yrs)     Types: Cigarettes     Start date: 1975     Last attempt to quit:      Years since quittin.3     Passive exposure: Past    Smokeless tobacco: Never    Tobacco comments:     Started when i was 18 quit a few times. Used vape but i think it gave me pneumonia   Vaping Use    Vaping status: Every Day    Substances: Nicotine   Substance and Sexual Activity    Alcohol use: No    Drug use: No    Sexual activity: Not Currently     Partners: Male     Birth control/protection: None     Comment: [place] to get pregnant..hahaa!       History of Present Illness  70 y/o C female here for f/u from hosp stay for AECOPD from -    Pt states she stayed home after last appt here but cont'd to feel SOB and went back to the ER      Pt states her BS's have been pretty high and she has been feeling tired all the time; pt states she still is getting SOB after min activity and will put on her NC O2 until she feels better; Pt admits she  does have a pulse oximeter she can use at home    The following portions of the patient's history were reviewed and updated as appropriate: allergies, current medications, past family history, past medical history, past social history, past surgical history and problem list.    Review of Systems   Constitutional:  Positive for activity change and fatigue. Negative for appetite change, unexpected weight gain and unexpected weight loss.   Respiratory:  Positive for shortness of breath. Negative for cough, chest tightness and wheezing.    Cardiovascular:  Negative for chest pain, palpitations and leg swelling.   Genitourinary:  Negative for frequency, urgency and urinary incontinence.   Musculoskeletal:  Negative for arthralgias and myalgias.   Neurological:  Negative for dizziness, facial asymmetry, speech difficulty, weakness, light-headedness, headache, memory problem and confusion.       Vitals:    05/19/25 1609   BP: 138/64   Pulse: 69   Resp: 14   Temp: 97.7 °F (36.5 °C)   SpO2: 95%       Objective   Physical Exam  Vitals and nursing note reviewed.   Constitutional:       General: She is not in acute distress.     Appearance: She is well-developed. She is not ill-appearing or toxic-appearing.   HENT:      Head: Normocephalic and atraumatic.   Cardiovascular:      Rate and Rhythm: Normal rate and regular rhythm.      Heart sounds: Normal heart sounds. No murmur heard.  Pulmonary:      Effort: Pulmonary effort is normal.      Breath sounds: Decreased breath sounds present. No wheezing, rhonchi or rales.   Skin:     General: Skin is warm and dry.      Findings: No rash.   Neurological:      Mental Status: She is alert and oriented to person, place, and time. Mental status is at baseline.   Psychiatric:         Attention and Perception: Attention and perception normal.         Mood and Affect: Mood and affect normal.         Speech: Speech normal.         Behavior: Behavior normal. Behavior is cooperative.          Thought Content: Thought content normal.         Cognition and Memory: Cognition and memory normal.         Judgment: Judgment normal.       Physical Exam      Assessment & Plan   Diagnoses and all orders for this visit:    1. Acute exacerbation of chronic obstructive pulmonary disease (COPD) (Primary)    2. Leukocytosis, unspecified type  -     CBC & Differential; Future    3. Type 2 diabetes mellitus with hyperglycemia, without long-term current use of insulin  -     Comprehensive Metabolic Panel; Future    4. Gastroesophageal reflux disease without esophagitis    5. Abnormal swallowing  -     Ambulatory referral for Screening EGD    6. Choking, subsequent encounter  -     Ambulatory referral for Screening EGD    7. Pharyngoesophageal dysphagia  -     Ambulatory referral for Screening EGD    Other orders  -     metFORMIN ER (GLUCOPHAGE-XR) 500 MG 24 hr tablet; Take 2 tablets by mouth 2 (Two) Times a Day With Meals.  Dispense: 360 tablet; Refill: 0  -     famotidine (Pepcid) 20 MG tablet; Take 2 tablets by mouth Every Night.  -     Semaglutide (Rybelsus) 3 MG tablet; Take 1 tablet by mouth Every Morning Before Breakfast.  Dispense: 30 tablet; Refill: 0    Increase metformin to 2 po BID/ add Rybelsus 3mg qday and increase pepcid to 40mg qhs  Assessment & Plan       Results            Patient or patient representative verbalized consent for the use of Ambient Listening during the visit with  Elen Jameson DO for chart documentation. 5/19/2025  22:06 EDT

## 2025-05-21 RX ORDER — FENOFIBRATE 160 MG/1
160 TABLET ORAL DAILY
Qty: 90 TABLET | Refills: 0 | Status: SHIPPED | OUTPATIENT
Start: 2025-05-21

## 2025-05-21 RX ORDER — ATORVASTATIN CALCIUM 80 MG/1
80 TABLET, FILM COATED ORAL DAILY
Qty: 90 TABLET | Refills: 0 | Status: SHIPPED | OUTPATIENT
Start: 2025-05-21

## 2025-05-21 RX ORDER — FLUTICASONE FUROATE, UMECLIDINIUM BROMIDE AND VILANTEROL TRIFENATATE 200; 62.5; 25 UG/1; UG/1; UG/1
1 POWDER RESPIRATORY (INHALATION) DAILY
Qty: 60 EACH | Refills: 3 | Status: SHIPPED | OUTPATIENT
Start: 2025-05-21

## 2025-05-21 RX ORDER — ALBUTEROL SULFATE 90 UG/1
2 INHALANT RESPIRATORY (INHALATION) EVERY 6 HOURS PRN
Qty: 18 G | Refills: 0 | Status: SHIPPED | OUTPATIENT
Start: 2025-05-21

## 2025-05-22 DIAGNOSIS — M54.50 ACUTE BILATERAL LOW BACK PAIN WITHOUT SCIATICA: ICD-10-CM

## 2025-05-22 NOTE — TELEPHONE ENCOUNTER
[facility]    Rx Refill Note  Requested Prescriptions     Pending Prescriptions Disp Refills    traMADol (ULTRAM) 50 MG tablet [Pharmacy Med Name: traMADol HCl Oral Tablet 50 MG] 60 tablet 0     Sig: TAKE 1 TABLET BY MOUTH EVERY 8 HOURS AS NEEDED FOR MODERATE PAIN      Last office visit with prescribing clinician: 5/19/2025   Last telemedicine visit with prescribing clinician: Visit date not found   Next office visit with prescribing clinician: 5/29/2025                         Would you like a call back once the refill request has been completed: [] Yes [] No    If the office needs to give you a call back, can they leave a voicemail: [] Yes [] No    Radha Henry, RT  05/22/25, 15:03 EDT   day(s)

## 2025-05-23 RX ORDER — TRAMADOL HYDROCHLORIDE 50 MG/1
50 TABLET ORAL EVERY 8 HOURS PRN
Qty: 60 TABLET | Refills: 0 | Status: SHIPPED | OUTPATIENT
Start: 2025-05-23

## 2025-05-27 RX ORDER — IPRATROPIUM BROMIDE AND ALBUTEROL 20; 100 UG/1; UG/1
SPRAY, METERED RESPIRATORY (INHALATION)
Qty: 4 G | Refills: 0 | OUTPATIENT
Start: 2025-05-27

## 2025-05-29 RX ORDER — ACYCLOVIR 400 MG/1
TABLET ORAL
Qty: 9 EACH | Refills: 0 | OUTPATIENT
Start: 2025-05-29

## 2025-06-03 RX ORDER — CYCLOBENZAPRINE HCL 10 MG
10 TABLET ORAL NIGHTLY PRN
Qty: 30 TABLET | Refills: 0 | Status: SHIPPED | OUTPATIENT
Start: 2025-06-03

## 2025-06-03 NOTE — TELEPHONE ENCOUNTER
Patient comment: Having more back pain since being in the hospital. I hurt w when staying up too long. On my feet need to get back in to shape       Rx Refill Note  Requested Prescriptions     Pending Prescriptions Disp Refills    cyclobenzaprine (FLEXERIL) 10 MG tablet 30 tablet 0     Sig: Take 1 tablet by mouth At Night As Needed for Muscle Spasms.      Last office visit with prescribing clinician: 5/19/2025   Last telemedicine visit with prescribing clinician: Visit date not found   Next office visit with prescribing clinician: 6/9/2025                         Would you like a call back once the refill request has been completed: [] Yes [] No    If the office needs to give you a call back, can they leave a voicemail: [] Yes [] No    Radha Henry, RT  06/03/25, 14:42 EDT

## 2025-06-05 RX ORDER — IPRATROPIUM BROMIDE AND ALBUTEROL 20; 100 UG/1; UG/1
SPRAY, METERED RESPIRATORY (INHALATION)
Qty: 4 G | Refills: 0 | OUTPATIENT
Start: 2025-06-05

## 2025-06-09 RX ORDER — IPRATROPIUM BROMIDE AND ALBUTEROL 20; 100 UG/1; UG/1
SPRAY, METERED RESPIRATORY (INHALATION)
Qty: 4 G | Refills: 0 | OUTPATIENT
Start: 2025-06-09

## 2025-06-11 ENCOUNTER — LAB (OUTPATIENT)
Dept: LAB | Facility: HOSPITAL | Age: 70
End: 2025-06-11
Payer: MEDICARE

## 2025-06-11 DIAGNOSIS — R79.89 LOW VITAMIN D LEVEL: ICD-10-CM

## 2025-06-11 DIAGNOSIS — I10 BENIGN ESSENTIAL HYPERTENSION: ICD-10-CM

## 2025-06-11 DIAGNOSIS — E11.65 TYPE 2 DIABETES MELLITUS WITH HYPERGLYCEMIA, WITHOUT LONG-TERM CURRENT USE OF INSULIN: ICD-10-CM

## 2025-06-11 DIAGNOSIS — E78.2 MIXED HYPERLIPIDEMIA: ICD-10-CM

## 2025-06-11 DIAGNOSIS — M89.9 DISORDER OF BONE, UNSPECIFIED: ICD-10-CM

## 2025-06-11 DIAGNOSIS — D72.829 LEUKOCYTOSIS, UNSPECIFIED TYPE: ICD-10-CM

## 2025-06-11 DIAGNOSIS — E11.9 TYPE 2 DIABETES MELLITUS WITHOUT COMPLICATION, WITHOUT LONG-TERM CURRENT USE OF INSULIN: ICD-10-CM

## 2025-06-11 LAB
25(OH)D3 SERPL-MCNC: 47 NG/ML (ref 30–100)
ALBUMIN SERPL-MCNC: 3.8 G/DL (ref 3.5–5.2)
ALBUMIN UR-MCNC: 1.3 MG/DL
ALBUMIN/GLOB SERPL: 1.4 G/DL
ALP SERPL-CCNC: 59 U/L (ref 39–117)
ALT SERPL W P-5'-P-CCNC: 13 U/L (ref 1–33)
ANION GAP SERPL CALCULATED.3IONS-SCNC: 7.6 MMOL/L (ref 5–15)
AST SERPL-CCNC: 15 U/L (ref 1–32)
BASOPHILS # BLD AUTO: 0.05 10*3/MM3 (ref 0–0.2)
BASOPHILS NFR BLD AUTO: 0.4 % (ref 0–1.5)
BILIRUB SERPL-MCNC: 0.4 MG/DL (ref 0–1.2)
BUN SERPL-MCNC: 22 MG/DL (ref 8–23)
BUN/CREAT SERPL: 20.2 (ref 7–25)
CALCIUM SPEC-SCNC: 10.2 MG/DL (ref 8.6–10.5)
CHLORIDE SERPL-SCNC: 102 MMOL/L (ref 98–107)
CHOLEST SERPL-MCNC: 197 MG/DL (ref 0–200)
CO2 SERPL-SCNC: 30.4 MMOL/L (ref 22–29)
CREAT SERPL-MCNC: 1.09 MG/DL (ref 0.57–1)
CREAT UR-MCNC: 183.8 MG/DL
DEPRECATED RDW RBC AUTO: 46.4 FL (ref 37–54)
EGFRCR SERPLBLD CKD-EPI 2021: 55.8 ML/MIN/1.73
EOSINOPHIL # BLD AUTO: 0.79 10*3/MM3 (ref 0–0.4)
EOSINOPHIL NFR BLD AUTO: 6.3 % (ref 0.3–6.2)
ERYTHROCYTE [DISTWIDTH] IN BLOOD BY AUTOMATED COUNT: 13.7 % (ref 12.3–15.4)
GLOBULIN UR ELPH-MCNC: 2.7 GM/DL
GLUCOSE SERPL-MCNC: 154 MG/DL (ref 65–99)
HBA1C MFR BLD: 8.48 % (ref 4.8–5.6)
HCT VFR BLD AUTO: 49.9 % (ref 34–46.6)
HDLC SERPL-MCNC: 59 MG/DL (ref 40–60)
HGB BLD-MCNC: 15.4 G/DL (ref 12–15.9)
IMM GRANULOCYTES # BLD AUTO: 0.1 10*3/MM3 (ref 0–0.05)
IMM GRANULOCYTES NFR BLD AUTO: 0.8 % (ref 0–0.5)
LDLC SERPL CALC-MCNC: 116 MG/DL (ref 0–100)
LDLC/HDLC SERPL: 1.91 {RATIO}
LYMPHOCYTES # BLD AUTO: 3.31 10*3/MM3 (ref 0.7–3.1)
LYMPHOCYTES NFR BLD AUTO: 26.5 % (ref 19.6–45.3)
MCH RBC QN AUTO: 28.4 PG (ref 26.6–33)
MCHC RBC AUTO-ENTMCNC: 30.9 G/DL (ref 31.5–35.7)
MCV RBC AUTO: 91.9 FL (ref 79–97)
MICROALBUMIN/CREAT UR: 7.1 MG/G (ref 0–29)
MONOCYTES # BLD AUTO: 1.03 10*3/MM3 (ref 0.1–0.9)
MONOCYTES NFR BLD AUTO: 8.3 % (ref 5–12)
NEUTROPHILS NFR BLD AUTO: 57.7 % (ref 42.7–76)
NEUTROPHILS NFR BLD AUTO: 7.19 10*3/MM3 (ref 1.7–7)
NRBC BLD AUTO-RTO: 0 /100 WBC (ref 0–0.2)
PLATELET # BLD AUTO: 296 10*3/MM3 (ref 140–450)
PMV BLD AUTO: 10 FL (ref 6–12)
POTASSIUM SERPL-SCNC: 5.4 MMOL/L (ref 3.5–5.2)
PROT SERPL-MCNC: 6.5 G/DL (ref 6–8.5)
RBC # BLD AUTO: 5.43 10*6/MM3 (ref 3.77–5.28)
SODIUM SERPL-SCNC: 140 MMOL/L (ref 136–145)
TRIGL SERPL-MCNC: 127 MG/DL (ref 0–150)
VLDLC SERPL-MCNC: 22 MG/DL (ref 5–40)
WBC NRBC COR # BLD AUTO: 12.47 10*3/MM3 (ref 3.4–10.8)

## 2025-06-11 PROCEDURE — 82043 UR ALBUMIN QUANTITATIVE: CPT

## 2025-06-11 PROCEDURE — 36415 COLL VENOUS BLD VENIPUNCTURE: CPT

## 2025-06-11 PROCEDURE — 85025 COMPLETE CBC W/AUTO DIFF WBC: CPT

## 2025-06-11 PROCEDURE — 80061 LIPID PANEL: CPT

## 2025-06-11 PROCEDURE — 83036 HEMOGLOBIN GLYCOSYLATED A1C: CPT

## 2025-06-11 PROCEDURE — 82570 ASSAY OF URINE CREATININE: CPT

## 2025-06-11 PROCEDURE — 82306 VITAMIN D 25 HYDROXY: CPT

## 2025-06-11 PROCEDURE — 80053 COMPREHEN METABOLIC PANEL: CPT

## 2025-06-12 ENCOUNTER — RESULTS FOLLOW-UP (OUTPATIENT)
Dept: LAB | Facility: HOSPITAL | Age: 70
End: 2025-06-12
Payer: MEDICARE

## 2025-06-12 ENCOUNTER — OFFICE VISIT (OUTPATIENT)
Dept: FAMILY MEDICINE CLINIC | Facility: CLINIC | Age: 70
End: 2025-06-12
Payer: MEDICARE

## 2025-06-12 VITALS
WEIGHT: 135 LBS | HEIGHT: 60 IN | BODY MASS INDEX: 26.5 KG/M2 | TEMPERATURE: 97.8 F | DIASTOLIC BLOOD PRESSURE: 64 MMHG | OXYGEN SATURATION: 97 % | HEART RATE: 92 BPM | SYSTOLIC BLOOD PRESSURE: 130 MMHG | RESPIRATION RATE: 16 BRPM

## 2025-06-12 DIAGNOSIS — M79.2: ICD-10-CM

## 2025-06-12 DIAGNOSIS — J43.1 PANLOBULAR EMPHYSEMA: Primary | ICD-10-CM

## 2025-06-12 DIAGNOSIS — I10 PRIMARY HYPERTENSION: ICD-10-CM

## 2025-06-12 RX ORDER — GABAPENTIN 300 MG/1
300 CAPSULE ORAL 3 TIMES DAILY
Qty: 90 CAPSULE | Refills: 0 | Status: SHIPPED | OUTPATIENT
Start: 2025-06-12

## 2025-06-12 RX ORDER — IPRATROPIUM BROMIDE AND ALBUTEROL 20; 100 UG/1; UG/1
SPRAY, METERED RESPIRATORY (INHALATION)
Qty: 4 G | Refills: 0 | OUTPATIENT
Start: 2025-06-12

## 2025-06-12 RX ORDER — ALBUTEROL SULFATE 90 UG/1
2 INHALANT RESPIRATORY (INHALATION) EVERY 6 HOURS PRN
Qty: 54 G | Refills: 0 | Status: SHIPPED | OUTPATIENT
Start: 2025-06-12

## 2025-06-12 NOTE — PROGRESS NOTES
Subjective   Darleen Stallings is a 69 y.o. female.     Chief Complaint   Patient presents with    Transitional Care Management     Hospital follow up on COPD          Current Outpatient Medications:     albuterol sulfate  (90 Base) MCG/ACT inhaler, Inhale 2 puffs Every 6 (Six) Hours As Needed for Shortness of Air or Wheezing (cough)., Disp: 54 g, Rfl: 0    amLODIPine (NORVASC) 5 MG tablet, TAKE 1 TABLET BY MOUTH EVERY DAY, Disp: 90 tablet, Rfl: 1    aspirin 81 MG tablet, Take 1 tablet by mouth Daily., Disp: , Rfl:     atorvastatin (LIPITOR) 80 MG tablet, Take 1 tablet by mouth Daily., Disp: 90 tablet, Rfl: 0    citalopram (CeleXA) 20 MG tablet, TAKE 1 TABLET BY MOUTH DAILY, Disp: 90 tablet, Rfl: 0    CRANBERRY PO, Take 4 tablets by mouth Daily., Disp: , Rfl:     empagliflozin (Jardiance) 25 MG tablet tablet, Take 1 tablet by mouth Daily., Disp: 90 tablet, Rfl: 1    famotidine (Pepcid) 20 MG tablet, Take 2 tablets by mouth Every Night., Disp: , Rfl:     fenofibrate 160 MG tablet, Take 1 tablet by mouth Daily., Disp: 90 tablet, Rfl: 0    Fluticasone-Umeclidin-Vilant (Trelegy Ellipta) 200-62.5-25 MCG/ACT inhaler, Inhale 1 puff Daily., Disp: 60 each, Rfl: 3    guaiFENesin (MUCINEX) 600 MG 12 hr tablet, Take 2 tablets by mouth Daily., Disp: , Rfl:     ibandronate (Boniva) 150 MG tablet, Take 1 tablet by mouth Every 30 (Thirty) Days., Disp: 3 tablet, Rfl: 3    metFORMIN ER (GLUCOPHAGE-XR) 500 MG 24 hr tablet, Take 2 tablets by mouth 2 (Two) Times a Day With Meals., Disp: 360 tablet, Rfl: 0    nitroglycerin (NITROSTAT) 0.4 MG SL tablet, Place 1 tablet under the tongue Every 5 (Five) Minutes As Needed for Chest Pain. Take no more than 3 doses in 15 minutes., Disp: 25 tablet, Rfl: 2    nystatin (MYCOSTATIN) 100,000 unit/mL suspension, Swish and swallow 5 mL 2 (Two) Times a Day As Needed., Disp: , Rfl:     omeprazole (priLOSEC) 40 MG capsule, TAKE 1 CAPSULE BY MOUTH EVERY DAY, Disp: 90 capsule, Rfl: 3    valsartan  (Diovan) 40 MG tablet, Take 1 tablet by mouth Daily., Disp: 90 tablet, Rfl: 0    ALPRAZolam (XANAX) 0.25 MG tablet, TAKE 1 TABLET BY MOUTH IN THE EVENING AS NEEDED FOR SLEEP OR ANXIETY, Disp: 25 tablet, Rfl: 0    cyclobenzaprine (FLEXERIL) 10 MG tablet, TAKE 1 TABLET BY MOUTH AT NIGHT AS NEEDED FOR MUSCLE SPASMS, Disp: 30 tablet, Rfl: 0    gabapentin (Neurontin) 300 MG capsule, Take 1 capsule by mouth 3 (Three) Times a Day., Disp: 90 capsule, Rfl: 0    Semaglutide (Rybelsus) 3 MG tablet, TAKE 1 TABLET BY MOUTH IN THE MORNING BEFORE BREAKFAST, Disp: 90 tablet, Rfl: 0    traMADol (ULTRAM) 50 MG tablet, TAKE 1 TABLET BY MOUTH EVERY 8 HOURS AS NEEDED FOR MODERATE PAIN., Disp: 60 tablet, Rfl: 0    vitamin D (ERGOCALCIFEROL) 1.25 MG (45054 UT) capsule capsule, TAKE 1 CAPSULE BY MOUTH 1 TIME A WEEK, Disp: 12 capsule, Rfl: 0    Past Medical History:   Diagnosis Date    ADHD (attention deficit hyperactivity disorder) Around maybe 2016 or 2017    It is getting worse. I was going to talk to dr vaughan about it..    Anxiety Always    Same as usual. I have the anxiety and the tremors. When my tremors r bad…which they have been my anxiety gets very high    Asthma Copd    CAD     Cervical disc disorder 2019    After fall    CHOL     Chronic pain disorder 2020    After fall down stairs    COPD     Dementia     Depression     DEXA     2023= (1.1/ -2.3)    DMII     Extremity pain 2019    Maybe longer    Fractures 2022    GERD     Hypertension     Joint pain 2020    Low back pain 2019    After fall    MAMMO     NEG = 2018/ 2023/ 2024    Myocardial infarction     Neck pain 2015    Maybe longer    Neuropathy in diabetes 2018    Osteoarthritis 2023    Diagnosed    PAP     NEG =2021    Scoliosis     Spinal stenosis 2019    Tremor        Past Surgical History:   Procedure Laterality Date    BRONCHOSCOPY N/A 04/10/2025    Procedure: BRONCHOSCOPY with right lung washing;  Surgeon: Marissa Soliz MD;  Location: [facility] ENDOSCOPY;  Service:  Pulmonary;  Laterality: N/A;    CARDIAC SURGERY      CATARACT EXTRACTION W/ INTRAOCULAR LENS  IMPLANT, BILATERAL      COLONOSCOPY      = NEG, rech    GSI    COMPRESSION HIP SCREW Right 2022    Procedure: HIP COMPRESSION SCREW, right [place] hip screw from [place];  Surgeon: Loco Cortes MD;  Location: [facility] MAIN OR;  Service: Orthopedics;  Laterality: Right;    CORONARY ARTERY BYPASS GRAFT      x 3    EYE SURGERY      FRACTURE SURGERY      TONSILLECTOMY      TUBAL ABDOMINAL LIGATION         Family History   Problem Relation Age of Onset    Stroke Mother     COPD Mother     Hypertension Mother     Tremor Father     Heart disease Father         CHF    COPD Father     Depression Father     Anxiety disorder Father     Tremor Sister     Fibromyalgia Sister     Hypertension Sister     Post-traumatic stress disorder Sister     Anxiety disorder Sister     Depression Sister     Heart disease Sister     Heart disease Sister     Hypertension Sister     Depression Sister     Tremor Brother     COPD Brother     Depression Brother     Hypertension Brother     Cancer Brother         Undiagnosed lung cancer    Anxiety disorder Brother     Cancer Brother         Undiagnosed lung cancer    Tremor Paternal Grandmother     Depression Sister     Hypertension Brother        Social History     Socioeconomic History    Marital status: Single   Tobacco Use    Smoking status: Every Day     Current packs/day: 0.00     Average packs/day: 0.5 packs/day for 48.0 years (24.0 ttl pk-yrs)     Types: Cigarettes     Start date: 1975     Last attempt to quit:      Years since quittin.5     Passive exposure: Past    Smokeless tobacco: Never    Tobacco comments:     Started when i was 18 quit a few times. Used vape but i think it gave me pneumonia   Vaping Use    Vaping status: Every Day    Substances: Nicotine   Substance and Sexual Activity    Alcohol use: No    Drug use: No    Sexual activity: Not Currently     Partners:  Male     Birth control/protection: None     Comment: [place] to get pregnant..hahaa!       History of Present Illness  The patient presents for hosp f/u on AECOPD, neuropathy, and nicotine dependence.    She reports experiencing a burning sensation in her leg, which she initially attributed to an insect bite. This discomfort extends from the foot to the entire leg, causing difficulty in standing up after sitting. Additionally, she mentions a recent episode of near syncope while descending stairs, accompanied by shortness of breath but no chest pain. She has been under the care of a pulmonologist at [facility] and uses oxygen therapy as needed. She was previously hospitalized due to wheezing and was treated with antibiotics and a nebulizer. She has not yet enrolled in pulmonary rehabilitation.    She has reduced her smoking habit to 3 cigarettes per day. Currently, she is on Trelegy and albuterol for her respiratory condition.    For pain management, she is taking tramadol and gabapentin 300 mg three times daily for neuropathy. She discontinued pregabalin due to confusion. She has a history of open heart surgery and was previously on Lipitor 80 mg and fenofibrate 160 mg, which she temporarily stopped but has since resumed.    She has a swallow study scheduled soon because she has been choking on food.    PAST SURGICAL HISTORY:  - Open heart surgery in 2019    SOCIAL HISTORY  The patient admits to smoking three cigarettes a day.        The following portions of the patient's history were reviewed and updated as appropriate: allergies, current medications, past family history, past medical history, past social history, past surgical history and problem list.    Review of Systems   Constitutional:  Negative for activity change, appetite change, unexpected weight gain and unexpected weight loss.   HENT:  Positive for trouble swallowing.    Respiratory:  Positive for shortness of breath.    Skin:  Positive for skin lesions.    Neurological:  Positive for light-headedness and numbness. Negative for syncope.       Vitals:    06/12/25 1523   BP: 130/64   Pulse: 92   Resp: 16   Temp: 97.8 °F (36.6 °C)   SpO2: 97%       Objective   Physical Exam  Vitals and nursing note reviewed.   Constitutional:       General: She is not in acute distress.     Appearance: She is well-developed. She is not ill-appearing or toxic-appearing.   HENT:      Head: Normocephalic and atraumatic.   Cardiovascular:      Rate and Rhythm: Normal rate and regular rhythm.      Heart sounds: Normal heart sounds. No murmur heard.  Pulmonary:      Effort: Pulmonary effort is normal.      Breath sounds: Examination of the right-upper field reveals decreased breath sounds. Examination of the left-upper field reveals decreased breath sounds. Examination of the right-middle field reveals decreased breath sounds. Examination of the left-middle field reveals decreased breath sounds. Examination of the right-lower field reveals decreased breath sounds. Examination of the left-lower field reveals decreased breath sounds. Decreased breath sounds present. No wheezing, rhonchi or rales.   Skin:     General: Skin is warm and dry.      Findings: No rash.   Neurological:      Mental Status: She is alert and oriented to person, place, and time.      Cranial Nerves: No cranial nerve deficit.      Gait: Gait normal.   Psychiatric:         Attention and Perception: Attention and perception normal.         Mood and Affect: Mood and affect normal.         Speech: Speech normal.         Behavior: Behavior normal. Behavior is cooperative.         Thought Content: Thought content normal.         Cognition and Memory: Cognition and memory normal.         Judgment: Judgment normal.       Physical Exam  Other: Oxygen saturation is at 97% on room air. Blood pressure is 130/64.     Assessment & Plan   Diagnoses and all orders for this visit:    1. Panlobular emphysema (Primary)  -     Ambulatory  Referral to Pulmonary Rehab    2. Primary hypertension    3. Leg neuralgia    Other orders  -     gabapentin (Neurontin) 300 MG capsule; Take 1 capsule by mouth 3 (Three) Times a Day.  Dispense: 90 capsule; Refill: 0  -     albuterol sulfate  (90 Base) MCG/ACT inhaler; Inhale 2 puffs Every 6 (Six) Hours As Needed for Shortness of Air or Wheezing (cough).  Dispense: 54 g; Refill: 0      Assessment & Plan  1. Chronic Obstructive Pulmonary Disease (COPD).  - Oxygen saturation levels have improved to 97% on room air.  - Currently on triple therapy with Trelegy.  - Prescription for albuterol will be provided.  - Referral to pulmonary rehabilitation will be made.    2. Neuropathy.  - Reports burning pain in leg and foot.  - Gabapentin 300 mg 3 times a day will be prescribed for one month.  - Dosage will be increased to 600 mg 3 times a day if no improvement within 4 days.  - Further increase to 900 mg 3 times a day if symptoms persist.    3. Nicotine Dependence.  - Smoking reduced to three cigarettes a day.  - Alternatives such as prepackaged nicotine bags and flavored vapes discussed.  - Encouraged to continue reducing smoking and consider these alternatives.    Follow-up  - Follow-up appointment scheduled for next month.     Results  Imaging   - X-ray: No significant findings   - CT scan: 05/12/2025, Mild COPD          Patient or patient representative verbalized consent for the use of Ambient Listening during the visit with  Elen Jameson DO for chart documentation. 7/4/2025  17:45 EDT

## 2025-06-17 ENCOUNTER — PRIOR AUTHORIZATION (OUTPATIENT)
Dept: FAMILY MEDICINE CLINIC | Facility: CLINIC | Age: 70
End: 2025-06-17
Payer: MEDICARE

## 2025-06-17 RX ORDER — ORAL SEMAGLUTIDE 3 MG/1
1 TABLET ORAL
Qty: 90 TABLET | Refills: 0 | Status: SHIPPED | OUTPATIENT
Start: 2025-06-17

## 2025-06-17 NOTE — TELEPHONE ENCOUNTER
Hub to relay     PA was denied for Rybelsus 3mg tablets    Why was coverage for this drug denied?  We denied coverage for this drug because we did not receive information on why you need the   requested quantity. The quantity limit for this drug is 60 tablets of the 3 milligram strength per   365 day period. We may be able to make an exception to this limit and approve the requested   amount. Your prescriber will need to send us a statement explaining why you need this quantity.   We based this decision on [employer] Grievances, Organization/Coverage   Determinations, and Appeals Guidance Section 40.5.3 - Supporting Statements for Exception   Requests.

## 2025-06-18 NOTE — TELEPHONE ENCOUNTER
HUB to relay   My chart message was sent to the patient    We received a prior authorization for the Rybelsus and it was denied.     Questions per :  Are you taking the Rybelsus? Can you go up to the 7mg? It looks like your insurance will only let her have 60 days of the 3mg Rybelsus.

## 2025-06-19 DIAGNOSIS — F41.1 GENERALIZED ANXIETY DISORDER: ICD-10-CM

## 2025-06-20 ENCOUNTER — TELEPHONE (OUTPATIENT)
Dept: CARDIAC REHAB | Facility: HOSPITAL | Age: 70
End: 2025-06-20
Payer: MEDICARE

## 2025-06-20 DIAGNOSIS — J43.8 OTHER EMPHYSEMA: Primary | ICD-10-CM

## 2025-06-20 RX ORDER — ERGOCALCIFEROL 1.25 MG/1
50000 CAPSULE, LIQUID FILLED ORAL WEEKLY
Qty: 12 CAPSULE | Refills: 0 | Status: SHIPPED | OUTPATIENT
Start: 2025-06-20

## 2025-06-20 NOTE — TELEPHONE ENCOUNTER
INSPECT RAN    Rx Refill Note  Requested Prescriptions     Pending Prescriptions Disp Refills    ALPRAZolam (XANAX) 0.25 MG tablet [Pharmacy Med Name: ALPRAZolam Oral Tablet 0.25 MG] 25 tablet 0     Sig: TAKE 1 TABLET BY MOUTH IN THE EVENING AS NEEDED FOR SLEEP OR ANXIETY      Last office visit with prescribing clinician: 6/12/2025   Last telemedicine visit with prescribing clinician: Visit date not found   Next office visit with prescribing clinician: 7/17/2025                         Would you like a call back once the refill request has been completed: [] Yes [] No    If the office needs to give you a call back, can they leave a voicemail: [] Yes [] No    Radha Henry, RT  06/20/25, 09:11 EDT

## 2025-06-20 NOTE — TELEPHONE ENCOUNTER
Patient is interested in pulmonary rehab. Cosign order for PFT entered. Patient informed to schedule pft at [phone]. Will run insurance and contact patient with information.

## 2025-06-21 RX ORDER — ALPRAZOLAM 0.25 MG
TABLET ORAL
Qty: 25 TABLET | Refills: 0 | Status: SHIPPED | OUTPATIENT
Start: 2025-06-21

## 2025-06-25 RX ORDER — IBUPROFEN 800 MG/1
TABLET, FILM COATED ORAL
Qty: 60 TABLET | Refills: 0 | OUTPATIENT
Start: 2025-06-25

## 2025-06-30 ENCOUNTER — OFFICE VISIT (OUTPATIENT)
Dept: PAIN MEDICINE | Facility: CLINIC | Age: 70
End: 2025-06-30
Payer: MEDICARE

## 2025-06-30 VITALS
WEIGHT: 135 LBS | OXYGEN SATURATION: 94 % | HEART RATE: 100 BPM | SYSTOLIC BLOOD PRESSURE: 131 MMHG | DIASTOLIC BLOOD PRESSURE: 88 MMHG | BODY MASS INDEX: 26.37 KG/M2 | RESPIRATION RATE: 16 BRPM

## 2025-06-30 DIAGNOSIS — M54.50 ACUTE BILATERAL LOW BACK PAIN WITHOUT SCIATICA: ICD-10-CM

## 2025-06-30 DIAGNOSIS — M54.12 CERVICAL RADICULITIS: ICD-10-CM

## 2025-06-30 DIAGNOSIS — M47.817 LUMBOSACRAL SPONDYLOSIS WITHOUT MYELOPATHY: Primary | ICD-10-CM

## 2025-06-30 DIAGNOSIS — M47.812 CERVICAL SPONDYLOSIS WITHOUT MYELOPATHY: ICD-10-CM

## 2025-06-30 PROCEDURE — 99214 OFFICE O/P EST MOD 30 MIN: CPT | Performed by: ANESTHESIOLOGY

## 2025-06-30 PROCEDURE — 1125F AMNT PAIN NOTED PAIN PRSNT: CPT | Performed by: ANESTHESIOLOGY

## 2025-06-30 PROCEDURE — 3079F DIAST BP 80-89 MM HG: CPT | Performed by: ANESTHESIOLOGY

## 2025-06-30 PROCEDURE — G2211 COMPLEX E/M VISIT ADD ON: HCPCS | Performed by: ANESTHESIOLOGY

## 2025-06-30 PROCEDURE — 3075F SYST BP GE 130 - 139MM HG: CPT | Performed by: ANESTHESIOLOGY

## 2025-06-30 RX ORDER — CYCLOBENZAPRINE HCL 10 MG
10 TABLET ORAL NIGHTLY PRN
Qty: 30 TABLET | Refills: 0 | Status: SHIPPED | OUTPATIENT
Start: 2025-06-30

## 2025-06-30 RX ORDER — TRAMADOL HYDROCHLORIDE 50 MG/1
50 TABLET ORAL EVERY 8 HOURS PRN
Qty: 60 TABLET | Refills: 0 | Status: SHIPPED | OUTPATIENT
Start: 2025-06-30

## 2025-06-30 NOTE — TELEPHONE ENCOUNTER
INSPECT RAN    Rx Refill Note  Requested Prescriptions     Pending Prescriptions Disp Refills    cyclobenzaprine (FLEXERIL) 10 MG tablet [Pharmacy Med Name: Cyclobenzaprine HCl Oral Tablet 10 MG] 30 tablet 0     Sig: TAKE 1 TABLET BY MOUTH AT NIGHT AS NEEDED FOR MUSCLE SPASMS    traMADol (ULTRAM) 50 MG tablet [Pharmacy Med Name: traMADol HCl Oral Tablet 50 MG] 60 tablet 0     Sig: TAKE 1 TABLET BY MOUTH EVERY 8 HOURS AS NEEDED FOR MODERATE PAIN.      Last office visit with prescribing clinician: 6/12/2025   Last telemedicine visit with prescribing clinician: Visit date not found   Next office visit with prescribing clinician: 7/17/2025                         Would you like a call back once the refill request has been completed: [] Yes [] No    If the office needs to give you a call back, can they leave a voicemail: [] Yes [] No    Radha Henry, RT  06/30/25, 10:39 EDT

## 2025-06-30 NOTE — PROGRESS NOTES
Subjective   CC Back pain hip pain, neck pain  Darleen Stallings is a 69 y.o. female with chronic back pain, neck pain here for follow-up.    Last seen several months ago.  Had cervical SADIE and LESI with excellent relief and functional benefit.  Today complains of worsening axial back pain, constant worse with standing walking or activity.  Denies radicular pain.  Tried home exercise program and anti-inflammatories without relief.  Continued neck pain radiating to both shoulders with cervicogenic headache and left arm pain.  Pain interfering with ADL.    Chronic axial ack pain mostly right-sided radiating to right hip and right lateral thigh, constant but worse with standing walking or any activity.  Significantly limiting ADL.  Denies weakness, saddle anesthesia bladder bowel continence.  Chronic back pain associated with cervicogenic headaches, radiating to both shoulders and left arm, constant but worse with neck movement, activity and at night.  Denies balance issues bladder bowel continence.  She has tried home exercise program, physical therapy in the past without relief.  Tried anti-inflammatory, steroids, muscle relaxers with marginal relief.  Neck pain and back pain interfering with ADL and sleep.    Imaging reviewed  L-spine CT 2024: Degenerative changes at multiple level facet arthropathy.  Moderate to severe foraminal stenosis at multiple levels.  C-spine MRI 2024: Limited study secondary to patient motion.  Multilevel cervical disc degeneration with mild to moderate narrowing of the central canal at C5-C6 and C6-C7. flattening   of the ventral cord without gross cord signal abnormality. Multilevel neural foraminal stenosis secondary to uncovertebral and facet osteoarthritis, most prominent at C5-C6. marrow edema   within the right C3-C4 facet joint.     Pain Assessment   Location of Pain: Lower Back, R Hip, L Hip,  Leg, neck pain, joint  Description of Pain: Dull/Aching, Throbbing, Stabbing  Previous  Pain Rating : 2  Current Pain Ratin  Aggravating Factors: Activity  Alleviating Factors: Rest, Medication  Pain onset over 12 weeks ago  Interferes with ADL's.   [place] back pain disability scale scanned in chart    PEG Assessment   What number best describes your pain on average in the past week?  2  What number best describes how, during the past week, pain has interfered with your enjoyment of life?  1  What number best describes how, during the past week, pain has interfered with your general activity?  10  Back pain,  The following portions of the patient's history were reviewed and updated as appropriate: allergies, current medications, past family history, past medical history, past social history, past surgical history and problem list.    Review of Systems   Musculoskeletal:  Positive for arthralgias, back pain and neck pain.   All other systems reviewed and are negative.      Objective   Physical Exam  Vitals reviewed.   Constitutional:       General: She is not in acute distress.  Pulmonary:      Effort: Pulmonary effort is normal.   Musculoskeletal:      Cervical back: Tenderness present. Decreased range of motion.      Lumbar back: Tenderness present. Decreased range of motion.      Comments: Lumbar loading positive, pain on extension of low back past 5 degrees.  TTP on the lumbar facets noted.  Positive Spurling's     /88 (BP Location: Left arm, Patient Position: Sitting, Cuff Size: Adult)   Pulse 100   Resp 16   Wt 61.2 kg (135 lb)   SpO2 94%   BMI 26.37 kg/m²     PHQ 9 on chart  Opioid risk tool low risk      Assessment & Plan   Diagnoses and all orders for this visit:    1. Lumbosacral spondylosis without myelopathy (Primary)  -     Facet  -     Facet    2. Cervical spondylosis without myelopathy    3. Cervical radiculitis      Summary  Darleen Stallings is a 69 y.o. female with chronic back pain, neck pain here for follow-up.  Referred by PCP.  Chronic pain from lumbar and cervical  DDD spondylosis, occasional radicular pain.  Chronic polyarthralgia.  She has tried home exercise program, physical therapy in the past without relief.  Tried anti-inflammatory, steroids, muscle relaxers with marginal relief.  Neck pain and back pain interfering with ADL and sleep.    Last seen several months ago.  Had cervical SADIE and LESI with excellent relief and functional benefit.  Today complains of worsening axial back pain, constant worse with standing walking or activity.  Denies radicular pain.  Tried home exercise program and anti-inflammatories without relief.    Continued neck pain radiating to both shoulders with cervicogenic headache and left arm pain.  80% relief with cervical SADIE.  Consider repeat.  Pain interfering with ADL.    Worsening axial back pain from spondylosis.  Will schedule for bilateral lumbar medial branch block at L4/5, L5/S1 x 2.  If effective will plan RFA.  Risks and benefits discussed.    RTC for procedure            Note is dictated utilizing voice recognition software. Unfortunately this leads to occasional typographical errors. I apologize in advance if the situation occurs. If questions occur please do not hesitate to call our office.

## 2025-07-04 PROBLEM — H65.02 NON-RECURRENT ACUTE SEROUS OTITIS MEDIA OF LEFT EAR: Status: RESOLVED | Noted: 2025-02-10 | Resolved: 2025-07-04

## 2025-07-04 PROBLEM — I10 BENIGN ESSENTIAL HYPERTENSION: Status: RESOLVED | Noted: 2018-07-17 | Resolved: 2025-07-04

## 2025-07-10 ENCOUNTER — HOSPITAL ENCOUNTER (OUTPATIENT)
Dept: PAIN MEDICINE | Facility: HOSPITAL | Age: 70
Discharge: HOME | End: 2025-07-10
Payer: MEDICARE

## 2025-07-10 ENCOUNTER — APPOINTMENT (OUTPATIENT)
Dept: RESPIRATORY THERAPY | Facility: HOSPITAL | Age: 70
End: 2025-07-10
Payer: MEDICARE

## 2025-07-10 VITALS
TEMPERATURE: 97.3 F | SYSTOLIC BLOOD PRESSURE: 105 MMHG | DIASTOLIC BLOOD PRESSURE: 85 MMHG | OXYGEN SATURATION: 94 % | HEART RATE: 95 BPM | RESPIRATION RATE: 16 BRPM

## 2025-07-10 DIAGNOSIS — R52 PAIN: ICD-10-CM

## 2025-07-10 DIAGNOSIS — M47.817 LUMBOSACRAL SPONDYLOSIS WITHOUT MYELOPATHY: Primary | ICD-10-CM

## 2025-07-10 PROCEDURE — 25510000001 IOPAMIDOL 41 % SOLUTION: Performed by: ANESTHESIOLOGY

## 2025-07-10 PROCEDURE — 25010000002 BUPIVACAINE (PF) 0.25 % SOLUTION: Performed by: ANESTHESIOLOGY

## 2025-07-10 PROCEDURE — 77003 FLUOROGUIDE FOR SPINE INJECT: CPT

## 2025-07-10 PROCEDURE — 25010000002 LIDOCAINE PF 1% 1 % SOLUTION: Performed by: ANESTHESIOLOGY

## 2025-07-10 RX ORDER — LIDOCAINE HYDROCHLORIDE 10 MG/ML
5 INJECTION, SOLUTION EPIDURAL; INFILTRATION; INTRACAUDAL; PERINEURAL ONCE
Status: COMPLETED | OUTPATIENT
Start: 2025-07-10 | End: 2025-07-10

## 2025-07-10 RX ORDER — BUPIVACAINE HYDROCHLORIDE 2.5 MG/ML
10 INJECTION, SOLUTION EPIDURAL; INFILTRATION; INTRACAUDAL; PERINEURAL ONCE
Status: COMPLETED | OUTPATIENT
Start: 2025-07-10 | End: 2025-07-10

## 2025-07-10 RX ORDER — IOPAMIDOL 408 MG/ML
3 INJECTION, SOLUTION INTRATHECAL
Status: COMPLETED | OUTPATIENT
Start: 2025-07-10 | End: 2025-07-10

## 2025-07-10 RX ADMIN — LIDOCAINE HYDROCHLORIDE 5 ML: 10 INJECTION, SOLUTION EPIDURAL; INFILTRATION; INTRACAUDAL; PERINEURAL at 14:16

## 2025-07-10 RX ADMIN — IOPAMIDOL 3 ML: 408 INJECTION, SOLUTION INTRATHECAL at 14:18

## 2025-07-10 RX ADMIN — BUPIVACAINE HYDROCHLORIDE 10 ML: 2.5 INJECTION, SOLUTION EPIDURAL; INFILTRATION; INTRACAUDAL; PERINEURAL at 14:18

## 2025-07-15 NOTE — THERAPY TREATMENT NOTE
"Subjective: Pt agreeable to therapeutic plan of care.    Objective:     Precautions - High fall risk; chest tube; contact and droplet    Bed mobility - Independent  Transfers - Supervision  Ambulation - 35 feet with SBA and line management with no LOB; Pt with increased HR to 102 and RR increased from 16-23    Therapeutic Exercise - 10 Reps   supported sitting / chair; worked on PLB; slowed breathing and increasing depth of breath    Vitals: WNL; satting at 90% on RA throughout session; replaced 2L per nasal cannula and pt returns to 92%    Pain: 0 VAS Location: n/a  Intervention for pain: N/A    Education: Provided education on the importance of mobility in the acute care setting and Transfer Training    Assessment: Darleen Stallings presents with functional mobility impairments which indicate the need for skilled intervention. Tolerating session today without incident. Will continue to follow and progress as tolerated. Pt with improved mobility.  Pt with improved max RR during session compared to evaluation.  She should be safe to return home at discharge.She may benefit from HH PT at discharge.     Plan/Recommendations:   If medically appropriate, Low Intensity Therapy recommended post-acute care - This is recommended as therapy feels this patient would require 2-3 visits per week. OP or HH would be the best option depending on patient's home bound status. Consider, if the patient has other  \"skilled\" needs such as wounds, IV antibiotics, etc. Combined with \"low intensity\" could also equate to a SNF. If patient is medically complex, consider LTAC. Pt requires no DME at discharge.     Pt desires Home with Home Health at discharge. Pt cooperative; agreeable to therapeutic recommendations and plan of care.         Basic Mobility 6-click:  Rollin = Total, A lot = 2, A little = 3; 4 = None  Supine>Sit:   1 = Total, A lot = 2, A little = 3; 4 = None   Sit>Stand with arms:  1 = Total, A lot = 2, A little = 3; " CONTINUES CURRENT TREATMENT, ON AMIO.  DC PLAN HOME ONCE CLEARED BY DRS.  ?OAC.    4 = None  Bed>Chair:   1 = Total, A lot = 2, A little = 3; 4 = None  Ambulate in room:  1 = Total, A lot = 2, A little = 3; 4 = None  3-5 Steps with railin = Total, A lot = 2, A little = 3; 4 = None  Score: 20    Modified [place]: N/A = No pre-op stroke/TIA    Post-Tx Position: Up in Chair, Alarms activated, and Call light and personal items within reach  PPE: gloves, surgical mask, and gown    Therapy Charges for Today       Code Description Service Date Service Provider Modifiers Qty    70039654828 HC PT EVAL MOD COMPLEXITY 4 2025 Conchita Centeno, PT GP 1    [id number]  PT THERAPEUTIC ACT EA 15 MIN 4/10/2025 Conchita Centeno, PT GP 1    [id number]  GAIT TRAINING EA 15 MIN 4/10/2025 Conchita Centeno, PT GP 1           PT Charges       Row Name 04/10/25 1522             Time Calculation    Start Time 1433  -CL      Stop Time 1457  -CL      Time Calculation (min) 24 min  -CL      PT Received On 04/10/25  -CL      PT - Next Appointment 25  -CL      PT Goal Re-Cert Due Date 25  -CL         Time Calculation- PT    Total Timed Code Minutes- PT 24 minute(s)  -CL                User Key  (r) = Recorded By, (t) = Taken By, (c) = Cosigned By      Initials Name Provider Type    CL Conchita Centeno PT Physical Therapist

## 2025-07-16 ENCOUNTER — HOSPITAL ENCOUNTER (OUTPATIENT)
Dept: CT IMAGING | Facility: HOSPITAL | Age: 70
Discharge: HOME | End: 2025-07-16
Admitting: SURGERY
Payer: MEDICARE

## 2025-07-16 DIAGNOSIS — J93.83 OTHER PNEUMOTHORAX: ICD-10-CM

## 2025-07-16 PROCEDURE — 71250 CT THORAX DX C-: CPT

## 2025-07-16 NOTE — PROCEDURES
Subjective   CC back pain  Darleen Stallings is a 69 y.o. female with lumbar spondylosis here for bilateral lumbar medial branch block at L4/5, L5/S1..   No anticoagulation    Pain Assessment   Location of Pain: Lower Back, R Hip, L Hip,  Description of Pain: Dull/Aching, Throbbing, Stabbing  Previous Pain Rating :10  Current Pain Rating: 10  Aggravating Factors: Activity  Alleviating Factors: Rest, Medication  Pain onset over 12 weeks ago  Pain interferes with ADL's  [place] back pain disability scale scanned in chart     The following portions of the patient's history were reviewed and updated as appropriate: allergies, current medications, past family history, past medical history, past social history, past surgical history and problem list.      Review of Systems  As in HPI  Objective   Physical Exam  Vitals reviewed.   Constitutional:       General: She is not in acute distress.  Pulmonary:      Effort: Pulmonary effort is normal.   Musculoskeletal:      Lumbar back: Tenderness present.       /85 (BP Location: Left arm, Patient Position: Sitting)   Pulse 95   Temp 97.3 °F (36.3 °C)   Resp 16   SpO2 94%     Assessment & Plan    underwent bilateral lumbar branch block at L4/5, L5/S1.  Reported 80% relief 15 to 20 minutes after the procedure    RTC 4-6 weeks or as needed for repeat    DATE OF PROCEDURE: 7/10/2025    PREOPERATIVE DIAGNOSIS: Lumbar spondylosis without myelopathy    POSTOPERATIVE DIAGNOSIS: same    PROCEDURE PERFORMED: Afshin Lumbar Medial Branch Block at  L4/L5, L5/S1.     The patient presents with a history of lumbosacral spondylosis bilaterally at level  [ L4/L5 ] [ L5/S1].   The patient understands the risks and benefits of the procedure and wishes to proceed. The patient was seen in the preoperative area.  Patient's consent was obtained and updated.  Vitals were taken.  Patient was then brought to the procedure suite and placed in a prone position. The appropriate anatomic area was widely  prepped with Chloroprep and draped in a sterile fashion.  Noninvasive monitoring per routine anesthesia protocol was placed.  Under fluoroscopic guidance an AP view with caudad cephaled tilt was obtained. A 22 gauge curved tip spinal needle was passed through skin anesthetized with 1% Lidocaine without epinephrine. The needle tip was guided into the superior medial aspect of the transverse process at  [ L4 ] [ L5 ] [ sacral ala ].  Next 0.25 mL of  preservative free contrast were injected.   At this point 0.5 mL of 0.25% bupivacaine was injected. A sterile dressing was placed over the puncture site. The patient tolerated the procedure with  no complications. They were then brought to the post procedure area where they recovered nicely.    Reported 80% relief 15 to 20 minutes after the procedure.

## 2025-07-17 ENCOUNTER — OFFICE VISIT (OUTPATIENT)
Dept: FAMILY MEDICINE CLINIC | Facility: CLINIC | Age: 70
End: 2025-07-17
Payer: MEDICARE

## 2025-07-17 VITALS
HEIGHT: 60 IN | RESPIRATION RATE: 20 BRPM | OXYGEN SATURATION: 94 % | DIASTOLIC BLOOD PRESSURE: 73 MMHG | WEIGHT: 139 LBS | SYSTOLIC BLOOD PRESSURE: 130 MMHG | BODY MASS INDEX: 27.29 KG/M2 | TEMPERATURE: 98.2 F | HEART RATE: 100 BPM

## 2025-07-17 DIAGNOSIS — J43.1 PANLOBULAR EMPHYSEMA: ICD-10-CM

## 2025-07-17 DIAGNOSIS — E11.65 TYPE 2 DIABETES MELLITUS WITH HYPERGLYCEMIA, WITHOUT LONG-TERM CURRENT USE OF INSULIN: ICD-10-CM

## 2025-07-17 DIAGNOSIS — I10 PRIMARY HYPERTENSION: ICD-10-CM

## 2025-07-17 DIAGNOSIS — D72.829 LEUKOCYTOSIS, UNSPECIFIED TYPE: ICD-10-CM

## 2025-07-17 DIAGNOSIS — R52 BODY ACHES: Primary | ICD-10-CM

## 2025-07-17 DIAGNOSIS — E87.5 HYPERKALEMIA: ICD-10-CM

## 2025-07-17 LAB
EXPIRATION DATE: NORMAL
EXPIRATION DATE: NORMAL
FLUAV AG NPH QL: NEGATIVE
FLUBV AG NPH QL: NEGATIVE
INTERNAL CONTROL: NORMAL
INTERNAL CONTROL: NORMAL
Lab: NORMAL
Lab: NORMAL
SARS-COV-2 AG UPPER RESP QL IA.RAPID: NOT DETECTED

## 2025-07-17 PROCEDURE — 80048 BASIC METABOLIC PNL TOTAL CA: CPT | Performed by: FAMILY MEDICINE

## 2025-07-17 PROCEDURE — 85025 COMPLETE CBC W/AUTO DIFF WBC: CPT | Performed by: FAMILY MEDICINE

## 2025-07-17 RX ORDER — GABAPENTIN 300 MG/1
300 CAPSULE ORAL 3 TIMES DAILY
Qty: 90 CAPSULE | Refills: 0 | Status: SHIPPED | OUTPATIENT
Start: 2025-07-17

## 2025-07-17 RX ORDER — AMLODIPINE BESYLATE 5 MG/1
5 TABLET ORAL DAILY
Qty: 90 TABLET | Refills: 1 | Status: SHIPPED | OUTPATIENT
Start: 2025-07-17

## 2025-07-17 RX ORDER — OMEPRAZOLE 40 MG/1
40 CAPSULE, DELAYED RELEASE ORAL DAILY
Qty: 90 CAPSULE | Refills: 1 | Status: SHIPPED | OUTPATIENT
Start: 2025-07-17

## 2025-07-17 RX ORDER — VALSARTAN 40 MG/1
40 TABLET ORAL DAILY
Qty: 90 TABLET | Refills: 2 | Status: SHIPPED | OUTPATIENT
Start: 2025-07-17

## 2025-07-17 RX ORDER — FAMOTIDINE 40 MG/1
40 TABLET, FILM COATED ORAL NIGHTLY
Qty: 90 TABLET | Refills: 0 | Status: SHIPPED | OUTPATIENT
Start: 2025-07-17

## 2025-07-17 RX ORDER — PEN NEEDLE, DIABETIC 30 GX3/16"
1 NEEDLE, DISPOSABLE MISCELLANEOUS DAILY
Qty: 30 EACH | Refills: 3 | Status: SHIPPED | OUTPATIENT
Start: 2025-07-17

## 2025-07-17 RX ORDER — FENOFIBRATE 160 MG/1
160 TABLET ORAL DAILY
Qty: 90 TABLET | Refills: 3 | Status: SHIPPED | OUTPATIENT
Start: 2025-07-17

## 2025-07-17 RX ORDER — FLUTICASONE FUROATE, UMECLIDINIUM BROMIDE AND VILANTEROL TRIFENATATE 200; 62.5; 25 UG/1; UG/1; UG/1
1 POWDER RESPIRATORY (INHALATION) DAILY
Qty: 60 EACH | Refills: 3 | Status: SHIPPED | OUTPATIENT
Start: 2025-07-17

## 2025-07-17 RX ORDER — IBUPROFEN 800 MG/1
TABLET, FILM COATED ORAL
Qty: 60 TABLET | Refills: 0 | OUTPATIENT
Start: 2025-07-17

## 2025-07-17 RX ORDER — ACYCLOVIR 400 MG/1
1 TABLET ORAL
Qty: 9 EACH | Refills: 3 | Status: SHIPPED | OUTPATIENT
Start: 2025-07-17

## 2025-07-17 NOTE — PROGRESS NOTES
Subjective   Darleen Stallings is a 69 y.o. female.     Chief Complaint   Patient presents with    COPD    Generalized Body Aches     Body aches and possible fever x 3 days s         Current Outpatient Medications:     albuterol sulfate  (90 Base) MCG/ACT inhaler, Inhale 2 puffs Every 6 (Six) Hours As Needed for Shortness of Air or Wheezing (cough)., Disp: 54 g, Rfl: 0    ALPRAZolam (XANAX) 0.25 MG tablet, TAKE 1 TABLET BY MOUTH IN THE EVENING AS NEEDED FOR SLEEP OR ANXIETY, Disp: 25 tablet, Rfl: 0    amLODIPine (NORVASC) 5 MG tablet, Take 1 tablet by mouth Daily., Disp: 90 tablet, Rfl: 1    aspirin 81 MG tablet, Take 1 tablet by mouth Daily., Disp: , Rfl:     atorvastatin (LIPITOR) 80 MG tablet, Take 1 tablet by mouth Daily., Disp: 90 tablet, Rfl: 0    citalopram (CeleXA) 20 MG tablet, TAKE 1 TABLET BY MOUTH DAILY, Disp: 90 tablet, Rfl: 0    CRANBERRY PO, Take 4 tablets by mouth Daily., Disp: , Rfl:     cyclobenzaprine (FLEXERIL) 10 MG tablet, TAKE 1 TABLET BY MOUTH AT NIGHT AS NEEDED FOR MUSCLE SPASMS, Disp: 30 tablet, Rfl: 0    empagliflozin (Jardiance) 25 MG tablet tablet, Take 1 tablet by mouth Daily., Disp: 90 tablet, Rfl: 1    famotidine (Pepcid) 40 MG tablet, Take 1 tablet by mouth Every Night., Disp: 90 tablet, Rfl: 0    fenofibrate 160 MG tablet, Take 1 tablet by mouth Daily., Disp: 90 tablet, Rfl: 3    Fluticasone-Umeclidin-Vilant (Trelegy Ellipta) 200-62.5-25 MCG/ACT inhaler, Inhale 1 puff Daily., Disp: 60 each, Rfl: 3    gabapentin (Neurontin) 300 MG capsule, Take 1 capsule by mouth 3 (Three) Times a Day., Disp: 90 capsule, Rfl: 0    guaiFENesin (MUCINEX) 600 MG 12 hr tablet, Take 2 tablets by mouth Daily., Disp: , Rfl:     ibandronate (Boniva) 150 MG tablet, Take 1 tablet by mouth Every 30 (Thirty) Days., Disp: 3 tablet, Rfl: 3    metFORMIN ER (GLUCOPHAGE-XR) 500 MG 24 hr tablet, Take 2 tablets by mouth 2 (Two) Times a Day With Meals., Disp: 360 tablet, Rfl: 0    nitroglycerin (NITROSTAT) 0.4 MG  SL tablet, Place 1 tablet under the tongue Every 5 (Five) Minutes As Needed for Chest Pain. Take no more than 3 doses in 15 minutes., Disp: 25 tablet, Rfl: 2    nystatin (MYCOSTATIN) 100,000 unit/mL suspension, Swish and swallow 5 mL 2 (Two) Times a Day As Needed., Disp: , Rfl:     omeprazole (priLOSEC) 40 MG capsule, Take 1 capsule by mouth Daily., Disp: 90 capsule, Rfl: 1    Semaglutide (Rybelsus) 3 MG tablet, TAKE 1 TABLET BY MOUTH IN THE MORNING BEFORE BREAKFAST, Disp: 90 tablet, Rfl: 0    traMADol (ULTRAM) 50 MG tablet, TAKE 1 TABLET BY MOUTH EVERY 8 HOURS AS NEEDED FOR MODERATE PAIN., Disp: 60 tablet, Rfl: 0    valsartan (Diovan) 40 MG tablet, Take 1 tablet by mouth Daily., Disp: 90 tablet, Rfl: 2    vitamin D (ERGOCALCIFEROL) 1.25 MG (07205 UT) capsule capsule, TAKE 1 CAPSULE BY MOUTH 1 TIME A WEEK, Disp: 12 capsule, Rfl: 0    Continuous Glucose Sensor (Dexcom G7 Sensor) misc, Use 1 package Every 10 (Ten) Days., Disp: 9 each, Rfl: 3    Insulin Glargine (LANTUS SOLOSTAR) 100 UNIT/ML injection pen, Inject 5 Units under the skin into the appropriate area as directed Daily., Disp: 3 mL, Rfl: 3    Insulin Pen Needle (Pen Needles) 32G X 4 MM misc, Use 1 package Daily., Disp: 30 each, Rfl: 3    Past Medical History:   Diagnosis Date    ADHD (attention deficit hyperactivity disorder) Around maybe 2016 or 2017    It is getting worse. I was going to talk to dr vaughan about it..    Anxiety Always    Same as usual. I have the anxiety and the tremors. When my tremors r bad…which they have been my anxiety gets very high    Asthma Copd    CAD     Cervical disc disorder 2019    After fall    CHOL     Chronic pain disorder 2020    After fall down stairs    COPD     Dementia     Depression     DEXA     2023= (1.1/ -2.3)    DMII     Extremity pain 2019    Maybe longer    Fractures 2022    GERD     Hypertension     Joint pain 2020    Low back pain 2019    After fall    MAMMO     NEG = 2018/ 2023/ 2024    Myocardial infarction      Neck pain 2015    Maybe longer    Neuropathy in diabetes 2018    Osteoarthritis 2023    Diagnosed    PAP     NEG =2021    Scoliosis     Spinal stenosis 2019    Tremor        Past Surgical History:   Procedure Laterality Date    BRONCHOSCOPY N/A 04/10/2025    Procedure: BRONCHOSCOPY with right lung washing;  Surgeon: Marissa Soliz MD;  Location: [facility] ENDOSCOPY;  Service: Pulmonary;  Laterality: N/A;    CARDIAC SURGERY      CATARACT EXTRACTION W/ INTRAOCULAR LENS  IMPLANT, BILATERAL      COLONOSCOPY      2023= NEG, rech 2026   GSI    COMPRESSION HIP SCREW Right 03/25/2022    Procedure: HIP COMPRESSION SCREW, right [place] hip screw from [place];  Surgeon: Loco Cortes MD;  Location: [facility] MAIN OR;  Service: Orthopedics;  Laterality: Right;    CORONARY ARTERY BYPASS GRAFT      x 3    EYE SURGERY      FRACTURE SURGERY      TONSILLECTOMY      TUBAL ABDOMINAL LIGATION         Family History   Problem Relation Age of Onset    Stroke Mother     COPD Mother     Hypertension Mother     Tremor Father     Heart disease Father         CHF    COPD Father     Depression Father     Anxiety disorder Father     Tremor Sister     Fibromyalgia Sister     Hypertension Sister     Post-traumatic stress disorder Sister     Anxiety disorder Sister     Depression Sister     Heart disease Sister     Heart disease Sister     Hypertension Sister     Depression Sister     Tremor Brother     COPD Brother     Depression Brother     Hypertension Brother     Cancer Brother         Undiagnosed lung cancer    Anxiety disorder Brother     Cancer Brother         Undiagnosed lung cancer    Tremor Paternal Grandmother     Depression Sister     Hypertension Brother        Social History     Socioeconomic History    Marital status: Single   Tobacco Use    Smoking status: Every Day     Current packs/day: 0.00     Average packs/day: 0.5 packs/day for 48.0 years (24.0 ttl pk-yrs)     Types: Cigarettes     Start date: 1/9/1975     Last attempt to quit:       Years since quittin.5     Passive exposure: Past    Smokeless tobacco: Never    Tobacco comments:     Started when i was 18 quit a few times. Used vape but i think it gave me pneumonia   Vaping Use    Vaping status: Every Day    Substances: Nicotine   Substance and Sexual Activity    Alcohol use: No    Drug use: No    Sexual activity: Not Currently     Partners: Male     Birth control/protection: None     Comment: [place] to get pregnant..hahaa!       History of Present Illness  The patient is a 69-year-old female who presents for f/u on COPD and complaints of body aches and possible fever for 3 days.    She has been experiencing body aches and a possible fever for the past 3 days. She also reports feeling unwell, with symptoms including excessive phlegm production and chest pain. She mentions that she tends to sleep more when she is ill. She recalls having a cramp on the opposite side of her chest yesterday, which caused her some discomfort.    She is currently on gabapentin 300 mg three times a day, Trelegy 200 one puff a day, fenofibric acid for cholesterol once a day, metformin  mg twice a day, Prilosec 40 mg daily, Rybelsus 3 mg, tramadol as needed, valsartan 40 mg, and vitamin D once a week. She needs refills for Prilosec and valsartan. She has been taking Prilosec for a long time and stopped it recently, but she has been experiencing acid reflux. She had blood work done in 2025 at the hospital.    She has been monitoring her blood sugar levels, which have been consistently high, ranging from 150 to 170, and occasionally spiking to over 200. Her blood sugar levels tend to rise in the evening. She is unsure of the cause of these elevated levels, as she has not been consuming sugary foods.  She uses a Dexcom device to monitor her blood sugar levels. She is currently on Jardiance and Rybelsus for her diabetes management.    She expresses concern about her cognitive function, fearing that she  may be developing dementia. However, she reports no difficulty with daily activities such as housework or driving. She has noticed a decline in her reading ability, which she attributes to her current health issues.        The following portions of the patient's history were reviewed and updated as appropriate: allergies, current medications, past family history, past medical history, past social history, past surgical history and problem list.    Review of Systems   Constitutional:  Positive for activity change, appetite change and fever. Negative for chills, fatigue, unexpected weight gain and unexpected weight loss.   HENT:  Negative for congestion, ear discharge, ear pain, postnasal drip, rhinorrhea, sinus pressure, sneezing, sore throat and swollen glands.    Eyes:  Negative for pain, discharge, redness, itching and visual disturbance.   Respiratory:  Positive for cough. Negative for shortness of breath, wheezing and stridor.    Musculoskeletal:  Positive for myalgias.   Skin:  Negative for rash.   Allergic/Immunologic: Negative for environmental allergies.   Neurological:  Positive for memory problem.   Psychiatric/Behavioral:  Positive for stress. Negative for sleep disturbance.        Vitals:    07/17/25 1459   BP: 130/73   Pulse: 100   Resp: 20   Temp: 98.2 °F (36.8 °C)   SpO2: 94%       Objective   Physical Exam  Vitals and nursing note reviewed.   Constitutional:       General: She is not in acute distress.     Appearance: She is well-developed. She is not ill-appearing, toxic-appearing or diaphoretic.   HENT:      Head: Normocephalic and atraumatic.      Right Ear: Tympanic membrane, ear canal and external ear normal. There is no impacted cerumen.      Left Ear: Tympanic membrane, ear canal and external ear normal. There is no impacted cerumen.      Nose: Nose normal. No congestion or rhinorrhea.      Mouth/Throat:      Mouth: Mucous membranes are moist.      Pharynx: Oropharynx is clear. No  oropharyngeal exudate or posterior oropharyngeal erythema.   Eyes:      General: No scleral icterus.        Right eye: No discharge.         Left eye: No discharge.      Extraocular Movements: Extraocular movements intact.      Conjunctiva/sclera: Conjunctivae normal.      Pupils: Pupils are equal, round, and reactive to light.   Neck:      Thyroid: No thyromegaly.   Cardiovascular:      Rate and Rhythm: Normal rate and regular rhythm.      Heart sounds: Normal heart sounds. No murmur heard.  Pulmonary:      Effort: Pulmonary effort is normal. No respiratory distress.      Breath sounds: Examination of the right-lower field reveals decreased breath sounds and rhonchi. Decreased breath sounds and rhonchi present. No wheezing or rales.   Musculoskeletal:      Cervical back: Normal range of motion and neck supple.   Lymphadenopathy:      Cervical: No cervical adenopathy.   Skin:     General: Skin is warm and dry.      Coloration: Skin is not pale.      Findings: No erythema or rash.   Neurological:      Mental Status: She is alert and oriented to person, place, and time.      Cranial Nerves: No cranial nerve deficit.      Gait: Gait normal.   Psychiatric:         Attention and Perception: Attention normal.         Mood and Affect: Mood and affect normal.         Speech: Speech normal.         Behavior: Behavior normal. Behavior is cooperative.         Thought Content: Thought content normal.         Cognition and Memory: Cognition and memory normal.         Judgment: Judgment normal.     Add once a day insulin.........  F/u lab today  Physical Exam    Assessment & Plan   Diagnoses and all orders for this visit:    1. Body aches (Primary)  -     POCT SARS-CoV-2 Antigen TAYLA  -     POCT Influenza A/B  -     CBC & Differential    2. Leukocytosis, unspecified type  -     CBC & Differential    3. Panlobular emphysema    4. Type 2 diabetes mellitus with hyperglycemia, without long-term current use of insulin    5. Primary  hypertension    6. Hyperkalemia  -     Basic Metabolic Panel    Other orders  -     famotidine (Pepcid) 40 MG tablet; Take 1 tablet by mouth Every Night.  Dispense: 90 tablet; Refill: 0  -     omeprazole (priLOSEC) 40 MG capsule; Take 1 capsule by mouth Daily.  Dispense: 90 capsule; Refill: 1  -     amLODIPine (NORVASC) 5 MG tablet; Take 1 tablet by mouth Daily.  Dispense: 90 tablet; Refill: 1  -     Insulin Glargine (LANTUS SOLOSTAR) 100 UNIT/ML injection pen; Inject 5 Units under the skin into the appropriate area as directed Daily.  Dispense: 3 mL; Refill: 3  -     Insulin Pen Needle (Pen Needles) 32G X 4 MM misc; Use 1 package Daily.  Dispense: 30 each; Refill: 3  -     Continuous Glucose Sensor (Dexcom G7 Sensor) misc; Use 1 package Every 10 (Ten) Days.  Dispense: 9 each; Refill: 3  -     empagliflozin (Jardiance) 25 MG tablet tablet; Take 1 tablet by mouth Daily.  Dispense: 90 tablet; Refill: 1  -     fenofibrate 160 MG tablet; Take 1 tablet by mouth Daily.  Dispense: 90 tablet; Refill: 3  -     Fluticasone-Umeclidin-Vilant (Trelegy Ellipta) 200-62.5-25 MCG/ACT inhaler; Inhale 1 puff Daily.  Dispense: 60 each; Refill: 3  -     gabapentin (Neurontin) 300 MG capsule; Take 1 capsule by mouth 3 (Three) Times a Day.  Dispense: 90 capsule; Refill: 0  -     valsartan (Diovan) 40 MG tablet; Take 1 tablet by mouth Daily.  Dispense: 90 tablet; Refill: 2      Assessment & Plan  1. Chronic obstructive pulmonary disease (COPD).  - Reports feeling unwell with increased phlegm production and chest discomfort.  - Physical exam reveals slight chest noise, not indicative of pneumonia.  - CT scan of the chest has been ordered to further evaluate her condition.  - Advised to contact the office immediately if symptoms worsen.    2. Diabetes mellitus.  - Blood glucose levels consistently elevated, ranging from 150 to 200 mg/dL, and higher in the evenings.  - Will start on Lantus insulin, 5 units once daily, either in the morning or  evening, and dispose of the pen after a month.  - Pen needles ordered for daily use; will continue using Dexcom device for glucose monitoring.  - If blood glucose levels do not improve, dosage of Rybelsus may be increased from 3 mg to 7 mg.    3. Medication management.  - Currently on gabapentin 300 mg three times a day, Trelegy 200 mcg one puff a day, fenofibric acid once a day, metformin  mg twice a day, Prilosec 40 mg daily, Rybelsus 3 mg, tramadol as needed, valsartan 40 mg, and vitamin D once a week.  - Refills for Prilosec and valsartan provided; Trelegy prescription refilled for one month.  - Will continue current regimen of fenofibrate for cholesterol management.    4. Cognitive concerns.  - Expresses concern about cognitive function, fearing development of dementia.  - Reports no difficulty with daily activities such as housework or driving.  - Noticed decline in reading ability, attributed to current health issues.  - Discussed potential benefit of occupational therapy, but patient does not feel it is necessary at this time.     Results  Labs   - Cholesterol levels: 06/2025, Good cholesterol over in the 50s   - Blood sugar levels: 06/2025, High   - Potassium level: 06/2025, Slightly high at 5.4   - Kidney function: 06/2025, Normal   - White blood cell count: 06/2025, Slightly elevated          Patient or patient representative verbalized consent for the use of Ambient Listening during the visit with  Elen Jameson DO for chart documentation. 7/19/2025  15:39 EDT

## 2025-07-17 NOTE — PROGRESS NOTES
Venipuncture performed in left arm by Harriett Aggarwal CMA  with good hemostasis. Patient tolerated well. 07/17/25 Harriett Aggarwal CMA

## 2025-07-18 LAB
ANION GAP SERPL CALCULATED.3IONS-SCNC: 13 MMOL/L (ref 5–15)
BASOPHILS # BLD AUTO: 0.06 10*3/MM3 (ref 0–0.2)
BASOPHILS NFR BLD AUTO: 0.6 % (ref 0–1.5)
BUN SERPL-MCNC: 25 MG/DL (ref 8–23)
BUN/CREAT SERPL: 16.1 (ref 7–25)
CALCIUM SPEC-SCNC: 9.7 MG/DL (ref 8.6–10.5)
CHLORIDE SERPL-SCNC: 105 MMOL/L (ref 98–107)
CO2 SERPL-SCNC: 23 MMOL/L (ref 22–29)
CREAT SERPL-MCNC: 1.55 MG/DL (ref 0.57–1)
DEPRECATED RDW RBC AUTO: 39.7 FL (ref 37–54)
EGFRCR SERPLBLD CKD-EPI 2021: 36.6 ML/MIN/1.73
EOSINOPHIL # BLD AUTO: 0.92 10*3/MM3 (ref 0–0.4)
EOSINOPHIL NFR BLD AUTO: 8.9 % (ref 0.3–6.2)
ERYTHROCYTE [DISTWIDTH] IN BLOOD BY AUTOMATED COUNT: 12.4 % (ref 12.3–15.4)
GLUCOSE SERPL-MCNC: 161 MG/DL (ref 65–99)
HCT VFR BLD AUTO: 44.1 % (ref 34–46.6)
HGB BLD-MCNC: 14.6 G/DL (ref 12–15.9)
IMM GRANULOCYTES # BLD AUTO: 0.05 10*3/MM3 (ref 0–0.05)
IMM GRANULOCYTES NFR BLD AUTO: 0.5 % (ref 0–0.5)
LYMPHOCYTES # BLD AUTO: 2.07 10*3/MM3 (ref 0.7–3.1)
LYMPHOCYTES NFR BLD AUTO: 19.9 % (ref 19.6–45.3)
MCH RBC QN AUTO: 29.4 PG (ref 26.6–33)
MCHC RBC AUTO-ENTMCNC: 33.1 G/DL (ref 31.5–35.7)
MCV RBC AUTO: 88.9 FL (ref 79–97)
MONOCYTES # BLD AUTO: 0.8 10*3/MM3 (ref 0.1–0.9)
MONOCYTES NFR BLD AUTO: 7.7 % (ref 5–12)
NEUTROPHILS NFR BLD AUTO: 6.48 10*3/MM3 (ref 1.7–7)
NEUTROPHILS NFR BLD AUTO: 62.4 % (ref 42.7–76)
NRBC BLD AUTO-RTO: 0 /100 WBC (ref 0–0.2)
PLATELET # BLD AUTO: 377 10*3/MM3 (ref 140–450)
PMV BLD AUTO: 10.1 FL (ref 6–12)
POTASSIUM SERPL-SCNC: 4.8 MMOL/L (ref 3.5–5.2)
RBC # BLD AUTO: 4.96 10*6/MM3 (ref 3.77–5.28)
SODIUM SERPL-SCNC: 141 MMOL/L (ref 136–145)
WBC NRBC COR # BLD AUTO: 10.38 10*3/MM3 (ref 3.4–10.8)

## 2025-07-22 ENCOUNTER — OFFICE VISIT (OUTPATIENT)
Dept: SURGERY | Facility: CLINIC | Age: 70
End: 2025-07-22
Payer: MEDICARE

## 2025-07-22 VITALS
BODY MASS INDEX: 27.43 KG/M2 | WEIGHT: 139.7 LBS | DIASTOLIC BLOOD PRESSURE: 74 MMHG | HEIGHT: 60 IN | OXYGEN SATURATION: 95 % | SYSTOLIC BLOOD PRESSURE: 137 MMHG

## 2025-07-22 DIAGNOSIS — R91.1 LUNG NODULE: Primary | ICD-10-CM

## 2025-07-22 PROCEDURE — 3075F SYST BP GE 130 - 139MM HG: CPT | Performed by: SURGERY

## 2025-07-22 PROCEDURE — 99214 OFFICE O/P EST MOD 30 MIN: CPT | Performed by: SURGERY

## 2025-07-22 PROCEDURE — 3078F DIAST BP <80 MM HG: CPT | Performed by: SURGERY

## 2025-07-29 RX ORDER — CYCLOBENZAPRINE HCL 10 MG
10 TABLET ORAL NIGHTLY PRN
Qty: 30 TABLET | Refills: 0 | Status: SHIPPED | OUTPATIENT
Start: 2025-07-29

## 2025-07-29 NOTE — PROGRESS NOTES
THORACIC SURGERY CLINIC CONSULT NOTE    REASON FOR CONSULT: Recurrent right-sided pneumothorax.     Subjective   HISTORY OF PRESENTING ILLNESS:   Darleen Stallings is a 69 y.o. female who has significant medical problems as mentioned in the medical chart.     History of Present Illness  She was admitted to [facility] on 4/8/2025 for recurrent pneumothorax.  She has 40-pack-year history of smoking.  She smoked at the time of hospital admission.  The pneumothorax was managed with chest tube.  CT scan of the chest demonstrated large basilar pneumothorax competent and airspace disease in the lungs.  She had another episode of pneumothorax earlier in April 2025.  This was manages well with chest tube placement.  While she was in the hospital thoracic surgery was consulted.  She was discharged home after the chest tube was removed.     She came to clinic for follow-up visit.    She has been attempting to quit smoking since her diagnosis of COPD but occasionally relapses. She also reports excessive phlegm production, which she believes may be linked to her COPD. She has dentures, which sometimes cause her to choke slightly, leading to increased phlegm production.    SOCIAL HISTORY  The patient admits to smoking occasionally when she can not resist the urge, despite trying to quit for a long time due to COPD.    Past Medical History:   Diagnosis Date    ADHD (attention deficit hyperactivity disorder) Around maybe 2016 or 2017    It is getting worse. I was going to talk to dr vaughan about it..    Anxiety Always    Same as usual. I have the anxiety and the tremors. When my tremors r bad…which they have been my anxiety gets very high    Asthma Copd    CAD     Cervical disc disorder 2019    After fall    CHOL     Chronic pain disorder 2020    After fall down stairs    COPD     Dementia     Depression     DEXA     2023= (1.1/ -2.3)    DMII     Extremity pain 2019    Maybe longer    Fractures 2022    GERD      Hypertension     Joint pain 2020    Low back pain 2019    After fall    MAMMO     NEG = 2018/ 2023/ 2024    Myocardial infarction     Neck pain 2015    Maybe longer    Neuropathy in diabetes 2018    Osteoarthritis 2023    Diagnosed    PAP     NEG =2021    Scoliosis     Spinal stenosis 2019    Tremor        Past Surgical History:   Procedure Laterality Date    BRONCHOSCOPY N/A 04/10/2025    Procedure: BRONCHOSCOPY with right lung washing;  Surgeon: Marissa Soliz MD;  Location: [facility] ENDOSCOPY;  Service: Pulmonary;  Laterality: N/A;    CARDIAC SURGERY      CATARACT EXTRACTION W/ INTRAOCULAR LENS  IMPLANT, BILATERAL      COLONOSCOPY      2023= NEG, rech 2026   GSI    COMPRESSION HIP SCREW Right 03/25/2022    Procedure: HIP COMPRESSION SCREW, right [place] hip screw from [employer];  Surgeon: Loco Cortes MD;  Location: [facility] MAIN OR;  Service: Orthopedics;  Laterality: Right;    CORONARY ARTERY BYPASS GRAFT      x 3    EYE SURGERY      FRACTURE SURGERY      TONSILLECTOMY      TUBAL ABDOMINAL LIGATION         Family History   Problem Relation Age of Onset    Stroke Mother     COPD Mother     Hypertension Mother     Tremor Father     Heart disease Father         CHF    COPD Father     Depression Father     Anxiety disorder Father     Tremor Sister     Fibromyalgia Sister     Hypertension Sister     Post-traumatic stress disorder Sister     Anxiety disorder Sister     Depression Sister     Heart disease Sister     Heart disease Sister     Hypertension Sister     Depression Sister     Tremor Brother     COPD Brother     Depression Brother     Hypertension Brother     Cancer Brother         Undiagnosed lung cancer    Anxiety disorder Brother     Cancer Brother         Undiagnosed lung cancer    Tremor Paternal Grandmother     Depression Sister     Hypertension Brother        Social History     Socioeconomic History    Marital status: Single   Tobacco Use    Smoking status: Every Day     Current packs/day: 0.00     Average  packs/day: 0.5 packs/day for 48.0 years (24.0 ttl pk-yrs)     Types: Cigarettes     Start date: 1975     Last attempt to quit:      Years since quittin.5     Passive exposure: Past    Smokeless tobacco: Never    Tobacco comments:     Started when i was 18 quit a few times. Used vape but i think it gave me pneumonia   Vaping Use    Vaping status: Every Day    Substances: Nicotine   Substance and Sexual Activity    Alcohol use: No    Drug use: No    Sexual activity: Not Currently     Partners: Male     Birth control/protection: None     Comment: [place] to get pregnant..hahaa!         Current Outpatient Medications:     albuterol sulfate  (90 Base) MCG/ACT inhaler, Inhale 2 puffs Every 6 (Six) Hours As Needed for Shortness of Air or Wheezing (cough)., Disp: 54 g, Rfl: 0    ALPRAZolam (XANAX) 0.25 MG tablet, TAKE 1 TABLET BY MOUTH IN THE EVENING AS NEEDED FOR SLEEP OR ANXIETY, Disp: 25 tablet, Rfl: 0    amLODIPine (NORVASC) 5 MG tablet, Take 1 tablet by mouth Daily., Disp: 90 tablet, Rfl: 1    aspirin 81 MG tablet, Take 1 tablet by mouth Daily., Disp: , Rfl:     atorvastatin (LIPITOR) 80 MG tablet, Take 1 tablet by mouth Daily., Disp: 90 tablet, Rfl: 0    citalopram (CeleXA) 20 MG tablet, TAKE 1 TABLET BY MOUTH DAILY, Disp: 90 tablet, Rfl: 0    Continuous Glucose Sensor (Dexcom G7 Sensor) misc, Use 1 package Every 10 (Ten) Days., Disp: 9 each, Rfl: 3    CRANBERRY PO, Take 4 tablets by mouth Daily., Disp: , Rfl:     cyclobenzaprine (FLEXERIL) 10 MG tablet, TAKE 1 TABLET BY MOUTH AT NIGHT AS NEEDED FOR MUSCLE SPASMS, Disp: 30 tablet, Rfl: 0    empagliflozin (Jardiance) 25 MG tablet tablet, Take 1 tablet by mouth Daily., Disp: 90 tablet, Rfl: 1    famotidine (Pepcid) 40 MG tablet, Take 1 tablet by mouth Every Night., Disp: 90 tablet, Rfl: 0    fenofibrate 160 MG tablet, Take 1 tablet by mouth Daily., Disp: 90 tablet, Rfl: 3    Fluticasone-Umeclidin-Vilant (Trelegy Ellipta) 200-62.5-25 MCG/ACT inhaler,  "Inhale 1 puff Daily., Disp: 60 each, Rfl: 3    gabapentin (Neurontin) 300 MG capsule, Take 1 capsule by mouth 3 (Three) Times a Day., Disp: 90 capsule, Rfl: 0    guaiFENesin (MUCINEX) 600 MG 12 hr tablet, Take 2 tablets by mouth Daily., Disp: , Rfl:     ibandronate (Boniva) 150 MG tablet, Take 1 tablet by mouth Every 30 (Thirty) Days., Disp: 3 tablet, Rfl: 3    Insulin Glargine (LANTUS SOLOSTAR) 100 UNIT/ML injection pen, Inject 5 Units under the skin into the appropriate area as directed Daily., Disp: 3 mL, Rfl: 3    Insulin Pen Needle (Pen Needles) 32G X 4 MM misc, Use 1 package Daily., Disp: 30 each, Rfl: 3    metFORMIN ER (GLUCOPHAGE-XR) 500 MG 24 hr tablet, Take 2 tablets by mouth 2 (Two) Times a Day With Meals., Disp: 360 tablet, Rfl: 0    nitroglycerin (NITROSTAT) 0.4 MG SL tablet, Place 1 tablet under the tongue Every 5 (Five) Minutes As Needed for Chest Pain. Take no more than 3 doses in 15 minutes., Disp: 25 tablet, Rfl: 2    nystatin (MYCOSTATIN) 100,000 unit/mL suspension, Swish and swallow 5 mL 2 (Two) Times a Day As Needed., Disp: , Rfl:     omeprazole (priLOSEC) 40 MG capsule, Take 1 capsule by mouth Daily., Disp: 90 capsule, Rfl: 1    Semaglutide (Rybelsus) 3 MG tablet, TAKE 1 TABLET BY MOUTH IN THE MORNING BEFORE BREAKFAST, Disp: 90 tablet, Rfl: 0    traMADol (ULTRAM) 50 MG tablet, TAKE 1 TABLET BY MOUTH EVERY 8 HOURS AS NEEDED FOR MODERATE PAIN., Disp: 60 tablet, Rfl: 0    valsartan (Diovan) 40 MG tablet, Take 1 tablet by mouth Daily., Disp: 90 tablet, Rfl: 2    vitamin D (ERGOCALCIFEROL) 1.25 MG (08101 UT) capsule capsule, TAKE 1 CAPSULE BY MOUTH 1 TIME A WEEK, Disp: 12 capsule, Rfl: 0     Allergies   Allergen Reactions    Ace Inhibitors Cough    Codeine Nausea And Vomiting    Pregabalin Confusion    Aricept [Donepezil] Mental Status Change    Paxil [Paroxetine] Other (See Comments)     TREMOR WORSENING             Objective    OBJECTIVE:     VITAL SIGNS:  /74   Ht 152.4 cm (60\")   Wt 63.4 " kg (139 lb 11.2 oz)   SpO2 95%   BMI 27.28 kg/m²     PHYSICAL EXAM:  Normal appearance.   Head is normocephalic.   Nose appears normal.   No obvious deformity of the mouth and throat.  Conjunctivae normal.   Heart rate and rhythm is normal.  Pulmonary effort is normal.   Moving all 4 extremities.  Extremities warm.  No focal deficit present.   Alert and oriented to person, place, and time.     RESULTS REVIEW:  I have reviewed the patient's all relevant laboratory and imaging findings.     Assessment & Plan    ASSESSMENT & PLAN:  Darleen Stallings is a 69 y.o. female with significant medical conditions as mentioned above presented to my clinic.    Assessment & Plan  1. Recurrent right-sided pneumothorax.   The patient's CT scan results are satisfactory, with only minor spots detected in the lung, which could be indicative of minor inflammation or mucus. Given her smoking history, she is at an elevated risk for lung cancer. A follow-up CT scan will be scheduled in 6 months to monitor the lung nodules. If the lung collapse recurs, surgical intervention may be considered.    2. Chronic obstructive pulmonary disease (COPD).  She reports significant phlegm production, which is associated with her COPD and smoking habit. She has been advised to completely quit smoking to reduce mucus production and improve her overall lung health. It was explained that it may take a few months of no smoking for her airway to become less reactive and for mucus production to decrease.    I discussed the patients findings and my recommendations with the patient/family/caregiver. The patient/family/caregiver was given adequate time to ask questions and all questions were answered to patient satisfaction. Thank you for this consult and allowing us to participate in the care of your patient.      Douglas Mcginnis MD  Thoracic Surgeon  [facility]        Dictated utilizing Dragon dictation    I spent 30 minutes caring for  Darleen on this date of service. This time includes time spent by me in the following activities:preparing for the visit, reviewing tests, obtaining and/or reviewing a separately obtained history, performing a medically appropriate examination and/or evaluation, counseling and educating the patient/family/caregiver, ordering medications, tests, or procedures, referring and communicating with other health care professionals , documenting information in the medical record, independently interpreting results and communicating that information with the patient/family/caregiver, and care coordination and more than half the time was spent in direct face to face evaluation and decision making.     Patient or patient representative verbalized consent for the use of Ambient Listening during the visit with  Douglas Mcginnis MD for chart documentation. 7/28/2025  22:05 EDT

## 2025-07-31 ENCOUNTER — PATIENT MESSAGE (OUTPATIENT)
Dept: FAMILY MEDICINE CLINIC | Facility: CLINIC | Age: 70
End: 2025-07-31
Payer: MEDICARE

## 2025-07-31 DIAGNOSIS — F41.1 GENERALIZED ANXIETY DISORDER: ICD-10-CM

## 2025-07-31 RX ORDER — ALPRAZOLAM 0.25 MG
TABLET ORAL
Qty: 25 TABLET | Refills: 0 | Status: SHIPPED | OUTPATIENT
Start: 2025-07-31

## 2025-07-31 NOTE — TELEPHONE ENCOUNTER
[facility]    Rx Refill Note  Requested Prescriptions     Pending Prescriptions Disp Refills    ALPRAZolam (XANAX) 0.25 MG tablet [Pharmacy Med Name: ALPRAZolam Oral Tablet 0.25 MG] 25 tablet 0     Sig: TAKE 1 TABLET BY MOUTH IN THE EVENING AS NEEDED FOR SLEEP/ANXIETY      Last office visit with prescribing clinician: 7/17/2025   Last telemedicine visit with prescribing clinician: Visit date not found   Next office visit with prescribing clinician: Visit date not found                         Would you like a call back once the refill request has been completed: [] Yes [] No    If the office needs to give you a call back, can they leave a voicemail: [] Yes [] No    Radha Henry, RT  07/31/25, 15:36 EDT

## 2025-08-04 ENCOUNTER — TELEPHONE (OUTPATIENT)
Dept: PAIN MEDICINE | Facility: CLINIC | Age: 70
End: 2025-08-04
Payer: MEDICARE

## 2025-08-09 DIAGNOSIS — M54.50 ACUTE BILATERAL LOW BACK PAIN WITHOUT SCIATICA: ICD-10-CM

## 2025-08-11 DIAGNOSIS — M54.50 ACUTE BILATERAL LOW BACK PAIN WITHOUT SCIATICA: ICD-10-CM

## 2025-08-11 RX ORDER — TRAMADOL HYDROCHLORIDE 50 MG/1
50 TABLET ORAL EVERY 8 HOURS PRN
Qty: 60 TABLET | Refills: 0 | Status: SHIPPED | OUTPATIENT
Start: 2025-08-11 | End: 2025-08-14 | Stop reason: SDUPTHER

## 2025-08-12 ENCOUNTER — OFFICE VISIT (OUTPATIENT)
Dept: FAMILY MEDICINE CLINIC | Facility: CLINIC | Age: 70
End: 2025-08-12
Payer: MEDICARE

## 2025-08-12 VITALS
BODY MASS INDEX: 27.48 KG/M2 | TEMPERATURE: 98.6 F | HEART RATE: 102 BPM | WEIGHT: 140 LBS | HEIGHT: 60 IN | OXYGEN SATURATION: 95 % | SYSTOLIC BLOOD PRESSURE: 121 MMHG | RESPIRATION RATE: 16 BRPM | DIASTOLIC BLOOD PRESSURE: 62 MMHG

## 2025-08-12 DIAGNOSIS — R06.09 EXERTIONAL DYSPNEA: ICD-10-CM

## 2025-08-12 DIAGNOSIS — I25.10 CORONARY ARTERY DISEASE INVOLVING NATIVE CORONARY ARTERY OF NATIVE HEART WITHOUT ANGINA PECTORIS: ICD-10-CM

## 2025-08-12 DIAGNOSIS — E11.65 UNCONTROLLED TYPE 2 DIABETES MELLITUS WITH HYPERGLYCEMIA: ICD-10-CM

## 2025-08-12 DIAGNOSIS — J44.1 COPD WITH EXACERBATION: Primary | ICD-10-CM

## 2025-08-12 PROCEDURE — 99213 OFFICE O/P EST LOW 20 MIN: CPT | Performed by: FAMILY MEDICINE

## 2025-08-12 PROCEDURE — 3052F HG A1C>EQUAL 8.0%<EQUAL 9.0%: CPT | Performed by: FAMILY MEDICINE

## 2025-08-12 PROCEDURE — 3078F DIAST BP <80 MM HG: CPT | Performed by: FAMILY MEDICINE

## 2025-08-12 PROCEDURE — 1125F AMNT PAIN NOTED PAIN PRSNT: CPT | Performed by: FAMILY MEDICINE

## 2025-08-12 PROCEDURE — 3074F SYST BP LT 130 MM HG: CPT | Performed by: FAMILY MEDICINE

## 2025-08-12 PROCEDURE — 1159F MED LIST DOCD IN RCRD: CPT | Performed by: FAMILY MEDICINE

## 2025-08-12 PROCEDURE — 1160F RVW MEDS BY RX/DR IN RCRD: CPT | Performed by: FAMILY MEDICINE

## 2025-08-12 RX ORDER — TOLTERODINE 4 MG/1
4 CAPSULE, EXTENDED RELEASE ORAL DAILY
Qty: 30 CAPSULE | Refills: 0 | Status: SHIPPED | OUTPATIENT
Start: 2025-08-12 | End: 2025-08-18

## 2025-08-12 RX ORDER — ORAL SEMAGLUTIDE 3 MG/1
2 TABLET ORAL
Status: SHIPPED
Start: 2025-08-12 | End: 2025-08-12

## 2025-08-12 RX ORDER — IBANDRONATE SODIUM 150 MG/1
150 TABLET, FILM COATED ORAL
Qty: 3 TABLET | Refills: 3 | Status: SHIPPED | OUTPATIENT
Start: 2025-08-12

## 2025-08-12 RX ORDER — TRAMADOL HYDROCHLORIDE 50 MG/1
50 TABLET ORAL EVERY 8 HOURS PRN
Qty: 60 TABLET | Refills: 0 | OUTPATIENT
Start: 2025-08-12

## 2025-08-12 RX ORDER — PREDNISONE 20 MG/1
TABLET ORAL
Qty: 1 TABLET | Refills: 0 | Status: SHIPPED | OUTPATIENT
Start: 2025-08-12

## 2025-08-12 RX ORDER — ORAL SEMAGLUTIDE 3 MG/1
1 TABLET ORAL
Status: SHIPPED
Start: 2025-08-12

## 2025-08-12 RX ORDER — GABAPENTIN 300 MG/1
300 CAPSULE ORAL 3 TIMES DAILY
Qty: 90 CAPSULE | Refills: 0 | Status: SHIPPED | OUTPATIENT
Start: 2025-08-12 | End: 2025-08-14 | Stop reason: SDUPTHER

## 2025-08-14 DIAGNOSIS — F41.1 GENERALIZED ANXIETY DISORDER: ICD-10-CM

## 2025-08-14 DIAGNOSIS — M54.50 ACUTE BILATERAL LOW BACK PAIN WITHOUT SCIATICA: ICD-10-CM

## 2025-08-14 RX ORDER — ALPRAZOLAM 0.25 MG
0.25 TABLET ORAL NIGHTLY PRN
Qty: 25 TABLET | Refills: 0 | Status: SHIPPED | OUTPATIENT
Start: 2025-08-14

## 2025-08-14 RX ORDER — GABAPENTIN 300 MG/1
300 CAPSULE ORAL 3 TIMES DAILY
Qty: 90 CAPSULE | Refills: 0 | Status: SHIPPED | OUTPATIENT
Start: 2025-08-14

## 2025-08-14 RX ORDER — TRAMADOL HYDROCHLORIDE 50 MG/1
50 TABLET ORAL EVERY 8 HOURS PRN
Qty: 60 TABLET | Refills: 0 | Status: SHIPPED | OUTPATIENT
Start: 2025-08-14

## 2025-08-15 ENCOUNTER — PRIOR AUTHORIZATION (OUTPATIENT)
Dept: FAMILY MEDICINE CLINIC | Facility: CLINIC | Age: 70
End: 2025-08-15
Payer: MEDICARE

## 2025-08-18 RX ORDER — SUCRALFATE 1 G/1
1 TABLET ORAL
Qty: 90 TABLET | Refills: 0 | Status: SHIPPED | OUTPATIENT
Start: 2025-08-18

## 2025-08-18 RX ORDER — TOLTERODINE 2 MG/1
2 CAPSULE, EXTENDED RELEASE ORAL DAILY
Qty: 30 CAPSULE | Refills: 0 | Status: SHIPPED | OUTPATIENT
Start: 2025-08-18

## 2025-08-25 ENCOUNTER — HOSPITAL ENCOUNTER (OUTPATIENT)
Dept: PAIN MEDICINE | Facility: HOSPITAL | Age: 70
Discharge: HOME | End: 2025-08-25
Payer: MEDICARE

## 2025-08-25 ENCOUNTER — TELEPHONE (OUTPATIENT)
Dept: FAMILY MEDICINE CLINIC | Facility: CLINIC | Age: 70
End: 2025-08-25
Payer: MEDICARE

## 2025-08-25 ENCOUNTER — OFFICE (AMBULATORY)
Dept: URBAN - METROPOLITAN AREA CLINIC 64 | Facility: CLINIC | Age: 70
End: 2025-08-25
Payer: MEDICARE

## 2025-08-25 VITALS
SYSTOLIC BLOOD PRESSURE: 149 MMHG | HEART RATE: 86 BPM | HEIGHT: 61 IN | WEIGHT: 141 LBS | SYSTOLIC BLOOD PRESSURE: 157 MMHG | DIASTOLIC BLOOD PRESSURE: 86 MMHG

## 2025-08-25 VITALS
TEMPERATURE: 97.1 F | RESPIRATION RATE: 16 BRPM | HEART RATE: 87 BPM | SYSTOLIC BLOOD PRESSURE: 153 MMHG | DIASTOLIC BLOOD PRESSURE: 100 MMHG | OXYGEN SATURATION: 91 %

## 2025-08-25 DIAGNOSIS — K21.9 GASTRO-ESOPHAGEAL REFLUX DISEASE WITHOUT ESOPHAGITIS: ICD-10-CM

## 2025-08-25 DIAGNOSIS — R52 PAIN: ICD-10-CM

## 2025-08-25 DIAGNOSIS — M47.817 LUMBOSACRAL SPONDYLOSIS WITHOUT MYELOPATHY: Primary | ICD-10-CM

## 2025-08-25 DIAGNOSIS — R13.10 DYSPHAGIA, UNSPECIFIED: ICD-10-CM

## 2025-08-25 PROCEDURE — 77003 FLUOROGUIDE FOR SPINE INJECT: CPT

## 2025-08-25 PROCEDURE — 25510000001 IOPAMIDOL 41 % SOLUTION: Performed by: ANESTHESIOLOGY

## 2025-08-25 PROCEDURE — 25010000002 LIDOCAINE PF 1% 1 % SOLUTION: Performed by: ANESTHESIOLOGY

## 2025-08-25 PROCEDURE — 64494 INJ PARAVERT F JNT L/S 2 LEV: CPT | Performed by: ANESTHESIOLOGY

## 2025-08-25 PROCEDURE — 64493 INJ PARAVERT F JNT L/S 1 LEV: CPT | Performed by: ANESTHESIOLOGY

## 2025-08-25 PROCEDURE — 25010000002 BUPIVACAINE (PF) 0.25 % SOLUTION: Performed by: ANESTHESIOLOGY

## 2025-08-25 PROCEDURE — 99214 OFFICE O/P EST MOD 30 MIN: CPT | Performed by: INTERNAL MEDICINE

## 2025-08-25 RX ORDER — BUPIVACAINE HYDROCHLORIDE 2.5 MG/ML
10 INJECTION, SOLUTION EPIDURAL; INFILTRATION; INTRACAUDAL; PERINEURAL ONCE
Status: COMPLETED | OUTPATIENT
Start: 2025-08-25 | End: 2025-08-25

## 2025-08-25 RX ORDER — IOPAMIDOL 408 MG/ML
3 INJECTION, SOLUTION INTRATHECAL
Status: COMPLETED | OUTPATIENT
Start: 2025-08-25 | End: 2025-08-25

## 2025-08-25 RX ORDER — LIDOCAINE HYDROCHLORIDE 10 MG/ML
5 INJECTION, SOLUTION EPIDURAL; INFILTRATION; INTRACAUDAL; PERINEURAL ONCE
Status: COMPLETED | OUTPATIENT
Start: 2025-08-25 | End: 2025-08-25

## 2025-08-25 RX ADMIN — BUPIVACAINE HYDROCHLORIDE 10 ML: 2.5 INJECTION, SOLUTION EPIDURAL; INFILTRATION; INTRACAUDAL; PERINEURAL at 10:32

## 2025-08-25 RX ADMIN — LIDOCAINE HYDROCHLORIDE 5 ML: 10 INJECTION, SOLUTION EPIDURAL; INFILTRATION; INTRACAUDAL; PERINEURAL at 10:29

## 2025-08-25 RX ADMIN — IOPAMIDOL 3 ML: 408 INJECTION, SOLUTION INTRATHECAL at 10:31

## 2025-08-26 ENCOUNTER — TELEPHONE (OUTPATIENT)
Dept: PAIN MEDICINE | Facility: HOSPITAL | Age: 70
End: 2025-08-26
Payer: MEDICARE

## 2025-08-27 ENCOUNTER — PATIENT OUTREACH (OUTPATIENT)
Dept: FAMILY MEDICINE CLINIC | Facility: CLINIC | Age: 70
End: 2025-08-27
Payer: MEDICARE

## (undated) DEVICE — GLV SURG SENSICARE PI ORTHO SZ8 LF STRL

## (undated) DEVICE — CORTEX SCREW S.T.
Type: IMPLANTABLE DEVICE | Site: HIP | Status: NON-FUNCTIONAL
Removed: 2022-03-25

## (undated) DEVICE — SUT VIC 2/0 CT1 36IN

## (undated) DEVICE — BAPTIST FLOYD BRONCHOSCOPY: Brand: MEDLINE INDUSTRIES, INC.

## (undated) DEVICE — UNDRGLV SURG BIOGEL PIMICROINDICATOR SYNTH SZ8 LF STRL

## (undated) DEVICE — PATIENT RETURN ELECTRODE, SINGLE-USE, CONTACT QUALITY MONITORING, ADULT, WITH 9FT CORD, FOR PATIENTS WEIGING OVER 33LBS. (15KG): Brand: MEGADYNE

## (undated) DEVICE — NDL HYPO PRECISIONGLIDE REG 25G 1 1/2

## (undated) DEVICE — PK GAM NAIL 50

## (undated) DEVICE — PENCL HND ROCKRSWTCH HOLSTR EZ CLEAN TP CRD 10FT

## (undated) DEVICE — TOTAL TRAY, DB, 100% SILI FOLEY, 16FR 10: Brand: MEDLINE

## (undated) DEVICE — SPNG GZ AVANT 6PLY 4X4IN STRL PK/2

## (undated) DEVICE — GUIDE PIN, THREADED TIP: Brand: OMEGA

## (undated) DEVICE — UNDERGLV SURG BIOGEL INDICAT PI SZ8 BLU

## (undated) DEVICE — PROXIMATE PLUS MD MULTI-DIRECTIONAL RELEASE SKIN STAPLERS CONTAINS 35 STAINLESS STEEL STAPLES APPROXIMATE CLOSED DIMENSIONS: 6.9MM X 3.9MM WIDE: Brand: PROXIMATE

## (undated) DEVICE — GOWN,REINFORCE,POLY,SIRUS,BREATH SLV,XLG: Brand: MEDLINE

## (undated) DEVICE — HIGH PERFORMANCE BONE DRILL

## (undated) DEVICE — 9165 UNIVERSAL PATIENT PLATE: Brand: 3M™

## (undated) DEVICE — SUT VIC COAT 1 CP-1 27IN

## (undated) DEVICE — OCCLUSIVE GAUZE STRIP OVERWRAP,3% BISMUTH TRIBROMOPHENATE IN PETROLATUM BLEND: Brand: XEROFORM

## (undated) DEVICE — PAD PERI POST

## (undated) DEVICE — SYR BLUNT/NDL 10ML 18G 1 1/2IN

## (undated) DEVICE — SOL IRRIG NACL 1000ML

## (undated) DEVICE — KT SURG TURNOVER 050